# Patient Record
Sex: FEMALE | Race: BLACK OR AFRICAN AMERICAN | NOT HISPANIC OR LATINO | Employment: FULL TIME | URBAN - METROPOLITAN AREA
[De-identification: names, ages, dates, MRNs, and addresses within clinical notes are randomized per-mention and may not be internally consistent; named-entity substitution may affect disease eponyms.]

---

## 2017-08-05 ENCOUNTER — APPOINTMENT (OUTPATIENT)
Dept: RADIOLOGY | Facility: MEDICAL CENTER | Age: 52
End: 2017-08-05
Attending: EMERGENCY MEDICINE
Payer: COMMERCIAL

## 2017-08-05 ENCOUNTER — HOSPITAL ENCOUNTER (EMERGENCY)
Facility: MEDICAL CENTER | Age: 52
End: 2017-08-05
Attending: EMERGENCY MEDICINE
Payer: COMMERCIAL

## 2017-08-05 VITALS
DIASTOLIC BLOOD PRESSURE: 86 MMHG | WEIGHT: 293 LBS | HEIGHT: 67 IN | RESPIRATION RATE: 18 BRPM | SYSTOLIC BLOOD PRESSURE: 165 MMHG | TEMPERATURE: 98.2 F | BODY MASS INDEX: 45.99 KG/M2 | HEART RATE: 88 BPM

## 2017-08-05 DIAGNOSIS — S40.011A CONTUSION OF RIGHT SHOULDER, INITIAL ENCOUNTER: ICD-10-CM
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
## 2017-08-05 DIAGNOSIS — S16.1XXA CERVICAL STRAIN, INITIAL ENCOUNTER: ICD-10-CM

## 2017-08-05 PROCEDURE — 73030 X-RAY EXAM OF SHOULDER: CPT | Mod: RT

## 2017-08-05 PROCEDURE — 99284 EMERGENCY DEPT VISIT MOD MDM: CPT

## 2017-08-05 PROCEDURE — 72125 CT NECK SPINE W/O DYE: CPT

## 2017-08-05 PROCEDURE — 700102 HCHG RX REV CODE 250 W/ 637 OVERRIDE(OP): Performed by: EMERGENCY MEDICINE

## 2017-08-05 PROCEDURE — A9270 NON-COVERED ITEM OR SERVICE: HCPCS | Performed by: EMERGENCY MEDICINE

## 2017-08-05 RX ORDER — HYDROCODONE BITARTRATE AND ACETAMINOPHEN 5; 325 MG/1; MG/1
1-2 TABLET ORAL EVERY 4 HOURS PRN
Qty: 12 TAB | Refills: 0 | Status: SHIPPED | OUTPATIENT
Start: 2017-08-05 | End: 2018-04-18

## 2017-08-05 RX ORDER — HYDROCODONE BITARTRATE AND ACETAMINOPHEN 10; 325 MG/1; MG/1
1 TABLET ORAL ONCE
Status: COMPLETED | OUTPATIENT
Start: 2017-08-05 | End: 2017-08-05

## 2017-08-05 RX ADMIN — HYDROCODONE BITARTRATE AND ACETAMINOPHEN 1 TABLET: 10; 325 TABLET ORAL at 14:46

## 2017-08-05 ASSESSMENT — PAIN SCALES - GENERAL
PAINLEVEL_OUTOF10: 4
PAINLEVEL_OUTOF10: 4

## 2017-08-05 ASSESSMENT — ENCOUNTER SYMPTOMS
NECK PAIN: 1
LOSS OF CONSCIOUSNESS: 0
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
## 2017-08-05 NOTE — DISCHARGE INSTRUCTIONS
Contusion  A contusion is a deep bruise. Contusions are the result of an injury that caused bleeding under the skin. The contusion may turn blue, purple, or yellow. Minor injuries will give you a painless contusion, but more severe contusions may stay painful and swollen for a few weeks.   CAUSES   A contusion is usually caused by a blow, trauma, or direct force to an area of the body.  SYMPTOMS   · Swelling and redness of the injured area.  · Bruising of the injured area.  · Tenderness and soreness of the injured area.  · Pain.  DIAGNOSIS   The diagnosis can be made by taking a history and physical exam. An X-ray, CT scan, or MRI may be needed to determine if there were any associated injuries, such as fractures.  TREATMENT   Specific treatment will depend on what area of the body was injured. In general, the best treatment for a contusion is resting, icing, elevating, and applying cold compresses to the injured area. Over-the-counter medicines may also be recommended for pain control. Ask your caregiver what the best treatment is for your contusion.  HOME CARE INSTRUCTIONS   · Put ice on the injured area.  · Put ice in a plastic bag.  · Place a towel between your skin and the bag.  · Leave the ice on for 15-20 minutes, 3-4 times a day, or as directed by your health care provider.  · Only take over-the-counter or prescription medicines for pain, discomfort, or fever as directed by your caregiver. Your caregiver may recommend avoiding anti-inflammatory medicines (aspirin, ibuprofen, and naproxen) for 48 hours because these medicines may increase bruising.  · Rest the injured area.  · If possible, elevate the injured area to reduce swelling.  SEEK IMMEDIATE MEDICAL CARE IF:   · You have increased bruising or swelling.  · You have pain that is getting worse.  · Your swelling or pain is not relieved with medicines.  MAKE SURE YOU:   · Understand these instructions.  · Will watch your condition.  · Will get help right  away if you are not doing well or get worse.     This information is not intended to replace advice given to you by your health care provider. Make sure you discuss any questions you have with your health care provider.     Document Released: 09/27/2006 Document Revised: 12/23/2014 Document Reviewed: 10/22/2012  [employer] Interactive Patient Education ©2016 [employer]    Cervical Sprain  A cervical sprain is when the tissues (ligaments) that hold the neck bones in place stretch or tear.  HOME CARE   · Put ice on the injured area.  ¨ Put ice in a plastic bag.  ¨ Place a towel between your skin and the bag.  ¨ Leave the ice on for 15-20 minutes, 3-4 times a day.  · You may have been given a collar to wear. This collar keeps your neck from moving while you heal.  ¨ Do not take the collar off unless told by your doctor.  ¨ If you have long hair, keep it outside of the collar.  ¨ Ask your doctor before changing the position of your collar. You may need to change its position over time to make it more comfortable.  ¨ If you are allowed to take off the collar for cleaning or bathing, follow your doctor's instructions on how to do it safely.  ¨ Keep your collar clean by wiping it with mild soap and water. Dry it completely. If the collar has removable pads, remove them every 1-2 days to hand wash them with soap and water. Allow them to air dry. They should be dry before you wear them in the collar.  ¨ Do not drive while wearing the collar.  · Only take medicine as told by your doctor.  · Keep all doctor visits as told.  · Keep all physical therapy visits as told.  · Adjust your work station so that you have good posture while you work.  · Avoid positions and activities that make your problems worse.  · Warm up and stretch before being active.  GET HELP IF:  · Your pain is not controlled with medicine.  · You cannot take less pain medicine over time as planned.  · Your activity level does not improve as expected.  GET HELP  RIGHT AWAY IF:   · You are bleeding.  · Your stomach is upset.  · You have an allergic reaction to your medicine.  · You develop new problems that you cannot explain.  · You lose feeling (become numb) or you cannot move any part of your body (paralysis).  · You have tingling or weakness in any part of your body.  · Your symptoms get worse. Symptoms include:  · Pain, soreness, stiffness, puffiness (swelling), or a burning feeling in your neck.  · Pain when your neck is touched.  · Shoulder or upper back pain.  · Limited ability to move your neck.  · Headache.  · Dizziness.  · Your hands or arms feel week, lose feeling, or tingle.  · Muscle spasms.  · Difficulty swallowing or chewing.  MAKE SURE YOU:   · Understand these instructions.  · Will watch your condition.  · Will get help right away if you are not doing well or get worse.     This information is not intended to replace advice given to you by your health care provider. Make sure you discuss any questions you have with your health care provider.     Document Released: 06/05/2009 Document Revised: 08/20/2014 Document Reviewed: 06/25/2014  [employer] Interactive Patient Education ©2016 [employer]    Motor Vehicle Collision  It is common to have multiple bruises and sore muscles after a motor vehicle collision (MVC). These tend to feel worse for the first 24 hours. You may have the most stiffness and soreness over the first several hours. You may also feel worse when you wake up the first morning after your collision. After this point, you will usually begin to improve with each day. The speed of improvement often depends on the severity of the collision, the number of injuries, and the location and nature of these injuries.  HOME CARE INSTRUCTIONS  · Put ice on the injured area.  ¨ Put ice in a plastic bag.  ¨ Place a towel between your skin and the bag.  ¨ Leave the ice on for 15-20 minutes, 3-4 times a day, or as directed by your health care provider.  · Drink  enough fluids to keep your urine clear or pale yellow. Do not drink alcohol.  · Take a warm shower or bath once or twice a day. This will increase blood flow to sore muscles.  · You may return to activities as directed by your caregiver. Be careful when lifting, as this may aggravate neck or back pain.  · Only take over-the-counter or prescription medicines for pain, discomfort, or fever as directed by your caregiver. Do not use aspirin. This may increase bruising and bleeding.  SEEK IMMEDIATE MEDICAL CARE IF:  · You have numbness, tingling, or weakness in the arms or legs.  · You develop severe headaches not relieved with medicine.  · You have severe neck pain, especially tenderness in the middle of the back of your neck.  · You have changes in bowel or bladder control.  · There is increasing pain in any area of the body.  · You have shortness of breath, light-headedness, dizziness, or fainting.  · You have chest pain.  · You feel sick to your stomach (nauseous), throw up (vomit), or sweat.  · You have increasing abdominal discomfort.  · There is blood in your urine, stool, or vomit.  · You have pain in your shoulder (shoulder strap areas).  · You feel your symptoms are getting worse.  MAKE SURE YOU:  · Understand these instructions.  · Will watch your condition.  · Will get help right away if you are not doing well or get worse.    Return if any shortness of breath or abdominal pain.     This information is not intended to replace advice given to you by your health care provider. Make sure you discuss any questions you have with your health care provider.     Document Released: 12/18/2006 Document Revised: 01/08/2016 Document Reviewed: 05/16/2012  [employer] Interactive Patient Education ©2016 [employer]

## 2017-08-05 NOTE — ED AVS SNAPSHOT
[employer] Access Code: [email or web address]  Expires: 9/4/2017  4:03 PM    Your email address is not on file at [facility].  Email Addresses are required for you to sign up for [employer], please contact [phone] to verify your personal information and to provide your email address prior to attempting to register for [employer].    Cyndee Calvert  [address] [place] [address] [place] [address]    [employer]  A secure, online tool to manage your health information     [facility]’s [employer]® is a secure, online tool that connects you to your personalized health information from the privacy of your home -- day or night - making it very easy for you to manage your healthcare. Once the activation process is completed, you can even access your medical information using the [employer] jose luis, which is available for free in the Apple Jose Luis store or Google Play store.     To learn more about [employer], visit [email or web address]    There are two levels of access available (as shown below):   My Chart Features  [facility] Primary Care Doctor [facility]  Specialists [facility]  Urgent  Care Non-[facility] Primary Care Doctor   Email your healthcare team securely and privately 24/7 X X X    Manage appointments: schedule your next appointment; view details of past/upcoming appointments X      Request prescription refills. X      View recent personal medical records, including lab and immunizations X X X X   View health record, including health history, allergies, medications X X X X   Read reports about your outpatient visits, procedures, consult and ER notes X X X X   See your discharge summary, which is a recap of your hospital and/or ER visit that includes your diagnosis, lab results, and care plan X X  X     How to register for [employer]:  Once your e-mail address has been verified, follow the following steps to sign up for [employer].     1. Go to  [email or web address]  2. Click on the Sign Up Now box, which takes you to the New Member Sign Up page. You will  need to provide the following information:  a. Enter your [employer] Access Code exactly as it appears at the top of this page. (You will not need to use this code after you’ve completed the sign-up process. If you do not sign up before the expiration date, you must request a new code.)   b. Enter your date of birth.   c. Enter your home email address.   d. Click Submit, and follow the next screen’s instructions.  3. Create a [employer] ID. This will be your [employer] login ID and cannot be changed, so think of one that is secure and easy to remember.  4. Create a [employer] password. You can change your password at any time.  5. Enter your Password Reset Question and Answer. This can be used at a later time if you forget your password.   6. Enter your e-mail address. This allows you to receive e-mail notifications when new information is available in [employer].  7. Click Sign Up. You can now view your health information.    For assistance activating your [employer] account, call [phone]
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
## 2017-08-05 NOTE — ED AVS SNAPSHOT
8/5/2017    [address] [place] [address]    Dear [facility]:    [facility] wants to ensure your discharge home is safe and you or your loved ones have had all of your questions answered regarding your care after you leave the hospital.    Below is a list of resources and contact information should you have any questions regarding your hospital stay, follow-up instructions, or active medical symptoms.    Questions or Concerns Regarding… Contact   Medical Questions Related to Your Discharge  (7 days a week, 8am-5pm) Contact a Nurse Care Coordinator   [phone]   Medical Questions Not Related to Your Discharge  (24 hours a day / 7 days a week)  Contact the Nurse Health Line   [phone]    Medications or Discharge Instructions Refer to your discharge packet   or contact your [facility] Primary Care Provider   [phone]   Follow-up Appointment(s) Schedule your appointment via [employer]   or contact Scheduling [phone]   Billing Review your statement via [employer]  or contact Billing [phone]   Medical Records Review your records via [employer]   or contact Medical Records [phone]     You may receive a telephone call within two days of discharge. This call is to make certain you understand your discharge instructions and have the opportunity to have any questions answered. You can also easily access your medical information, test results and upcoming appointments via the [employer] free online health management tool. You can learn more and sign up at [email or web address]. For assistance setting up your [employer] account, please call [phone].    Once again, we want to ensure your discharge home is safe and that you have a clear understanding of any next steps in your care. If you have any questions or concerns, please do not hesitate to contact us, we are here for you. Thank you for choosing [facility] for your healthcare needs.    Sincerely,    Your [facility] Healthcare Team

## 2017-08-08 ENCOUNTER — HOSPITAL ENCOUNTER (EMERGENCY)
Facility: MEDICAL CENTER | Age: 52
End: 2017-08-08
Attending: EMERGENCY MEDICINE
Payer: COMMERCIAL

## 2017-08-08 VITALS
BODY MASS INDEX: 45.99 KG/M2 | WEIGHT: 293 LBS | HEART RATE: 85 BPM | DIASTOLIC BLOOD PRESSURE: 101 MMHG | OXYGEN SATURATION: 98 % | SYSTOLIC BLOOD PRESSURE: 190 MMHG | HEIGHT: 67 IN | TEMPERATURE: 97.6 F | RESPIRATION RATE: 16 BRPM
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
## 2017-08-08 DIAGNOSIS — M54.50 ACUTE BILATERAL LOW BACK PAIN WITHOUT SCIATICA: ICD-10-CM

## 2017-08-08 PROCEDURE — 99284 EMERGENCY DEPT VISIT MOD MDM: CPT

## 2017-08-08 RX ORDER — HYDROCODONE BITARTRATE AND ACETAMINOPHEN 5; 325 MG/1; MG/1
1-2 TABLET ORAL EVERY 6 HOURS PRN
Qty: 20 TAB | Refills: 0 | Status: SHIPPED | OUTPATIENT
Start: 2017-08-08 | End: 2018-04-18

## 2017-08-08 RX ORDER — CYCLOBENZAPRINE HCL 5 MG
5-10 TABLET ORAL 3 TIMES DAILY PRN
Qty: 20 TAB | Refills: 0 | Status: SHIPPED | OUTPATIENT
Start: 2017-08-08 | End: 2018-04-18

## 2017-08-08 NOTE — ED AVS SNAPSHOT
8/8/2017    [address] [place] [address]    Dear [facility]:    [facility] wants to ensure your discharge home is safe and you or your loved ones have had all of your questions answered regarding your care after you leave the hospital.    Below is a list of resources and contact information should you have any questions regarding your hospital stay, follow-up instructions, or active medical symptoms.    Questions or Concerns Regarding… Contact   Medical Questions Related to Your Discharge  (7 days a week, 8am-5pm) Contact a Nurse Care Coordinator   [phone]   Medical Questions Not Related to Your Discharge  (24 hours a day / 7 days a week)  Contact the Nurse Health Line   [phone]    Medications or Discharge Instructions Refer to your discharge packet   or contact your [facility] Primary Care Provider   [phone]   Follow-up Appointment(s) Schedule your appointment via [employer]   or contact Scheduling [phone]   Billing Review your statement via [employer]  or contact Billing [phone]   Medical Records Review your records via [employer]   or contact Medical Records [phone]     You may receive a telephone call within two days of discharge. This call is to make certain you understand your discharge instructions and have the opportunity to have any questions answered. You can also easily access your medical information, test results and upcoming appointments via the [employer] free online health management tool. You can learn more and sign up at [employer]. For assistance setting up your [employer] account, please call [phone].    Once again, we want to ensure your discharge home is safe and that you have a clear understanding of any next steps in your care. If you have any questions or concerns, please do not hesitate to contact us, we are here for you. Thank you for choosing [facility] for your healthcare needs.    Sincerely,    Your [facility] Healthcare Team

## 2017-08-08 NOTE — ED AVS SNAPSHOT
Hyperpublic Access Code: [email or web address]  Expires: 9/4/2017  4:03 PM    Your email address is not on file at "[employer].  Email Addresses are required for you to sign up for Hyperpublic, please contact [phone] to verify your personal information and to provide your email address prior to attempting to register for Hyperpublic.    Cyndee Calvert  [address] [place] [address] [place] [address]    [employer]  A secure, online tool to manage your health information     "[employer]’s Hyperpublic® is a secure, online tool that connects you to your personalized health information from the privacy of your home -- day or night - making it very easy for you to manage your healthcare. Once the activation process is completed, you can even access your medical information using the Hyperpublic jose luis, which is available for free in the Apple [employer] store or Google Play store.     To learn more about Hyperpublic, visit [email or web address]    There are two levels of access available (as shown below):   My Chart Features  [facility] Primary Care Doctor [facility]  Specialists [facility]  Urgent  Care Non-[facility] Primary Care Doctor   Email your healthcare team securely and privately 24/7 X X X    Manage appointments: schedule your next appointment; view details of past/upcoming appointments X      Request prescription refills. X      View recent personal medical records, including lab and immunizations X X X X   View health record, including health history, allergies, medications X X X X   Read reports about your outpatient visits, procedures, consult and ER notes X X X X   See your discharge summary, which is a recap of your hospital and/or ER visit that includes your diagnosis, lab results, and care plan X X  X     How to register for Hyperpublic:  Once your e-mail address has been verified, follow the following steps to sign up for Hyperpublic.     1. Go to  [email or web address]  2. Click on the Sign Up Now box, which takes you to the New Member Sign Up page. You will  need to provide the following information:  a. Enter your [employer] Access Code exactly as it appears at the top of this page. (You will not need to use this code after you’ve completed the sign-up process. If you do not sign up before the expiration date, you must request a new code.)   b. Enter your date of birth.   c. Enter your home email address.   d. Click Submit, and follow the next screen’s instructions.  3. Create a Etherstackt ID. This will be your [employer] login ID and cannot be changed, so think of one that is secure and easy to remember.  4. Create a [employer] password. You can change your password at any time.  5. Enter your Password Reset Question and Answer. This can be used at a later time if you forget your password.   6. Enter your e-mail address. This allows you to receive e-mail notifications when new information is available in [employer].  7. Click Sign Up. You can now view your health information.    For assistance activating your [employer] account, call [phone]
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
## 2017-08-08 NOTE — ED NOTES
Discharge instructions given to pt including follow up w/pcp or returning for any worsening symptoms.  Prescriptions x 2 given to pt. Instructed no drinking no driving while on prescribed pain medication. erp aware of pt's high BP. Pt asymptomatic and hasn't taken her meds for BP yet. Instructed to take it when she gets home. Questions answered by RN.

## 2018-03-19 ENCOUNTER — APPOINTMENT (OUTPATIENT)
Dept: RADIOLOGY | Facility: MEDICAL CENTER | Age: 53
End: 2018-03-19
Attending: EMERGENCY MEDICINE
Payer: COMMERCIAL

## 2018-03-19 ENCOUNTER — HOSPITAL ENCOUNTER (EMERGENCY)
Facility: MEDICAL CENTER | Age: 53
End: 2018-03-19
Attending: EMERGENCY MEDICINE
Payer: COMMERCIAL

## 2018-03-19 VITALS
TEMPERATURE: 97.1 F | OXYGEN SATURATION: 97 % | HEART RATE: 90 BPM | SYSTOLIC BLOOD PRESSURE: 174 MMHG | BODY MASS INDEX: 45.99 KG/M2 | DIASTOLIC BLOOD PRESSURE: 81 MMHG | HEIGHT: 67 IN | WEIGHT: 293 LBS | RESPIRATION RATE: 17 BRPM

## 2018-03-19 DIAGNOSIS — S83.92XA SPRAIN OF LEFT KNEE, UNSPECIFIED LIGAMENT, INITIAL ENCOUNTER: ICD-10-CM
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
## 2018-03-19 PROCEDURE — 73610 X-RAY EXAM OF ANKLE: CPT | Mod: LT

## 2018-03-19 PROCEDURE — 99284 EMERGENCY DEPT VISIT MOD MDM: CPT

## 2018-03-19 PROCEDURE — 73562 X-RAY EXAM OF KNEE 3: CPT | Mod: LT

## 2018-03-19 RX ORDER — MELOXICAM 7.5 MG/1
7.5 TABLET ORAL DAILY
Qty: 30 TAB | Refills: 0 | Status: SHIPPED | OUTPATIENT
Start: 2018-03-19 | End: 2018-04-18

## 2018-03-19 ASSESSMENT — PAIN SCALES - GENERAL: PAINLEVEL_OUTOF10: 8

## 2018-03-19 ASSESSMENT — LIFESTYLE VARIABLES: DO YOU DRINK ALCOHOL: NO

## 2018-03-19 NOTE — ED TRIAGE NOTES
Pt ambulates to triage  Chief Complaint   Patient presents with   • Fall     fell on ice pushing cart at [facility]   • Knee Pain     left knee   • Wrist Injury     right    • Ankle Pain     left    Pt asked to wait in lobby, pt updated on triage process and pt asked to inform RN of any changes.

## 2018-03-19 NOTE — DISCHARGE INSTRUCTIONS
Knee Sprain, Adult  A knee sprain is a stretch or tear in a knee ligament. Knee ligaments are bands of tissue that connect bones in the knee to each other.  What are the causes?  This condition often results from:  · A fall.  · An injury to the knee.  What are the signs or symptoms?  Symptoms of this condition include:  · Trouble bending the leg.  · Swelling in the knee.  · Bruising around the knee.  · Tenderness or pain in the knee.  · Muscle spasms around the knee.  How is this diagnosed?  This condition may be diagnosed based on:  · A physical exam.  · What happened just before you started to have symptoms.  · Tests, including:  ¨ An X-ray. This may be done to make sure no bones are broken.  ¨ An MRI. This may be done to check if the ligament is torn.  ¨ Stress testing of the knee. This may be done to check ligament damage.  How is this treated?  Treatment for this condition may involve:  · Keeping the knee still (immobilized) with a cast, brace, or splint.  · Applying ice to the knee. This helps with pain and swelling.  · Keeping the knee raised (elevated) above the level of your heart when you are resting. This helps with pain and swelling.  · Taking medicine for pain.  · Exercises to prevent or limit permanent weakness or stiffness in your knee.  · Surgery to reconnect the ligament to the bone or to reconstruct it. This may be needed if the ligament tore all the way.  Follow these instructions at home:  If you have a splint or brace:  · Wear the splint or brace as told by your health care provider. Remove it only as told by your health care provider.  · Loosen the splint or brace if your toes tingle, become numb, or turn cold and blue.  · Keep the splint or brace clean.  · If the splint or brace is not waterproof:  ¨ Do not let it get wet.  ¨ Cover it with a watertight covering when you take a bath or a shower.  If you have a cast:  · Do not stick anything inside the cast to scratch your skin. Doing that  increases your risk of infection.  · Check the skin around the cast every day. Tell your health care provider about any concerns.  · You may put lotion on dry skin around the edges of the cast. Do not put lotion on the skin underneath the cast.  · Keep the cast clean.  · If the cast is not waterproof:  ¨ Do not let it get wet.  ¨ Cover it with a watertight covering when you take a bath or a shower.  Managing pain, stiffness, and swelling  · If directed, put ice on the injured area.  ¨ If you have a removable splint or brace, remove it as told by your health care provider.  ¨ Put ice in a plastic bag.  ¨ Place a towel between your skin and the bag or between your cast and the bag.  ¨ Leave the ice on for 20 minutes, 2-3 times a day.  · Gently move your toes often to avoid stiffness and to lessen swelling.  · Elevate the injured area above the level of your heart while you are sitting or lying down.  · Take over-the-counter and prescription medicines only as told by your health care provider.  General instructions  · Do exercises as told by your health care provider.  · Keep all follow-up visits as told by your health care provider. This is important.  Contact a health care provider if:  · You have pain that gets worse.  · The cast, brace, or splint does not fit right.  · The cast, brace, or splint gets damaged.  Get help right away if:  · You cannot use your injured joint to support any of your body weight (cannot bear weight).  · You cannot move the injured joint.  · You cannot walk more than a few steps without pain or without your knee buckling.  · You have significant pain, swelling, or numbness below the cast, brace, or splint.  This information is not intended to replace advice given to you by your health care provider. Make sure you discuss any questions you have with your health care provider.  Document Released: 12/18/2006 Document Revised: 09/06/2017 Document Reviewed: 07/07/2017  [employer]  Patient Education © 2017 [employer]

## 2018-03-19 NOTE — ED PROVIDER NOTES
ED Provider Note    CHIEF COMPLAINT  Chief Complaint   Patient presents with   • Fall     fell on ice pushing cart at [facility]   • Knee Pain     left knee   • Wrist Injury     right    • Ankle Pain     left        HPI  Cyndee Calvert is a 52 y.o. female who presents for evaluation after a slip and fall on the ice. The patient states that she was at [facility] and pushing a cart through the parking lot when she slipped. She is complaining primarily of left knee pain. She also has some left ankle pain and some very slight right wrist discomfort. She states the real reason she will hospital days because of her left knee. She has been ambulatory but states it's uncomfortable.    REVIEW OF SYSTEMS  See HPI for further details. All other systems are negative.     PAST MEDICAL HISTORY  Past Medical History:   Diagnosis Date   • Anemia    • High cholesterol    • Hypertension        FAMILY HISTORY  Family History   Problem Relation Age of Onset   • Heart Disease Mother    • Heart Attack Father    • Heart Failure Brother        SOCIAL HISTORY  Social History     Social History   • Marital status: Single     Spouse name: N/A   • Number of children: N/A   • Years of education: N/A     Social History Main Topics   • Smoking status: Never Smoker   • Smokeless tobacco: Not on file   • Alcohol use No   • Drug use: No   • Sexual activity: Not on file     Other Topics Concern   • Not on file     Social History Narrative   • No narrative on file       SURGICAL HISTORY  Past Surgical History:   Procedure Laterality Date   • BLADDER BIOPSY WITH CYSTOSCOPY  9/2/08    Performed by LILLIE WILLIAM at SURGERY [facility] ORS   • HYSTERECTOMY, TOTAL ABDOMINAL     • TONSILLECTOMY         CURRENT MEDICATIONS  Home Medications    **Home medications have not yet been reviewed for this encounter**         ALLERGIES  Allergies   Allergen Reactions   • Latex    • Percocet [Oxycodone-Acetaminophen] Shortness of Breath, Vomiting and Swelling  "      PHYSICAL EXAM  VITAL SIGNS: BP (!) 184/105   Pulse 98   Temp 36.3 °C (97.3 °F)   Resp 16   Ht 1.702 m (5' 7\")   Wt (!) 141.3 kg (311 lb 8.2 oz)   SpO2 97%   BMI 48.79 kg/m²     Constitutional: Well developed, Well nourished, No acute distress, Non-toxic appearance.   HENT: Normocephalic, Atraumatic.   Eyes:  EOMI, Conjunctiva normal, No discharge.   Cardiovascular: Normal heart rate.   Thorax & Lungs: No respiratory distress.   Skin: Warm, Dry.   Musculoskeletal: Left lower extremity shows no obvious deformity. Exam is made difficult by the patient's obesity. The knee Joint appears to be stable. She has some slight bilateral malleolar tenderness.  Neurologic: Awake alert.    RADIOLOGY/PROCEDURES  DX-KNEE 3 VIEWS LEFT   Final Result      Degenerative changes of the knee without definite acute osseous abnormality.      DX-ANKLE 3+ VIEWS LEFT   Final Result      Soft tissue swelling. No acute fracture.            COURSE & MEDICAL DECISION MAKING  Pertinent Labs & Imaging studies reviewed. (See chart for details)  Is a 52-year-old here for evaluation after ground-level fall. X-rays of the left knee and left ankle are obtained. I reviewed the studies and they show no evidence of acute traumatic injury. She does have some degenerative changes. I discussed results of the study with the patient. I discussed the possibility of a sprain. This may also just be contusion if she struck the knee. It is my do not think she needs a knee immobilizer or crutches as she has been ambulatory without them. I will provide her a prescription for meloxicam and have referred her to Dr. Cooper of orthopedics as needed. She is given a discharge instruction sheet on knee sprains.    FINAL IMPRESSION  1. Mechanical ground-level fall  2. Left knee sprain 3.         Electronically signed by: Roverto Perez, 3/19/2018 10:49 AM    "

## 2018-04-18 ENCOUNTER — RESOLUTE PROFESSIONAL BILLING HOSPITAL PROF FEE (OUTPATIENT)
Dept: HOSPITALIST | Facility: MEDICAL CENTER | Age: 53
End: 2018-04-18
Payer: COMMERCIAL

## 2018-04-18 ENCOUNTER — HOSPITAL ENCOUNTER (INPATIENT)
Facility: MEDICAL CENTER | Age: 53
LOS: 9 days | DRG: 758 | End: 2018-04-27
Attending: EMERGENCY MEDICINE | Admitting: HOSPITALIST
Payer: COMMERCIAL

## 2018-04-18 DIAGNOSIS — L02.214 GROIN ABSCESS: ICD-10-CM

## 2018-04-18 PROBLEM — E11.9 DM (DIABETES MELLITUS) (HCC): Status: ACTIVE | Noted: 2018-04-18

## 2018-04-18 PROBLEM — N17.9 AKI (ACUTE KIDNEY INJURY) (HCC): Status: ACTIVE | Noted: 2018-04-18

## 2018-04-18 LAB
ALBUMIN SERPL BCP-MCNC: 3.4 G/DL (ref 3.2–4.9)
ALBUMIN/GLOB SERPL: 0.7 G/DL
ALP SERPL-CCNC: 97 U/L (ref 30–99)
ALT SERPL-CCNC: 16 U/L (ref 2–50)
ANION GAP SERPL CALC-SCNC: 13 MMOL/L (ref 0–11.9)
APPEARANCE UR: CLEAR
AST SERPL-CCNC: 14 U/L (ref 12–45)
BACTERIA #/AREA URNS HPF: NEGATIVE /HPF
BASOPHILS # BLD AUTO: 0.9 % (ref 0–1.8)
BASOPHILS # BLD: 0.08 K/UL (ref 0–0.12)
BILIRUB SERPL-MCNC: 0.6 MG/DL (ref 0.1–1.5)
BILIRUB UR QL STRIP.AUTO: NEGATIVE
BUN SERPL-MCNC: 23 MG/DL (ref 8–22)
CALCIUM SERPL-MCNC: 8.2 MG/DL (ref 8.5–10.5)
CHLORIDE SERPL-SCNC: 100 MMOL/L (ref 96–112)
CO2 SERPL-SCNC: 21 MMOL/L (ref 20–33)
COLOR UR: YELLOW
CREAT SERPL-MCNC: 2.21 MG/DL (ref 0.5–1.4)
EOSINOPHIL # BLD AUTO: 0.08 K/UL (ref 0–0.51)
EOSINOPHIL NFR BLD: 0.9 % (ref 0–6.9)
EPI CELLS #/AREA URNS HPF: ABNORMAL /HPF
ERYTHROCYTE [DISTWIDTH] IN BLOOD BY AUTOMATED COUNT: 41.7 FL (ref 35.9–50)
GLOBULIN SER CALC-MCNC: 4.7 G/DL (ref 1.9–3.5)
GLUCOSE BLD-MCNC: 165 MG/DL (ref 65–99)
GLUCOSE BLD-MCNC: 189 MG/DL (ref 65–99)
GLUCOSE BLD-MCNC: 255 MG/DL (ref 65–99)
GLUCOSE SERPL-MCNC: 283 MG/DL (ref 65–99)
GLUCOSE UR STRIP.AUTO-MCNC: NEGATIVE MG/DL
GRAM STN SPEC: NORMAL
HCT VFR BLD AUTO: 37.1 % (ref 37–47)
HGB BLD-MCNC: 12 G/DL (ref 12–16)
HYALINE CASTS #/AREA URNS LPF: ABNORMAL /LPF
KETONES UR STRIP.AUTO-MCNC: ABNORMAL MG/DL
LACTATE BLD-SCNC: 1.9 MMOL/L (ref 0.5–2)
LEUKOCYTE ESTERASE UR QL STRIP.AUTO: ABNORMAL
LYMPHOCYTES # BLD AUTO: 2.65 K/UL (ref 1–4.8)
LYMPHOCYTES NFR BLD: 30.1 % (ref 22–41)
MANUAL DIFF BLD: NORMAL
MCH RBC QN AUTO: 29.1 PG (ref 27–33)
MCHC RBC AUTO-ENTMCNC: 32.3 G/DL (ref 33.6–35)
MCV RBC AUTO: 89.8 FL (ref 81.4–97.8)
METAMYELOCYTES NFR BLD MANUAL: 0.9 %
MICRO URNS: ABNORMAL
MONOCYTES # BLD AUTO: 0.47 K/UL (ref 0–0.85)
MONOCYTES NFR BLD AUTO: 5.3 % (ref 0–13.4)
MORPHOLOGY BLD-IMP: NORMAL
NEUTROPHILS # BLD AUTO: 5.45 K/UL (ref 2–7.15)
NEUTROPHILS NFR BLD: 61 % (ref 44–72)
NEUTS BAND NFR BLD MANUAL: 0.9 % (ref 0–10)
NITRITE UR QL STRIP.AUTO: NEGATIVE
NRBC # BLD AUTO: 0 K/UL
NRBC BLD-RTO: 0 /100 WBC
PH UR STRIP.AUTO: 7 [PH]
PLATELET # BLD AUTO: 304 K/UL (ref 164–446)
PLATELET BLD QL SMEAR: NORMAL
PMV BLD AUTO: 11.2 FL (ref 9–12.9)
POTASSIUM SERPL-SCNC: 4.4 MMOL/L (ref 3.6–5.5)
PROT SERPL-MCNC: 8.1 G/DL (ref 6–8.2)
PROT UR QL STRIP: NEGATIVE MG/DL
RBC # BLD AUTO: 4.13 M/UL (ref 4.2–5.4)
RBC # URNS HPF: >150 /HPF
RBC BLD AUTO: NORMAL
RBC UR QL AUTO: ABNORMAL
SIGNIFICANT IND 70042: NORMAL
SITE SITE: NORMAL
SODIUM SERPL-SCNC: 134 MMOL/L (ref 135–145)
SOURCE SOURCE: NORMAL
SP GR UR STRIP.AUTO: 1.01
UROBILINOGEN UR STRIP.AUTO-MCNC: 0.2 MG/DL
WBC # BLD AUTO: 8.8 K/UL (ref 4.8–10.8)
WBC #/AREA URNS HPF: ABNORMAL /HPF

## 2018-04-18 PROCEDURE — 700111 HCHG RX REV CODE 636 W/ 250 OVERRIDE (IP): Performed by: EMERGENCY MEDICINE

## 2018-04-18 PROCEDURE — 85027 COMPLETE CBC AUTOMATED: CPT

## 2018-04-18 PROCEDURE — 80053 COMPREHEN METABOLIC PANEL: CPT

## 2018-04-18 PROCEDURE — 700102 HCHG RX REV CODE 250 W/ 637 OVERRIDE(OP): Performed by: HOSPITALIST

## 2018-04-18 PROCEDURE — 700111 HCHG RX REV CODE 636 W/ 250 OVERRIDE (IP): Performed by: HOSPITALIST

## 2018-04-18 PROCEDURE — 96365 THER/PROPH/DIAG IV INF INIT: CPT

## 2018-04-18 PROCEDURE — 85007 BL SMEAR W/DIFF WBC COUNT: CPT

## 2018-04-18 PROCEDURE — 87205 SMEAR GRAM STAIN: CPT

## 2018-04-18 PROCEDURE — 87070 CULTURE OTHR SPECIMN AEROBIC: CPT

## 2018-04-18 PROCEDURE — 700101 HCHG RX REV CODE 250: Performed by: HOSPITALIST

## 2018-04-18 PROCEDURE — 99223 1ST HOSP IP/OBS HIGH 75: CPT | Performed by: HOSPITALIST

## 2018-04-18 PROCEDURE — 87150 DNA/RNA AMPLIFIED PROBE: CPT

## 2018-04-18 PROCEDURE — 83036 HEMOGLOBIN GLYCOSYLATED A1C: CPT

## 2018-04-18 PROCEDURE — 87086 URINE CULTURE/COLONY COUNT: CPT

## 2018-04-18 PROCEDURE — 700105 HCHG RX REV CODE 258: Performed by: EMERGENCY MEDICINE

## 2018-04-18 PROCEDURE — 82962 GLUCOSE BLOOD TEST: CPT

## 2018-04-18 PROCEDURE — 700105 HCHG RX REV CODE 258: Performed by: HOSPITALIST

## 2018-04-18 PROCEDURE — A9270 NON-COVERED ITEM OR SERVICE: HCPCS | Performed by: HOSPITALIST

## 2018-04-18 PROCEDURE — 87040 BLOOD CULTURE FOR BACTERIA: CPT

## 2018-04-18 PROCEDURE — 81001 URINALYSIS AUTO W/SCOPE: CPT

## 2018-04-18 PROCEDURE — 99285 EMERGENCY DEPT VISIT HI MDM: CPT

## 2018-04-18 PROCEDURE — 83605 ASSAY OF LACTIC ACID: CPT

## 2018-04-18 PROCEDURE — 770006 HCHG ROOM/CARE - MED/SURG/GYN SEMI*

## 2018-04-18 PROCEDURE — 87077 CULTURE AEROBIC IDENTIFY: CPT | Mod: 91

## 2018-04-18 RX ORDER — AMOXICILLIN 250 MG
2 CAPSULE ORAL 2 TIMES DAILY
Status: DISCONTINUED | OUTPATIENT
Start: 2018-04-18 | End: 2018-04-27 | Stop reason: HOSPADM

## 2018-04-18 RX ORDER — SODIUM CHLORIDE 9 MG/ML
INJECTION, SOLUTION INTRAVENOUS CONTINUOUS
Status: DISCONTINUED | OUTPATIENT
Start: 2018-04-18 | End: 2018-04-22

## 2018-04-18 RX ORDER — CARVEDILOL 3.12 MG/1
3.12 TABLET ORAL 2 TIMES DAILY WITH MEALS
Status: DISCONTINUED | OUTPATIENT
Start: 2018-04-18 | End: 2018-04-19

## 2018-04-18 RX ORDER — POLYETHYLENE GLYCOL 3350 17 G/17G
1 POWDER, FOR SOLUTION ORAL
Status: DISCONTINUED | OUTPATIENT
Start: 2018-04-18 | End: 2018-04-27 | Stop reason: HOSPADM

## 2018-04-18 RX ORDER — HYDRALAZINE HYDROCHLORIDE 20 MG/ML
10 INJECTION INTRAMUSCULAR; INTRAVENOUS EVERY 6 HOURS PRN
Status: DISCONTINUED | OUTPATIENT
Start: 2018-04-18 | End: 2018-04-27 | Stop reason: HOSPADM

## 2018-04-18 RX ORDER — HYDROCODONE BITARTRATE AND ACETAMINOPHEN 5; 325 MG/1; MG/1
0.5 TABLET ORAL EVERY 6 HOURS PRN
Status: ON HOLD | COMMUNITY
End: 2018-04-27

## 2018-04-18 RX ORDER — DEXTROSE MONOHYDRATE 25 G/50ML
25 INJECTION, SOLUTION INTRAVENOUS
Status: DISCONTINUED | OUTPATIENT
Start: 2018-04-18 | End: 2018-04-19 | Stop reason: SDUPTHER

## 2018-04-18 RX ORDER — CARVEDILOL 3.12 MG/1
3.12 TABLET ORAL 2 TIMES DAILY WITH MEALS
COMMUNITY
End: 2018-07-25 | Stop reason: SDUPTHER

## 2018-04-18 RX ORDER — BISACODYL 10 MG
10 SUPPOSITORY, RECTAL RECTAL
Status: DISCONTINUED | OUTPATIENT
Start: 2018-04-18 | End: 2018-04-27 | Stop reason: HOSPADM

## 2018-04-18 RX ORDER — VALSARTAN 40 MG/1
40 TABLET ORAL DAILY
COMMUNITY
End: 2018-07-25

## 2018-04-18 RX ORDER — SULFAMETHOXAZOLE AND TRIMETHOPRIM 800; 160 MG/1; MG/1
1 TABLET ORAL 2 TIMES DAILY
Status: ON HOLD | COMMUNITY
Start: 2018-04-16 | End: 2018-04-24

## 2018-04-18 RX ORDER — SIMVASTATIN 5 MG
5 TABLET ORAL NIGHTLY
COMMUNITY
End: 2018-07-25 | Stop reason: SDUPTHER

## 2018-04-18 RX ORDER — SIMVASTATIN 20 MG
5 TABLET ORAL NIGHTLY
Status: DISCONTINUED | OUTPATIENT
Start: 2018-04-18 | End: 2018-04-27 | Stop reason: HOSPADM

## 2018-04-18 RX ORDER — CLINDAMYCIN HYDROCHLORIDE 300 MG/1
300 CAPSULE ORAL 3 TIMES DAILY
Status: ON HOLD | COMMUNITY
Start: 2018-04-16 | End: 2018-04-24

## 2018-04-18 RX ORDER — HYDROCODONE BITARTRATE AND ACETAMINOPHEN 5; 325 MG/1; MG/1
1-2 TABLET ORAL EVERY 6 HOURS PRN
Status: DISCONTINUED | OUTPATIENT
Start: 2018-04-18 | End: 2018-04-27 | Stop reason: HOSPADM

## 2018-04-18 RX ORDER — SODIUM CHLORIDE AND POTASSIUM CHLORIDE 150; 900 MG/100ML; MG/100ML
INJECTION, SOLUTION INTRAVENOUS CONTINUOUS
Status: DISCONTINUED | OUTPATIENT
Start: 2018-04-18 | End: 2018-04-18

## 2018-04-18 RX ADMIN — SODIUM CHLORIDE: 900 INJECTION INTRAVENOUS at 16:06

## 2018-04-18 RX ADMIN — VANCOMYCIN HYDROCHLORIDE 3000 MG: 100 INJECTION, POWDER, LYOPHILIZED, FOR SOLUTION INTRAVENOUS at 16:05

## 2018-04-18 RX ADMIN — STANDARDIZED SENNA CONCENTRATE AND DOCUSATE SODIUM 2 TABLET: 8.6; 5 TABLET, FILM COATED ORAL at 19:52

## 2018-04-18 RX ADMIN — AMPICILLIN SODIUM AND SULBACTAM SODIUM 3 G: 2; 1 INJECTION, POWDER, FOR SOLUTION INTRAMUSCULAR; INTRAVENOUS at 12:58

## 2018-04-18 RX ADMIN — INSULIN HUMAN 1 UNITS: 100 INJECTION, SOLUTION PARENTERAL at 18:08

## 2018-04-18 RX ADMIN — POTASSIUM CHLORIDE AND SODIUM CHLORIDE: 900; 150 INJECTION, SOLUTION INTRAVENOUS at 15:13

## 2018-04-18 RX ADMIN — CARVEDILOL 3.12 MG: 3.12 TABLET, FILM COATED ORAL at 16:58

## 2018-04-18 RX ADMIN — SIMVASTATIN 5 MG: 20 TABLET, FILM COATED ORAL at 19:48

## 2018-04-18 RX ADMIN — AMPICILLIN SODIUM AND SULBACTAM SODIUM 3 G: 2; 1 INJECTION, POWDER, FOR SOLUTION INTRAMUSCULAR; INTRAVENOUS at 19:52

## 2018-04-18 RX ADMIN — HYDRALAZINE HYDROCHLORIDE 10 MG: 20 INJECTION INTRAMUSCULAR; INTRAVENOUS at 16:19

## 2018-04-18 ASSESSMENT — ENCOUNTER SYMPTOMS
FEVER: 0
NECK PAIN: 0
SHORTNESS OF BREATH: 0
DEPRESSION: 0
BLURRED VISION: 0
NAUSEA: 0
DOUBLE VISION: 0
MYALGIAS: 0
DIZZINESS: 0
HEARTBURN: 0
HEADACHES: 0
WEAKNESS: 0
ABDOMINAL PAIN: 0
COUGH: 0
VOMITING: 0
ORTHOPNEA: 0

## 2018-04-18 ASSESSMENT — PAIN SCALES - GENERAL: PAINLEVEL_OUTOF10: 2

## 2018-04-18 NOTE — ED TRIAGE NOTES
Chief Complaint   Patient presents with   • Groin Pain     left x1wks   • Sent by MD     from [facility] clinic     Pt ambulated to triage, sent here by [facility] for right groin abscess. She has been on antibiotic and wound not healing.   Pt is diabetic, fsbs 255.

## 2018-04-18 NOTE — ED PROVIDER NOTES
ED Provider Note    Scribed for Frederick De Leon D.O. by Edouard Ivory. 4/18/2018  11:50 AM    Means of arrival: Walk-in  History obtained from: Patient  History limited by: None    CHIEF COMPLAINT  Chief Complaint   Patient presents with   • Groin Pain     left x1wks   • Sent by MD     from [facility]       VERONICA Calvert is a 52 y.o. female who presents to the Emergency Department complaining of an abscess located in her groin region that she first noticed five days ago. She was evaluated by a physician at the [facility] and placed on an antibiotics course which includes Bactrim. Additionally, she received antibiotic injections two days ago and yesterday, which include Rocephin and Clindamycin. She has not noticed a change in the appearance of the abscess nor the severity of the associated pain. She was again seen at the [facility] today and subsequently referred here. The patient reports associated purulent drainage from the abscess. She denies fever. She is managing her diabetes mellitus with metformin.    REVIEW OF SYSTEMS  Pertinent positives include painful abscess and purulent drainage. Pertinent negatives include no fever.  All other systems reviewed and negative.  C    PAST MEDICAL HISTORY  Past Medical History:   Diagnosis Date   • Anemia    • Diabetes (CMS-HCC)    • High cholesterol    • Hypertension        SURGICAL HISTORY  Past Surgical History:   Procedure Laterality Date   • BLADDER BIOPSY WITH CYSTOSCOPY  9/2/08    Performed by LILLIE WILLIAM at SURGERY [facility] ORS   • HYSTERECTOMY, TOTAL ABDOMINAL     • TONSILLECTOMY          SOCIAL HISTORY  Social History   Substance Use Topics   • Smoking status: Never Smoker        • Alcohol use No      History   Drug Use No       FAMILY HISTORY  Family History   Problem Relation Age of Onset   • Heart Disease Mother    • Heart Attack Father    • Heart Failure Brother        CURRENT MEDICATIONS  Home Medications     Reviewed by  Camryn Philip (Pharmacy Tech) on 04/18/18 at 1323  Med List Status: Complete   Medication Last Dose Status   carvedilol (COREG) 3.125 MG Tab 4/18/2018 Active   clindamycin (CLEOCIN) 300 MG Cap 4/18/2018 Active   HYDROcodone-acetaminophen (NORCO) 5-325 MG Tab per tablet 4/18/2018 Active   metFORMIN (GLUCOPHAGE) 500 MG Tab 4/18/2018 Active   simvastatin (ZOCOR) 5 MG Tab 4/17/2018 Active   sulfamethoxazole-trimethoprim (BACTRIM DS) 800-160 MG tablet 4/18/2018 Active   valsartan (DIOVAN) 40 MG Tab 4/18/2018 Active                ALLERGIES  Allergies   Allergen Reactions   • Latex    • Percocet [Oxycodone-Acetaminophen] Shortness of Breath, Vomiting and Swelling       PHYSICAL EXAM  VITAL SIGNS: BP (!) 164/77   Pulse 96   Temp 35.9 °C (96.7 °F) (Temporal)   Resp 16   Wt (!) 141.1 kg (311 lb)   SpO2 94%   BMI 48.71 kg/m²     Nursing notes and vitals reviewed.  Constitutional: Well developed, Well nourished, Slight distress secondary to pain. Non-toxic appearance.   Eyes: PERRLA, EOMI, Conjunctiva normal, No discharge.   Cardiovascular: Normal heart rate, Normal rhythm, No murmurs, No rubs, No gallops.   Thorax & Lungs: No respiratory distress, No rales, No rhonchi, No wheezing, No chest tenderness.   Abdomen: Bowel sounds normal, Soft, No tenderness, No guarding, No rebound, No masses, No pulsatile masses.   Skin: Warm, Dry, No erythema, No rash. Shows a large erythematous, indurated and slightly draining lesion the mons pubis that radiates to the left labia majora.  :  Shows a large erythematous, indurated and slightly draining lesion the mons pubis that radiates to the left labia majora. There is no expansion to the vaginal introitus  Musculoskeletal: Intact distal pulses, No edema, No cyanosis, No clubbing. Good range of motion in all major joints. No tenderness to palpation or major deformities noted, Neurologic: Alert & oriented x 3, Normal motor function, Normal sensory function, No focal deficits  noted.  Psychiatric: Affect normal for clinical presentation.    DIAGNOSTIC STUDIES/PROCEDURES    LABS  Results for orders placed or performed during the hospital encounter of 04/18/18   Lactic acid (lactate)   Result Value Ref Range    Lactic Acid 1.9 0.5 - 2.0 mmol/L   CBC WITH DIFFERENTIAL   Result Value Ref Range    WBC 8.8 4.8 - 10.8 K/uL    RBC 4.13 (L) 4.20 - 5.40 M/uL    Hemoglobin 12.0 12.0 - 16.0 g/dL    Hematocrit 37.1 37.0 - 47.0 %    MCV 89.8 81.4 - 97.8 fL    MCH 29.1 27.0 - 33.0 pg    MCHC 32.3 (L) 33.6 - 35.0 g/dL    RDW 41.7 35.9 - 50.0 fL    Platelet Count 304 164 - 446 K/uL    MPV 11.2 9.0 - 12.9 fL    Neutrophils-Polys 61.00 44.00 - 72.00 %    Lymphocytes 30.10 22.00 - 41.00 %    Monocytes 5.30 0.00 - 13.40 %    Eosinophils 0.90 0.00 - 6.90 %    Basophils 0.90 0.00 - 1.80 %    Nucleated RBC 0.00 /100 WBC    Neutrophils (Absolute) 5.45 2.00 - 7.15 K/uL    Lymphs (Absolute) 2.65 1.00 - 4.80 K/uL    Monos (Absolute) 0.47 0.00 - 0.85 K/uL    Eos (Absolute) 0.08 0.00 - 0.51 K/uL    Baso (Absolute) 0.08 0.00 - 0.12 K/uL    NRBC (Absolute) 0.00 K/uL   COMP METABOLIC PANEL   Result Value Ref Range    Sodium 134 (L) 135 - 145 mmol/L    Potassium 4.4 3.6 - 5.5 mmol/L    Chloride 100 96 - 112 mmol/L    Co2 21 20 - 33 mmol/L    Anion Gap 13.0 (H) 0.0 - 11.9    Glucose 283 (H) 65 - 99 mg/dL    Bun 23 (H) 8 - 22 mg/dL    Creatinine 2.21 (H) 0.50 - 1.40 mg/dL    Calcium 8.2 (L) 8.5 - 10.5 mg/dL    AST(SGOT) 14 12 - 45 U/L    ALT(SGPT) 16 2 - 50 U/L    Alkaline Phosphatase 97 30 - 99 U/L    Total Bilirubin 0.6 0.1 - 1.5 mg/dL    Albumin 3.4 3.2 - 4.9 g/dL    Total Protein 8.1 6.0 - 8.2 g/dL    Globulin 4.7 (H) 1.9 - 3.5 g/dL    A-G Ratio 0.7 g/dL   ESTIMATED GFR   Result Value Ref Range    GFR If  28 (A) >60 mL/min/1.73 m 2    GFR If Non African American 23 (A) >60 mL/min/1.73 m 2   DIFFERENTIAL MANUAL   Result Value Ref Range    Bands-Stabs 0.90 0.00 - 10.00 %    Metamyelocytes 0.90 %    Manual  Diff Status PERFORMED    PERIPHERAL SMEAR REVIEW   Result Value Ref Range    Peripheral Smear Review see below    PLATELET ESTIMATE   Result Value Ref Range    Plt Estimation Normal    MORPHOLOGY   Result Value Ref Range    RBC Morphology Normal    ACCU-CHEK GLUCOSE   Result Value Ref Range    Glucose - Accu-Ck 255 (H) 65 - 99 mg/dL     All labs reviewed by me.    COURSE & MEDICAL DECISION MAKING  Pertinent Labs & Imaging studies reviewed. (See chart for details)    11:50 AM - Patient seen and examined at bedside. Patient will be treated with ampicillin/sulbactam 3 g in  ml IVPB. Ordered lactic acid, culture wound with gram stain x2, lactic acid every two hours after STAT order, CBC with differential, CMP, U/A, urine culture, blood cultures x2, estimated GFR, and accu-chek glucose to evaluate her symptoms. I examined the patient's abscess in the presence of a female nurse.    12:18 PM I ordered vancomycin 3000 mg in  ml IVPB to treat.    12:56 PM I discussed the patient's case and the above findings with Dr. Valente ([facility] Hospitalist) who agrees to admit and transfer care of the patient at this time.    12:58 PM Paged Gynecology.    12:58 PM I discussed the patient's case and the above findings with Dr. Granger (Gynecology) who agrees to consult.    This is a charming 52 y.o. female that presents with abscess in the mons pubis as well as left labia majora. IV antibiotics have been given the form of Unasyn and vancomycin. Patient does have evidence of elevated blood sugar noted to diabetic ketoacidosis. I discussed the patient with gynecology for surgical intervention the patient will be admitted to hospitalist for IV antibiotic care.  On my examination, is no evidence of necrotizing fasciitis.  DISPOSITION:  Patient will be admitted to Dr. Valente ([facility] Hospitalist) in guarded condition.    FINAL IMPRESSION    Left labial abscess       ider (Scribe), am scribing for, and in the presence of, Frederick DOBBS  Moises NAVA    Electronically signed by: Edouard Ivory (Scribe), 4/18/2018    I, Frederick De Leon D.O. personally performed the services described in this documentation, as scribed by Edouard Ivory in my presence, and it is both accurate and complete.    The note accurately reflects work and decisions made by me.  Frederick De Leon  4/18/2018  5:55 PM

## 2018-04-18 NOTE — PROGRESS NOTES
Pharmacy Kinetics 52 y.o. female on vancomycin day # 1 2018    Vancomycin 3,000 mg IV loading dose x 1 ordered in ER      Indication for treatment: abscess of left groin     Pertinent history per medical record: Admitted on 2018 for a groin abscess. There patient is a 52 y.o. female who presented to the ER from the [facility] w/ a worsening left groin abscess. The pt began a course of oral antibiotics (clindamycin and Bactrim DS BID on ) at the [facility] w/ little clinical improvement and now has foul-smelling wound drainage with increased pain. The patient also reportedly received IV antibiotics about two days (ceftriaxone and clindamycin). The patient denies fevers.     Other antibiotics:   · Ampicillin/sulbactam 3g IV Q6 h (for the first 24 hours)  · Ampicillin/sulbactam 3g IV Q12 h thereafter (due to renal function)     Allergies: Latex and Percocet [oxycodone-acetaminophen]     List concerns for renal function: ANOOP (SCr 2.21), obesity     Pertinent cultures to date:   18: Blood culture - in process   18: Blood culture - in process   18: Urine culture - in process   18: Wound culture - in process     Recent Labs      18   1121   WBC  8.8   NEUTSPOLYS  61.00   BANDSSTABS  0.90     Recent Labs      18   1121   BUN  23*   CREATININE  2.21*   ALBUMIN  3.4     Blood pressure (!) 166/72, pulse 97, temperature 35.9 °C (96.7 °F), temperature source Temporal, resp. rate 16, weight (!) 141.1 kg (311 lb), SpO2 94 %. Temp (24hrs), Av.9 °C (96.7 °F), Min:35.9 °C (96.7 °F), Max:35.9 °C (96.7 °F)    A/P   1. Vancomycin dose change: No other doses ordered at this time due to the patient's renal function  2. Next vancomycin level: Vancomycin random level ordered for 10:00 AM tomorrow morning (~18h post loading dose)  3. Goal trough: 12-16 mcg/mL  4. Comments: Due to the patient's unknown baseline renal function and elevated SCr, no vancomycin maintenance doses have been  scheduled yet. Will obtain a vancomycin random level tomorrow morning ~18h after LD and assess renal function based on AM labs. Pharmacy will continue to follow and recommend de-escalation of antibiotics as appropriate.      Salome Brock, PharmD, BCPS

## 2018-04-18 NOTE — ASSESSMENT & PLAN NOTE
Unknown if patient has underlying CKD, however at this time Cr elevated >2, patient has hx of DM  2nd to dehydration  improving  Avoid nephrotoxics,   Caution with vancomycin, monitor renal function ,renally dose all meds  Hold home metformin  - dc ivf  Normal renal function now.

## 2018-04-18 NOTE — ASSESSMENT & PLAN NOTE
Nutrition consult  Uncontrolled Dm,  A1c 16  Monitor  Discussed healthy diet and life-style modification

## 2018-04-18 NOTE — ED NOTES
Pt resting in bed.  Verbalizes understanding of plan of care.  Second set of blood cultures sent to lab.

## 2018-04-18 NOTE — WOUND TEAM
"[facility]  Inpatient Services  Initial Wound and Skin Care Evaluation    Admission Date:   04/18/2018.  HPI, PMH, SH: Reviewed  Unit where seen by Wound Team: Dacia Chavarria.    WOUND CONSULT RELATED TO: Left mons pubis abscess.    SUBJECTIVE:  \"Oh no! You can't poke me, it hurts so much!\"    Self Report / Pain Level: 8/10    OBJECTIVE: Agreeable despite pain.     WOUND TYPE, LOCATION, CHARACTERISTICS (Pressure ulcers: location, stage, POA or date identified)    Location and type of wound: Left mons pubis: Full thickness wound related to infection/abscess.         Periwound:     Discolored, induration to mons, left labia, upper inner thigh.  Drainage:     Heavy, purulent.  Tissue Type and %:    70% red, 30% yellow.  Wound Edges:    Open.  Odor:      Strongly malodorous.   Exposed structure(s):  None.  S&S of Infection:   Malodorous, purulent drainage, induration      Measurements:   04/18/2018  Length:     0.3 cm  Width:      0.3 cm  Depth:   LINDSEY      INTERVENTIONS BY WOUND TEAM: Plastic backed pad used to manage drainage removed. Area cleaned with NS and gauze. Wiping area painful for patient. Able to measure length and width, but patient stated she could not tolerate probing unless area numbed. Called Dr. Granger and obtained order for lidocaine jelly, as of now had not reached the floor. Took photo, dressed wound with hydrofiber silver over wound opening, then a menstrual pad and hospital briefs to hold secure. Wound Team will return in the morning, apply lidocaine gel and get depth measurement ans well as insert silver strip packing into wound.       Interdisciplinary consultation: Patient and nursing.     EVALUATION: Hydrofiber silver provides absorption of exudate and topical antimicrobial treatment.     Factors affecting wound healing: Diabetes. Infection.   Goals: Manage exudate and provide topical antimicrobial treatment.     NURSING PLAN OF CARE ORDERS (X):    Dressing changes: See Dressing " Maintenance orders: X  Skin care: See Skin Care orders:   Rectal tube care: See Rectal Tube Care orders:   Other orders:    RSKIN: CURRENT (X) ORDERED (O)  Q shift Davis:  X  Q shift pressure point assessments:  X  Pressure redistribution mattress      X  LUISITO      Bariatric LUISITO      Bariatric foam        Heel float boots       Heels floated on pillows      Barrier wipes      Barrier Cream      Barrier paste      Sacral silicone dressing      Silicone O2 tubing      Anchorfast      Trach with Optifoam split foam       Waffle cushion      Rectal tube or BMS      Antifungal tx    Turn q 2 hours   X  Up to chair     Ambulate   PT/OT     Dietician      PO  X   TF   TPN     PVN    NPO   # days   Other       WOUND TEAM PLAN OF CARE (X):   NPWT change 3 x week:        Dressing changes by wound team:       Follow up as needed:     X tomorrow.  Other (explain):    Anticipated discharge plans (X): To be determined.   SNF:           Home Care:           Outpatient Wound Center:            Self Care:            Other:

## 2018-04-18 NOTE — ASSESSMENT & PLAN NOTE
Left groin abscess extending down to the left labia, with induration, fluctuance, foul smelling drainage. Noted about 5 days ago, was seen at [facility] and started on oral abx, however abscess not getting better, rather draining now. Referred to the ER from [facility] today  Continues to have pain and drainage  Gynecology consult, her  -Decreased discharge from wound  -plan for continued outpatient wound care follow-up   arranged an f/u appointment for this patient on 5/1/18.  Wound care to coordinate with patient's daughter to educate on dressing change prior to discharge home.    Will call the patient's daughter to verify whether she knows how to do dressing changes.      SCD for dvt ppx  Not septic a the time of admission  Monitor labs     Discontinued vancomycin  Wound culture found group b strep  Switch from unasyn to augmentin

## 2018-04-18 NOTE — ED NOTES
Med Rec completed per Pt at bedside  Allergies Reviewed    Pt started 10 day course of BACTRIM on 4/16      Pt started 10 days course of CLINDAMYCIN on 4/16

## 2018-04-18 NOTE — ASSESSMENT & PLAN NOTE
Uncontrolled with hyperglycemia, BS > 200 on admission, no gap  Acidosis resolved  Nausea/vomiting resolved  Diabetic education given.  Continue lantus and sliding scale.  Discussed with the patient regarding the importance of diabetic control in association with wound healing.  Nursing staff taught the patient on how to do blood glucose check and insulin injection.

## 2018-04-18 NOTE — H&P
Hospital Medicine History and Physical    Date of Service  4/18/2018    Chief Complaint  Chief Complaint   Patient presents with   • Groin Pain     left x1wks   • Sent by MD     from [facility]       History of Presenting Illness  Cyndee Calvert is a 52 y.o. female who presents to the Emergency Department from [facility] or worsening Left groin abscess. She noticed this about 5 days ago and was started on oral antibiotics by [facility] however very little clinical improvement and now having drainage and foul smell and increased pain.  Additionally, she received antibiotic injections two days ago and yesterday, which include Rocephin and Clindamycin.  The patient reports associated purulent drainage from the abscess. She denies fevers. She is managing her diabetes mellitus with metformin.however BS in the ED are > 200 and has ANOOP with Cr >2.     Gynecology has been consulted by the ERP  No white count, normal lactate, not septic  Primary Care Physician  Pcp Pt States None    Consultants  Gyne surgery    Code Status  full    Review of Systems  Review of Systems   Constitutional: Negative for fever and malaise/fatigue.   HENT: Negative for congestion and hearing loss.    Eyes: Negative for blurred vision and double vision.   Respiratory: Negative for cough and shortness of breath.    Cardiovascular: Negative for chest pain, orthopnea and leg swelling.   Gastrointestinal: Negative for abdominal pain, heartburn, nausea and vomiting.   Genitourinary: Negative for dysuria, frequency and urgency.        Left groin abscess, painful, drainage   Musculoskeletal: Negative for myalgias and neck pain.   Neurological: Negative for dizziness, weakness and headaches.   Psychiatric/Behavioral: Negative for depression and suicidal ideas.        Past Medical History  Past Medical History:   Diagnosis Date   • Anemia    • Diabetes (CMS-HCC)    • High cholesterol    • Hypertension        Surgical History  Past Surgical History:    Procedure Laterality Date   • BLADDER BIOPSY WITH CYSTOSCOPY  08    Performed by LILLIE WILLIAM at SURGERY [facility]   • HYSTERECTOMY, TOTAL ABDOMINAL     • TONSILLECTOMY         Medications  No current facility-administered medications on file prior to encounter.      Current Outpatient Prescriptions on File Prior to Encounter   Medication Sig Dispense Refill   • hydrocodone-acetaminophen (NORCO) 5-325 MG Tab per tablet Take 1-2 Tabs by mouth every 6 hours as needed. 20 Tab 0       Family History  Family History   Problem Relation Age of Onset   • Heart Disease Mother    • Heart Attack Father    • Heart Failure Brother        Social History  Social History   Substance Use Topics   • Smoking status: Never Smoker   • Smokeless tobacco: Not on file   • Alcohol use No       Allergies  Allergies   Allergen Reactions   • Latex    • Percocet [Oxycodone-Acetaminophen] Shortness of Breath, Vomiting and Swelling        Physical Exam  Laboratory   Hemodynamics  Temp (24hrs), Av.9 °C (96.7 °F), Min:35.9 °C (96.7 °F), Max:35.9 °C (96.7 °F)   Temperature: 35.9 °C (96.7 °F)  Pulse  Av  Min: 96  Max: 96    Blood Pressure: (!) 164/77      Respiratory      Respiration: 16, Pulse Oximetry: 94 %             Physical Exam   Constitutional: She is oriented to person, place, and time. She appears well-developed and well-nourished.   HENT:   Head: Normocephalic and atraumatic.   Mouth/Throat: Oropharynx is clear and moist.   Eyes: Conjunctivae are normal. Pupils are equal, round, and reactive to light.   Neck: Normal range of motion. Neck supple. No JVD present.   Cardiovascular: Normal rate and regular rhythm.    No murmur heard.  Pulmonary/Chest: Effort normal and breath sounds normal. No respiratory distress. She has no wheezes.   Abdominal: Soft. Bowel sounds are normal. She exhibits no distension. There is no tenderness. There is no rebound.   Genitourinary:   Genitourinary Comments: Left groin abscess  extending down to the left labia, small opening seen with foul smelling drainage. Induration noted, painful on examination   Musculoskeletal: Normal range of motion. She exhibits no edema or tenderness.   Lymphadenopathy:     She has no cervical adenopathy.   Neurological: She is alert and oriented to person, place, and time.   Skin: Skin is warm and dry. No rash noted. There is erythema. No pallor.   Psychiatric: She has a normal mood and affect.       Recent Labs      04/18/18   1121   WBC  8.8   RBC  4.13*   HEMOGLOBIN  12.0   HEMATOCRIT  37.1   MCV  89.8   MCH  29.1   MCHC  32.3*   RDW  41.7   PLATELETCT  304   MPV  11.2     Recent Labs      04/18/18   1121   SODIUM  134*   POTASSIUM  4.4   CHLORIDE  100   CO2  21   GLUCOSE  283*   BUN  23*   CREATININE  2.21*   CALCIUM  8.2*     Recent Labs      04/18/18   1121   ALTSGPT  16   ASTSGOT  14   ALKPHOSPHAT  97   TBILIRUBIN  0.6   GLUCOSE  283*                 Lab Results   Component Value Date    TROPONINI <0.01 09/17/2008     Urinalysis:    Lab Results  Component Value Date/Time   SPECGRAVITY 1.015 11/20/2010 1745   GLUCOSEUR Negative 11/20/2010 1745   KETONES Negative 11/20/2010 1745   NITRITE Negative 11/20/2010 1745   WBCURINE 100-150 (A) 11/20/2010 1745   RBCURINE 10-20 (A) 11/20/2010 1745   BACTERIA Moderate (A) 11/20/2010 1745   EPITHELCELL Few 11/20/2010 1745        Imaging  none   Assessment/Plan     I anticipate this patient will require atleast 2 midnights for appropriate medical management, needing inpatient admission    * Groin abscess   Assessment & Plan    Left groin abscess extending down to the left labia, with induration, fluctuance, foul smelling drainage. Noted about 5 days ago, was seen at [facility] and started on oral abx, however abscess not getting better, rather draining now. Referred to the ER from [facility] clinic today  Continues to have pain and drainage  Gynecology consulted in the ER for possible drainage  Will keep her NPO  SCD for  dvt ppx  Not septic a the time of admission  Monitor labs   Lactates q2hr  Start IV unasyn and vancomycin, monitor renal function and vancomycin troughs  Cultures pending        ANOOP (acute kidney injury) (CMS-HCC)   Assessment & Plan    Unknown if patient has underlying CKD, however at this time Cr elevated >2, patient has hx of DM  Likely 2/2 recent antibiotic use vs dehydration vs acute infection  Avoid nephrotoxics,   Caution with vancomycin, monitor renal function ,renally dose all meds  Hold home metformin  If no improvement with fluids consider renal US        Hypertension- (present on admission)   Assessment & Plan    Chronic, uncontrolled, monitor  Continue home meds, adding PRN        High cholesterol- (present on admission)   Assessment & Plan    Chronic and stable  Continue statin        DM (diabetes mellitus) (CMS-HCC)   Assessment & Plan    Uncontrolled with hyperglycemia, BS > 200 on admission, no gap  Start IVF  Hold home metformin  Start SS and monitor BS        Obesity- (present on admission)   Assessment & Plan    Nutrition consult  Uncontrolled Dm, obtain A1c  Monitor  Counseled on diet and modification            VTE prophylaxis: scds.

## 2018-04-19 LAB
ANION GAP SERPL CALC-SCNC: 9 MMOL/L (ref 0–11.9)
BUN SERPL-MCNC: 18 MG/DL (ref 8–22)
CALCIUM SERPL-MCNC: 7.9 MG/DL (ref 8.5–10.5)
CHLORIDE SERPL-SCNC: 105 MMOL/L (ref 96–112)
CO2 SERPL-SCNC: 22 MMOL/L (ref 20–33)
CREAT SERPL-MCNC: 1.48 MG/DL (ref 0.5–1.4)
ERYTHROCYTE [DISTWIDTH] IN BLOOD BY AUTOMATED COUNT: 42 FL (ref 35.9–50)
EST. AVERAGE GLUCOSE BLD GHB EST-MCNC: 427 MG/DL
GLUCOSE BLD-MCNC: 145 MG/DL (ref 65–99)
GLUCOSE BLD-MCNC: 170 MG/DL (ref 65–99)
GLUCOSE BLD-MCNC: 199 MG/DL (ref 65–99)
GLUCOSE BLD-MCNC: 220 MG/DL (ref 65–99)
GLUCOSE BLD-MCNC: 246 MG/DL (ref 65–99)
GLUCOSE SERPL-MCNC: 180 MG/DL (ref 65–99)
HBA1C MFR BLD: 16.5 % (ref 0–5.6)
HCT VFR BLD AUTO: 35.2 % (ref 37–47)
HGB BLD-MCNC: 11.1 G/DL (ref 12–16)
MCH RBC QN AUTO: 28.5 PG (ref 27–33)
MCHC RBC AUTO-ENTMCNC: 31.5 G/DL (ref 33.6–35)
MCV RBC AUTO: 90.3 FL (ref 81.4–97.8)
PLATELET # BLD AUTO: 265 K/UL (ref 164–446)
PMV BLD AUTO: 11.1 FL (ref 9–12.9)
POTASSIUM SERPL-SCNC: 3.8 MMOL/L (ref 3.6–5.5)
RBC # BLD AUTO: 3.9 M/UL (ref 4.2–5.4)
SODIUM SERPL-SCNC: 136 MMOL/L (ref 135–145)
VANCOMYCIN SERPL-MCNC: 17.9 UG/ML
WBC # BLD AUTO: 9.4 K/UL (ref 4.8–10.8)

## 2018-04-19 PROCEDURE — 700111 HCHG RX REV CODE 636 W/ 250 OVERRIDE (IP): Performed by: HOSPITALIST

## 2018-04-19 PROCEDURE — A9270 NON-COVERED ITEM OR SERVICE: HCPCS | Performed by: INTERNAL MEDICINE

## 2018-04-19 PROCEDURE — A9270 NON-COVERED ITEM OR SERVICE: HCPCS | Performed by: HOSPITALIST

## 2018-04-19 PROCEDURE — 85027 COMPLETE CBC AUTOMATED: CPT

## 2018-04-19 PROCEDURE — 99233 SBSQ HOSP IP/OBS HIGH 50: CPT | Performed by: INTERNAL MEDICINE

## 2018-04-19 PROCEDURE — 80202 ASSAY OF VANCOMYCIN: CPT

## 2018-04-19 PROCEDURE — 80048 BASIC METABOLIC PNL TOTAL CA: CPT

## 2018-04-19 PROCEDURE — 36415 COLL VENOUS BLD VENIPUNCTURE: CPT

## 2018-04-19 PROCEDURE — 770006 HCHG ROOM/CARE - MED/SURG/GYN SEMI*

## 2018-04-19 PROCEDURE — 700102 HCHG RX REV CODE 250 W/ 637 OVERRIDE(OP): Performed by: INTERNAL MEDICINE

## 2018-04-19 PROCEDURE — 82962 GLUCOSE BLOOD TEST: CPT | Mod: 91

## 2018-04-19 PROCEDURE — 700102 HCHG RX REV CODE 250 W/ 637 OVERRIDE(OP): Performed by: HOSPITALIST

## 2018-04-19 PROCEDURE — 700105 HCHG RX REV CODE 258: Performed by: HOSPITALIST

## 2018-04-19 RX ORDER — ONDANSETRON 2 MG/ML
4 INJECTION INTRAMUSCULAR; INTRAVENOUS EVERY 4 HOURS PRN
Status: DISCONTINUED | OUTPATIENT
Start: 2018-04-19 | End: 2018-04-19

## 2018-04-19 RX ORDER — CARVEDILOL 3.12 MG/1
3.12 TABLET ORAL ONCE
Status: COMPLETED | OUTPATIENT
Start: 2018-04-19 | End: 2018-04-19

## 2018-04-19 RX ORDER — CARVEDILOL 6.25 MG/1
6.25 TABLET ORAL 2 TIMES DAILY WITH MEALS
Status: DISCONTINUED | OUTPATIENT
Start: 2018-04-19 | End: 2018-04-27 | Stop reason: HOSPADM

## 2018-04-19 RX ORDER — DEXTROSE MONOHYDRATE 25 G/50ML
25 INJECTION, SOLUTION INTRAVENOUS
Status: DISCONTINUED | OUTPATIENT
Start: 2018-04-19 | End: 2018-04-27 | Stop reason: HOSPADM

## 2018-04-19 RX ORDER — ONDANSETRON 2 MG/ML
4 INJECTION INTRAMUSCULAR; INTRAVENOUS EVERY 4 HOURS PRN
Status: DISCONTINUED | OUTPATIENT
Start: 2018-04-19 | End: 2018-04-27 | Stop reason: HOSPADM

## 2018-04-19 RX ADMIN — HYDROCODONE BITARTRATE AND ACETAMINOPHEN 1 TABLET: 5; 325 TABLET ORAL at 01:01

## 2018-04-19 RX ADMIN — CARVEDILOL 6.25 MG: 6.25 TABLET, FILM COATED ORAL at 17:59

## 2018-04-19 RX ADMIN — HYDROCODONE BITARTRATE AND ACETAMINOPHEN 1 TABLET: 5; 325 TABLET ORAL at 17:59

## 2018-04-19 RX ADMIN — SIMVASTATIN 5 MG: 20 TABLET, FILM COATED ORAL at 20:05

## 2018-04-19 RX ADMIN — HYDRALAZINE HYDROCHLORIDE 10 MG: 20 INJECTION INTRAMUSCULAR; INTRAVENOUS at 14:43

## 2018-04-19 RX ADMIN — STANDARDIZED SENNA CONCENTRATE AND DOCUSATE SODIUM 2 TABLET: 8.6; 5 TABLET, FILM COATED ORAL at 20:05

## 2018-04-19 RX ADMIN — AMPICILLIN SODIUM AND SULBACTAM SODIUM 3 G: 2; 1 INJECTION, POWDER, FOR SOLUTION INTRAMUSCULAR; INTRAVENOUS at 20:06

## 2018-04-19 RX ADMIN — INSULIN HUMAN 1 UNITS: 100 INJECTION, SOLUTION PARENTERAL at 12:14

## 2018-04-19 RX ADMIN — CARVEDILOL 3.12 MG: 3.12 TABLET, FILM COATED ORAL at 08:10

## 2018-04-19 RX ADMIN — SODIUM CHLORIDE: 900 INJECTION INTRAVENOUS at 16:56

## 2018-04-19 RX ADMIN — AMPICILLIN SODIUM AND SULBACTAM SODIUM 3 G: 2; 1 INJECTION, POWDER, FOR SOLUTION INTRAMUSCULAR; INTRAVENOUS at 14:37

## 2018-04-19 RX ADMIN — INSULIN HUMAN 2 UNITS: 100 INJECTION, SOLUTION PARENTERAL at 17:58

## 2018-04-19 RX ADMIN — INSULIN HUMAN 2 UNITS: 100 INJECTION, SOLUTION PARENTERAL at 00:16

## 2018-04-19 RX ADMIN — HYDROCODONE BITARTRATE AND ACETAMINOPHEN 1 TABLET: 5; 325 TABLET ORAL at 08:13

## 2018-04-19 RX ADMIN — AMPICILLIN SODIUM AND SULBACTAM SODIUM 3 G: 2; 1 INJECTION, POWDER, FOR SOLUTION INTRAMUSCULAR; INTRAVENOUS at 09:33

## 2018-04-19 RX ADMIN — INSULIN HUMAN 3 UNITS: 100 INJECTION, SOLUTION PARENTERAL at 20:10

## 2018-04-19 RX ADMIN — AMPICILLIN SODIUM AND SULBACTAM SODIUM 3 G: 2; 1 INJECTION, POWDER, FOR SOLUTION INTRAMUSCULAR; INTRAVENOUS at 01:05

## 2018-04-19 RX ADMIN — CARVEDILOL 3.12 MG: 3.12 TABLET, FILM COATED ORAL at 14:37

## 2018-04-19 ASSESSMENT — ENCOUNTER SYMPTOMS
NERVOUS/ANXIOUS: 0
VOMITING: 0
DEPRESSION: 0
DIAPHORESIS: 0
HEADACHES: 0
COUGH: 0
FOCAL WEAKNESS: 0
BACK PAIN: 0
PALPITATIONS: 0
MYALGIAS: 1
SPEECH CHANGE: 0
FLANK PAIN: 0
MEMORY LOSS: 0
NAUSEA: 0
SHORTNESS OF BREATH: 0
CHILLS: 0
ABDOMINAL PAIN: 0
FEVER: 0
WEAKNESS: 1

## 2018-04-19 ASSESSMENT — PATIENT HEALTH QUESTIONNAIRE - PHQ9
4. FEELING TIRED OR HAVING LITTLE ENERGY: NEARLY EVERY DAY
1. LITTLE INTEREST OR PLEASURE IN DOING THINGS: SEVERAL DAYS
9. THOUGHTS THAT YOU WOULD BE BETTER OFF DEAD, OR OF HURTING YOURSELF: NOT AT ALL
6. FEELING BAD ABOUT YOURSELF - OR THAT YOU ARE A FAILURE OR HAVE LET YOURSELF OR YOUR FAMILY DOWN: NOT AL ALL
7. TROUBLE CONCENTRATING ON THINGS, SUCH AS READING THE NEWSPAPER OR WATCHING TELEVISION: NOT AT ALL
2. FEELING DOWN, DEPRESSED, IRRITABLE, OR HOPELESS: NOT AT ALL
8. MOVING OR SPEAKING SO SLOWLY THAT OTHER PEOPLE COULD HAVE NOTICED. OR THE OPPOSITE, BEING SO FIGETY OR RESTLESS THAT YOU HAVE BEEN MOVING AROUND A LOT MORE THAN USUAL: NOT AT ALL
3. TROUBLE FALLING OR STAYING ASLEEP OR SLEEPING TOO MUCH: NEARLY EVERY DAY
5. POOR APPETITE OR OVEREATING: SEVERAL DAYS
SUM OF ALL RESPONSES TO PHQ9 QUESTIONS 1 AND 2: 1
SUM OF ALL RESPONSES TO PHQ QUESTIONS 1-9: 8

## 2018-04-19 ASSESSMENT — LIFESTYLE VARIABLES
EVER_SMOKED: NEVER
ALCOHOL_USE: NO

## 2018-04-19 ASSESSMENT — PAIN SCALES - GENERAL
PAINLEVEL_OUTOF10: 8
PAINLEVEL_OUTOF10: 7
PAINLEVEL_OUTOF10: 0
PAINLEVEL_OUTOF10: 6

## 2018-04-19 NOTE — PROGRESS NOTES
Pharmacy Kinetics 52 y.o. female on vancomycin day # 2 2018    Currently on Vancomycin pulse dosing   3000 mg x 1  @1600    VR  @1000 = 17.9    Indication for treatment: abscess of left groin      Pertinent history per medical record: Admitted on 2018 for a groin abscess. There patient is a 52 y.o. female who presented to the ER from the [facility] w/ a worsening left groin abscess. The pt began a course of oral antibiotics (clindamycin and Bactrim DS BID on ) at the [facility] w/ little clinical improvement and now has foul-smelling wound drainage with increased pain. The patient also reportedly received IV antibiotics about two days (ceftriaxone and clindamycin). The patient denies fevers.      Other antibiotics:   · Ampicillin/sulbactam 3g IV Q6 h (for the first 24 hours)  · Ampicillin/sulbactam 3g IV Q12 h thereafter (due to renal function)      Allergies: Latex and Percocet [oxycodone-acetaminophen]      List concerns for renal function: ANOOP (SCr 2.21), obesity      Pertinent cultures to date:   18: Blood culture - in process   18: Blood culture - in process   18: Urine culture - in process   18: Wound culture - GPCs + GPRs    Recent Labs      18   1121  18   0319   WBC  8.8  9.4   NEUTSPOLYS  61.00   --    BANDSSTABS  0.90   --      Recent Labs      18   1121  18   0319   BUN  23*  18   CREATININE  2.21*  1.48*   ALBUMIN  3.4   --      Recent Labs      18   0946   VANCORANDOM  17.9     Intake/Output Summary (Last 24 hours) at 18 1058  Last data filed at 18 0349   Gross per 24 hour   Intake              120 ml   Output                0 ml   Net              120 ml      Blood pressure 160/93, pulse 86, temperature 36.6 °C (97.9 °F), resp. rate 18, weight (!) 141.1 kg (311 lb), last menstrual period 2003, SpO2 91 %, not currently breastfeeding. Temp (24hrs), Av.4 °C (97.6 °F), Min:36.3 °C (97.3 °F), Max:36.6 °C (97.9  °F)      A/P   1. Vancomycin dose change: initiate 2000 mg q24hrs (~14 mg/kg)  2. Next vancomycin level: ~2 days  3. Goal trough: 12-16 mcg/mL  4. Comments: Patient is afebrile and WBC within norms. Wound culture preliminary results with some Gram positive organisms. Substantial improvement in renal indices today. Difficult to know actual renal function with rapid changes - however, 18-hr vanco level post-load indicates patient may be appropriate for q24-hr dosing. Will initiate scheduled dosing and plan to check a level after 2 maintenance doses. Pharmacy will follow.    Freedom Rios, NoheliaD

## 2018-04-19 NOTE — PROGRESS NOTES
[facility] Hospitalist Progress Note    Date of Service: 2018    Chief Complaint  52 y.o. female admitted 2018 with labial abscess s/p packing on IV abx.    Interval Problem Update  Pain controlled  Min h/a    Consultants/Specialty  gyn    Disposition  tbd        Review of Systems   Constitutional: Negative for chills, diaphoresis, fever and malaise/fatigue.   HENT: Negative for congestion and hearing loss.    Respiratory: Negative for cough and shortness of breath.    Cardiovascular: Negative for chest pain, palpitations and leg swelling.   Gastrointestinal: Negative for abdominal pain, nausea and vomiting.   Genitourinary: Negative for dysuria and flank pain.   Musculoskeletal: Positive for myalgias. Negative for back pain.   Neurological: Positive for weakness. Negative for speech change, focal weakness and headaches.   Psychiatric/Behavioral: Negative for depression and memory loss. The patient is not nervous/anxious.       Physical Exam  Laboratory/Imaging   Hemodynamics  Temp (24hrs), Av.4 °C (97.6 °F), Min:36.3 °C (97.3 °F), Max:36.6 °C (97.9 °F)   Temperature: 36.6 °C (97.9 °F)  Pulse  Av.9  Min: 83  Max: 103    Blood Pressure: 160/93      Respiratory      Respiration: 18, Pulse Oximetry: 91 %        RUL Breath Sounds: Clear, RML Breath Sounds: Clear, RLL Breath Sounds: Clear, JULIANA Breath Sounds: Clear, LLL Breath Sounds: Clear    Fluids    Intake/Output Summary (Last 24 hours) at 18 1417  Last data filed at 18 0349   Gross per 24 hour   Intake              120 ml   Output                0 ml   Net              120 ml       Nutrition  Orders Placed This Encounter   Procedures   • DIET ORDER     Standing Status:   Standing     Number of Occurrences:   1     Order Specific Question:   Diet:     Answer:   Diabetic [3]     Order Specific Question:   Electrolyte modifications:     Answer:   No Added Salt [2]     Physical Exam   Constitutional: She is oriented to person, place, and time.  She appears well-nourished. No distress.   HENT:   Head: Normocephalic and atraumatic.   Nose: Nose normal.   Eyes: EOM are normal. Pupils are equal, round, and reactive to light. No scleral icterus.   Neck: Neck supple. No thyromegaly present.   Cardiovascular: Normal rate and intact distal pulses.    Pulmonary/Chest: Effort normal and breath sounds normal. No respiratory distress. She has no wheezes.   Abdominal: Soft. Bowel sounds are normal. She exhibits no distension.   Genitourinary:   Genitourinary Comments: Labial dressing c/d/i   Musculoskeletal: She exhibits edema. She exhibits no tenderness.   Neurological: She is alert and oriented to person, place, and time. No cranial nerve deficit. She exhibits normal muscle tone. Coordination normal.   Skin: Skin is warm and dry. No rash noted. She is not diaphoretic. There is erythema.   Psychiatric: She has a normal mood and affect. Her behavior is normal. Judgment and thought content normal.   Nursing note and vitals reviewed.      Recent Labs      04/18/18   1121  04/19/18   0319   WBC  8.8  9.4   RBC  4.13*  3.90*   HEMOGLOBIN  12.0  11.1*   HEMATOCRIT  37.1  35.2*   MCV  89.8  90.3   MCH  29.1  28.5   MCHC  32.3*  31.5*   RDW  41.7  42.0   PLATELETCT  304  265   MPV  11.2  11.1     Recent Labs      04/18/18   1121  04/19/18   0319   SODIUM  134*  136   POTASSIUM  4.4  3.8   CHLORIDE  100  105   CO2  21  22   GLUCOSE  283*  180*   BUN  23*  18   CREATININE  2.21*  1.48*   CALCIUM  8.2*  7.9*                      Assessment/Plan     * Groin abscess   Assessment & Plan    Left groin abscess extending down to the left labia, with induration, fluctuance, foul smelling drainage. Noted about 5 days ago, was seen at [facility] clinic and started on oral abx, however abscess not getting better, rather draining now. Referred to the ER from [facility] clinic today  Continues to have pain and drainage  Gynecology consulted, appreciate input    SCD for dvt ppx  Not septic a the time  of admission  Monitor labs   Lactates q2hr  Wound cx + GBS  Cont IV unasyn   - dc vancomycin          ANOOP (acute kidney injury) (CMS-HCC)   Assessment & Plan    Unknown if patient has underlying CKD, however at this time Cr elevated >2, patient has hx of DM  2nd to dehydration  improving  Avoid nephrotoxics,   Caution with vancomycin, monitor renal function ,renally dose all meds  Hold home metformin  - cont ivf        Hypertension- (present on admission)   Assessment & Plan    Chronic, uncontrolled, monitor  With ARF, hold arb  Increase coreg        High cholesterol- (present on admission)   Assessment & Plan    Chronic and stable  Continue statin        DM (diabetes mellitus) (CMS-HCC)   Assessment & Plan    Uncontrolled with hyperglycemia, BS > 200 on admission, no gap  Start IVF  Hold home metformin  Start SS and monitor BS        Obesity- (present on admission)   Assessment & Plan    Nutrition consult  Uncontrolled Dm,  A1c 16  Adjust ssi  Consider addition of lantus  Monitor  Counseled on diet and modification          Quality-Core Measures   Reviewed items::  Medications reviewed, Labs reviewed and Radiology images reviewed  DVT prophylaxis - mechanical:  SCDs  Ulcer Prophylaxis::  Not indicated  Antibiotics:  Treating active infection/contamination beyond 24 hours perioperative coverage

## 2018-04-19 NOTE — DIETARY
Nutrition Services: consult for uncontrolled DM and pt with high BMI    A1C = 16.5  Estim. Avg Glu = 427    Visited pt at bedside and went over the importance of diet in the management of blood sugars. Went over proper meal planning tips for diabetes, and provided education on nutrition label reading and recommendations on how many carbs to consume. Pt verbalized understanding.   BMI - Pt with BMI >40 (=48.71). Weight loss counseling not appropriate in acute care setting.     RECOMMEND - Referral to outpatient nutrition services for weight management/medical nutrition therapy after D/C.

## 2018-04-19 NOTE — PROGRESS NOTES
Patient is awake and oriented to person, place, and time. Grown dressing is CDI and not bothering her. She ambulates to the bathroom without assistance and is steady on her feet. Will continue to monitor.

## 2018-04-19 NOTE — DISCHARGE PLANNING
Medical Social Work  Dr. Man recommending Wound care or nursing staff/home health to teach the family wound care for when the patient is at home.  If H/H is recommended, will need to verify if patient has a primary doctor.

## 2018-04-19 NOTE — CARE PLAN
Problem: Safety  Goal: Will remain free from falls  Outcome: PROGRESSING AS EXPECTED  Bed locked in lowest position, treaded socks in place, call light within reach, room near nursing station.  Pt instructed to call for assistance.    Problem: Infection  Goal: Will remain free from infection  Outcome: PROGRESSING AS EXPECTED  Antibiotics administered per MAR.  Handwashing performed before and after patient contact.

## 2018-04-19 NOTE — PROGRESS NOTES
Late entry - Patient arrived from ED, report received from ED nurse.  Pt AOx4, complains of headache, nonpharm interventions in place.  Patient oriented to room.  Skin and sacral assessment complete.  Wound noted to left inguinal fold.  On room air.  Up self, no bed alarm.  Bed locked in lowest position, treaded socks in place, call light within reach.

## 2018-04-19 NOTE — WOUND TEAM
"[facility]  Inpatient Services  Wound and Skin Care Progress Note    Admission Date:   04/18/2018.  HPI, PMH, SH: Reviewed  Unit where seen by Wound Team: Dacia Chavarria.    WOUND CONSULT RELATED TO: Left mons pubis abscess.    SUBJECTIVE:  \"are you done yet\"    Self Report / Pain Level: 8/10    OBJECTIVE: Agreeable despite pain. Pt in bed, ambulating to bathroom.  Pt with aquace ag in place    WOUND TYPE, LOCATION, CHARACTERISTICS (Pressure ulcers: location, stage, POA or date identified)    Location and type of wound: Left mons pubis: Full thickness wound related to infection/abscess.         Periwound:     Discolored, induration to mons, left labia,   Drainage:     Heavy, purulent.  Tissue Type and %:    Only visualize red from small portal.  Wound Edges:    Open, detached  Odor:      malodorous.   Exposed structure(s):  None.  S&S of Infection:   Malodorous, purulent drainage, induration      Measurements:   04/19/2018  Length:     1.5 cm  Width:      0.8 cm  Depth:      1.0  6 oclock   6.0  9 oclock   1.0      INTERVENTIONS BY WOUND TEAM:  Dressing removed, lidocaine gel applied and allowed to sit for 10\".  Area cleaned with NS and gauze. Gentle pressure to express drainage.  Took photo and measruements, dressed wound with silver strip packing, menstrual pad and hospital briefs to hold secure. (ordered XXL briefs to be delivered to wound for nursing to paulino)        Interdisciplinary consultation: Patient and nursing and nursing sutdent.  Requested nurse speak to hospitalist re pain medication for dressing changes    EVALUATION: silver strip packing provides to wick drainage amd provide topical antimicrobial treatment.     Factors affecting wound healing: Diabetes. Infection.   Goals: Manage exudate and provide topical antimicrobial treatment.     NURSING PLAN OF CARE ORDERS (X):    Dressing changes: See Dressing Maintenance orders: X  Skin care: See Skin Care orders:   Rectal tube care: See Rectal " Tube Care orders:   Other orders:    RSKIN: CURRENT (X) ORDERED (O)  Q shift Davis:  X  Q shift pressure point assessments:  X  Pressure redistribution mattress      X  LUISITO      Bariatric LUISITO      Bariatric foam        Heel float boots       Heels floated on pillows      Barrier wipes      Barrier Cream      Barrier paste      Sacral silicone dressing      Silicone O2 tubing      Anchorfast      Trach with Optifoam split foam       Waffle cushion      Rectal tube or BMS      Antifungal tx    Turn q 2 hours   X  Up to chair     Ambulate   PT/OT     Dietician      PO  X   TF   TPN     PVN    NPO   # days   Other       WOUND TEAM PLAN OF CARE (X):   NPWT change 3 x week:        Dressing changes by wound team:     X  Follow up as needed:       Other (explain):    Anticipated discharge plans (X): To be determined.   SNF:           Home Care:           Outpatient Wound Center: X            Self Care:            Other:       Anticipate pt will need ongoing wound care upon discharge from acute care.

## 2018-04-19 NOTE — PROGRESS NOTES
Patient resting quietly in bed, no S/S of distress, AA&Ox4. Denies SOB, chest pain, dizziness. Call light within reach,  pt calls appropriately, up SB to restroom. Bed in lowest position, bed locked, RN and CNA numbers provided, no further needs at this time. No changes from EPIC. Hourly rounding in place.

## 2018-04-19 NOTE — CARE PLAN
Problem: Safety  Goal: Will remain free from falls  Outcome: PROGRESSING AS EXPECTED  Fall precautions in place: treaded slipper socks on, mobility signs posted, hourly rounding, bed in lowest position, belongings and call light are within reach, and near nurses station.    Problem: Pain Management  Goal: Pain level will decrease to patient's comfort goal  Outcome: PROGRESSING AS EXPECTED  Pain managed well with PRN medication at this time.  Patient uses pharmacological and non pharmacological pain-relief strategies. Patient displays improvement in mood, coping.

## 2018-04-19 NOTE — CARE PLAN
Problem: Safety  Goal: Will remain free from injury  Outcome: PROGRESSING AS EXPECTED  Oriented patient to hospital safety equipment.     Problem: Infection  Goal: Will remain free from infection  Outcome: PROGRESSING AS EXPECTED  Administered antibiotic.

## 2018-04-19 NOTE — PROGRESS NOTES
2 RN skin check performed with ADAN Lujan.  Wound noted to left inguinal fold.  Heels red and blanching.  Sacrum intact.  All other areas of skin intact.

## 2018-04-19 NOTE — PROGRESS NOTES
Gynecology consult progress Note    [facility] day #2  Chief Admitting Dx: Abscess  Elevated blood sugar         Subjective:    Patient states that she is feeling much better today and is tolerating the IV antibiotics.  She states that a large amount of drainage and material is coming out of the abscess area.  She states that when she got up to go to the bathroom a small amount of chunky tissue came out of her vulva.  She denies fevers/chills/night sweats.  She states that the labia is much less tender today.  She is asking about her ECG which is scheduled for tomorrow morning.    Discussed the plan for wound care to evaluate the wound and repack as well as teach her and her family wound packing.    Vitals:    04/18/18 1613 04/18/18 1700 04/18/18 1925 04/19/18 0355   BP: (!) 177/90 148/82 147/80 153/88   Pulse: (!) 101  (!) 103 83   Resp:   18 16   Temp:   36.6 °C (97.9 °F) 36.3 °C (97.3 °F)   TempSrc:       SpO2:   93% 91%   Weight:           Exam:    A, A, and O x 3 NAD    Perineum: not examined this morning - will examine tomorrow after wound care has reassessed and changed the packing.     Extremities - bilateral lower extremity swelling.       Labs:   Recent Results (from the past 24 hour(s))   ACCU-CHEK GLUCOSE    Collection Time: 04/18/18 10:54 AM   Result Value Ref Range    Glucose - Accu-Ck 255 (H) 65 - 99 mg/dL   Lactic acid (lactate)    Collection Time: 04/18/18 11:21 AM   Result Value Ref Range    Lactic Acid 1.9 0.5 - 2.0 mmol/L   CBC WITH DIFFERENTIAL    Collection Time: 04/18/18 11:21 AM   Result Value Ref Range    WBC 8.8 4.8 - 10.8 K/uL    RBC 4.13 (L) 4.20 - 5.40 M/uL    Hemoglobin 12.0 12.0 - 16.0 g/dL    Hematocrit 37.1 37.0 - 47.0 %    MCV 89.8 81.4 - 97.8 fL    MCH 29.1 27.0 - 33.0 pg    MCHC 32.3 (L) 33.6 - 35.0 g/dL    RDW 41.7 35.9 - 50.0 fL    Platelet Count 304 164 - 446 K/uL    MPV 11.2 9.0 - 12.9 fL    Neutrophils-Polys 61.00 44.00 - 72.00 %    Lymphocytes 30.10 22.00 -  41.00 %    Monocytes 5.30 0.00 - 13.40 %    Eosinophils 0.90 0.00 - 6.90 %    Basophils 0.90 0.00 - 1.80 %    Nucleated RBC 0.00 /100 WBC    Neutrophils (Absolute) 5.45 2.00 - 7.15 K/uL    Lymphs (Absolute) 2.65 1.00 - 4.80 K/uL    Monos (Absolute) 0.47 0.00 - 0.85 K/uL    Eos (Absolute) 0.08 0.00 - 0.51 K/uL    Baso (Absolute) 0.08 0.00 - 0.12 K/uL    NRBC (Absolute) 0.00 K/uL   COMP METABOLIC PANEL    Collection Time: 04/18/18 11:21 AM   Result Value Ref Range    Sodium 134 (L) 135 - 145 mmol/L    Potassium 4.4 3.6 - 5.5 mmol/L    Chloride 100 96 - 112 mmol/L    Co2 21 20 - 33 mmol/L    Anion Gap 13.0 (H) 0.0 - 11.9    Glucose 283 (H) 65 - 99 mg/dL    Bun 23 (H) 8 - 22 mg/dL    Creatinine 2.21 (H) 0.50 - 1.40 mg/dL    Calcium 8.2 (L) 8.5 - 10.5 mg/dL    AST(SGOT) 14 12 - 45 U/L    ALT(SGPT) 16 2 - 50 U/L    Alkaline Phosphatase 97 30 - 99 U/L    Total Bilirubin 0.6 0.1 - 1.5 mg/dL    Albumin 3.4 3.2 - 4.9 g/dL    Total Protein 8.1 6.0 - 8.2 g/dL    Globulin 4.7 (H) 1.9 - 3.5 g/dL    A-G Ratio 0.7 g/dL   BLOOD CULTURE    Collection Time: 04/18/18 11:21 AM   Result Value Ref Range    Significant Indicator NEG     Source BLD     Site PERIPHERAL     Blood Culture       No Growth    Note: Blood cultures are incubated for 5 days and  are monitored continuously.Positive blood cultures  are called to the RN and reported as soon as  they are identified.     ESTIMATED GFR    Collection Time: 04/18/18 11:21 AM   Result Value Ref Range    GFR If  28 (A) >60 mL/min/1.73 m 2    GFR If Non African American 23 (A) >60 mL/min/1.73 m 2   DIFFERENTIAL MANUAL    Collection Time: 04/18/18 11:21 AM   Result Value Ref Range    Bands-Stabs 0.90 0.00 - 10.00 %    Metamyelocytes 0.90 %    Manual Diff Status PERFORMED    PERIPHERAL SMEAR REVIEW    Collection Time: 04/18/18 11:21 AM   Result Value Ref Range    Peripheral Smear Review see below    PLATELET ESTIMATE    Collection Time: 04/18/18 11:21 AM   Result Value Ref Range     Plt Estimation Normal    MORPHOLOGY    Collection Time: 04/18/18 11:21 AM   Result Value Ref Range    RBC Morphology Normal    HEMOGLOBIN A1C    Collection Time: 04/18/18 11:21 AM   Result Value Ref Range    Glycohemoglobin 16.5 (H) 0.0 - 5.6 %    Est Avg Glucose 427 mg/dL   GRAM STAIN    Collection Time: 04/18/18 12:05 PM   Result Value Ref Range    Significant Indicator .     Source WND     Site Groin     Gram Stain Result       Rare WBCs.  Moderate Gram positive rods.  Few Gram positive cocci.     BLOOD CULTURE    Collection Time: 04/18/18 12:28 PM   Result Value Ref Range    Significant Indicator NEG     Source BLD     Site PERIPHERAL     Blood Culture       No Growth    Note: Blood cultures are incubated for 5 days and  are monitored continuously.Positive blood cultures  are called to the RN and reported as soon as  they are identified.     ACCU-CHEK GLUCOSE    Collection Time: 04/18/18  3:18 PM   Result Value Ref Range    Glucose - Accu-Ck 189 (H) 65 - 99 mg/dL   ACCU-CHEK GLUCOSE    Collection Time: 04/18/18  6:07 PM   Result Value Ref Range    Glucose - Accu-Ck 165 (H) 65 - 99 mg/dL   URINALYSIS    Collection Time: 04/18/18  6:35 PM   Result Value Ref Range    Color Yellow     Character Clear     Specific Gravity 1.015 <1.035    Ph 7.0 5.0 - 8.0    Glucose Negative Negative mg/dL    Ketones Trace (A) Negative mg/dL    Protein Negative Negative mg/dL    Bilirubin Negative Negative    Urobilinogen, Urine 0.2 Negative    Nitrite Negative Negative    Leukocyte Esterase Moderate (A) Negative    Occult Blood Large (A) Negative    Micro Urine Req Microscopic    URINE MICROSCOPIC (W/UA)    Collection Time: 04/18/18  6:35 PM   Result Value Ref Range    WBC 10-20 (A) /hpf    RBC >150 (A) /hpf    Bacteria Negative None /hpf    Epithelial Cells Moderate (A) /hpf    Hyaline Cast 3-5 (A) /lpf   ACCU-CHEK GLUCOSE    Collection Time: 04/19/18 12:10 AM   Result Value Ref Range    Glucose - Accu-Ck 220 (H) 65 - 99 mg/dL    CBC without Differential    Collection Time: 04/19/18  3:19 AM   Result Value Ref Range    WBC 9.4 4.8 - 10.8 K/uL    RBC 3.90 (L) 4.20 - 5.40 M/uL    Hemoglobin 11.1 (L) 12.0 - 16.0 g/dL    Hematocrit 35.2 (L) 37.0 - 47.0 %    MCV 90.3 81.4 - 97.8 fL    MCH 28.5 27.0 - 33.0 pg    MCHC 31.5 (L) 33.6 - 35.0 g/dL    RDW 42.0 35.9 - 50.0 fL    Platelet Count 265 164 - 446 K/uL    MPV 11.1 9.0 - 12.9 fL   Basic Metabolic Panel (BMP)    Collection Time: 04/19/18  3:19 AM   Result Value Ref Range    Sodium 136 135 - 145 mmol/L    Potassium 3.8 3.6 - 5.5 mmol/L    Chloride 105 96 - 112 mmol/L    Co2 22 20 - 33 mmol/L    Glucose 180 (H) 65 - 99 mg/dL    Bun 18 8 - 22 mg/dL    Creatinine 1.48 (H) 0.50 - 1.40 mg/dL    Calcium 7.9 (L) 8.5 - 10.5 mg/dL    Anion Gap 9.0 0.0 - 11.9   ESTIMATED GFR    Collection Time: 04/19/18  3:19 AM   Result Value Ref Range    GFR If  44 (A) >60 mL/min/1.73 m 2    GFR If Non  37 (A) >60 mL/min/1.73 m 2   ACCU-CHEK GLUCOSE    Collection Time: 04/19/18  5:53 AM   Result Value Ref Range    Glucose - Accu-Ck 145 (H) 65 - 99 mg/dL       Assessment:  Mons pubis abscess with tracking to the left labia.  Failed outpatient management.  Poor diabetic control with acute renal failure.    Plan:  Continue IV antibiotics.  Continue packing the abscess site.  Wound care or nursing staff/home health to teach the family wound care for when the patient is at home.  Continue packing until the site closes and the abscess cavity resolves.    Glendy Man M.D.

## 2018-04-19 NOTE — PROGRESS NOTES
Marline from Lab called with critical result of Blood Culture Gram positive cocci: Possible Staphylococcus  at 1215. Critical lab result read back to Marline.   Dr. Portillo notified of critical lab result at 1220.  Critical lab result read back by Dr. Portillo.

## 2018-04-20 ENCOUNTER — APPOINTMENT (OUTPATIENT)
Dept: CARDIOLOGY | Facility: MEDICAL CENTER | Age: 53
End: 2018-04-20
Attending: NURSE PRACTITIONER
Payer: COMMERCIAL

## 2018-04-20 PROBLEM — G44.209 TENSION TYPE HEADACHE: Status: ACTIVE | Noted: 2018-04-20

## 2018-04-20 LAB
BACTERIA BLD CULT: ABNORMAL
BACTERIA BLD CULT: ABNORMAL
BACTERIA UR CULT: NORMAL
BACTERIA WND AEROBE CULT: ABNORMAL
BACTERIA WND AEROBE CULT: ABNORMAL
GLUCOSE BLD-MCNC: 153 MG/DL (ref 65–99)
GLUCOSE BLD-MCNC: 153 MG/DL (ref 65–99)
GLUCOSE BLD-MCNC: 161 MG/DL (ref 65–99)
GLUCOSE BLD-MCNC: 195 MG/DL (ref 65–99)
GRAM STN SPEC: ABNORMAL
SIGNIFICANT IND 70042: ABNORMAL
SIGNIFICANT IND 70042: ABNORMAL
SIGNIFICANT IND 70042: NORMAL
SITE SITE: ABNORMAL
SITE SITE: ABNORMAL
SITE SITE: NORMAL
SOURCE SOURCE: ABNORMAL
SOURCE SOURCE: ABNORMAL
SOURCE SOURCE: NORMAL

## 2018-04-20 PROCEDURE — A9270 NON-COVERED ITEM OR SERVICE: HCPCS | Performed by: HOSPITALIST

## 2018-04-20 PROCEDURE — A9270 NON-COVERED ITEM OR SERVICE: HCPCS | Performed by: INTERNAL MEDICINE

## 2018-04-20 PROCEDURE — 700105 HCHG RX REV CODE 258: Performed by: INTERNAL MEDICINE

## 2018-04-20 PROCEDURE — 99233 SBSQ HOSP IP/OBS HIGH 50: CPT | Performed by: INTERNAL MEDICINE

## 2018-04-20 PROCEDURE — 700102 HCHG RX REV CODE 250 W/ 637 OVERRIDE(OP): Performed by: HOSPITALIST

## 2018-04-20 PROCEDURE — 700111 HCHG RX REV CODE 636 W/ 250 OVERRIDE (IP): Performed by: INTERNAL MEDICINE

## 2018-04-20 PROCEDURE — 36415 COLL VENOUS BLD VENIPUNCTURE: CPT

## 2018-04-20 PROCEDURE — 87040 BLOOD CULTURE FOR BACTERIA: CPT | Mod: 91

## 2018-04-20 PROCEDURE — 700102 HCHG RX REV CODE 250 W/ 637 OVERRIDE(OP): Performed by: INTERNAL MEDICINE

## 2018-04-20 PROCEDURE — 700111 HCHG RX REV CODE 636 W/ 250 OVERRIDE (IP): Performed by: HOSPITALIST

## 2018-04-20 PROCEDURE — 770006 HCHG ROOM/CARE - MED/SURG/GYN SEMI*

## 2018-04-20 PROCEDURE — 82962 GLUCOSE BLOOD TEST: CPT | Mod: 91

## 2018-04-20 PROCEDURE — 700105 HCHG RX REV CODE 258: Performed by: HOSPITALIST

## 2018-04-20 RX ORDER — AMLODIPINE BESYLATE 5 MG/1
5 TABLET ORAL
Status: DISCONTINUED | OUTPATIENT
Start: 2018-04-20 | End: 2018-04-21

## 2018-04-20 RX ORDER — HEPARIN SODIUM 5000 [USP'U]/ML
5000 INJECTION, SOLUTION INTRAVENOUS; SUBCUTANEOUS EVERY 8 HOURS
Status: DISCONTINUED | OUTPATIENT
Start: 2018-04-20 | End: 2018-04-24

## 2018-04-20 RX ORDER — INSULIN GLARGINE 100 [IU]/ML
5 INJECTION, SOLUTION SUBCUTANEOUS EVERY EVENING
Status: DISCONTINUED | OUTPATIENT
Start: 2018-04-20 | End: 2018-04-21

## 2018-04-20 RX ADMIN — HEPARIN SODIUM 5000 UNITS: 5000 INJECTION, SOLUTION INTRAVENOUS; SUBCUTANEOUS at 21:34

## 2018-04-20 RX ADMIN — AMLODIPINE BESYLATE 5 MG: 5 TABLET ORAL at 09:42

## 2018-04-20 RX ADMIN — INSULIN HUMAN 2 UNITS: 100 INJECTION, SOLUTION PARENTERAL at 17:31

## 2018-04-20 RX ADMIN — CARVEDILOL 6.25 MG: 6.25 TABLET, FILM COATED ORAL at 17:25

## 2018-04-20 RX ADMIN — AMPICILLIN SODIUM AND SULBACTAM SODIUM 3 G: 2; 1 INJECTION, POWDER, FOR SOLUTION INTRAMUSCULAR; INTRAVENOUS at 23:50

## 2018-04-20 RX ADMIN — ONDANSETRON HYDROCHLORIDE 4 MG: 2 INJECTION, SOLUTION INTRAMUSCULAR; INTRAVENOUS at 17:25

## 2018-04-20 RX ADMIN — INSULIN HUMAN 2 UNITS: 100 INJECTION, SOLUTION PARENTERAL at 05:56

## 2018-04-20 RX ADMIN — INSULIN HUMAN 2 UNITS: 100 INJECTION, SOLUTION PARENTERAL at 14:04

## 2018-04-20 RX ADMIN — INSULIN GLARGINE 5 UNITS: 100 INJECTION, SOLUTION SUBCUTANEOUS at 21:28

## 2018-04-20 RX ADMIN — SIMVASTATIN 5 MG: 20 TABLET, FILM COATED ORAL at 21:31

## 2018-04-20 RX ADMIN — HYDROCODONE BITARTRATE AND ACETAMINOPHEN 2 TABLET: 5; 325 TABLET ORAL at 08:18

## 2018-04-20 RX ADMIN — ONDANSETRON HYDROCHLORIDE 4 MG: 2 INJECTION, SOLUTION INTRAMUSCULAR; INTRAVENOUS at 13:01

## 2018-04-20 RX ADMIN — AMPICILLIN SODIUM AND SULBACTAM SODIUM 3 G: 2; 1 INJECTION, POWDER, FOR SOLUTION INTRAMUSCULAR; INTRAVENOUS at 08:23

## 2018-04-20 RX ADMIN — CARVEDILOL 6.25 MG: 6.25 TABLET, FILM COATED ORAL at 08:18

## 2018-04-20 RX ADMIN — SODIUM CHLORIDE: 900 INJECTION INTRAVENOUS at 14:06

## 2018-04-20 RX ADMIN — HYDRALAZINE HYDROCHLORIDE 10 MG: 20 INJECTION INTRAMUSCULAR; INTRAVENOUS at 16:15

## 2018-04-20 RX ADMIN — AMPICILLIN SODIUM AND SULBACTAM SODIUM 3 G: 2; 1 INJECTION, POWDER, FOR SOLUTION INTRAMUSCULAR; INTRAVENOUS at 12:57

## 2018-04-20 RX ADMIN — AMPICILLIN SODIUM AND SULBACTAM SODIUM 3 G: 2; 1 INJECTION, POWDER, FOR SOLUTION INTRAMUSCULAR; INTRAVENOUS at 17:25

## 2018-04-20 RX ADMIN — SODIUM CHLORIDE: 900 INJECTION INTRAVENOUS at 03:38

## 2018-04-20 RX ADMIN — INSULIN HUMAN 2 UNITS: 100 INJECTION, SOLUTION PARENTERAL at 21:28

## 2018-04-20 ASSESSMENT — PAIN SCALES - GENERAL
PAINLEVEL_OUTOF10: 5
PAINLEVEL_OUTOF10: 0
PAINLEVEL_OUTOF10: 7

## 2018-04-20 ASSESSMENT — ENCOUNTER SYMPTOMS
CHILLS: 0
MEMORY LOSS: 0
SHORTNESS OF BREATH: 0
NAUSEA: 0
PALPITATIONS: 0
BACK PAIN: 0
ABDOMINAL PAIN: 0
MYALGIAS: 1
TREMORS: 0
NERVOUS/ANXIOUS: 0
HEADACHES: 0
FLANK PAIN: 0
FEVER: 0
WEAKNESS: 1
DIZZINESS: 0

## 2018-04-20 NOTE — CARE PLAN
Problem: Venous Thromboembolism (VTW)/Deep Vein Thrombosis (DVT) Prevention:  Goal: Patient will participate in Venous Thrombosis (VTE)/Deep Vein Thrombosis (DVT)Prevention Measures  Outcome: PROGRESSING AS EXPECTED  Dr. Portillo notified of no VTE prevention- heparin sub Q added.

## 2018-04-20 NOTE — WOUND TEAM
"[facility]  Inpatient Services  Wound and Skin Care Progress Note    Admission Date:   04/18/2018.  HPI, PMH, SH: Reviewed  Unit where seen by Wound Team: Dacia Chavarria.    WOUND CONSULT RELATED TO: Left mons pubis abscess.    SUBJECTIVE:  \"it wasn't as bad this time\"    Self Report / Pain Level: with packing    OBJECTIVE: Agreeable despite pain. Pt in bed, ambulating to bathroom.  Pt packing in place.    WOUND TYPE, LOCATION, CHARACTERISTICS (Pressure ulcers: location, stage, POA or date identified)    Location and type of wound: Left mons pubis: Full thickness wound related to infection/abscess.         Periwound:     Discolored, induration to mons, left labia,   Drainage:     Mod serosanguinous.  Tissue Type and %:    Only visualize red from small portal.  Wound Edges:    Open, detached  Odor:      Slight malodorous.   Exposed structure(s):  None.  S&S of Infection:   odorous, induration      Measurements:   04/19/2018  Length:     1.5 cm  Width:      0.8 cm  Depth:      1.0  6 oclock   6.0  9 oclock   1.0      INTERVENTIONS BY WOUND TEAM:  Dressing removed, lidocaine gel applied and allowed to sit for 10\".  Area cleaned with NS and gauze. Gentle pressure did not express purulent drainage.  Dressed wound with silver strip packing, menstrual pad and pt refused hospital briefs.  If drainage continues to decrease the silver strip packing can be left in place for 48 hours    Interdisciplinary consultation: Patient and nursing .      EVALUATION: silver strip packing provides to wick drainage amd provide topical antimicrobial treatment.     Factors affecting wound healing: Diabetes. Infection.   Goals: Manage exudate and provide topical antimicrobial treatment.     NURSING PLAN OF CARE ORDERS (X):    Dressing changes: See Dressing Maintenance orders: X  Skin care: See Skin Care orders:   Rectal tube care: See Rectal Tube Care orders:   Other orders:    RSKIN: CURRENT (X) ORDERED (O)  Q shift Davis:  X  Q " shift pressure point assessments:  X  Pressure redistribution mattress      X  LUISITO      Bariatric LUISITO      Bariatric foam        Heel float boots       Heels floated on pillows      Barrier wipes      Barrier Cream      Barrier paste      Sacral silicone dressing      Silicone O2 tubing      Anchorfast      Trach with Optifoam split foam       Waffle cushion      Rectal tube or BMS      Antifungal tx    Turn q 2 hours   X  Up to chair     Ambulate   PT/OT     Dietician      PO  X   TF   TPN     PVN    NPO   # days   Other       WOUND TEAM PLAN OF CARE (X):   NPWT change 3 x week:        Dressing changes by wound team:     X  Follow up as needed:       Other (explain):    Anticipated discharge plans (X): To be determined.   SNF:           Home Care:      X     Outpatient Wound Center:            Self Care:            Other:       Anticipate pt will need ongoing wound care upon discharge from acute care.  Recommend HH

## 2018-04-20 NOTE — PROGRESS NOTES
Patient BP elevated- will give scheduled coreg and pain medication and recheck before giving PRN hydralazine.

## 2018-04-20 NOTE — PROGRESS NOTES
Patient alert and oriented, BP elevated today- PRN BP meds given and amlodipine added to patient daily meds, denies pain at this time however medicated per MAR when needed for pain to groin area. Patient continues on IV antibiotics, blood cultures re-drawn today as first set possibly contaminated. Wound packing done by wound team today- reinforced as needed with ABD pad or maternity pad for any drainage. Patient educated on fall risk and calls for help appropriately. Patient refuses bed alarm at this time. Patient with non skid socks in place. Will monitor.

## 2018-04-20 NOTE — PROGRESS NOTES
Report received from day shift RN . Patient a/ox4. Patient reported pain in the IV area on LAC. This RN assessed the IV , blood return noted and flushed with no resistance. Patient denied any pain at this time. No signs of infiltration noted. IV abx started. Patient encouraged to notified if pain returns. 3 units of R insulin administered for fsbs 246. Abdominal pad applied to groin area, white discharge and some foul smell noted.   Assessment completed with no significant findings noted at this time.    Call light and personal belongings within reach, bed kept low, treaded socks on. Assisted as necessary. Kept rested and comfortable at all times. Hourly rounds in place.

## 2018-04-20 NOTE — PROGRESS NOTES
[facility] Hospitalist Progress Note    Date of Service: 2018    Chief Complaint  52 y.o. female admitted 2018 with labial abscess s/p packing on IV abx.    Interval Problem Update  . Nausea and vomiting this a.m.  Now resolved the pain prescription  Pain controlled    Consultants/Specialty  gyn    Disposition  tbd        Review of Systems   Constitutional: Negative for chills, fever and malaise/fatigue.   HENT: Negative for congestion and hearing loss.    Respiratory: Negative for shortness of breath.    Cardiovascular: Negative for palpitations and leg swelling.   Gastrointestinal: Negative for abdominal pain and nausea.   Genitourinary: Negative for dysuria, flank pain and urgency.   Musculoskeletal: Positive for myalgias. Negative for back pain.   Neurological: Positive for weakness. Negative for dizziness, tremors and headaches.   Psychiatric/Behavioral: Negative for memory loss. The patient is not nervous/anxious.       Physical Exam  Laboratory/Imaging   Hemodynamics  Temp (24hrs), Av.4 °C (97.6 °F), Min:36.3 °C (97.4 °F), Max:36.7 °C (98 °F)   Temperature: 36.3 °C (97.4 °F)  Pulse  Av.3  Min: 77  Max: 103    Blood Pressure: 154/73      Respiratory      Respiration: 16, Pulse Oximetry: 95 %        RUL Breath Sounds: Clear, RML Breath Sounds: Clear, RLL Breath Sounds: Clear, JULIANA Breath Sounds: Clear, LLL Breath Sounds: Clear    Fluids    Intake/Output Summary (Last 24 hours) at 18 1447  Last data filed at 18 0900   Gross per 24 hour   Intake             1680 ml   Output                0 ml   Net             1680 ml       Nutrition  Orders Placed This Encounter   Procedures   • DIET ORDER     Standing Status:   Standing     Number of Occurrences:   1     Order Specific Question:   Diet:     Answer:   Diabetic [3]     Order Specific Question:   Electrolyte modifications:     Answer:   No Added Salt [2]     Physical Exam   Constitutional: She is oriented to person, place, and time. She  appears well-nourished. No distress.   HENT:   Head: Normocephalic and atraumatic.   Nose: Nose normal.   Eyes: EOM are normal. Pupils are equal, round, and reactive to light. No scleral icterus.   Neck: Neck supple. No thyromegaly present.   Cardiovascular: Normal rate and intact distal pulses.    Pulmonary/Chest: Effort normal and breath sounds normal. No respiratory distress. She has no wheezes.   Abdominal: Soft. Bowel sounds are normal. She exhibits no distension.   Genitourinary:   Genitourinary Comments: Labial dressing c/d/i   Musculoskeletal: She exhibits edema.   Neurological: She is alert and oriented to person, place, and time. No cranial nerve deficit. She exhibits normal muscle tone.   Skin: Skin is warm and dry. No rash noted. She is not diaphoretic. There is erythema.   Psychiatric: She has a normal mood and affect. Her behavior is normal. Judgment and thought content normal.   Nursing note and vitals reviewed.      Recent Labs      04/18/18   1121  04/19/18   0319   WBC  8.8  9.4   RBC  4.13*  3.90*   HEMOGLOBIN  12.0  11.1*   HEMATOCRIT  37.1  35.2*   MCV  89.8  90.3   MCH  29.1  28.5   MCHC  32.3*  31.5*   RDW  41.7  42.0   PLATELETCT  304  265   MPV  11.2  11.1     Recent Labs      04/18/18   1121  04/19/18   0319   SODIUM  134*  136   POTASSIUM  4.4  3.8   CHLORIDE  100  105   CO2  21  22   GLUCOSE  283*  180*   BUN  23*  18   CREATININE  2.21*  1.48*   CALCIUM  8.2*  7.9*                      Assessment/Plan     * Groin abscess   Assessment & Plan    Left groin abscess extending down to the left labia, with induration, fluctuance, foul smelling drainage. Noted about 5 days ago, was seen at [facility] clinic and started on oral abx, however abscess not getting better, rather draining now. Referred to the ER from [facility] clinic today  Continues to have pain and drainage  Gynecology consulted, appreciate input    SCD for dvt ppx  Not septic a the time of admission  Monitor labs   Lactates q2hr  Wound cx  + GBS  Cont IV unasyn   - dc vancomycin          ANOOP (acute kidney injury) (CMS-HCC)   Assessment & Plan    Unknown if patient has underlying CKD, however at this time Cr elevated >2, patient has hx of DM  2nd to dehydration  improving  Avoid nephrotoxics,   Caution with vancomycin, monitor renal function ,renally dose all meds  Hold home metformin  - cont ivf        Hypertension- (present on admission)   Assessment & Plan    Chronic, uncontrolled, monitor  With ARF, hold arb  - coreg  - Add Norvasc        High cholesterol- (present on admission)   Assessment & Plan    Chronic and stable  Continue statin        Tension type headache   Assessment & Plan    Secondary to hypertension  Continue blood pressure control  Now improving        DM (diabetes mellitus) (CMS-HCC)   Assessment & Plan    Uncontrolled with hyperglycemia, BS > 200 on admission, no gap  Start IVF  Hold home metformin  Start SS and monitor BS        Obesity- (present on admission)   Assessment & Plan    Nutrition consult  Uncontrolled Dm,  A1c 16  Adjust ssi  At low dose lantus  Monitor  Counseled on diet and modification          Quality-Core Measures   Reviewed items::  Medications reviewed, Labs reviewed and Radiology images reviewed  DVT prophylaxis - mechanical:  SCDs  Ulcer Prophylaxis::  Not indicated  Antibiotics:  Treating active infection/contamination beyond 24 hours perioperative coverage

## 2018-04-20 NOTE — CARE PLAN
Problem: Infection  Goal: Will remain free from infection  IV antibiotics administered per MAR. Wound care in place. Perineal hygiene provided. Patient afebrile. No signs of worsening infection noted.     Problem: Bowel/Gastric:  Goal: Normal bowel function is maintained or improved  No BM today, senna administered per MAR. Pt reports LBM yesterday. Denies constipation.

## 2018-04-20 NOTE — CARE PLAN
Problem: Infection  Goal: Will remain free from infection  Outcome: PROGRESSING AS EXPECTED  Patient on antibiotics for abscess to perineum. Proper infection prevention practiced.

## 2018-04-21 LAB
ALBUMIN SERPL BCP-MCNC: 2.8 G/DL (ref 3.2–4.9)
ALBUMIN/GLOB SERPL: 0.7 G/DL
ALP SERPL-CCNC: 70 U/L (ref 30–99)
ALT SERPL-CCNC: 13 U/L (ref 2–50)
ANION GAP SERPL CALC-SCNC: 9 MMOL/L (ref 0–11.9)
AST SERPL-CCNC: 20 U/L (ref 12–45)
BASOPHILS # BLD AUTO: 0.9 % (ref 0–1.8)
BASOPHILS # BLD: 0.08 K/UL (ref 0–0.12)
BILIRUB SERPL-MCNC: 0.9 MG/DL (ref 0.1–1.5)
BUN SERPL-MCNC: 13 MG/DL (ref 8–22)
CALCIUM SERPL-MCNC: 7.9 MG/DL (ref 8.5–10.5)
CHLORIDE SERPL-SCNC: 107 MMOL/L (ref 96–112)
CO2 SERPL-SCNC: 19 MMOL/L (ref 20–33)
CREAT SERPL-MCNC: 0.82 MG/DL (ref 0.5–1.4)
EOSINOPHIL # BLD AUTO: 0.24 K/UL (ref 0–0.51)
EOSINOPHIL NFR BLD: 2.5 % (ref 0–6.9)
ERYTHROCYTE [DISTWIDTH] IN BLOOD BY AUTOMATED COUNT: 43.9 FL (ref 35.9–50)
GLOBULIN SER CALC-MCNC: 4.3 G/DL (ref 1.9–3.5)
GLUCOSE BLD-MCNC: 153 MG/DL (ref 65–99)
GLUCOSE BLD-MCNC: 188 MG/DL (ref 65–99)
GLUCOSE BLD-MCNC: 203 MG/DL (ref 65–99)
GLUCOSE BLD-MCNC: 242 MG/DL (ref 65–99)
GLUCOSE SERPL-MCNC: 168 MG/DL (ref 65–99)
HCT VFR BLD AUTO: 34.7 % (ref 37–47)
HGB BLD-MCNC: 10.9 G/DL (ref 12–16)
LYMPHOCYTES # BLD AUTO: 1.99 K/UL (ref 1–4.8)
LYMPHOCYTES NFR BLD: 21.2 % (ref 22–41)
MANUAL DIFF BLD: NORMAL
MCH RBC QN AUTO: 29 PG (ref 27–33)
MCHC RBC AUTO-ENTMCNC: 31.4 G/DL (ref 33.6–35)
MCV RBC AUTO: 92.3 FL (ref 81.4–97.8)
MONOCYTES # BLD AUTO: 0.32 K/UL (ref 0–0.85)
MONOCYTES NFR BLD AUTO: 3.4 % (ref 0–13.4)
MORPHOLOGY BLD-IMP: NORMAL
MYELOCYTES NFR BLD MANUAL: 1.7 %
NEUTROPHILS # BLD AUTO: 6.61 K/UL (ref 2–7.15)
NEUTROPHILS NFR BLD: 68.6 % (ref 44–72)
NEUTS BAND NFR BLD MANUAL: 1.7 % (ref 0–10)
NRBC # BLD AUTO: 0.02 K/UL
NRBC BLD-RTO: 0.2 /100 WBC
PLATELET # BLD AUTO: 247 K/UL (ref 164–446)
PLATELET BLD QL SMEAR: NORMAL
PMV BLD AUTO: 11 FL (ref 9–12.9)
POTASSIUM SERPL-SCNC: 4.3 MMOL/L (ref 3.6–5.5)
PROT SERPL-MCNC: 7.1 G/DL (ref 6–8.2)
RBC # BLD AUTO: 3.76 M/UL (ref 4.2–5.4)
RBC BLD AUTO: NORMAL
SODIUM SERPL-SCNC: 135 MMOL/L (ref 135–145)
WBC # BLD AUTO: 9.4 K/UL (ref 4.8–10.8)

## 2018-04-21 PROCEDURE — 700102 HCHG RX REV CODE 250 W/ 637 OVERRIDE(OP): Performed by: INTERNAL MEDICINE

## 2018-04-21 PROCEDURE — 85007 BL SMEAR W/DIFF WBC COUNT: CPT

## 2018-04-21 PROCEDURE — 99233 SBSQ HOSP IP/OBS HIGH 50: CPT | Performed by: INTERNAL MEDICINE

## 2018-04-21 PROCEDURE — 700111 HCHG RX REV CODE 636 W/ 250 OVERRIDE (IP): Performed by: INTERNAL MEDICINE

## 2018-04-21 PROCEDURE — A9270 NON-COVERED ITEM OR SERVICE: HCPCS | Performed by: INTERNAL MEDICINE

## 2018-04-21 PROCEDURE — 700105 HCHG RX REV CODE 258: Performed by: INTERNAL MEDICINE

## 2018-04-21 PROCEDURE — 80053 COMPREHEN METABOLIC PANEL: CPT

## 2018-04-21 PROCEDURE — A9270 NON-COVERED ITEM OR SERVICE: HCPCS | Performed by: HOSPITALIST

## 2018-04-21 PROCEDURE — 82962 GLUCOSE BLOOD TEST: CPT | Mod: 91

## 2018-04-21 PROCEDURE — 85027 COMPLETE CBC AUTOMATED: CPT

## 2018-04-21 PROCEDURE — 770006 HCHG ROOM/CARE - MED/SURG/GYN SEMI*

## 2018-04-21 PROCEDURE — 36415 COLL VENOUS BLD VENIPUNCTURE: CPT

## 2018-04-21 PROCEDURE — 700102 HCHG RX REV CODE 250 W/ 637 OVERRIDE(OP): Performed by: HOSPITALIST

## 2018-04-21 PROCEDURE — 700105 HCHG RX REV CODE 258: Performed by: HOSPITALIST

## 2018-04-21 RX ORDER — AMLODIPINE BESYLATE 10 MG/1
10 TABLET ORAL
Status: DISCONTINUED | OUTPATIENT
Start: 2018-04-22 | End: 2018-04-27 | Stop reason: HOSPADM

## 2018-04-21 RX ORDER — INSULIN GLARGINE 100 [IU]/ML
7 INJECTION, SOLUTION SUBCUTANEOUS EVERY EVENING
Status: DISCONTINUED | OUTPATIENT
Start: 2018-04-21 | End: 2018-04-22

## 2018-04-21 RX ORDER — AMLODIPINE BESYLATE 5 MG/1
5 TABLET ORAL ONCE
Status: COMPLETED | OUTPATIENT
Start: 2018-04-21 | End: 2018-04-21

## 2018-04-21 RX ADMIN — AMPICILLIN SODIUM AND SULBACTAM SODIUM 3 G: 2; 1 INJECTION, POWDER, FOR SOLUTION INTRAMUSCULAR; INTRAVENOUS at 05:23

## 2018-04-21 RX ADMIN — SIMVASTATIN 5 MG: 20 TABLET, FILM COATED ORAL at 21:28

## 2018-04-21 RX ADMIN — INSULIN HUMAN 3 UNITS: 100 INJECTION, SOLUTION PARENTERAL at 21:25

## 2018-04-21 RX ADMIN — INSULIN GLARGINE 7 UNITS: 100 INJECTION, SOLUTION SUBCUTANEOUS at 21:24

## 2018-04-21 RX ADMIN — HEPARIN SODIUM 5000 UNITS: 5000 INJECTION, SOLUTION INTRAVENOUS; SUBCUTANEOUS at 21:28

## 2018-04-21 RX ADMIN — AMPICILLIN SODIUM AND SULBACTAM SODIUM 3 G: 2; 1 INJECTION, POWDER, FOR SOLUTION INTRAMUSCULAR; INTRAVENOUS at 12:07

## 2018-04-21 RX ADMIN — HYDROCODONE BITARTRATE AND ACETAMINOPHEN 1 TABLET: 5; 325 TABLET ORAL at 11:13

## 2018-04-21 RX ADMIN — INSULIN HUMAN 2 UNITS: 100 INJECTION, SOLUTION PARENTERAL at 17:59

## 2018-04-21 RX ADMIN — AMLODIPINE BESYLATE 5 MG: 5 TABLET ORAL at 08:29

## 2018-04-21 RX ADMIN — INSULIN HUMAN 2 UNITS: 100 INJECTION, SOLUTION PARENTERAL at 05:16

## 2018-04-21 RX ADMIN — CARVEDILOL 6.25 MG: 6.25 TABLET, FILM COATED ORAL at 08:29

## 2018-04-21 RX ADMIN — SODIUM CHLORIDE: 900 INJECTION INTRAVENOUS at 17:52

## 2018-04-21 RX ADMIN — CARVEDILOL 6.25 MG: 6.25 TABLET, FILM COATED ORAL at 18:02

## 2018-04-21 RX ADMIN — HEPARIN SODIUM 5000 UNITS: 5000 INJECTION, SOLUTION INTRAVENOUS; SUBCUTANEOUS at 05:23

## 2018-04-21 RX ADMIN — INSULIN HUMAN 3 UNITS: 100 INJECTION, SOLUTION PARENTERAL at 12:13

## 2018-04-21 RX ADMIN — AMLODIPINE BESYLATE 5 MG: 5 TABLET ORAL at 14:44

## 2018-04-21 RX ADMIN — AMPICILLIN SODIUM AND SULBACTAM SODIUM 3 G: 2; 1 INJECTION, POWDER, FOR SOLUTION INTRAMUSCULAR; INTRAVENOUS at 17:55

## 2018-04-21 RX ADMIN — HEPARIN SODIUM 5000 UNITS: 5000 INJECTION, SOLUTION INTRAVENOUS; SUBCUTANEOUS at 14:44

## 2018-04-21 RX ADMIN — SODIUM CHLORIDE: 900 INJECTION INTRAVENOUS at 02:55

## 2018-04-21 ASSESSMENT — ENCOUNTER SYMPTOMS
HEADACHES: 0
NERVOUS/ANXIOUS: 0
MEMORY LOSS: 0
FOCAL WEAKNESS: 0
BLURRED VISION: 0
FEVER: 0
DEPRESSION: 0
WEAKNESS: 1
BACK PAIN: 0
MYALGIAS: 1
TREMORS: 0
CHILLS: 0
FLANK PAIN: 0
PALPITATIONS: 0
SHORTNESS OF BREATH: 0
ABDOMINAL PAIN: 0

## 2018-04-21 ASSESSMENT — PAIN SCALES - GENERAL
PAINLEVEL_OUTOF10: 0
PAINLEVEL_OUTOF10: 8
PAINLEVEL_OUTOF10: 0

## 2018-04-21 NOTE — PROGRESS NOTES
[facility]    Date of Service: 2018    Chief Complaint  52 y.o. female admitted 2018 with labial abscess s/p packing on IV abx.    Interval Problem Update  Denies nausea today  Headache resolved      Consultants/Specialty  gyn    Disposition  tbd        Review of Systems   Constitutional: Negative for chills and fever.   HENT: Negative for congestion and hearing loss.    Eyes: Negative for blurred vision.   Respiratory: Negative for shortness of breath.    Cardiovascular: Negative for palpitations and leg swelling.   Gastrointestinal: Negative for abdominal pain.   Genitourinary: Negative for dysuria, flank pain and urgency.   Musculoskeletal: Positive for myalgias. Negative for back pain.   Neurological: Positive for weakness. Negative for tremors, focal weakness and headaches.   Psychiatric/Behavioral: Negative for depression and memory loss. The patient is not nervous/anxious.       Physical Exam  Laboratory/Imaging   Hemodynamics  Temp (24hrs), Av.3 °C (97.3 °F), Min:36.1 °C (96.9 °F), Max:36.6 °C (97.8 °F)   Temperature: 36.4 °C (97.5 °F)  Pulse  Av.5  Min: 77  Max: 103    Blood Pressure: 154/76      Respiratory      Respiration: 17, Pulse Oximetry: 96 %             Fluids    Intake/Output Summary (Last 24 hours) at 18 1331  Last data filed at 18 0900   Gross per 24 hour   Intake             3480 ml   Output                0 ml   Net             3480 ml       Nutrition  Orders Placed This Encounter   Procedures   • DIET ORDER     Standing Status:   Standing     Number of Occurrences:   1     Order Specific Question:   Diet:     Answer:   Diabetic [3]     Order Specific Question:   Electrolyte modifications:     Answer:   No Added Salt [2]     Physical Exam   Constitutional: She is oriented to person, place, and time. She appears well-nourished. No distress.   HENT:   Head: Normocephalic and atraumatic.   Nose: Nose normal.   Eyes: EOM are normal. Pupils are  equal, round, and reactive to light.   Neck: Neck supple. No JVD present.   Cardiovascular: Normal rate and intact distal pulses.    Pulmonary/Chest: Effort normal and breath sounds normal. No respiratory distress.   Abdominal: Soft. Bowel sounds are normal. She exhibits no distension. There is no tenderness.   Genitourinary:   Genitourinary Comments: Labial dressing c/d/i   Musculoskeletal: She exhibits edema.   Neurological: She is alert and oriented to person, place, and time. No cranial nerve deficit. Coordination normal.   Skin: Skin is warm and dry. There is erythema.   Labial opening, with pustular discharge, packing in place   Psychiatric: She has a normal mood and affect. Her behavior is normal. Judgment and thought content normal.   Nursing note and vitals reviewed.      Recent Labs      04/19/18 0319 04/21/18   0421   WBC  9.4  9.4   RBC  3.90*  3.76*   HEMOGLOBIN  11.1*  10.9*   HEMATOCRIT  35.2*  34.7*   MCV  90.3  92.3   MCH  28.5  29.0   MCHC  31.5*  31.4*   RDW  42.0  43.9   PLATELETCT  265  247   MPV  11.1  11.0     Recent Labs      04/19/18   0319  04/21/18   0421   SODIUM  136  135   POTASSIUM  3.8  4.3   CHLORIDE  105  107   CO2  22  19*   GLUCOSE  180*  168*   BUN  18  13   CREATININE  1.48*  0.82   CALCIUM  7.9*  7.9*                      Assessment/Plan     * Groin abscess   Assessment & Plan    Left groin abscess extending down to the left labia, with induration, fluctuance, foul smelling drainage. Noted about 5 days ago, was seen at [facility] and started on oral abx, however abscess not getting better, rather draining now. Referred to the ER from [facility] clinic today  Continues to have pain and drainage  Gynecology consulted, appreciate input    SCD for dvt ppx  Not septic a the time of admission  Monitor labs   Lactates q2hr  Wound cx + GBS  Cont IV unasyn   - dc vancomycin          ANOOP (acute kidney injury) (CMS-HCC)   Assessment & Plan    Unknown if patient has underlying CKD, however  at this time Cr elevated >2, patient has hx of DM  2nd to dehydration  improving  Avoid nephrotoxics,   Caution with vancomycin, monitor renal function ,renally dose all meds  Hold home metformin  - cont ivf        Hypertension- (present on admission)   Assessment & Plan    Chronic, uncontrolled, monitor  With ARF, hold arb  - coreg  -Increase Norvasc        High cholesterol- (present on admission)   Assessment & Plan    Chronic and stable  Continue statin        Tension type headache   Assessment & Plan    Secondary to hypertension  Continue blood pressure control  Resolved        DM (diabetes mellitus) (CMS-[facility])   Assessment & Plan    Uncontrolled with hyperglycemia, BS > 200 on admission, no gap  As below  Bicarb of 19, monitor, no anion gap  Nausea/vomiting resolved        Obesity- (present on admission)   Assessment & Plan    Nutrition consult  Uncontrolled Dm,  A1c 16  Adjust ssi  Increase lantus  Monitor  Counseled on diet and modification          Quality-Core Measures   Reviewed items::  Medications reviewed, Labs reviewed and Radiology images reviewed  DVT prophylaxis - mechanical:  SCDs  Ulcer Prophylaxis::  Not indicated  Antibiotics:  Treating active infection/contamination beyond 24 hours perioperative coverage

## 2018-04-21 NOTE — PROGRESS NOTES
"Gynecology consult progress Note    [facility] day # 4  Chief Admitting Dx: Abscess  Elevated blood sugar         Subjective:    Doing well.  Feeling better.  Tolerating packing and wound care.  No nausea/vomiting/fevers/chills/night sweats.  States that there is less \"pus\" coming out of the wound and less pain in the area.  Discussed using an ice pack to the area to help with the swelling and to help soothe the area (as she is unable to do sitz baths here in the hospital).    Vitals:    04/21/18 0511 04/21/18 0811 04/21/18 0918 04/21/18 1219   BP: 151/77 (!) 175/86 136/80 154/76   Pulse: 89 96 96 91   Resp:  17     Temp:  36.4 °C (97.5 °F)     TempSrc:       SpO2:  96%     Weight:       Height:           Exam:    A, A, and O x 3 NAD    Perineum: patient able to be examined at the bedside - left mons pubis abscess site - packing in place - 1 cm opening with serosanguinous drainage noted.  Still indurated and edematous.  No erythema noted.  Much less tender to palpation.    Labs:   Recent Results (from the past 24 hour(s))   ACCU-CHEK GLUCOSE    Collection Time: 04/20/18  5:30 PM   Result Value Ref Range    Glucose - Accu-Ck 153 (H) 65 - 99 mg/dL   ACCU-CHEK GLUCOSE    Collection Time: 04/20/18  9:27 PM   Result Value Ref Range    Glucose - Accu-Ck 195 (H) 65 - 99 mg/dL   CBC WITH DIFFERENTIAL    Collection Time: 04/21/18  4:21 AM   Result Value Ref Range    WBC 9.4 4.8 - 10.8 K/uL    RBC 3.76 (L) 4.20 - 5.40 M/uL    Hemoglobin 10.9 (L) 12.0 - 16.0 g/dL    Hematocrit 34.7 (L) 37.0 - 47.0 %    MCV 92.3 81.4 - 97.8 fL    MCH 29.0 27.0 - 33.0 pg    MCHC 31.4 (L) 33.6 - 35.0 g/dL    RDW 43.9 35.9 - 50.0 fL    Platelet Count 247 164 - 446 K/uL    MPV 11.0 9.0 - 12.9 fL    Nucleated RBC 0.20 /100 WBC    NRBC (Absolute) 0.02 K/uL    Neutrophils-Polys 68.60 44.00 - 72.00 %    Lymphocytes 21.20 (L) 22.00 - 41.00 %    Monocytes 3.40 0.00 - 13.40 %    Eosinophils 2.50 0.00 - 6.90 %    Basophils 0.90 0.00 - 1.80 " %    Neutrophils (Absolute) 6.61 2.00 - 7.15 K/uL    Lymphs (Absolute) 1.99 1.00 - 4.80 K/uL    Monos (Absolute) 0.32 0.00 - 0.85 K/uL    Eos (Absolute) 0.24 0.00 - 0.51 K/uL    Baso (Absolute) 0.08 0.00 - 0.12 K/uL   COMP METABOLIC PANEL    Collection Time: 04/21/18  4:21 AM   Result Value Ref Range    Sodium 135 135 - 145 mmol/L    Potassium 4.3 3.6 - 5.5 mmol/L    Chloride 107 96 - 112 mmol/L    Co2 19 (L) 20 - 33 mmol/L    Anion Gap 9.0 0.0 - 11.9    Glucose 168 (H) 65 - 99 mg/dL    Bun 13 8 - 22 mg/dL    Creatinine 0.82 0.50 - 1.40 mg/dL    Calcium 7.9 (L) 8.5 - 10.5 mg/dL    AST(SGOT) 20 12 - 45 U/L    ALT(SGPT) 13 2 - 50 U/L    Alkaline Phosphatase 70 30 - 99 U/L    Total Bilirubin 0.9 0.1 - 1.5 mg/dL    Albumin 2.8 (L) 3.2 - 4.9 g/dL    Total Protein 7.1 6.0 - 8.2 g/dL    Globulin 4.3 (H) 1.9 - 3.5 g/dL    A-G Ratio 0.7 g/dL   ESTIMATED GFR    Collection Time: 04/21/18  4:21 AM   Result Value Ref Range    GFR If African American >60 >60 mL/min/1.73 m 2    GFR If Non African American >60 >60 mL/min/1.73 m 2   DIFFERENTIAL MANUAL    Collection Time: 04/21/18  4:21 AM   Result Value Ref Range    Bands-Stabs 1.70 0.00 - 10.00 %    Myelocytes 1.70 %    Manual Diff Status PERFORMED    PERIPHERAL SMEAR REVIEW    Collection Time: 04/21/18  4:21 AM   Result Value Ref Range    Peripheral Smear Review see below    PLATELET ESTIMATE    Collection Time: 04/21/18  4:21 AM   Result Value Ref Range    Plt Estimation Normal    MORPHOLOGY    Collection Time: 04/21/18  4:21 AM   Result Value Ref Range    RBC Morphology Normal    ACCU-CHEK GLUCOSE    Collection Time: 04/21/18  5:13 AM   Result Value Ref Range    Glucose - Accu-Ck 153 (H) 65 - 99 mg/dL   ACCU-CHEK GLUCOSE    Collection Time: 04/21/18 12:12 PM   Result Value Ref Range    Glucose - Accu-Ck 203 (H) 65 - 99 mg/dL     Results for MERY MORA (MRN [id number]) as of 4/21/2018 13:20   Ref. Range 4/20/2018 10:28   Significant Indicator Unknown NEG   Site Unknown  PERIPHERAL   Source Unknown BLD       Assessment:  Continue current timmy cares/mons pubis abscess cares per wound care team - thank you for your excellent care.  Infection improving.  Pain under better control.  Mons pubis still indurated.    Plan:    Continue IV antibiotics.  Home health to be coordinated to assist in changing the packing.  Will follow up in my office in 1-2 weeks to establish care.  Will follow until discharge.  Ice packs to the area.  Patient may use lidocaine jelly as needed for pain control.  Recommend sitz baths if possible while in the hospital for pain control.    Glendy Man M.D.

## 2018-04-21 NOTE — CARE PLAN
Problem: Venous Thromboembolism (VTW)/Deep Vein Thrombosis (DVT) Prevention:  Goal: Patient will participate in Venous Thrombosis (VTE)/Deep Vein Thrombosis (DVT)Prevention Measures   04/21/18 0141   OTHER   Pharmacologic Prophylaxis Used Unfractionated Heparin       Problem: Pain Management  Goal: Pain level will decrease to patient's comfort goal    Intervention: Follow pain managment plan developed in collaboration with patient and Interdisciplinary Team  Denied any pain

## 2018-04-21 NOTE — FACE TO FACE
Face to Face Supporting Documentation - Home Health    The encounter with this patient was in whole or in part the primary reason for home health admission.    Date of encounter:   Patient:                    MRN:                       YOB: 2020  Cyndee Calvert  [id number]  1965     Home health to see patient for:  Wound Care    Skilled need for:  New Onset Medical Diagnosis labial abscess    Skilled nursing interventions to include:  Wound Care    Homebound status evidenced by:  Needs the assistance of another person in order to leave the home. Leaving home requires a considerable and taxing effort. There is a normal inability to leave the home.    Community Physician to provide follow up care: Pcp Pt States None     Optional Interventions? No      I certify the face to face encounter for this home health care referral meets the CMS requirements and the encounter/clinical assessment with the patient was, in whole, or in part, for the medical condition(s) listed above, which is the primary reason for home health care. Based on my clinical findings: the service(s) are medically necessary, support the need for home health care, and the homebound criteria are met.  I certify that this patient has had a face to face encounter by myself.  Rylee Portillo D.O. - NPI: [id number]

## 2018-04-21 NOTE — WOUND TEAM
"[facility]  Inpatient Services  Wound and Skin Care Progress Note    Admission Date:   04/18/2018.  HPI, PMH, SH: Reviewed  Unit where seen by Wound Team: [facility].    WOUND CONSULT RELATED TO: Left mons pubis abscess.    SUBJECTIVE:  \"I might go home Monday\"    Self Report / Pain Level: with packing    OBJECTIVE: Agreeable despite pain. Pt in bed, ambulating to bathroom.  Pt packing in place.    WOUND TYPE, LOCATION, CHARACTERISTICS (Pressure ulcers: location, stage, POA or date identified)    Location and type of wound: Left mons pubis: Full thickness wound related to infection/abscess.         Periwound:     Discolored, induration to mons, left labia,   Drainage:     Mod serosanguinous.  Tissue Type and %:    Only visualize red from small portal.  Wound Edges:    Open, detached  Odor:      Slight malodorous.   Exposed structure(s):  None.  S&S of Infection:   odorous, induration      Measurements:   04/19/2018  Length:     1.5 cm  Width:      0.8 cm  Depth:      1.0  6 oclock   6.0  9 oclock   1.0      INTERVENTIONS BY WOUND TEAM:  Dressing removed.  Did not use lidocaine gel today but nursing is ordering more for next dressing change.  Area cleaned with NS and gauze.   Dressed wound with silver strip packing, menstrual pad and pt refused hospital briefs.  If drainage continues to decrease the silver strip packing can be left in place for 48 hours    Interdisciplinary consultation: Patient and nursing .      EVALUATION: silver strip packing provides to wick drainage amd provide topical antimicrobial treatment.     Factors affecting wound healing: Diabetes. Infection.   Goals: Manage exudate and provide topical antimicrobial treatment.     NURSING PLAN OF CARE ORDERS (X):    Dressing changes: See Dressing Maintenance orders: X  Skin care: See Skin Care orders:   Rectal tube care: See Rectal Tube Care orders:   Other orders:    RSKIN: CURRENT (X) ORDERED (O)  Q shift Davis:  X  Q shift pressure " point assessments:  X  Pressure redistribution mattress      X  LUISITO      Bariatric LUISITO      Bariatric foam        Heel float boots       Heels floated on pillows      Barrier wipes      Barrier Cream      Barrier paste      Sacral silicone dressing      Silicone O2 tubing      Anchorfast      Trach with Optifoam split foam       Waffle cushion      Rectal tube or BMS      Antifungal tx    Turn q 2 hours   X  Up to chair     Ambulate   PT/OT     Dietician      PO  X   TF   TPN     PVN    NPO   # days   Other       WOUND TEAM PLAN OF CARE (X):   NPWT change 3 x week:        Dressing changes by wound team:     X  Follow up as needed:       Other (explain):    Anticipated discharge plans (X): To be determined.   SNF:           Home Care:      X     Outpatient Wound Center:            Self Care:            Other:       Anticipate pt will need ongoing wound care upon discharge from acute care.  Recommend HH

## 2018-04-21 NOTE — PROGRESS NOTES
Patient is AOx4, complains of 8/10 headache, declines pharmacological intervention.  /88, medicated with scheduled BP medications.  Assessment complete, on room air.  No bed alarm, pt is no risk per Bowen Chris fall risk assessment.  Bed locked in lowest position, treaded socks in place, call light within reach, all needs met at this time.

## 2018-04-22 LAB
ANION GAP SERPL CALC-SCNC: 6 MMOL/L (ref 0–11.9)
BUN SERPL-MCNC: 11 MG/DL (ref 8–22)
CALCIUM SERPL-MCNC: 7.8 MG/DL (ref 8.5–10.5)
CHLORIDE SERPL-SCNC: 109 MMOL/L (ref 96–112)
CO2 SERPL-SCNC: 21 MMOL/L (ref 20–33)
CREAT SERPL-MCNC: 1.02 MG/DL (ref 0.5–1.4)
GLUCOSE BLD-MCNC: 187 MG/DL (ref 65–99)
GLUCOSE BLD-MCNC: 201 MG/DL (ref 65–99)
GLUCOSE BLD-MCNC: 211 MG/DL (ref 65–99)
GLUCOSE BLD-MCNC: 246 MG/DL (ref 65–99)
GLUCOSE SERPL-MCNC: 189 MG/DL (ref 65–99)
POTASSIUM SERPL-SCNC: 4.1 MMOL/L (ref 3.6–5.5)
SODIUM SERPL-SCNC: 136 MMOL/L (ref 135–145)

## 2018-04-22 PROCEDURE — 770006 HCHG ROOM/CARE - MED/SURG/GYN SEMI*

## 2018-04-22 PROCEDURE — 700105 HCHG RX REV CODE 258: Performed by: HOSPITALIST

## 2018-04-22 PROCEDURE — A9270 NON-COVERED ITEM OR SERVICE: HCPCS | Performed by: HOSPITALIST

## 2018-04-22 PROCEDURE — 36415 COLL VENOUS BLD VENIPUNCTURE: CPT

## 2018-04-22 PROCEDURE — 700111 HCHG RX REV CODE 636 W/ 250 OVERRIDE (IP): Performed by: INTERNAL MEDICINE

## 2018-04-22 PROCEDURE — 700102 HCHG RX REV CODE 250 W/ 637 OVERRIDE(OP): Performed by: HOSPITALIST

## 2018-04-22 PROCEDURE — 700102 HCHG RX REV CODE 250 W/ 637 OVERRIDE(OP): Performed by: INTERNAL MEDICINE

## 2018-04-22 PROCEDURE — 700105 HCHG RX REV CODE 258: Performed by: INTERNAL MEDICINE

## 2018-04-22 PROCEDURE — A9270 NON-COVERED ITEM OR SERVICE: HCPCS | Performed by: INTERNAL MEDICINE

## 2018-04-22 PROCEDURE — 99233 SBSQ HOSP IP/OBS HIGH 50: CPT | Performed by: INTERNAL MEDICINE

## 2018-04-22 PROCEDURE — 82962 GLUCOSE BLOOD TEST: CPT

## 2018-04-22 PROCEDURE — 80048 BASIC METABOLIC PNL TOTAL CA: CPT

## 2018-04-22 RX ORDER — INSULIN GLARGINE 100 [IU]/ML
10 INJECTION, SOLUTION SUBCUTANEOUS EVERY EVENING
Status: DISCONTINUED | OUTPATIENT
Start: 2018-04-22 | End: 2018-04-27 | Stop reason: HOSPADM

## 2018-04-22 RX ORDER — AMOXICILLIN AND CLAVULANATE POTASSIUM 875; 125 MG/1; MG/1
1 TABLET, FILM COATED ORAL EVERY 12 HOURS
Status: DISCONTINUED | OUTPATIENT
Start: 2018-04-22 | End: 2018-04-27 | Stop reason: HOSPADM

## 2018-04-22 RX ORDER — VALSARTAN 80 MG/1
40 TABLET ORAL DAILY
Status: DISCONTINUED | OUTPATIENT
Start: 2018-04-22 | End: 2018-04-27 | Stop reason: HOSPADM

## 2018-04-22 RX ADMIN — AMOXICILLIN AND CLAVULANATE POTASSIUM 1 TABLET: 875; 125 TABLET, FILM COATED ORAL at 21:45

## 2018-04-22 RX ADMIN — HEPARIN SODIUM 5000 UNITS: 5000 INJECTION, SOLUTION INTRAVENOUS; SUBCUTANEOUS at 05:26

## 2018-04-22 RX ADMIN — AMLODIPINE BESYLATE 10 MG: 10 TABLET ORAL at 08:28

## 2018-04-22 RX ADMIN — INSULIN HUMAN 2 UNITS: 100 INJECTION, SOLUTION PARENTERAL at 05:24

## 2018-04-22 RX ADMIN — AMPICILLIN SODIUM AND SULBACTAM SODIUM 3 G: 2; 1 INJECTION, POWDER, FOR SOLUTION INTRAMUSCULAR; INTRAVENOUS at 12:25

## 2018-04-22 RX ADMIN — INSULIN HUMAN 3 UNITS: 100 INJECTION, SOLUTION PARENTERAL at 17:39

## 2018-04-22 RX ADMIN — AMPICILLIN SODIUM AND SULBACTAM SODIUM 3 G: 2; 1 INJECTION, POWDER, FOR SOLUTION INTRAMUSCULAR; INTRAVENOUS at 00:06

## 2018-04-22 RX ADMIN — INSULIN GLARGINE 10 UNITS: 100 INJECTION, SOLUTION SUBCUTANEOUS at 21:43

## 2018-04-22 RX ADMIN — SODIUM CHLORIDE: 900 INJECTION INTRAVENOUS at 05:21

## 2018-04-22 RX ADMIN — VALSARTAN 40 MG: 80 TABLET ORAL at 17:31

## 2018-04-22 RX ADMIN — CARVEDILOL 6.25 MG: 6.25 TABLET, FILM COATED ORAL at 17:31

## 2018-04-22 RX ADMIN — HYDROCODONE BITARTRATE AND ACETAMINOPHEN 1 TABLET: 5; 325 TABLET ORAL at 09:27

## 2018-04-22 RX ADMIN — HEPARIN SODIUM 5000 UNITS: 5000 INJECTION, SOLUTION INTRAVENOUS; SUBCUTANEOUS at 21:43

## 2018-04-22 RX ADMIN — HEPARIN SODIUM 5000 UNITS: 5000 INJECTION, SOLUTION INTRAVENOUS; SUBCUTANEOUS at 15:03

## 2018-04-22 RX ADMIN — CARVEDILOL 6.25 MG: 6.25 TABLET, FILM COATED ORAL at 08:27

## 2018-04-22 RX ADMIN — INSULIN HUMAN 3 UNITS: 100 INJECTION, SOLUTION PARENTERAL at 12:28

## 2018-04-22 RX ADMIN — INSULIN HUMAN 3 UNITS: 100 INJECTION, SOLUTION PARENTERAL at 21:44

## 2018-04-22 RX ADMIN — AMPICILLIN SODIUM AND SULBACTAM SODIUM 3 G: 2; 1 INJECTION, POWDER, FOR SOLUTION INTRAMUSCULAR; INTRAVENOUS at 05:30

## 2018-04-22 RX ADMIN — SIMVASTATIN 5 MG: 20 TABLET, FILM COATED ORAL at 21:44

## 2018-04-22 ASSESSMENT — ENCOUNTER SYMPTOMS
NERVOUS/ANXIOUS: 0
FOCAL WEAKNESS: 0
TREMORS: 0
DIZZINESS: 0
HEADACHES: 0
FLANK PAIN: 0
ABDOMINAL PAIN: 0
PALPITATIONS: 0
SHORTNESS OF BREATH: 0
BACK PAIN: 0
FEVER: 0
MYALGIAS: 1
WEAKNESS: 1
MEMORY LOSS: 0
NAUSEA: 0

## 2018-04-22 ASSESSMENT — PAIN SCALES - GENERAL
PAINLEVEL_OUTOF10: 0
PAINLEVEL_OUTOF10: 0

## 2018-04-22 NOTE — WOUND TEAM
"[facility]  Inpatient Services  Wound and Skin Care Progress Note    Admission Date:   04/18/2018.  HPI, PMH, SH: Reviewed  Unit where seen by Wound Team: [facility].    WOUND CONSULT RELATED TO: daily dressing change    SUBJECTIVE:  \" I'm supposed to go home tomorrow.\"    Self Report / Pain Level: states 8/10 with oral pre-medication    OBJECTIVE:  Packing in place, patient agreeable to dressing change.    WOUND TYPE, LOCATION, CHARACTERISTICS (Pressure ulcers: location, stage, POA or date identified)    Location and type of wound: Left mons pubis: Full thickness wound related to infection/abscess.         Periwound:     Discolored, induration to mons, left labia,   Drainage:     Mod serosanguinous.  Tissue Type and %:    Only visualize red from small portal.  Wound Edges:    Open, detached  Odor:      none.   Exposed structure(s):  None.  S&S of Infection:   induration      Measurements:   04/19/2018  Length:     1.5 cm  Width:      0.8 cm  Depth:      1.0  6 oclock   6.0  9 oclock   1.0      INTERVENTIONS BY WOUND TEAM:  Dressing removed.  Patient did not request lidocaine gel.  Area cleaned with NS and gauze. Gently packed wound with silver strip packing, ABD pad placed into groin, patient refusing timmy pad and underwear.     Interdisciplinary consultation: Patient and nursing .      EVALUATION: Silver strip packing providing drainage management to wick drainage and provide topical antimicrobial treatment.     Factors affecting wound healing: Diabetes. Infection.   Goals: decrease depth by 2% each week.     NURSING PLAN OF CARE ORDERS (X):    Dressing changes: See Dressing Maintenance orders: X  Skin care: See Skin Care orders:   Rectal tube care: See Rectal Tube Care orders:   Other orders:    WOUND TEAM PLAN OF CARE (X):   NPWT change 3 x week:        Dressing changes by wound team:       Follow up as needed: X      Other (explain):    Anticipated discharge plans (X): To be determined. "   SNF:           Home Care:      X     Outpatient Wound Center:            Self Care:            Other:       Anticipate pt will need ongoing wound care upon discharge from acute care.  Recommend HH

## 2018-04-22 NOTE — CARE PLAN
Problem: Infection  Goal: Will remain free from infection  Outcome: PROGRESSING AS EXPECTED  No s/s of new or worsening infection. Vital signs stable. Abx administered as ordered, pt tolerating well.    Problem: Discharge Barriers/Planning  Goal: Patient's continuum of care needs will be met  Outcome: PROGRESSING AS EXPECTED  RN encouraged pt to verbalize anxieties, fears, concerns. Cares and procedures explained. Pt questions addressed.

## 2018-04-22 NOTE — PROGRESS NOTES
Charge RN informed that this pt has to move to different room.  This RN woke pt up and informed pt re: moving to different room.  Charge RN also came and talked to pt.  After pt was moved, she verbalized that she is irritable about moving middle of night.  Explained to pt again that new pt needs desk bed.  LAST used.

## 2018-04-22 NOTE — DISCHARGE PLANNING
JACEY received home health order from MD. JACEY met with pt at bedside and explained home health order. SW provided pt with a choice form and pt chose [facility]. Pt states she sees her PCP at [facility] and is unsure of the spelling of her doctors name. She thinks its Dr. Cedeño. JACEY faxed choice form to Emeka DURBIN.

## 2018-04-22 NOTE — PROGRESS NOTES
[facility]    Date of Service: 2018    Chief Complaint  52 y.o. female admitted 2018 with labial abscess s/p packing on IV abx.    Interval Problem Update  Reports minimal cough  Denies shortness of breath  Decreasing labial discharge      Consultants/Specialty  gyn    Disposition  To home in 1-2 days with clinical improvement  Home health ordered        Review of Systems   Constitutional: Negative for fever and malaise/fatigue.   HENT: Negative for congestion and hearing loss.    Respiratory: Negative for shortness of breath.    Cardiovascular: Negative for palpitations and leg swelling.   Gastrointestinal: Negative for abdominal pain and nausea.   Genitourinary: Negative for dysuria, flank pain and urgency.   Musculoskeletal: Positive for myalgias. Negative for back pain.   Neurological: Positive for weakness. Negative for dizziness, tremors, focal weakness and headaches.   Psychiatric/Behavioral: Negative for memory loss. The patient is not nervous/anxious.       Physical Exam  Laboratory/Imaging   Hemodynamics  Temp (24hrs), Av.4 °C (97.5 °F), Min:36.3 °C (97.4 °F), Max:36.6 °C (97.8 °F)   Temperature: 36.6 °C (97.8 °F)  Pulse  Av.8  Min: 74  Max: 103    Blood Pressure: 155/74      Respiratory      Respiration: 17, Pulse Oximetry: 95 %             Fluids    Intake/Output Summary (Last 24 hours) at 18 1539  Last data filed at 18 0645   Gross per 24 hour   Intake             2720 ml   Output                0 ml   Net             2720 ml       Nutrition  Orders Placed This Encounter   Procedures   • DIET ORDER     Standing Status:   Standing     Number of Occurrences:   1     Order Specific Question:   Diet:     Answer:   Diabetic [3]     Order Specific Question:   Electrolyte modifications:     Answer:   No Added Salt [2]     Physical Exam   Constitutional: She is oriented to person, place, and time. No distress.   HENT:   Head: Normocephalic and atraumatic.    Mouth/Throat: No oropharyngeal exudate.   Eyes: EOM are normal. Pupils are equal, round, and reactive to light. No scleral icterus.   Neck: Neck supple.   Cardiovascular: Normal rate and intact distal pulses.    Pulmonary/Chest: Effort normal and breath sounds normal. No respiratory distress. She has no wheezes.   Abdominal: Soft. Bowel sounds are normal. She exhibits no distension.   Genitourinary:   Genitourinary Comments: Labial dressing c/d/i   Musculoskeletal: She exhibits edema.   Neurological: She is alert and oriented to person, place, and time. No cranial nerve deficit. She exhibits normal muscle tone.   Skin: Skin is warm and dry. She is not diaphoretic. There is erythema.   Labial opening, with pustular discharge, packing in place   Psychiatric: She has a normal mood and affect. Her behavior is normal. Judgment and thought content normal.   Nursing note and vitals reviewed.      Recent Labs      04/21/18   0421   WBC  9.4   RBC  3.76*   HEMOGLOBIN  10.9*   HEMATOCRIT  34.7*   MCV  92.3   MCH  29.0   MCHC  31.4*   RDW  43.9   PLATELETCT  247   MPV  11.0     Recent Labs      04/21/18   0421  04/22/18   0328   SODIUM  135  136   POTASSIUM  4.3  4.1   CHLORIDE  107  109   CO2  19*  21   GLUCOSE  168*  189*   BUN  13  11   CREATININE  0.82  1.02   CALCIUM  7.9*  7.8*                      Assessment/Plan     * Groin abscess   Assessment & Plan    Left groin abscess extending down to the left labia, with induration, fluctuance, foul smelling drainage. Noted about 5 days ago, was seen at [facility] and started on oral abx, however abscess not getting better, rather draining now. Referred to the ER from [facility] today  Continues to have pain and drainage  Gynecology consult, her  , Decreased discharge, plan for continued wound care outpatient      SCD for dvt ppx  Not septic a the time of admission  Monitor labs     Wound cx + GBS  Dc unasyn , change to augmentin  - dc vancomycin          ANOOP (acute kidney  injury) (CMS-HCC)   Assessment & Plan    Unknown if patient has underlying CKD, however at this time Cr elevated >2, patient has hx of DM  2nd to dehydration  improving  Avoid nephrotoxics,   Caution with vancomycin, monitor renal function ,renally dose all meds  Hold home metformin  - dc ivf        Hypertension- (present on admission)   Assessment & Plan    Chronic, uncontrolled, monitor  With ARF,   - restart  arb  - coreg  -Increase Norvasc        High cholesterol- (present on admission)   Assessment & Plan    Chronic and stable  Continue statin        Tension type headache   Assessment & Plan    Secondary to hypertension  Continue blood pressure control  Resolved        DM (diabetes mellitus) (CMS-HCC)   Assessment & Plan    Uncontrolled with hyperglycemia, BS > 200 on admission, no gap  As below  Acidosis resolved  Nausea/vomiting resolved        Obesity- (present on admission)   Assessment & Plan    Nutrition consult  Uncontrolled Dm,  A1c 16  Adjust ssi  Increase lantus  Monitor  Counseled on diet and modification          Quality-Core Measures   Reviewed items::  Medications reviewed, Labs reviewed and Radiology images reviewed  DVT prophylaxis - mechanical:  SCDs  Ulcer Prophylaxis::  Not indicated  Antibiotics:  Treating active infection/contamination beyond 24 hours perioperative coverage

## 2018-04-22 NOTE — CARE PLAN
Problem: Safety  Goal: Will remain free from injury    Intervention: Provide assistance with mobility  Calls for assist.        Problem: Pain Management  Goal: Pain level will decrease to patient's comfort goal    Intervention: Follow pain managment plan developed in collaboration with patient and Interdisciplinary Team  Declined to take pain med

## 2018-04-22 NOTE — CARE PLAN
Problem: Safety  Goal: Will remain free from falls  Outcome: PROGRESSING AS EXPECTED  Bed locked in lowest position, treaded socks in place, call light within reach.  No bed alarm, patient up self, steady.    Problem: Infection  Goal: Will remain free from infection  Outcome: PROGRESSING AS EXPECTED  Antibiotics administered per MAR.  Handwashing performed before and after patient contact.

## 2018-04-22 NOTE — DISCHARGE PLANNING
Received choice form from [facility] for HH. Referral sent to Bridget at home at 1506.

## 2018-04-23 ENCOUNTER — HOME HEALTH ADMISSION (OUTPATIENT)
Dept: HOME HEALTH SERVICES | Facility: HOME HEALTHCARE | Age: 53
End: 2018-04-23
Payer: COMMERCIAL

## 2018-04-23 LAB
ANION GAP SERPL CALC-SCNC: 6 MMOL/L (ref 0–11.9)
BACTERIA BLD CULT: NORMAL
BUN SERPL-MCNC: 11 MG/DL (ref 8–22)
CALCIUM SERPL-MCNC: 8.3 MG/DL (ref 8.5–10.5)
CHLORIDE SERPL-SCNC: 108 MMOL/L (ref 96–112)
CO2 SERPL-SCNC: 22 MMOL/L (ref 20–33)
CREAT SERPL-MCNC: 0.87 MG/DL (ref 0.5–1.4)
GLUCOSE BLD-MCNC: 157 MG/DL (ref 65–99)
GLUCOSE BLD-MCNC: 159 MG/DL (ref 65–99)
GLUCOSE BLD-MCNC: 194 MG/DL (ref 65–99)
GLUCOSE SERPL-MCNC: 167 MG/DL (ref 65–99)
POTASSIUM SERPL-SCNC: 4 MMOL/L (ref 3.6–5.5)
SIGNIFICANT IND 70042: NORMAL
SITE SITE: NORMAL
SODIUM SERPL-SCNC: 136 MMOL/L (ref 135–145)
SOURCE SOURCE: NORMAL

## 2018-04-23 PROCEDURE — 700102 HCHG RX REV CODE 250 W/ 637 OVERRIDE(OP): Performed by: INTERNAL MEDICINE

## 2018-04-23 PROCEDURE — 770006 HCHG ROOM/CARE - MED/SURG/GYN SEMI*

## 2018-04-23 PROCEDURE — A9270 NON-COVERED ITEM OR SERVICE: HCPCS | Performed by: INTERNAL MEDICINE

## 2018-04-23 PROCEDURE — 36415 COLL VENOUS BLD VENIPUNCTURE: CPT

## 2018-04-23 PROCEDURE — 700102 HCHG RX REV CODE 250 W/ 637 OVERRIDE(OP): Performed by: HOSPITALIST

## 2018-04-23 PROCEDURE — A9270 NON-COVERED ITEM OR SERVICE: HCPCS | Performed by: HOSPITALIST

## 2018-04-23 PROCEDURE — 700111 HCHG RX REV CODE 636 W/ 250 OVERRIDE (IP): Performed by: INTERNAL MEDICINE

## 2018-04-23 PROCEDURE — 99233 SBSQ HOSP IP/OBS HIGH 50: CPT | Performed by: INTERNAL MEDICINE

## 2018-04-23 PROCEDURE — 80048 BASIC METABOLIC PNL TOTAL CA: CPT

## 2018-04-23 PROCEDURE — 82962 GLUCOSE BLOOD TEST: CPT | Mod: 91

## 2018-04-23 RX ADMIN — INSULIN HUMAN 2 UNITS: 100 INJECTION, SOLUTION PARENTERAL at 12:49

## 2018-04-23 RX ADMIN — AMOXICILLIN AND CLAVULANATE POTASSIUM 1 TABLET: 875; 125 TABLET, FILM COATED ORAL at 08:45

## 2018-04-23 RX ADMIN — SIMVASTATIN 5 MG: 20 TABLET, FILM COATED ORAL at 20:52

## 2018-04-23 RX ADMIN — HEPARIN SODIUM 5000 UNITS: 5000 INJECTION, SOLUTION INTRAVENOUS; SUBCUTANEOUS at 20:51

## 2018-04-23 RX ADMIN — INSULIN HUMAN 3 UNITS: 100 INJECTION, SOLUTION PARENTERAL at 20:50

## 2018-04-23 RX ADMIN — AMLODIPINE BESYLATE 10 MG: 10 TABLET ORAL at 08:45

## 2018-04-23 RX ADMIN — HYDROCODONE BITARTRATE AND ACETAMINOPHEN 1 TABLET: 5; 325 TABLET ORAL at 16:32

## 2018-04-23 RX ADMIN — VALSARTAN 40 MG: 80 TABLET ORAL at 08:46

## 2018-04-23 RX ADMIN — HYDROCODONE BITARTRATE AND ACETAMINOPHEN 1 TABLET: 5; 325 TABLET ORAL at 12:07

## 2018-04-23 RX ADMIN — INSULIN HUMAN 2 UNITS: 100 INJECTION, SOLUTION PARENTERAL at 18:03

## 2018-04-23 RX ADMIN — HEPARIN SODIUM 5000 UNITS: 5000 INJECTION, SOLUTION INTRAVENOUS; SUBCUTANEOUS at 05:12

## 2018-04-23 RX ADMIN — CARVEDILOL 6.25 MG: 6.25 TABLET, FILM COATED ORAL at 08:45

## 2018-04-23 RX ADMIN — HEPARIN SODIUM 5000 UNITS: 5000 INJECTION, SOLUTION INTRAVENOUS; SUBCUTANEOUS at 12:49

## 2018-04-23 RX ADMIN — CARVEDILOL 6.25 MG: 6.25 TABLET, FILM COATED ORAL at 16:34

## 2018-04-23 RX ADMIN — INSULIN HUMAN 2 UNITS: 100 INJECTION, SOLUTION PARENTERAL at 05:12

## 2018-04-23 RX ADMIN — INSULIN GLARGINE 10 UNITS: 100 INJECTION, SOLUTION SUBCUTANEOUS at 20:51

## 2018-04-23 RX ADMIN — AMOXICILLIN AND CLAVULANATE POTASSIUM 1 TABLET: 875; 125 TABLET, FILM COATED ORAL at 20:51

## 2018-04-23 ASSESSMENT — ENCOUNTER SYMPTOMS
HEADACHES: 0
COUGH: 0
PALPITATIONS: 0
VOMITING: 0
MYALGIAS: 1
WEAKNESS: 1
NERVOUS/ANXIOUS: 0
BACK PAIN: 0
SHORTNESS OF BREATH: 0
DIAPHORESIS: 0
FLANK PAIN: 0
DEPRESSION: 0
NAUSEA: 0
TREMORS: 0
ABDOMINAL PAIN: 0
FEVER: 0

## 2018-04-23 ASSESSMENT — PAIN SCALES - GENERAL
PAINLEVEL_OUTOF10: 7
PAINLEVEL_OUTOF10: 0
PAINLEVEL_OUTOF10: 0
PAINLEVEL_OUTOF10: 4
PAINLEVEL_OUTOF10: 0

## 2018-04-23 NOTE — PROGRESS NOTES
Patient a/ox4, lying in bed. No distress noted. All scheduled medications administered. IV abx discontinued and PO abx started. Pt educated on PO abx, verbalized understanding. IV fluids were also discontinued. Tolerating well. Assessment completed, no significant findings at this time. Abd pad applied to perineal area. Drainage still noted. Call light and personal belongings within reach, bed kept low, treaded socks on. Assisted as necessary. Kept rested and comfortable at all times. Hourly rounds in place.

## 2018-04-23 NOTE — CARE PLAN
Problem: Infection  Goal: Will remain free from infection  PO abx administered per MAR     Problem: Bowel/Gastric:  Goal: Normal bowel function is maintained or improved  Held senna. Pt reports two loose stools today.

## 2018-04-23 NOTE — DISCHARGE PLANNING
[facility] has received your referral. At this time this referral is being placed on hold for insurance verification. Patient also does not have a PCP. HH required that patient have a PCP to sign all HH orders. Please get this patient set up with the DC clinical within 48 hours of DC or HH cannot accept referral.     THank you

## 2018-04-23 NOTE — PROGRESS NOTES
[facility]ist Progress Note    Date of Service: 2018    Chief Complaint  52 y.o. female admitted 2018 with labial abscess s/p packing on IV abx.    Interval Problem Update  Minimal cough  Report improving labial pain      Consultants/Specialty  gyn    Disposition  To home in 1-2 days with clinical improvement  Wound care to educate patient's daughter to assist with dressing changes  Outpatient wound care follow-up  Diabetic educator  Home health ordered        Review of Systems   Constitutional: Negative for diaphoresis, fever and malaise/fatigue.   HENT: Negative for congestion, hearing loss and tinnitus.    Respiratory: Negative for cough and shortness of breath.    Cardiovascular: Negative for chest pain, palpitations and leg swelling.   Gastrointestinal: Negative for abdominal pain, nausea and vomiting.   Genitourinary: Negative for dysuria, flank pain and urgency.   Musculoskeletal: Positive for myalgias. Negative for back pain.   Neurological: Positive for weakness. Negative for tremors and headaches.   Psychiatric/Behavioral: Negative for depression. The patient is not nervous/anxious.       Physical Exam  Laboratory/Imaging   Hemodynamics  Temp (24hrs), Av.4 °C (97.5 °F), Min:36.2 °C (97.1 °F), Max:36.7 °C (98 °F)   Temperature: 36.7 °C (98 °F)  Pulse  Av.8  Min: 74  Max: 103    Blood Pressure: 141/83      Respiratory      Respiration: 16, Pulse Oximetry: 94 %        RUL Breath Sounds: Clear, RML Breath Sounds: Clear, RLL Breath Sounds: Clear, JULIANA Breath Sounds: Clear, LLL Breath Sounds: Clear    Fluids    Intake/Output Summary (Last 24 hours) at 18 1442  Last data filed at 18 1000   Gross per 24 hour   Intake              320 ml   Output                0 ml   Net              320 ml       Nutrition  Orders Placed This Encounter   Procedures   • DIET ORDER     Standing Status:   Standing     Number of Occurrences:   1     Order Specific Question:   Diet:     Answer:    Diabetic [3]     Order Specific Question:   Electrolyte modifications:     Answer:   No Added Salt [2]     Physical Exam   Constitutional: She is oriented to person, place, and time. She appears well-developed. No distress.   HENT:   Head: Normocephalic and atraumatic.   Eyes: EOM are normal. Pupils are equal, round, and reactive to light.   Neck: Neck supple. No JVD present.   Cardiovascular: Normal rate and intact distal pulses.    Pulmonary/Chest: Effort normal and breath sounds normal. No respiratory distress. She has no wheezes.   Abdominal: Soft. Bowel sounds are normal. She exhibits no distension. There is no tenderness.   Genitourinary:   Genitourinary Comments: Labial dressing c/d/i   Musculoskeletal: She exhibits no edema or tenderness.   Neurological: She is alert and oriented to person, place, and time. No cranial nerve deficit. Coordination normal.   Skin: Skin is warm and dry. There is erythema.   Labial opening, with pustular discharge, packing in place   Psychiatric: She has a normal mood and affect. Her behavior is normal. Judgment and thought content normal.   Nursing note and vitals reviewed.      Recent Labs      04/21/18   0421   WBC  9.4   RBC  3.76*   HEMOGLOBIN  10.9*   HEMATOCRIT  34.7*   MCV  92.3   MCH  29.0   MCHC  31.4*   RDW  43.9   PLATELETCT  247   MPV  11.0     Recent Labs      04/21/18   0421  04/22/18   0328  04/23/18   0404   SODIUM  135  136  136   POTASSIUM  4.3  4.1  4.0   CHLORIDE  107  109  108   CO2  19*  21  22   GLUCOSE  168*  189*  167*   BUN  13  11  11   CREATININE  0.82  1.02  0.87   CALCIUM  7.9*  7.8*  8.3*                      Assessment/Plan     * Groin abscess   Assessment & Plan    Left groin abscess extending down to the left labia, with induration, fluctuance, foul smelling drainage. Noted about 5 days ago, was seen at [facility] and started on oral abx, however abscess not getting better, rather draining now. Referred to the ER from [facility]  today  Continues to have pain and drainage  Gynecology consult, her  -Decreased discharge from wound  -plan for continued wound care outpatient, home health discharge or outpatient wound care follow-up   to assist  Wound care to coordinate with patient's daughter to educate on dressing change prior to discharge home      SCD for dvt ppx  Not septic a the time of admission  Monitor labs     Wound cx + GBS  Dc unasyn , change to augmentin  - dc vancomycin          ANOOP (acute kidney injury) (CMS-HCC)   Assessment & Plan    Unknown if patient has underlying CKD, however at this time Cr elevated >2, patient has hx of DM  2nd to dehydration  improving  Avoid nephrotoxics,   Caution with vancomycin, monitor renal function ,renally dose all meds  Hold home metformin  - dc ivf        Hypertension- (present on admission)   Assessment & Plan    Chronic, uncontrolled, monitor  With ARF,   - restart  arb  - coreg  -Increase Norvasc        High cholesterol- (present on admission)   Assessment & Plan    Chronic and stable  Continue statin        Tension type headache   Assessment & Plan    Secondary to hypertension  Continue blood pressure control  Resolved        DM (diabetes mellitus) (CMS-HCC)   Assessment & Plan    Uncontrolled with hyperglycemia, BS > 200 on admission, no gap  As below  Acidosis resolved  Nausea/vomiting resolved  Now on Lantus 10 units  Diabetic educator        Obesity- (present on admission)   Assessment & Plan    Nutrition consult  Uncontrolled Dm,  A1c 16  Adjust ssi  Increase lantus  Monitor  Counseled on diet and modification          Quality-Core Measures   Reviewed items::  Medications reviewed, Labs reviewed and Radiology images reviewed  DVT prophylaxis - mechanical:  SCDs  Ulcer Prophylaxis::  Not indicated  Antibiotics:  Treating active infection/contamination beyond 24 hours perioperative coverage

## 2018-04-23 NOTE — DISCHARGE PLANNING
Medical Social Work  PC to [facility] to find out if they accept patient's insurance, told they do.

## 2018-04-23 NOTE — CARE PLAN
Problem: Knowledge Deficit  Goal: Knowledge of disease process/condition, treatment plan, diagnostic tests, and medications will improve  POC: Wound care management, dressing care, PO Augmentin     Problem: Discharge Barriers/Planning  Goal: Patient's continuum of care needs will be met  DC home with HH once medically cleared

## 2018-04-23 NOTE — CONSULTS
Diabetes education: Met with patient who has a hx of diabetes on oral agents at home. Pt here with labial abscess, now on insulin.  Pt had [employer] insurance but now has [employer] . Pt was given and taught to use a True metrix meter as this is the meter now covered by her insurance. Goals for blood sugars as well as finger stick frequency also discussed.  Pt was instructed on insulin management including discussion about Levemir, Novolog and Novolin ( insulins covered), storage, shelf life and site rotation. Discussed vials vs pens ( both are tier 2 with her insurance) and patient was instructed on use of both as well as practiced with saline vials and syringes as well as saline pen needles and practice device. Pt will need to give her own injection with nursing. Pt choose pens.  Reviewed hypoglycemia, hyperglycemia, foot care, sick day, what effect blood sugars, need for protein and carbs with every meal and hs snack.  Handouts given and questions answered.  Plan: Pt will need to give her own insulin with nursing. Pt will need prescriptions for Novolog flex pen (box of 5), with sliding scale written out and for ac only, Levemir flex pen ( box of five), gurjit pen needles 4 mm(box of 100), True Metrix test strips and lancets to test four times a day. Please call [phone] if needs change.

## 2018-04-23 NOTE — DISCHARGE PLANNING
Received choice form from [facility].  Referral sent to [facility]. Per Cortney at [facility] they are not providers with insurance.  Spoke with Neeta at [place], Benny at at [facility]. And Rena at [facility] all are not providers with insurance.  Spoke with Katerine at [facility], she states we are providers with insurance.  Call placed to Cortney at [facility], she will review with her manager.

## 2018-04-23 NOTE — DISCHARGE PLANNING
HH Referral declined by [facility] due to them not being contracted with pt's insurance. JUMANA Zimmerman notified.

## 2018-04-23 NOTE — DISCHARGE PLANNING
ATTN: Case Management  RE: Referral for Home Health    Reason for referral denial: Not in network with patients insurance plan. Insurance has declined authorizations in the past because not in network.                  We would like to take this opportunity to thank you for submitting a referral for your patient to continue the journey to recovery with [facility]. We hope to facilitate continued referrals from your facility as our goal is to provide quality, skilled care to as many patients as possible.            Unfortunately, we are not able to accept your patient into our service for the reason listed above. If further clarity is needed, our Intake Coordinators are available to discuss any barriers to service.            If you have any questions or concerns regarding this or future patients’ transition to Home Health, please do not hesitate to contact us. We are open for referrals 7 days a week from 8AM to 5PM at [phone].      We look forward to collaborating with you in the future,  [facility] Team

## 2018-04-23 NOTE — PROGRESS NOTES
Diabetes education: Met with Cyndee and education completed. Please see consult note.  Plan: Pt will need to give her own insulin with nursing. Pt will need prescriptions for Novolog flex pen (box of 5), with sliding scale written out and for ac only, Levemir flex pen ( box of five), gurjit pen needles 4 mm(box of 100), True Metrix test strips and lancets to test four times a day. Please call [phone] if needs change.

## 2018-04-23 NOTE — DIETARY
Nutrition Services:  Brief Update    Consult received for Diabetes diet education.  Diabetes diet explanation and handouts provided to pt 4/19, and 4/21.  Followed up with pt today regarding questions and offered additional handouts, which pt did not want.  RD will re-screen weekly, consult RD as needed.     RD available PRN.

## 2018-04-23 NOTE — DISCHARGE PLANNING
[facility] has received your referral. At this time this referral is being placed on hold for insurance verification. Patient also does not have a PCP. HH required that patient have a PCP to sign all HH orders. Please get this patient set up with the DC clinical within 48 hours of DC or HH cannot accept referral. Also advised CCS Bre that the earliest HH will be able to see this patient is 4/25/2018 and that the MD must place a note that he is agreeable to this time frame.     THank you

## 2018-04-24 LAB
GLUCOSE BLD-MCNC: 160 MG/DL (ref 65–99)
GLUCOSE BLD-MCNC: 161 MG/DL (ref 65–99)
GLUCOSE BLD-MCNC: 211 MG/DL (ref 65–99)
GLUCOSE BLD-MCNC: 222 MG/DL (ref 65–99)

## 2018-04-24 PROCEDURE — A9270 NON-COVERED ITEM OR SERVICE: HCPCS | Performed by: HOSPITALIST

## 2018-04-24 PROCEDURE — 82962 GLUCOSE BLOOD TEST: CPT | Mod: 91

## 2018-04-24 PROCEDURE — 700102 HCHG RX REV CODE 250 W/ 637 OVERRIDE(OP): Performed by: HOSPITALIST

## 2018-04-24 PROCEDURE — A9270 NON-COVERED ITEM OR SERVICE: HCPCS | Performed by: INTERNAL MEDICINE

## 2018-04-24 PROCEDURE — 770006 HCHG ROOM/CARE - MED/SURG/GYN SEMI*

## 2018-04-24 PROCEDURE — 99232 SBSQ HOSP IP/OBS MODERATE 35: CPT | Performed by: INTERNAL MEDICINE

## 2018-04-24 PROCEDURE — 700111 HCHG RX REV CODE 636 W/ 250 OVERRIDE (IP): Performed by: INTERNAL MEDICINE

## 2018-04-24 PROCEDURE — 700102 HCHG RX REV CODE 250 W/ 637 OVERRIDE(OP): Performed by: INTERNAL MEDICINE

## 2018-04-24 RX ORDER — AMOXICILLIN AND CLAVULANATE POTASSIUM 875; 125 MG/1; MG/1
1 TABLET, FILM COATED ORAL 2 TIMES DAILY
Qty: 14 TAB | Refills: 0 | Status: SHIPPED | OUTPATIENT
Start: 2018-04-24 | End: 2018-05-01

## 2018-04-24 RX ORDER — INSULIN GLARGINE 100 [IU]/ML
10 INJECTION, SOLUTION SUBCUTANEOUS EVERY EVENING
Qty: 10 ML | Refills: 1 | Status: ON HOLD | OUTPATIENT
Start: 2018-04-24 | End: 2021-03-17

## 2018-04-24 RX ADMIN — HYDROCODONE BITARTRATE AND ACETAMINOPHEN 1 TABLET: 5; 325 TABLET ORAL at 16:59

## 2018-04-24 RX ADMIN — INSULIN HUMAN 2 UNITS: 100 INJECTION, SOLUTION PARENTERAL at 13:09

## 2018-04-24 RX ADMIN — HEPARIN SODIUM 5000 UNITS: 5000 INJECTION, SOLUTION INTRAVENOUS; SUBCUTANEOUS at 05:54

## 2018-04-24 RX ADMIN — VALSARTAN 40 MG: 80 TABLET ORAL at 09:21

## 2018-04-24 RX ADMIN — SIMVASTATIN 5 MG: 20 TABLET, FILM COATED ORAL at 21:54

## 2018-04-24 RX ADMIN — INSULIN HUMAN 2 UNITS: 100 INJECTION, SOLUTION PARENTERAL at 05:54

## 2018-04-24 RX ADMIN — AMLODIPINE BESYLATE 10 MG: 10 TABLET ORAL at 09:20

## 2018-04-24 RX ADMIN — CARVEDILOL 6.25 MG: 6.25 TABLET, FILM COATED ORAL at 16:59

## 2018-04-24 RX ADMIN — INSULIN HUMAN 3 UNITS: 100 INJECTION, SOLUTION PARENTERAL at 18:54

## 2018-04-24 RX ADMIN — HEPARIN SODIUM 5000 UNITS: 5000 INJECTION, SOLUTION INTRAVENOUS; SUBCUTANEOUS at 21:53

## 2018-04-24 RX ADMIN — INSULIN GLARGINE 10 UNITS: 100 INJECTION, SOLUTION SUBCUTANEOUS at 21:55

## 2018-04-24 RX ADMIN — AMOXICILLIN AND CLAVULANATE POTASSIUM 1 TABLET: 875; 125 TABLET, FILM COATED ORAL at 09:21

## 2018-04-24 RX ADMIN — HEPARIN SODIUM 5000 UNITS: 5000 INJECTION, SOLUTION INTRAVENOUS; SUBCUTANEOUS at 15:10

## 2018-04-24 RX ADMIN — INSULIN HUMAN 5 UNITS: 100 INJECTION, SOLUTION PARENTERAL at 21:54

## 2018-04-24 RX ADMIN — CARVEDILOL 6.25 MG: 6.25 TABLET, FILM COATED ORAL at 09:21

## 2018-04-24 RX ADMIN — AMOXICILLIN AND CLAVULANATE POTASSIUM 1 TABLET: 875; 125 TABLET, FILM COATED ORAL at 21:54

## 2018-04-24 RX ADMIN — HYDROCODONE BITARTRATE AND ACETAMINOPHEN 1 TABLET: 5; 325 TABLET ORAL at 11:18

## 2018-04-24 ASSESSMENT — ENCOUNTER SYMPTOMS
HEADACHES: 0
COUGH: 0
BACK PAIN: 0
FLANK PAIN: 0
FEVER: 0
TREMORS: 0
ABDOMINAL PAIN: 0
DIAPHORESIS: 0
NAUSEA: 0
NERVOUS/ANXIOUS: 0
VOMITING: 0
WEAKNESS: 1
DEPRESSION: 0
PALPITATIONS: 0
SHORTNESS OF BREATH: 0
MYALGIAS: 1

## 2018-04-24 ASSESSMENT — PAIN SCALES - GENERAL
PAINLEVEL_OUTOF10: 2
PAINLEVEL_OUTOF10: 0
PAINLEVEL_OUTOF10: 0

## 2018-04-24 NOTE — PROGRESS NOTES
[facility]ist Progress Note    Date of Service: 2018    Chief Complaint  52 y.o. female admitted 2018 with labial abscess s/p packing on IV abx.    Interval Problem Update  :  The patient states that she is aware that she is ready to be discharged once wound care is arranged.      Consultants/Specialty  gyn    Disposition  To home in 1 day with clinical improvement  Wound care to educate patient's daughter to assist with dressing changes, wound packing three times per week.  Outpatient wound care follow-up  Diabetic educator  Home health ordered        Review of Systems   Constitutional: Negative for diaphoresis, fever and malaise/fatigue.   HENT: Negative for congestion, hearing loss and tinnitus.    Respiratory: Negative for cough and shortness of breath.    Cardiovascular: Negative for chest pain, palpitations and leg swelling.   Gastrointestinal: Negative for abdominal pain, nausea and vomiting.   Genitourinary: Negative for dysuria, flank pain and urgency.   Musculoskeletal: Positive for myalgias. Negative for back pain.   Neurological: Positive for weakness. Negative for tremors and headaches.   Psychiatric/Behavioral: Negative for depression. The patient is not nervous/anxious.       Physical Exam  Laboratory/Imaging   Hemodynamics  Temp (24hrs), Av.4 °C (97.6 °F), Min:36.4 °C (97.5 °F), Max:36.5 °C (97.7 °F)   Temperature: 36.5 °C (97.7 °F)  Pulse  Av.1  Min: 74  Max: 103    Blood Pressure: 143/71      Respiratory      Respiration: 16, Pulse Oximetry: 96 %        RUL Breath Sounds: Clear, RML Breath Sounds: Clear, RLL Breath Sounds: Clear, JULIANA Breath Sounds: Clear, LLL Breath Sounds: Clear    Fluids    Intake/Output Summary (Last 24 hours) at 18 1407  Last data filed at 18 0900   Gross per 24 hour   Intake              240 ml   Output                0 ml   Net              240 ml       Nutrition  Orders Placed This Encounter   Procedures   • DIET ORDER     Standing  Status:   Standing     Number of Occurrences:   1     Order Specific Question:   Diet:     Answer:   Diabetic [3]     Order Specific Question:   Electrolyte modifications:     Answer:   No Added Salt [2]     Physical Exam   Constitutional: She is oriented to person, place, and time. She appears well-developed. No distress.   HENT:   Head: Normocephalic and atraumatic.   Eyes: EOM are normal. Pupils are equal, round, and reactive to light.   Neck: Neck supple. No JVD present.   Cardiovascular: Normal rate and intact distal pulses.    Pulmonary/Chest: Effort normal and breath sounds normal. No respiratory distress. She has no wheezes.   Abdominal: Soft. Bowel sounds are normal. She exhibits no distension. There is no tenderness.   Genitourinary:   Genitourinary Comments: Labial wound open with packing in place    Musculoskeletal: She exhibits no edema or tenderness.   Neurological: She is alert and oriented to person, place, and time. No cranial nerve deficit. Coordination normal.   Skin: Skin is warm and dry.   Labial opening, packing in place, scant amount of white mucous discharge around the wound.   Psychiatric: She has a normal mood and affect. Her behavior is normal. Judgment and thought content normal.   Nursing note and vitals reviewed.          Recent Labs      04/22/18   0328  04/23/18   0404   SODIUM  136  136   POTASSIUM  4.1  4.0   CHLORIDE  109  108   CO2  21  22   GLUCOSE  189*  167*   BUN  11  11   CREATININE  1.02  0.87   CALCIUM  7.8*  8.3*                      Assessment/Plan     * Groin abscess   Assessment & Plan    Left groin abscess extending down to the left labia, with induration, fluctuance, foul smelling drainage. Noted about 5 days ago, was seen at [facility] and started on oral abx, however abscess not getting better, rather draining now. Referred to the ER from [facility] today  Continues to have pain and drainage  Gynecology consult, her  -Decreased discharge from wound  -plan for  continued wound care outpatient, home health discharge or outpatient wound care follow-up   to assist  Wound care to coordinate with patient's daughter to educate on dressing change prior to discharge home      SCD for dvt ppx  Not septic a the time of admission  Monitor labs     Wound cx + GBS  Dc unasyn , change to augmentin  - dc vancomycin          ANOOP (acute kidney injury) (CMS-HCC)   Assessment & Plan    Unknown if patient has underlying CKD, however at this time Cr elevated >2, patient has hx of DM  2nd to dehydration  improving  Avoid nephrotoxics,   Caution with vancomycin, monitor renal function ,renally dose all meds  Hold home metformin  - dc ivf  -- repeat renal function panel.        Hypertension- (present on admission)   Assessment & Plan    - restarted  arb  - coreg  -Increased Norvasc  -- better controlled. Monitor bp.        High cholesterol- (present on admission)   Assessment & Plan    Chronic and stable  Continue statin        Tension type headache   Assessment & Plan    Secondary to hypertension  Continue blood pressure control  Resolved        DM (diabetes mellitus) (CMS-HCC)   Assessment & Plan    Uncontrolled with hyperglycemia, BS > 200 on admission, no gap  Acidosis resolved  Nausea/vomiting resolved  Diabetic education given.  Continue lantus and sliding scale.  Discussed with the patient regarding the importance of diabetic control in association with wound healing.        Obesity- (present on admission)   Assessment & Plan    Nutrition consult  Uncontrolled Dm,  A1c 16  Monitor  Counseled on diet and modification          Quality-Core Measures   Reviewed items::  Medications reviewed, Labs reviewed and Radiology images reviewed  DVT prophylaxis - mechanical:  SCDs  Ulcer Prophylaxis::  Not indicated  Antibiotics:  Treating active infection/contamination beyond 24 hours perioperative coverage

## 2018-04-24 NOTE — DISCHARGE PLANNING
Medical Social Work  [facility] to [facility]: talked to Bárbara, can see the patient on May 3rd at 9:00.

## 2018-04-24 NOTE — DISCHARGE PLANNING
Medical Social Work  Talked to patient about her insurance.  Patient stated prior to being admitted to [facility] she was going to [facility] on a daily basis for wound care and would like to go there.    PC to [facility], [phone], told they can schedule wound care.

## 2018-04-24 NOTE — CARE PLAN
Problem: Infection  Goal: Will remain free from infection  PO antibiotics administered per MAR    Problem: Bowel/Gastric:  Goal: Normal bowel function is maintained or improved  Senna refused. 2 loose Bms today per patient

## 2018-04-24 NOTE — DISCHARGE PLANNING
Per Supervisor Mikala, if no home health agency will accept patient an AIDAN for home health maybe offered to [facility].

## 2018-04-24 NOTE — DISCHARGE PLANNING
Discussed with Supervisor Mikala, referral sent to [facility],.  Spoke with Neeta she will have reviewed, she will return call as they are new with insurance,

## 2018-04-24 NOTE — DISCHARGE PLANNING
Received call [facility] patient has a out of pocket cost of $5,250 for home health. Insurance requires patient pay 100% of home health cost. HANNAH Zimmerman advised.

## 2018-04-24 NOTE — PROGRESS NOTES
Patient lying in bed, a.ox4. No distress noted. Per report patient may be discharged tomorrow. Pt aware and agreeable with discharge. All scheduled medications administered per MAR. No need/request for prn medications. Pt denies pain at this time. No other significant findings at this time. Call light and personal belongings within reach, bed kept low, treaded socks on. Assisted as necessary. Kept rested and comfortable at all times. Hourly rounds in place.

## 2018-04-25 LAB
ALBUMIN SERPL BCP-MCNC: 3 G/DL (ref 3.2–4.9)
BACTERIA BLD CULT: NORMAL
BACTERIA BLD CULT: NORMAL
BASOPHILS # BLD AUTO: 0.7 % (ref 0–1.8)
BASOPHILS # BLD: 0.07 K/UL (ref 0–0.12)
BUN SERPL-MCNC: 12 MG/DL (ref 8–22)
CALCIUM SERPL-MCNC: 8.5 MG/DL (ref 8.5–10.5)
CHLORIDE SERPL-SCNC: 109 MMOL/L (ref 96–112)
CO2 SERPL-SCNC: 23 MMOL/L (ref 20–33)
CREAT SERPL-MCNC: 0.86 MG/DL (ref 0.5–1.4)
EOSINOPHIL # BLD AUTO: 0.29 K/UL (ref 0–0.51)
EOSINOPHIL NFR BLD: 2.7 % (ref 0–6.9)
ERYTHROCYTE [DISTWIDTH] IN BLOOD BY AUTOMATED COUNT: 43.1 FL (ref 35.9–50)
GLUCOSE BLD-MCNC: 133 MG/DL (ref 65–99)
GLUCOSE BLD-MCNC: 177 MG/DL (ref 65–99)
GLUCOSE BLD-MCNC: 181 MG/DL (ref 65–99)
GLUCOSE BLD-MCNC: 232 MG/DL (ref 65–99)
GLUCOSE BLD-MCNC: 262 MG/DL (ref 65–99)
GLUCOSE SERPL-MCNC: 151 MG/DL (ref 65–99)
HCT VFR BLD AUTO: 33 % (ref 37–47)
HGB BLD-MCNC: 10.6 G/DL (ref 12–16)
IMM GRANULOCYTES # BLD AUTO: 0.2 K/UL (ref 0–0.11)
IMM GRANULOCYTES NFR BLD AUTO: 1.9 % (ref 0–0.9)
LYMPHOCYTES # BLD AUTO: 3.26 K/UL (ref 1–4.8)
LYMPHOCYTES NFR BLD: 30.6 % (ref 22–41)
MAGNESIUM SERPL-MCNC: 1.4 MG/DL (ref 1.5–2.5)
MCH RBC QN AUTO: 29.3 PG (ref 27–33)
MCHC RBC AUTO-ENTMCNC: 32.1 G/DL (ref 33.6–35)
MCV RBC AUTO: 91.2 FL (ref 81.4–97.8)
MONOCYTES # BLD AUTO: 0.76 K/UL (ref 0–0.85)
MONOCYTES NFR BLD AUTO: 7.1 % (ref 0–13.4)
NEUTROPHILS # BLD AUTO: 6.07 K/UL (ref 2–7.15)
NEUTROPHILS NFR BLD: 57 % (ref 44–72)
NRBC # BLD AUTO: 0.04 K/UL
NRBC BLD-RTO: 0.4 /100 WBC
PHOSPHATE SERPL-MCNC: 3.2 MG/DL (ref 2.5–4.5)
PLATELET # BLD AUTO: 222 K/UL (ref 164–446)
PMV BLD AUTO: 10.9 FL (ref 9–12.9)
POTASSIUM SERPL-SCNC: 3.9 MMOL/L (ref 3.6–5.5)
RBC # BLD AUTO: 3.62 M/UL (ref 4.2–5.4)
SIGNIFICANT IND 70042: NORMAL
SIGNIFICANT IND 70042: NORMAL
SITE SITE: NORMAL
SITE SITE: NORMAL
SODIUM SERPL-SCNC: 137 MMOL/L (ref 135–145)
SOURCE SOURCE: NORMAL
SOURCE SOURCE: NORMAL
WBC # BLD AUTO: 10.7 K/UL (ref 4.8–10.8)

## 2018-04-25 PROCEDURE — 36415 COLL VENOUS BLD VENIPUNCTURE: CPT

## 2018-04-25 PROCEDURE — 99232 SBSQ HOSP IP/OBS MODERATE 35: CPT | Performed by: INTERNAL MEDICINE

## 2018-04-25 PROCEDURE — 700102 HCHG RX REV CODE 250 W/ 637 OVERRIDE(OP): Performed by: HOSPITALIST

## 2018-04-25 PROCEDURE — 85025 COMPLETE CBC W/AUTO DIFF WBC: CPT

## 2018-04-25 PROCEDURE — 83735 ASSAY OF MAGNESIUM: CPT

## 2018-04-25 PROCEDURE — 82962 GLUCOSE BLOOD TEST: CPT

## 2018-04-25 PROCEDURE — 700102 HCHG RX REV CODE 250 W/ 637 OVERRIDE(OP): Performed by: INTERNAL MEDICINE

## 2018-04-25 PROCEDURE — 770006 HCHG ROOM/CARE - MED/SURG/GYN SEMI*

## 2018-04-25 PROCEDURE — 80069 RENAL FUNCTION PANEL: CPT

## 2018-04-25 PROCEDURE — A9270 NON-COVERED ITEM OR SERVICE: HCPCS | Performed by: INTERNAL MEDICINE

## 2018-04-25 PROCEDURE — A9270 NON-COVERED ITEM OR SERVICE: HCPCS | Performed by: HOSPITALIST

## 2018-04-25 RX ADMIN — CARVEDILOL 6.25 MG: 6.25 TABLET, FILM COATED ORAL at 18:05

## 2018-04-25 RX ADMIN — SIMVASTATIN 5 MG: 20 TABLET, FILM COATED ORAL at 20:58

## 2018-04-25 RX ADMIN — VALSARTAN 40 MG: 80 TABLET ORAL at 08:59

## 2018-04-25 RX ADMIN — INSULIN HUMAN 2 UNITS: 100 INJECTION, SOLUTION PARENTERAL at 12:33

## 2018-04-25 RX ADMIN — INSULIN GLARGINE 10 UNITS: 100 INJECTION, SOLUTION SUBCUTANEOUS at 20:59

## 2018-04-25 RX ADMIN — HYDROCODONE BITARTRATE AND ACETAMINOPHEN 1 TABLET: 5; 325 TABLET ORAL at 15:57

## 2018-04-25 RX ADMIN — AMOXICILLIN AND CLAVULANATE POTASSIUM 1 TABLET: 875; 125 TABLET, FILM COATED ORAL at 20:58

## 2018-04-25 RX ADMIN — INSULIN HUMAN 3 UNITS: 100 INJECTION, SOLUTION PARENTERAL at 20:59

## 2018-04-25 RX ADMIN — AMLODIPINE BESYLATE 10 MG: 10 TABLET ORAL at 09:00

## 2018-04-25 RX ADMIN — AMOXICILLIN AND CLAVULANATE POTASSIUM 1 TABLET: 875; 125 TABLET, FILM COATED ORAL at 09:00

## 2018-04-25 RX ADMIN — MAGNESIUM GLUCONATE 500 MG ORAL TABLET 400 MG: 500 TABLET ORAL at 12:28

## 2018-04-25 RX ADMIN — INSULIN HUMAN 2 UNITS: 100 INJECTION, SOLUTION PARENTERAL at 18:03

## 2018-04-25 RX ADMIN — CARVEDILOL 6.25 MG: 6.25 TABLET, FILM COATED ORAL at 09:00

## 2018-04-25 ASSESSMENT — PATIENT HEALTH QUESTIONNAIRE - PHQ9
SUM OF ALL RESPONSES TO PHQ QUESTIONS 1-9: 8
8. MOVING OR SPEAKING SO SLOWLY THAT OTHER PEOPLE COULD HAVE NOTICED. OR THE OPPOSITE, BEING SO FIGETY OR RESTLESS THAT YOU HAVE BEEN MOVING AROUND A LOT MORE THAN USUAL: NOT AT ALL
4. FEELING TIRED OR HAVING LITTLE ENERGY: NEARLY EVERY DAY
2. FEELING DOWN, DEPRESSED, IRRITABLE, OR HOPELESS: NOT AT ALL
3. TROUBLE FALLING OR STAYING ASLEEP OR SLEEPING TOO MUCH: NEARLY EVERY DAY
6. FEELING BAD ABOUT YOURSELF - OR THAT YOU ARE A FAILURE OR HAVE LET YOURSELF OR YOUR FAMILY DOWN: NOT AL ALL
9. THOUGHTS THAT YOU WOULD BE BETTER OFF DEAD, OR OF HURTING YOURSELF: NOT AT ALL
SUM OF ALL RESPONSES TO PHQ9 QUESTIONS 1 AND 2: 1
1. LITTLE INTEREST OR PLEASURE IN DOING THINGS: SEVERAL DAYS
5. POOR APPETITE OR OVEREATING: SEVERAL DAYS
7. TROUBLE CONCENTRATING ON THINGS, SUCH AS READING THE NEWSPAPER OR WATCHING TELEVISION: NOT AT ALL

## 2018-04-25 ASSESSMENT — ENCOUNTER SYMPTOMS
VOMITING: 0
NAUSEA: 0
HEADACHES: 0
DEPRESSION: 0
MYALGIAS: 1
SHORTNESS OF BREATH: 0
DIAPHORESIS: 0
PALPITATIONS: 0
FEVER: 0
COUGH: 0
FLANK PAIN: 0
WEAKNESS: 1
NERVOUS/ANXIOUS: 0
ABDOMINAL PAIN: 0
TREMORS: 0
BACK PAIN: 0

## 2018-04-25 ASSESSMENT — PAIN SCALES - GENERAL
PAINLEVEL_OUTOF10: 0
PAINLEVEL_OUTOF10: 2
PAINLEVEL_OUTOF10: 4

## 2018-04-25 ASSESSMENT — PAIN SCALES - WONG BAKER
WONGBAKER_NUMERICALRESPONSE: HURTS JUST A LITTLE BIT
WONGBAKER_NUMERICALRESPONSE: HURTS JUST A LITTLE BIT

## 2018-04-25 NOTE — PROGRESS NOTES
Patient lying in bed, a/ox4. No distress noted. All medications administered per MAR. Tolerated well. Assessment completed, no significant findings at this time. Pt is stable. Pt requesting to discontinue heparin since she is ambulating during the day. RN will relay request to on call MD and day shift Rn. Call light and personal belongings within reach, bed kept low, treaded socks on. Assisted as necessary. Kept rested and comfortable at all times. Hourly rounds in place.

## 2018-04-25 NOTE — CARE PLAN
Problem: Infection  Goal: Will remain free from infection    Intervention: Assess signs and symptoms of infection  No drainage noted when cleaning pt's wound. Wound tender to touch.

## 2018-04-25 NOTE — PROGRESS NOTES
On call MD notified of patient's request to discontinue heparin. MD accepted order. Heparin will be discontinued.

## 2018-04-25 NOTE — PROGRESS NOTES
Pt A&Ox4, feeling well with no c/o pain or n/v at this time. Discussed POC for day, discharge pending out pt wound management. Dressing to be changed this afternoon with premedication given.

## 2018-04-25 NOTE — CARE PLAN
Problem: Infection  Goal: Will remain free from infection  PO abx administered per MAR.     Problem: Venous Thromboembolism (VTW)/Deep Vein Thrombosis (DVT) Prevention:  Goal: Patient will participate in Venous Thrombosis (VTE)/Deep Vein Thrombosis (DVT)Prevention Measures  Heparin injection administered per MAR.

## 2018-04-25 NOTE — PROGRESS NOTES
PT aox4. LUIS E. Refused alarms. Educated pt with use of alarms for safety and injury prevention. Pt states that she will call for assistance before getting out of bed. POC discussed to premedicate and change her dressing this afternoon.

## 2018-04-26 ENCOUNTER — PATIENT OUTREACH (OUTPATIENT)
Dept: HEALTH INFORMATION MANAGEMENT | Facility: OTHER | Age: 53
End: 2018-04-26

## 2018-04-26 PROBLEM — E83.42 HYPOMAGNESEMIA: Status: ACTIVE | Noted: 2018-04-26

## 2018-04-26 LAB
GLUCOSE BLD-MCNC: 145 MG/DL (ref 65–99)
GLUCOSE BLD-MCNC: 162 MG/DL (ref 65–99)
GLUCOSE BLD-MCNC: 164 MG/DL (ref 65–99)
GLUCOSE BLD-MCNC: 236 MG/DL (ref 65–99)
MAGNESIUM SERPL-MCNC: 1.5 MG/DL (ref 1.5–2.5)

## 2018-04-26 PROCEDURE — A9270 NON-COVERED ITEM OR SERVICE: HCPCS | Performed by: HOSPITALIST

## 2018-04-26 PROCEDURE — A9270 NON-COVERED ITEM OR SERVICE: HCPCS | Performed by: INTERNAL MEDICINE

## 2018-04-26 PROCEDURE — 700102 HCHG RX REV CODE 250 W/ 637 OVERRIDE(OP): Performed by: HOSPITALIST

## 2018-04-26 PROCEDURE — 82962 GLUCOSE BLOOD TEST: CPT | Mod: 91

## 2018-04-26 PROCEDURE — 700102 HCHG RX REV CODE 250 W/ 637 OVERRIDE(OP): Performed by: INTERNAL MEDICINE

## 2018-04-26 PROCEDURE — 36415 COLL VENOUS BLD VENIPUNCTURE: CPT

## 2018-04-26 PROCEDURE — 99232 SBSQ HOSP IP/OBS MODERATE 35: CPT | Performed by: INTERNAL MEDICINE

## 2018-04-26 PROCEDURE — 770006 HCHG ROOM/CARE - MED/SURG/GYN SEMI*

## 2018-04-26 PROCEDURE — 83735 ASSAY OF MAGNESIUM: CPT

## 2018-04-26 RX ADMIN — MAGNESIUM GLUCONATE 500 MG ORAL TABLET 400 MG: 500 TABLET ORAL at 21:40

## 2018-04-26 RX ADMIN — CARVEDILOL 6.25 MG: 6.25 TABLET, FILM COATED ORAL at 17:39

## 2018-04-26 RX ADMIN — INSULIN GLARGINE 10 UNITS: 100 INJECTION, SOLUTION SUBCUTANEOUS at 21:42

## 2018-04-26 RX ADMIN — AMOXICILLIN AND CLAVULANATE POTASSIUM 1 TABLET: 875; 125 TABLET, FILM COATED ORAL at 08:16

## 2018-04-26 RX ADMIN — CARVEDILOL 6.25 MG: 6.25 TABLET, FILM COATED ORAL at 08:16

## 2018-04-26 RX ADMIN — SIMVASTATIN 5 MG: 20 TABLET, FILM COATED ORAL at 21:41

## 2018-04-26 RX ADMIN — INSULIN HUMAN 2 UNITS: 100 INJECTION, SOLUTION PARENTERAL at 21:42

## 2018-04-26 RX ADMIN — AMLODIPINE BESYLATE 10 MG: 10 TABLET ORAL at 08:16

## 2018-04-26 RX ADMIN — MAGNESIUM GLUCONATE 500 MG ORAL TABLET 400 MG: 500 TABLET ORAL at 15:07

## 2018-04-26 RX ADMIN — HYDROCODONE BITARTRATE AND ACETAMINOPHEN 1 TABLET: 5; 325 TABLET ORAL at 16:51

## 2018-04-26 RX ADMIN — INSULIN HUMAN 2 UNITS: 100 INJECTION, SOLUTION PARENTERAL at 17:43

## 2018-04-26 RX ADMIN — VALSARTAN 40 MG: 80 TABLET ORAL at 08:16

## 2018-04-26 RX ADMIN — AMOXICILLIN AND CLAVULANATE POTASSIUM 1 TABLET: 875; 125 TABLET, FILM COATED ORAL at 21:40

## 2018-04-26 RX ADMIN — INSULIN HUMAN 3 UNITS: 100 INJECTION, SOLUTION PARENTERAL at 12:15

## 2018-04-26 ASSESSMENT — ENCOUNTER SYMPTOMS
PALPITATIONS: 0
FEVER: 0
VOMITING: 0
NERVOUS/ANXIOUS: 0
HEADACHES: 0
DIAPHORESIS: 0
SHORTNESS OF BREATH: 0
DEPRESSION: 0
NAUSEA: 0
ABDOMINAL PAIN: 0
TREMORS: 0
COUGH: 0
BACK PAIN: 0
FLANK PAIN: 0

## 2018-04-26 ASSESSMENT — PAIN SCALES - GENERAL
PAINLEVEL_OUTOF10: 0
PAINLEVEL_OUTOF10: 6

## 2018-04-26 NOTE — DISCHARGE PLANNING
Medical Social Work  Discussed patient's needs during IDT rounds.  Patient's daughter will be here at 5:00 to see if she can change patient's dressing.  If all goes well patient will be d/c tonight.

## 2018-04-26 NOTE — CARE PLAN
Problem: Discharge Barriers/Planning  Goal: Patient's continuum of care needs will be met  Outcome: PROGRESSING SLOWER THAN EXPECTED  Pt daughter might not be able to come until later today. Pt to keep RN updated. Pt daughter needing teaching before pt able to d/c. Awaiting pt daughter.     Problem: Pain Management  Goal: Pain level will decrease to patient's comfort goal  Outcome: PROGRESSING AS EXPECTED  Pt pain well controlled. Denies need for pain medication. Pt able to rest and ambulate comfortably.

## 2018-04-26 NOTE — PROGRESS NOTES
Pt anxious to d/c today. Daughter to come by today for WND care teaching, per pt daughter has many meetings today so she may not be able to come until late today, possibly 5pm, MD aware. Pt requesting to shower, CNA aware. Drsing to be changed, pt requested to wait till after shower. Denies pain.

## 2018-04-26 NOTE — PROGRESS NOTES
[facility]    Date of Service: 2018    Chief Complaint  52 y.o. female admitted 2018 with labial abscess s/p packing on IV abx.    Interval Problem Update  :  The patient states that she is aware that she is ready to be discharged once wound care is arranged.  :  The patient was unable to to do wound care on her own per the patient's primary nurse.  :  The patient states that she feels okay.  No new change.  Her daughter is coming at 5 pm to demonstrate wound care to the nurse.     Consultants/Specialty  gyn    Disposition  Wound care to educate patient's daughter to assist with dressing changes, wound packing three times per week.  Outpatient wound care follow-up  Diabetic educator  If the patient's daughter able to demonstrate wound care to the nursing staff, then the patient may be discharged home.        Review of Systems   Constitutional: Negative for diaphoresis, fever and malaise/fatigue.   HENT: Negative for congestion, hearing loss and tinnitus.    Respiratory: Negative for cough and shortness of breath.    Cardiovascular: Negative for chest pain, palpitations and leg swelling.   Gastrointestinal: Negative for abdominal pain, nausea and vomiting.   Genitourinary: Negative for dysuria, flank pain and urgency.   Musculoskeletal: Negative for back pain.   Neurological: Negative for tremors and headaches.   Psychiatric/Behavioral: Negative for depression. The patient is not nervous/anxious.       Physical Exam  Laboratory/Imaging   Hemodynamics  Temp (24hrs), Av.3 °C (97.3 °F), Min:36.1 °C (96.9 °F), Max:36.5 °C (97.7 °F)   Temperature: 36.5 °C (97.7 °F)  Pulse  Av.9  Min: 74  Max: 103    Blood Pressure: 125/59      Respiratory      Respiration: 14, Pulse Oximetry: 95 %        RUL Breath Sounds: Clear, RML Breath Sounds: Clear, RLL Breath Sounds: Clear, JULIANA Breath Sounds: Clear, LLL Breath Sounds: Clear    Fluids    Intake/Output Summary (Last 24 hours) at  04/26/18 0854  Last data filed at 04/26/18 0400   Gross per 24 hour   Intake              900 ml   Output                0 ml   Net              900 ml       Nutrition  Orders Placed This Encounter   Procedures   • DIET ORDER     Standing Status:   Standing     Number of Occurrences:   1     Order Specific Question:   Diet:     Answer:   Diabetic [3]     Order Specific Question:   Electrolyte modifications:     Answer:   No Added Salt [2]     Physical Exam   Constitutional: She is oriented to person, place, and time. She appears well-developed. No distress.   HENT:   Head: Normocephalic and atraumatic.   Eyes: EOM are normal. Pupils are equal, round, and reactive to light.   Neck: Neck supple. No JVD present.   Cardiovascular: Normal rate and intact distal pulses.    Pulmonary/Chest: Effort normal and breath sounds normal. No respiratory distress. She has no wheezes.   Abdominal: Soft. Bowel sounds are normal. She exhibits no distension. There is no tenderness.   Genitourinary:   Genitourinary Comments: Labial wound open with packing in place    Musculoskeletal: She exhibits no edema or tenderness.   Neurological: She is alert and oriented to person, place, and time. No cranial nerve deficit. Coordination normal.   Skin: Skin is warm and dry.   Labial opening, packing in place.   Psychiatric: She has a normal mood and affect. Her behavior is normal. Judgment and thought content normal.   Nursing note and vitals reviewed.      Recent Labs      04/25/18   0342   WBC  10.7   RBC  3.62*   HEMOGLOBIN  10.6*   HEMATOCRIT  33.0*   MCV  91.2   MCH  29.3   MCHC  32.1*   RDW  43.1   PLATELETCT  222   MPV  10.9     Recent Labs      04/25/18   0342   SODIUM  137   POTASSIUM  3.9   CHLORIDE  109   CO2  23   GLUCOSE  151*   BUN  12   CREATININE  0.86   CALCIUM  8.5                      Assessment/Plan     * Groin abscess   Assessment & Plan    Left groin abscess extending down to the left labia, with induration, fluctuance, foul  smelling drainage. Noted about 5 days ago, was seen at [facility] and started on oral abx, however abscess not getting better, rather draining now. Referred to the ER from [facility] today  Continues to have pain and drainage  Gynecology consult, her  -Decreased discharge from wound  -plan for continued outpatient wound care follow-up   arranged an f/u appointment for this patient on 5/1/18.  Wound care to coordinate with patient's daughter to educate on dressing change prior to discharge home.    Will call the patient's daughter to verify whether she knows how to do dressing changes.      SCD for dvt ppx  Not septic a the time of admission  Monitor labs     Discontinued vancomycin  Wound culture found group b strep  Switch from unasyn to augmentin        ANOOP (acute kidney injury) (CMS-HCC)   Assessment & Plan    Unknown if patient has underlying CKD, however at this time Cr elevated >2, patient has hx of DM  2nd to dehydration  improving  Avoid nephrotoxics,   Caution with vancomycin, monitor renal function ,renally dose all meds  Hold home metformin  - dc ivf  Normal renal function now.        Hypertension- (present on admission)   Assessment & Plan    - Continue  arb  - coreg and Norvasc  -- better controlled. Monitor bp.        High cholesterol- (present on admission)   Assessment & Plan    Chronic and stable  Continue statin        Hypomagnesemia   Assessment & Plan    Continue oral replacement.        Tension type headache   Assessment & Plan    Secondary to hypertension  Continue blood pressure control  Resolved        DM (diabetes mellitus) (CMS-HCC)   Assessment & Plan    Uncontrolled with hyperglycemia, BS > 200 on admission, no gap  Acidosis resolved  Nausea/vomiting resolved  Diabetic education given.  Continue lantus and sliding scale.  Discussed with the patient regarding the importance of diabetic control in association with wound healing.  Nursing staff taught the patient on how to  do blood glucose check and insulin injection.        Class 3 obesity with serious comorbidity and body mass index (BMI) of 45.0 to 49.9 in adult (CMS-HCC)- (present on admission)   Assessment & Plan    Nutrition consult  Uncontrolled Dm,  A1c 16  Monitor  Discussed healthy diet and life-style modification          Quality-Core Measures   Reviewed items::  Medications reviewed, Labs reviewed and Radiology images reviewed  DVT prophylaxis - mechanical:  SCDs  Ulcer Prophylaxis::  Not indicated  Antibiotics:  Treating active infection/contamination beyond 24 hours perioperative coverage

## 2018-04-26 NOTE — PROGRESS NOTES
[facility] Progress Note    Date of Service: 2018    Chief Complaint  52 y.o. female admitted 2018 with labial abscess s/p packing on IV abx.    Interval Problem Update  :  The patient states that she is aware that she is ready to be discharged once wound care is arranged.  :  The patient was unable to to do wound care on her own per the patient's primary nurse.      Consultants/Specialty  gyn    Disposition  To home in 1 day with clinical improvement  Wound care to educate patient's daughter to assist with dressing changes, wound packing three times per week.  Outpatient wound care follow-up  Diabetic educator  [facility] ordered        Review of Systems   Constitutional: Negative for diaphoresis, fever and malaise/fatigue.   HENT: Negative for congestion, hearing loss and tinnitus.    Respiratory: Negative for cough and shortness of breath.    Cardiovascular: Negative for chest pain, palpitations and leg swelling.   Gastrointestinal: Negative for abdominal pain, nausea and vomiting.   Genitourinary: Negative for dysuria, flank pain and urgency.   Musculoskeletal: Positive for myalgias. Negative for back pain.   Neurological: Positive for weakness. Negative for tremors and headaches.   Psychiatric/Behavioral: Negative for depression. The patient is not nervous/anxious.       Physical Exam  Laboratory/Imaging   Hemodynamics  Temp (24hrs), Av.3 °C (97.3 °F), Min:36.1 °C (97 °F), Max:36.6 °C (97.8 °F)   Temperature: 36.1 °C (97 °F)  Pulse  Av.3  Min: 74  Max: 103    Blood Pressure: 132/77      Respiratory      Respiration: 16, Pulse Oximetry: 92 %        RUL Breath Sounds: Clear, RML Breath Sounds: Clear, RLL Breath Sounds: Clear, JULIANA Breath Sounds: Clear, LLL Breath Sounds: Clear    Fluids    Intake/Output Summary (Last 24 hours) at 18 1921  Last data filed at 18 1300   Gross per 24 hour   Intake              900 ml   Output                0 ml   Net              900 ml        Nutrition  Orders Placed This Encounter   Procedures   • DIET ORDER     Standing Status:   Standing     Number of Occurrences:   1     Order Specific Question:   Diet:     Answer:   Diabetic [3]     Order Specific Question:   Electrolyte modifications:     Answer:   No Added Salt [2]     Physical Exam   Constitutional: She is oriented to person, place, and time. She appears well-developed. No distress.   HENT:   Head: Normocephalic and atraumatic.   Eyes: EOM are normal. Pupils are equal, round, and reactive to light.   Neck: Neck supple. No JVD present.   Cardiovascular: Normal rate and intact distal pulses.    Pulmonary/Chest: Effort normal and breath sounds normal. No respiratory distress. She has no wheezes.   Abdominal: Soft. Bowel sounds are normal. She exhibits no distension. There is no tenderness.   Genitourinary:   Genitourinary Comments: Labial wound open with packing in place    Musculoskeletal: She exhibits no edema or tenderness.   Neurological: She is alert and oriented to person, place, and time. No cranial nerve deficit. Coordination normal.   Skin: Skin is warm and dry.   Labial opening, packing in place, scant amount of white mucous discharge around the wound.   Psychiatric: She has a normal mood and affect. Her behavior is normal. Judgment and thought content normal.   Nursing note and vitals reviewed.      Recent Labs      04/25/18   0342   WBC  10.7   RBC  3.62*   HEMOGLOBIN  10.6*   HEMATOCRIT  33.0*   MCV  91.2   MCH  29.3   MCHC  32.1*   RDW  43.1   PLATELETCT  222   MPV  10.9     Recent Labs      04/23/18   0404  04/25/18   0342   SODIUM  136  137   POTASSIUM  4.0  3.9   CHLORIDE  108  109   CO2  22  23   GLUCOSE  167*  151*   BUN  11  12   CREATININE  0.87  0.86   CALCIUM  8.3*  8.5                      Assessment/Plan     * Groin abscess   Assessment & Plan    Left groin abscess extending down to the left labia, with induration, fluctuance, foul smelling drainage. Noted about 5 days  ago, was seen at [facility] and started on oral abx, however abscess not getting better, rather draining now. Referred to the ER from [facility] today  Continues to have pain and drainage  Gynecology consult, her  -Decreased discharge from wound  -plan for continued outpatient wound care follow-up   arranged an f/u appointment for this patient on 5/1/18.  Wound care to coordinate with patient's daughter to educate on dressing change prior to discharge home.    Will call the patient's daughter to verify whether she knows how to do dressing changes.      SCD for dvt ppx  Not septic a the time of admission  Monitor labs     Wound cx + GBS  Dc unasyn , change to augmentin  - dc vancomycin          ANOOP (acute kidney injury) (CMS-HCC)   Assessment & Plan    Unknown if patient has underlying CKD, however at this time Cr elevated >2, patient has hx of DM  2nd to dehydration  improving  Avoid nephrotoxics,   Caution with vancomycin, monitor renal function ,renally dose all meds  Hold home metformin  - dc ivf  -- repeat renal function panel.        Hypertension- (present on admission)   Assessment & Plan    - restarted  arb  - coreg  -Increased Norvasc  -- better controlled. Monitor bp.        High cholesterol- (present on admission)   Assessment & Plan    Chronic and stable  Continue statin        Tension type headache   Assessment & Plan    Secondary to hypertension  Continue blood pressure control  Resolved        DM (diabetes mellitus) (CMS-HCC)   Assessment & Plan    Uncontrolled with hyperglycemia, BS > 200 on admission, no gap  Acidosis resolved  Nausea/vomiting resolved  Diabetic education given.  Continue lantus and sliding scale.  Discussed with the patient regarding the importance of diabetic control in association with wound healing.  Nursing staff taught the patient on how to do blood glucose check and insulin injection.        Class 3 obesity with serious comorbidity and body mass index (BMI) of  45.0 to 49.9 in adult (CMS-HCC)- (present on admission)   Assessment & Plan    Nutrition consult  Uncontrolled Dm,  A1c 16  Monitor  Counseled on diet and modification          Quality-Core Measures   Reviewed items::  Medications reviewed, Labs reviewed and Radiology images reviewed  DVT prophylaxis - mechanical:  SCDs  Ulcer Prophylaxis::  Not indicated  Antibiotics:  Treating active infection/contamination beyond 24 hours perioperative coverage

## 2018-04-26 NOTE — CARE PLAN
Problem: Safety  Goal: Will remain free from injury  Outcome: PROGRESSING AS EXPECTED  No injury or falls during shift, calls appropriately, steady gait, call-light and items within reach     Problem: Bowel/Gastric:  Goal: Normal bowel function is maintained or improved  Outcome: PROGRESSING AS EXPECTED  Pt has had several bowel movements today, refuses senna

## 2018-04-26 NOTE — PROGRESS NOTES
Pt resting in bed quietly, denies pain, blood sugar=232 c/o mild indigestion, requests diet ginger ale. Pt educated on monitoring blood sugar and best places to give insulin. Pt responsive to education although she voices fear of needles. Pt denies needs, call-light and items within reach

## 2018-04-27 VITALS
OXYGEN SATURATION: 93 % | HEIGHT: 67 IN | HEART RATE: 79 BPM | RESPIRATION RATE: 16 BRPM | WEIGHT: 293 LBS | DIASTOLIC BLOOD PRESSURE: 70 MMHG | SYSTOLIC BLOOD PRESSURE: 130 MMHG | TEMPERATURE: 97.6 F | BODY MASS INDEX: 45.99 KG/M2

## 2018-04-27 LAB
GLUCOSE BLD-MCNC: 142 MG/DL (ref 65–99)
GLUCOSE BLD-MCNC: 146 MG/DL (ref 65–99)

## 2018-04-27 PROCEDURE — 700111 HCHG RX REV CODE 636 W/ 250 OVERRIDE (IP): Performed by: INTERNAL MEDICINE

## 2018-04-27 PROCEDURE — 82962 GLUCOSE BLOOD TEST: CPT | Mod: 91

## 2018-04-27 PROCEDURE — A9270 NON-COVERED ITEM OR SERVICE: HCPCS | Performed by: INTERNAL MEDICINE

## 2018-04-27 PROCEDURE — 700102 HCHG RX REV CODE 250 W/ 637 OVERRIDE(OP): Performed by: INTERNAL MEDICINE

## 2018-04-27 PROCEDURE — 99239 HOSP IP/OBS DSCHRG MGMT >30: CPT | Performed by: INTERNAL MEDICINE

## 2018-04-27 RX ORDER — MORPHINE SULFATE 4 MG/ML
2 INJECTION, SOLUTION INTRAMUSCULAR; INTRAVENOUS ONCE
Status: COMPLETED | OUTPATIENT
Start: 2018-04-27 | End: 2018-04-27

## 2018-04-27 RX ORDER — HYDROCODONE BITARTRATE AND ACETAMINOPHEN 5; 325 MG/1; MG/1
1 TABLET ORAL EVERY 6 HOURS PRN
Qty: 20 TAB | Refills: 0 | Status: SHIPPED | OUTPATIENT
Start: 2018-04-27 | End: 2018-05-07

## 2018-04-27 RX ADMIN — MORPHINE SULFATE 2 MG: 4 INJECTION INTRAVENOUS at 11:11

## 2018-04-27 RX ADMIN — VALSARTAN 40 MG: 80 TABLET ORAL at 09:17

## 2018-04-27 RX ADMIN — AMLODIPINE BESYLATE 10 MG: 10 TABLET ORAL at 09:17

## 2018-04-27 RX ADMIN — CARVEDILOL 6.25 MG: 6.25 TABLET, FILM COATED ORAL at 09:17

## 2018-04-27 RX ADMIN — MAGNESIUM GLUCONATE 500 MG ORAL TABLET 400 MG: 500 TABLET ORAL at 09:17

## 2018-04-27 RX ADMIN — AMOXICILLIN AND CLAVULANATE POTASSIUM 1 TABLET: 875; 125 TABLET, FILM COATED ORAL at 09:17

## 2018-04-27 ASSESSMENT — PAIN SCALES - GENERAL: PAINLEVEL_OUTOF10: 6

## 2018-04-27 NOTE — DISCHARGE SUMMARY
CHIEF COMPLAINT ON ADMISSION  Chief Complaint   Patient presents with   • Groin Pain     left x1wks   • Sent by MD     from [facility]       CODE STATUS  Full Code    HPI & HOSPITAL COURSE  This is a 52 y.o. female here with left groin pain and for over a week.  The patient was diagnosed to have a left groin abscess and DM type 2 poorly controlled.   Subsequently, gynecologist, Dr. Glendy Man was consulted.   Dr. Man did not recommend further surgical intervention as the patient's abscess is already opened and drainig.  Thus, the wound care team was consulted to help with packing the wound and specialized wound care.  In the mean time, the patient was under the treatment of IV antibiotic.  The patient's wound culture eventually grew out group B strep.  Then the patient's antibiotic was switched to oral antibiotic.  However, the patient couldn't be discharged to home as the patient has difficulty to doing her own wound care.   With the staff teaching the patient and her daughters, the patient and her family gradually learned how to do wound dressing changes.   Social workers were able to secure a outpatient wound care clinic follow up appointment for the patient.    The patient met 2-midnight criteria for an inpatient stay at the time of discharge.    Therefore, she is discharged in fair and stable condition with close outpatient follow-up.    SPECIFIC OUTPATIENT FOLLOW-UP  Wound care clinic and Gynecologist, Dr. Man.    DISCHARGE PROBLEM LIST  Principal Problem:    Groin abscess POA: Unknown  Active Problems:    ANOOP (acute kidney injury) (CMS-HCC) POA: Unknown    High cholesterol POA: Yes    Hypertension POA: Yes    Anemia POA: Yes    Class 3 obesity with serious comorbidity and body mass index (BMI) of 45.0 to 49.9 in adult (CMS-HCC) POA: Yes    DM (diabetes mellitus) (CMS-HCC) POA: Unknown    Tension type headache POA: Unknown    Hypomagnesemia POA: Unknown  Resolved Problems:    * No  resolved hospital problems. *      FOLLOW UP  Future Appointments  Date Time Provider Department Center   5/1/2018 4:00 PM TRISTA Pack [address] [place] [address]   5/4/2018 9:00 AM TRISTA Reis [address] [place] [address]   5/8/2018 10:00 AM TRISTA Morse [address] [place] [address]   5/10/2018 10:00 AM Bull Diop M.D. [facility] None   5/11/2018 1:00 PM TRISTA Morse [address] [place] [address]   5/15/2018 9:00 AM TRISTA Morse [address] [place] [address]   5/18/2018 2:30 PM TRISTA Morse [address] [place] [address]   5/22/2018 10:00 AM TRISTA Morse [address] [place] [address] LESLY Cheung  [address]  [address]  [place] [address]  [phone]    In 1 week      WOUND CARE [facility]  [address] [facility] [address]  [phone]    For wound re-check, on May 1st, 2018    Glendy Man M.D.  [address]  [place] [id number] [phone]    Schedule an appointment as soon as possible for a visit in 2 weeks  To establish care with Ob gyn doctor.      MEDICATIONS ON DISCHARGE   Cyndee Calvert   Home Medication Instructions AMADOU:[id number]    Printed on:04/27/18 6042   Medication Information                      amoxicillin-clavulanate (AUGMENTIN) 875-125 MG Tab  Take 1 Tab by mouth 2 times a day for 7 days.             carvedilol (COREG) 3.125 MG Tab  Take 3.125 mg by mouth 2 times a day, with meals.             HYDROcodone-acetaminophen (NORCO) 5-325 MG Tab per tablet  Take 1 Tab by mouth every 6 hours as needed (Pain) for up to 10 days.             insulin glargine (LANTUS) 100 UNIT/ML Solution  Inject 10 Units as instructed every evening.             insulin regular (HUMULIN R) 100 Unit/mL Solution  Inject 2-9 Units as instructed 4 Times a Day,Before Meals and at Bedtime.             simvastatin (ZOCOR) 5 MG Tab  Take 5 mg by mouth every evening.             valsartan (DIOVAN) 40 MG Tab  Take 40 mg by mouth every day.                 DIET  Orders Placed This Encounter   Procedures   • DIET ORDER     Standing Status:   Standing     Number of Occurrences:   1      Order Specific Question:   Diet:     Answer:   Diabetic [3]     Order Specific Question:   Electrolyte modifications:     Answer:   No Added Salt [2]       ACTIVITY  As tolerated.        CONSULTATIONS  Gynecologist: Dr. Man.  Inpatient wound care team.    PROCEDURES  None.    LABORATORY  Lab Results   Component Value Date/Time    SODIUM 137 04/25/2018 03:42 AM    POTASSIUM 3.9 04/25/2018 03:42 AM    CHLORIDE 109 04/25/2018 03:42 AM    CO2 23 04/25/2018 03:42 AM    GLUCOSE 151 (H) 04/25/2018 03:42 AM    BUN 12 04/25/2018 03:42 AM    CREATININE 0.86 04/25/2018 03:42 AM    CREATININE 1.2 09/18/2008 03:57 AM        Lab Results   Component Value Date/Time    WBC 10.7 04/25/2018 03:42 AM    HEMOGLOBIN 10.6 (L) 04/25/2018 03:42 AM    HEMATOCRIT 33.0 (L) 04/25/2018 03:42 AM    PLATELETCT 222 04/25/2018 03:42 AM      Physical Exam   Constitutional: She is oriented to person, place, and time. She appears well-developed. No distress.   HENT:   Head: Normocephalic and atraumatic.   Eyes: EOM are normal. Pupils are equal, round, and reactive to light.   Neck: Neck supple. No JVD present.   Cardiovascular: Normal rate and intact distal pulses.    Pulmonary/Chest: Effort normal and breath sounds normal. No respiratory distress. She has no wheezes.   Abdominal: Soft. Bowel sounds are normal. She exhibits no distension. There is no tenderness.   Genitourinary:   Genitourinary Comments: Labial wound open with packing in place    Musculoskeletal: She exhibits no edema or tenderness.   Neurological: She is alert and oriented to person, place, and time. No cranial nerve deficit. Coordination normal.   Skin: Skin is warm and dry.    Psychiatric: She has a normal mood and affect. Her behavior is normal. Judgment and thought content normal.     Total time of the discharge process exceeds 36 minutes

## 2018-04-27 NOTE — PROGRESS NOTES
Pt stated daughter was busy with work today. Pt has not heard from daughter since this morning and states she does not think she will come today. RN changed jani.

## 2018-04-27 NOTE — PROGRESS NOTES
Received report from day RN, assumed care of pt 1900.  Pt A&Ox4. PIV SL, is patent with CDI dressing.  Dressing to groin silver packing with ABD pad is CDI.   Pt denies pain at this time.   Pt up self, states had BM today.   Pt medicated per MAR. Hoping to discharge tomorrow.

## 2018-04-27 NOTE — CARE PLAN
Problem: Discharge Barriers/Planning  Goal: Patient's continuum of care needs will be met  Outcome: PROGRESSING AS EXPECTED  Pt hopeful to be discharged today. Pts needs met throughout shift.     Problem: Pain Management  Goal: Pain level will decrease to patient's comfort goal  Pt declined pain medication throughout shift; stated she was not in pain.

## 2018-04-27 NOTE — DISCHARGE INSTRUCTIONS
Discharge Instructions    Discharged to home by car with relative. Discharged via wheelchair, hospital escort: Yes.  Special equipment needed: Not Applicable    Be sure to schedule a follow-up appointment with your primary care doctor or any specialists as instructed.     Discharge Plan:   Diet Plan: Discussed  Activity Level: Discussed  Confirmed Follow up Appointment: Appointment Scheduled  Confirmed Symptoms Management: Discussed  Medication Reconciliation Updated: Yes  Influenza Vaccine Indication: Patient Refuses    I understand that a diet low in cholesterol, fat, and sodium is recommended for good health. Unless I have been given specific instructions below for another diet, I accept this instruction as my diet prescription.   Other diet: Diabetic    Special Instructions: None    · Is patient discharged on Warfarin / Coumadin?   No     Depression / Suicide Risk    [facility], it is important to learn how to keep safe from harming yourself.    Recognize the warning signs:  · Abrupt changes in personality, positive or negative- including increase in energy   · Giving away possessions  · Change in eating patterns- significant weight changes-  positive or negative  · Change in sleeping patterns- unable to sleep or sleeping all the time   · Unwillingness or inability to communicate  · Depression  · Unusual sadness, discouragement and loneliness  · Talk of wanting to die  · Neglect of personal appearance   · Rebelliousness- reckless behavior  · Withdrawal from people/activities they love  · Confusion- inability to concentrate     If you or a loved one observes any of these behaviors or has concerns about self-harm, here's what you can do:  · Talk about it- your feelings and reasons for harming yourself  · Remove any means that you might use to hurt yourself (examples: pills, rope, extension cords, firearm)  · Get professional help from the community (Mental Health,  Substance Abuse, psychological counseling)  · Do not be alone:Call your Safe Contact- someone whom you trust who will be there for you.  · Call your local CRISIS HOTLINE 181-8002 or 717-660-7060  · Call your local Children's Mobile Crisis Response Team [place] (180) 773-0972 or www.[employer]  · Call the toll free National Suicide Prevention Hotlines   · National Suicide Prevention Lifeline 696-108-TSFV (6406)  · National Hope Line Network 800-SUICIDE (658-3618)

## 2018-04-28 NOTE — PROGRESS NOTES
Wound care education provided to daughter and family friend. Daughter educated and demonstrated ability to change dressing per wound care orders. All supplies provided for pt to take home. Pt has follow up appointment on Monday and RN instructed pt/family to change once over the weekend. Dr. Albarran instructed pt to change 3 x per week. Pt to ask after Monday if dressing needs to be changed at home. Daughter and mother feel comfortable changing after instruction/demonstration by daughter.

## 2018-05-01 ENCOUNTER — NON-PROVIDER VISIT (OUTPATIENT)
Dept: WOUND CARE | Facility: MEDICAL CENTER | Age: 53
End: 2018-05-01
Attending: INTERNAL MEDICINE
Payer: COMMERCIAL

## 2018-05-01 PROCEDURE — 97602 WOUND(S) CARE NON-SELECTIVE: CPT

## 2018-05-01 NOTE — CERTIFICATION
Advanced Wound Care  [facility]  [address]  [address]  [place] [address]  [phone] Fax: [phone]      Initial Evaluation  For Certification Period:05/01/2018 - 07/21/2018      Referring Physician: Rylee Love MD  Primary Physician:   Markos Cheung FNP     Consulting Physicians:         Wound(s):L groin/ labia majora abscess L02.214  Start of Care: 05/01/2018       Subjective:        HPI:     Pt is a 52 year old diabetic lady with class 3 obesity who is referred by hospitalist for management of a groin abscess. She was admitted 4/18/2018 for wound care and IV abx.           Pain:   Pt c/o pain 5/10 during probing of wound, otherwise denies.         Past Medical History:  Past Medical History:   Diagnosis Date   • Anemia    • Diabetes (HCC)    • High cholesterol    • Hypertension      Current Medications:  Current Outpatient Prescriptions:   •  HYDROcodone-acetaminophen (NORCO) 5-325 MG Tab per tablet, Take 1 Tab by mouth every 6 hours as needed (Pain) for up to 10 days., Disp: 20 Tab, Rfl: 0  •  insulin regular (HUMULIN R) 100 Unit/mL Solution, Inject 2-9 Units as instructed 4 Times a Day,Before Meals and at Bedtime., Disp: 10 mL, Rfl: 1  •  insulin glargine (LANTUS) 100 UNIT/ML Solution, Inject 10 Units as instructed every evening., Disp: 10 mL, Rfl: 1  •  amoxicillin-clavulanate (AUGMENTIN) 875-125 MG Tab, Take 1 Tab by mouth 2 times a day for 7 days., Disp: 14 Tab, Rfl: 0  •  valsartan (DIOVAN) 40 MG Tab, Take 40 mg by mouth every day., Disp: , Rfl:   •  carvedilol (COREG) 3.125 MG Tab, Take 3.125 mg by mouth 2 times a day, with meals., Disp: , Rfl:   •  simvastatin (ZOCOR) 5 MG Tab, Take 5 mg by mouth every evening., Disp: , Rfl:   Allergies: Latex and Percocet [oxycodone-acetaminophen]    Past Surgical History:   Past Surgical History:   Procedure Laterality Date   • BLADDER BIOPSY WITH CYSTOSCOPY  9/2/08    Performed by LILLIE WILLIAM at SURGERY [facility]   •  HYSTERECTOMY, TOTAL ABDOMINAL     • TONSILLECTOMY       Social History:    Social History     Social History   • Marital status: Single     Spouse name: N/A   • Number of children: N/A   • Years of education: N/A     Occupational History   • Not on file.     Social History Main Topics   • Smoking status: Never Smoker   • Smokeless tobacco: Not on file   • Alcohol use No   • Drug use: No   • Sexual activity: Not on file     Other Topics Concern   • Not on file     Social History Narrative   • No narrative on file           Objective:      Tests and Measures: Pt reports her FBS this am was 108, and her range is 97 - 180. Culture deferred as there are no clinical ss infection and pt is currently on abx    Orthotic, protective, supportive devices: none    Fall Risk Assessment (jeanette all that apply with an X): pt is not a fall risk              65 years or older                Fall within the last 2 years, uses   Ambulatory devices   Loss of protective sensation in feet,    Use of prostethic/orthotic, years               Presence of lower extremity/foot/toe amputation              Taking medication that increases risk (per facility policy)       Wound Characteristics                                                    Location:L groin/labia majora   Initial Evaluation  Date:05/01/2018   Tissue Type and %: 100% red viable tissue as far as can be seen   Periwound: intact   Drainage: Mod ss   Exposed structures none   Wound Edges:   open   Odor: none   S&S of Infection:   none   Edema: none   Sensation: intact               Measurements: Initial Evaluation  Date:05/01/2018   Length (cm) 1.0   Width (cm) 0.4   Depth (cm) 1.0   Area (cm2) 0.4cm2   Tract/undermine Undermining at 6 o'clock 3.0cm        Procedures:     Debridement :  Non selective with q-tip    Cleansed with:   Irrigated with NSS                                                                       Periwound protected with:skin prep, then zinc barrier  paste   Primary dressing: packed loosely with silver hydrofiber strip packing   Secondary Dressing: ABD pad held in groin fold by underwear (consider using small ad foam or hypafix if this does not work well)   Other:      Patient Education: pt instr increased protein diet, keeping FBS <140 , use of MVI if ok with MD; s/s infection, increased erythema and edema/fever/chills/N+V when to call MD/go to ER. Instr rational for wound care products. Instr to make appts for  2x week. Instr to keep dressings clean and dry, shower on clinic days right before coming in. Pt and cg with good understanding.    Professional Collaboration: Eval sent to referring provider via EPIC      Assessment:      Wound etiology: I+D of abscess    Wound Progress:  Initial eval - TBD    Rationale for Treatment:AqAg to manage bioburden, absorb exudate, and maintain moist wound environment without laterally wicking exudate therefore reducing timmy-wound maceration    Patient tolerance/compliance: Pt tolerated care well. Appears receptive to instr, and motivated to heal    Complicating factors:DM, obesity, infection    Need for ongoing Advanced Wound Care services:continued skilled wound care for debridement as needed, dressing management and skilled clinical observation to prevent complications and expedite healing.        Plan:      Treatment Plan and Recommendations:  Diagnosis/ICD9: L labia majora abscess L02.214    Procedures/CPT:non selective debridement RN    Frequency: 2 x week      Treatment Goals: STG 2 Weeks  LTG 4 Weeks   Granulation Tissue: 100% 100   Decrease Necrotic Tissue to: 0 0   Wound Phase:  proliferative proliferative   Decrease Size by: 40% 80%   Periwound:  intact intact   Decrease tracts/undermining by: 40% 80%   Decrease Pain:  2 0       At the time of each visit a thorough assessment of the patient is completed to assure the  appropriateness of our plan of care.  The dressings or modalities may need to be adapted   from  the original plan to address any significant changes in the wound environment.          Clinician Signature:_______________________________Date__________________      Physician Signature:______________________________Date:__________________

## 2018-05-04 ENCOUNTER — NON-PROVIDER VISIT (OUTPATIENT)
Dept: WOUND CARE | Facility: MEDICAL CENTER | Age: 53
End: 2018-05-04
Attending: INTERNAL MEDICINE
Payer: COMMERCIAL

## 2018-05-04 PROCEDURE — 97602 WOUND(S) CARE NON-SELECTIVE: CPT

## 2018-05-04 NOTE — WOUND TEAM
Advanced Wound Care  [facility]  [address]  [address]  [place] [address]  [phone] Fax: [phone]      Encounter Note  For Certification Period: 05/01/2018 - 07/21/2018      Referring Physician: Rylee Love MD  Primary Physician:   Markos Cheung FNP     Consulting Physicians:         Wound(s):L groin/ labia majora abscess L02.214  Start of Care: 05/01/2018       Subjective:        HPI:     Pt is a 52 year old diabetic lady with class 3 obesity who is referred by hospitalist for management of a groin abscess. She was admitted 4/18/2018 for wound care and IV abx.           Pain: 6/10         Past Medical History:  Past Medical History:   Diagnosis Date   • Anemia    • Diabetes (HCC)    • High cholesterol    • Hypertension      Current Medications:  Current Outpatient Prescriptions:   •  HYDROcodone-acetaminophen (NORCO) 5-325 MG Tab per tablet, Take 1 Tab by mouth every 6 hours as needed (Pain) for up to 10 days., Disp: 20 Tab, Rfl: 0  •  insulin regular (HUMULIN R) 100 Unit/mL Solution, Inject 2-9 Units as instructed 4 Times a Day,Before Meals and at Bedtime., Disp: 10 mL, Rfl: 1  •  insulin glargine (LANTUS) 100 UNIT/ML Solution, Inject 10 Units as instructed every evening., Disp: 10 mL, Rfl: 1  •  valsartan (DIOVAN) 40 MG Tab, Take 40 mg by mouth every day., Disp: , Rfl:   •  carvedilol (COREG) 3.125 MG Tab, Take 3.125 mg by mouth 2 times a day, with meals., Disp: , Rfl:   •  simvastatin (ZOCOR) 5 MG Tab, Take 5 mg by mouth every evening., Disp: , Rfl:   Allergies: Latex and Percocet [oxycodone-acetaminophen]    Past Surgical History:   Past Surgical History:   Procedure Laterality Date   • BLADDER BIOPSY WITH CYSTOSCOPY  9/2/08    Performed by LILLEI WILLIAM at SURGERY [facility]   • HYSTERECTOMY, TOTAL ABDOMINAL     • TONSILLECTOMY       Social History:    Social History     Social History   • Marital status: Single     Spouse name: N/A   • Number of children: N/A    • Years of education: N/A     Occupational History   • Not on file.     Social History Main Topics   • Smoking status: Never Smoker   • Smokeless tobacco: Not on file   • Alcohol use No   • Drug use: No   • Sexual activity: Not on file     Other Topics Concern   • Not on file     Social History Narrative   • No narrative on file           Objective:      Tests and Measures: Pt reports her FBS this am was 108, and her range is 97 - 180. Culture deferred as there are no clinical ss infection and pt is currently on abx    Orthotic, protective, supportive devices: none    Fall Risk Assessment (jeanette all that apply with an X): pt is not a fall risk              65 years or older                Fall within the last 2 years, uses   Ambulatory devices   Loss of protective sensation in feet,    Use of prostethic/orthotic, years               Presence of lower extremity/foot/toe amputation              Taking medication that increases risk (per facility policy)       Wound Characteristics                                                    Location:L groin/labia majora   Initial Evaluation  Date:05/01/2018 Encounter  Date: 05/04/2018   Tissue Type and %: 100% red viable tissue as far as can be seen 100% red viable tissue as far as can be seen   Periwound: intact intact   Drainage: Mod ss Moderate serous   Exposed structures none none   Wound Edges:   open open   Odor: none none   S&S of Infection:   none none   Edema: none localized   Sensation: intact intact               Measurements: Initial Evaluation  Date:05/01/2018   Length (cm) 1.0   Width (cm) 0.4   Depth (cm) 1.0   Area (cm2) 0.4cm2   Tract/undermine Undermining at 6 o'clock 3.0cm        Procedures:     Debridement :  Non selective with q-tip    Cleansed with:   Irrigated with NSS                                                                       Periwound protected with: no sting skin prep, then zinc barrier paste   Primary dressing: packed loosely with silver  hydrofiber strip packing   Secondary Dressing: ABD pad held in groin fold by underwear (consider using small ad foam or hypafix if this does not work well)   Other:      Patient Education: Reiterated importance of: pt instr increased protein diet, keeping FBS <140 , use of MVI if ok with MD; s/s infection, increased erythema and edema/fever/chills/N+V when to call MD/go to ER. Instr rational for wound care products. Instr to make appts for  2x week. Instr to keep dressings clean and dry, shower on clinic days right before coming in. Pt and cg with good understanding.    Professional Collaboration: none      Assessment:      Wound etiology: I+D of abscess    Wound Progress:  Decreasing tract length    Rationale for Treatment:AqAg to manage bioburden, absorb exudate, and maintain moist wound environment without laterally wicking exudate therefore reducing timmy-wound maceration    Patient tolerance/compliance: Pt tolerated care well. Appears receptive to instr, and motivated to heal    Complicating factors:DM, obesity, infection    Need for ongoing Advanced Wound Care services:continued skilled wound care for debridement as needed, dressing management and skilled clinical observation to prevent complications and expedite healing.        Plan:      Treatment Plan and Recommendations:  Diagnosis/ICD9: L labia majora abscess L02.214    Procedures/CPT:non selective debridement RN    Frequency: 2 x week      Treatment Goals: STG 2 Weeks  LTG 4 Weeks   Granulation Tissue: 100% 100   Decrease Necrotic Tissue to: 0 0   Wound Phase:  proliferative proliferative   Decrease Size by: 40% 80%   Periwound:  intact intact   Decrease tracts/undermining by: 40% 80%   Decrease Pain:  2 0       At the time of each visit a thorough assessment of the patient is completed to assure the  appropriateness of our plan of care.  The dressings or modalities may need to be adapted   from the original plan to address any significant changes in the  wound environment.          Clinician Signature:_______________________________Date__________________      Physician Signature:______________________________Date:__________________

## 2018-05-08 ENCOUNTER — NON-PROVIDER VISIT (OUTPATIENT)
Dept: WOUND CARE | Facility: MEDICAL CENTER | Age: 53
End: 2018-05-08
Attending: INTERNAL MEDICINE
Payer: COMMERCIAL

## 2018-05-08 PROCEDURE — 97602 WOUND(S) CARE NON-SELECTIVE: CPT

## 2018-05-08 NOTE — WOUND TEAM
Advanced Wound Care  [facility]  [address]  [address]  [place] [address]  [phone] Fax: [phone]      Encounter Note  For Certification Period: 05/01/2018 - 07/21/2018      Referring Physician: Rylee Love MD  Primary Physician:   Markos Cheung FNP     Consulting Physicians:         Wound(s):L groin/ labia majora abscess L02.214  Start of Care: 05/01/2018       Subjective:        HPI:     Pt is a 52 year old diabetic lady with class 3 obesity who is referred by hospitalist for management of a groin abscess. She was admitted 4/18/2018 for wound care and IV abx.           Pain: 6/10         Past Medical History:  Past Medical History:   Diagnosis Date   • Anemia    • Diabetes (HCC)    • High cholesterol    • Hypertension      Current Medications:  Current Outpatient Prescriptions:   •  insulin regular (HUMULIN R) 100 Unit/mL Solution, Inject 2-9 Units as instructed 4 Times a Day,Before Meals and at Bedtime., Disp: 10 mL, Rfl: 1  •  insulin glargine (LANTUS) 100 UNIT/ML Solution, Inject 10 Units as instructed every evening., Disp: 10 mL, Rfl: 1  •  valsartan (DIOVAN) 40 MG Tab, Take 40 mg by mouth every day., Disp: , Rfl:   •  carvedilol (COREG) 3.125 MG Tab, Take 3.125 mg by mouth 2 times a day, with meals., Disp: , Rfl:   •  simvastatin (ZOCOR) 5 MG Tab, Take 5 mg by mouth every evening., Disp: , Rfl:       Allergies: Latex and Percocet [oxycodone-acetaminophen]            Objective:      Tests and Measures: Pt reports her FBS this am was 108, and her range is 97 - 180. Culture deferred as there are no clinical ss infection and pt is currently on abx    Orthotic, protective, supportive devices: none    Fall Risk Assessment (jeanette all that apply with an X): pt is not a fall risk              65 years or older                Fall within the last 2 years, uses   Ambulatory devices   Loss of protective sensation in feet,    Use of prostethic/orthotic, years               Presence of  lower extremity/foot/toe amputation              Taking medication that increases risk (per facility policy)       Wound Characteristics                                                    Location:L groin/labia majora   Initial Evaluation  Date:05/01/2018 Encounter  Date: 05/08/2018   Tissue Type and %: 100% red viable tissue as far as can be seen 100% red viable tissue as far as can be seen   Periwound: intact intact   Drainage: Mod ss Moderate serous   Exposed structures none none   Wound Edges:   open open   Odor: none none   S&S of Infection:   none none   Edema: none localized   Sensation: intact intact               Measurements: Initial Evaluation  Date:05/01/2018 Encounter  Date: 5/8/18   Length (cm) 1.0 1   Width (cm) 0.4 0.4   Depth (cm) 1.0 0.8   Area (cm2) 0.4cm2 0.4   Tract/undermine Undermining at 6 o'clock 3.0cm UM @ 6 o'clock 2cm        Procedures:     Debridement :  Non selective with q-tip    Cleansed with:   Irrigated with NSS                                                                       Periwound protected with: skin prep, then zinc barrier paste   Primary dressing: packed loosely with silver hydrofiber strip packing   Secondary Dressing: ABD pad held in groin fold by underwear (consider using small ad foam or hypafix if this does not work well)   Other:      Patient Education: Discussed wound progress with patient. She says that the ABD held in place well, and the AqAg strip was also still in place, so we will continue with these dressings.     Reiterated importance of: pt instr increased protein diet, keeping FBS <140 , use of MVI if ok with MD; s/s infection, increased erythema and edema/fever/chills/N+V when to call MD/go to ER. Instr rational for wound care products. Instr to make appts for  2x week. Instr to keep dressings clean and dry, shower on clinic days right before coming in. Pt and cg with good understanding.    Professional Collaboration: none      Assessment:      Wound  etiology: I+D of abscess    Wound Progress:  Decreasing tract length, less depth.     Rationale for Treatment:AqAg to manage bioburden, absorb exudate, and maintain moist wound environment without laterally wicking exudate therefore reducing timmy-wound maceration    Patient tolerance/compliance: Pt tolerated care well. Appears receptive to instr, and motivated to heal    Complicating factors:DM, obesity, infection    Need for ongoing Advanced Wound Care services:continued skilled wound care for debridement as needed, dressing management and skilled clinical observation to prevent complications and expedite healing.        Plan:      Treatment Plan and Recommendations:  Diagnosis/ICD9: L labia majora abscess L02.214    Procedures/CPT:non selective debridement RN    Frequency: 2 x week      Treatment Goals: STG 2 Weeks  LTG 4 Weeks   Granulation Tissue: 100% 100   Decrease Necrotic Tissue to: 0 0   Wound Phase:  proliferative proliferative   Decrease Size by: 40% 80%   Periwound:  intact intact   Decrease tracts/undermining by: 40% 80%   Decrease Pain:  2 0       At the time of each visit a thorough assessment of the patient is completed to assure the  appropriateness of our plan of care.  The dressings or modalities may need to be adapted   from the original plan to address any significant changes in the wound environment.          Clinician Signature:_______________________________Date__________________      Physician Signature:______________________________Date:__________________

## 2018-05-10 ENCOUNTER — OFFICE VISIT (OUTPATIENT)
Dept: CARDIOLOGY | Facility: MEDICAL CENTER | Age: 53
End: 2018-05-10
Payer: COMMERCIAL

## 2018-05-10 VITALS
OXYGEN SATURATION: 97 % | WEIGHT: 293 LBS | HEIGHT: 67 IN | BODY MASS INDEX: 45.99 KG/M2 | HEART RATE: 72 BPM | SYSTOLIC BLOOD PRESSURE: 146 MMHG | DIASTOLIC BLOOD PRESSURE: 96 MMHG | RESPIRATION RATE: 16 BRPM

## 2018-05-10 DIAGNOSIS — I10 ESSENTIAL HYPERTENSION: ICD-10-CM

## 2018-05-10 DIAGNOSIS — R06.09 DYSPNEA ON EXERTION: ICD-10-CM

## 2018-05-10 DIAGNOSIS — Z79.4 TYPE 2 DIABETES MELLITUS WITHOUT COMPLICATION, WITH LONG-TERM CURRENT USE OF INSULIN (HCC): ICD-10-CM

## 2018-05-10 DIAGNOSIS — E78.00 HIGH CHOLESTEROL: ICD-10-CM

## 2018-05-10 DIAGNOSIS — I89.0 LYMPHEDEMA OF BOTH LOWER EXTREMITIES: ICD-10-CM

## 2018-05-10 DIAGNOSIS — E11.9 TYPE 2 DIABETES MELLITUS WITHOUT COMPLICATION, WITH LONG-TERM CURRENT USE OF INSULIN (HCC): ICD-10-CM

## 2018-05-10 PROCEDURE — 99204 OFFICE O/P NEW MOD 45 MIN: CPT | Performed by: INTERNAL MEDICINE

## 2018-05-10 RX ORDER — CARVEDILOL 3.12 MG/1
TABLET ORAL
COMMUNITY
End: 2018-07-25

## 2018-05-10 RX ORDER — NYSTATIN 100000 [USP'U]/G
POWDER TOPICAL
COMMUNITY
End: 2018-07-25

## 2018-05-10 RX ORDER — AMOXICILLIN AND CLAVULANATE POTASSIUM 875; 125 MG/1; MG/1
TABLET, FILM COATED ORAL
COMMUNITY
End: 2018-07-25

## 2018-05-10 RX ORDER — CLINDAMYCIN HYDROCHLORIDE 300 MG/1
CAPSULE ORAL
COMMUNITY
End: 2018-07-25

## 2018-05-10 RX ORDER — SIMVASTATIN 5 MG
TABLET ORAL
COMMUNITY
End: 2018-07-25

## 2018-05-10 RX ORDER — HYDROCODONE BITARTRATE AND ACETAMINOPHEN 5; 325 MG/1; MG/1
TABLET ORAL
COMMUNITY
End: 2021-03-15

## 2018-05-10 RX ORDER — HYDROCHLOROTHIAZIDE 12.5 MG/1
TABLET ORAL
Qty: 30 TAB | Refills: 6 | Status: SHIPPED | OUTPATIENT
Start: 2018-05-10 | End: 2019-01-17 | Stop reason: SDUPTHER

## 2018-05-10 RX ORDER — SULFAMETHOXAZOLE AND TRIMETHOPRIM 800; 160 MG/1; MG/1
TABLET ORAL
COMMUNITY
End: 2018-07-25

## 2018-05-10 RX ORDER — FLUCONAZOLE 150 MG/1
TABLET ORAL
COMMUNITY
End: 2018-07-25

## 2018-05-10 RX ORDER — VALSARTAN 40 MG/1
TABLET ORAL
COMMUNITY
End: 2018-07-25

## 2018-05-10 RX ORDER — MELOXICAM 7.5 MG/1
TABLET ORAL
COMMUNITY
End: 2018-07-25

## 2018-05-11 ENCOUNTER — NON-PROVIDER VISIT (OUTPATIENT)
Dept: WOUND CARE | Facility: MEDICAL CENTER | Age: 53
End: 2018-05-11
Attending: INTERNAL MEDICINE
Payer: COMMERCIAL

## 2018-05-11 PROCEDURE — 97602 WOUND(S) CARE NON-SELECTIVE: CPT

## 2018-05-11 NOTE — WOUND TEAM
Advanced [facility]  [facility]  [address]  [address]  [place] [address]  [phone] Fax: [phone]      Encounter Note  For Certification Period: 05/01/2018 - 07/21/2018      Referring Physician: Rylee Love MD  Primary Physician:   Markos Cheung FNP     Consulting Physicians:         Wound(s):L groin/ labia majora abscess L02.214  Start of Care: 05/01/2018       Subjective:        HPI:     Pt is a 52 year old diabetic lady with class 3 obesity who is referred by hospitalist for management of a groin abscess. She was admitted 4/18/2018 for wound care and IV abx.           Pain: 6/10         Current Medications:  Current Outpatient Prescriptions:   •  amoxicillin-clavulanate (AUGMENTIN) 875-125 MG Tab, amoxicillin 875 mg-potassium clavulanate 125 mg tablet, Disp: , Rfl:   •  clindamycin (CLEOCIN) 300 MG Cap, clindamycin HCl 300 mg capsule, Disp: , Rfl:   •  fluconazole (DIFLUCAN) 150 MG tablet, fluconazole 150 mg tablet, Disp: , Rfl:   •  HYDROcodone-acetaminophen (NORCO) 5-325 MG Tab per tablet, hydrocodone 5 mg-acetaminophen 325 mg tablet, Disp: , Rfl:   •  meloxicam (MOBIC) 7.5 MG Tab, meloxicam 7.5 mg tablet, Disp: , Rfl:   •  metFORMIN (GLUCOPHAGE) 500 MG Tab, metformin 500 mg tablet, Disp: , Rfl:   •  nystatin (NYSTOP) powder, Nystop 100,000 unit/gram topical powder, Disp: , Rfl:   •  sulfamethoxazole-trimethoprim (BACTRIM DS) 800-160 MG tablet, sulfamethoxazole 800 mg-trimethoprim 160 mg tablet, Disp: , Rfl:   •  carvedilol (COREG) 3.125 MG Tab, carvedilol 3.125 mg tablet, Disp: , Rfl:   •  insulin regular (HUMULIN R) 100 Unit/mL Solution, Humulin R Regular U-100 Insulin 100 unit/mL injection solution, Disp: , Rfl:   •  simvastatin (ZOCOR) 5 MG Tab, simvastatin 5 mg tablet, Disp: , Rfl:   •  valsartan (DIOVAN) 40 MG Tab, valsartan 40 mg tablet, Disp: , Rfl:   •  hydroCHLOROthiazide (HYDRODIURIL) 12.5 MG tablet, 25 mg daily, Disp: 30 Tab, Rfl: 6  •  insulin regular (HUMULIN  R) 100 Unit/mL Solution, Inject 2-9 Units as instructed 4 Times a Day,Before Meals and at Bedtime., Disp: 10 mL, Rfl: 1  •  insulin glargine (LANTUS) 100 UNIT/ML Solution, Inject 10 Units as instructed every evening., Disp: 10 mL, Rfl: 1  •  valsartan (DIOVAN) 40 MG Tab, Take 40 mg by mouth every day., Disp: , Rfl:   •  carvedilol (COREG) 3.125 MG Tab, Take 3.125 mg by mouth 2 times a day, with meals., Disp: , Rfl:   •  simvastatin (ZOCOR) 5 MG Tab, Take 5 mg by mouth every evening., Disp: , Rfl:     Allergies: Latex and Percocet [oxycodone-acetaminophen]      Objective:      Tests and Measures: Pt reports her FBS this am was 108, and her range is 97 - 180. Culture deferred as there are no clinical ss infection and pt is currently on abx    Orthotic, protective, supportive devices: none    Fall Risk Assessment (jeanette all that apply with an X): pt is not a fall risk              65 years or older                Fall within the last 2 years, uses   Ambulatory devices   Loss of protective sensation in feet,    Use of prostethic/orthotic, years               Presence of lower extremity/foot/toe amputation              Taking medication that increases risk (per facility policy)       Wound Characteristics                                                    Location:L groin/labia majora   Initial Evaluation  Date:05/01/2018 Encounter  Date: 05/11/2018   Tissue Type and %: 100% red viable tissue as far as can be seen 100% red viable tissue as far as can be seen   Periwound: intact intact   Drainage: Mod ss LINDSEY, pt states she had increased drainage and had to take dressing off   Exposed structures none none   Wound Edges:   open open   Odor: none none   S&S of Infection:   none none   Edema: none localized   Sensation: intact intact               Measurements: Initial Evaluation  Date:05/01/2018 Encounter  Date: 5/8/18   Length (cm) 1.0 1   Width (cm) 0.4 0.4   Depth (cm) 1.0 0.8   Area (cm2) 0.4cm2 0.4   Tract/undermine  Undermining at 6 o'clock 3.0cm UM @ 6 o'clock 2cm        Procedures:     Debridement :  Non selective with q-tip    Cleansed with:   Irrigated with NSS                                                                       Periwound protected with: skin prep   Primary dressing: packed loosely with silver hydrofiber strip packing   Secondary Dressing: ABD pad held in groin fold by underwear (consider using small ad foam or hypafix if this does not work well)   Other:      Patient Education: Discussed wound progress with patient. She says that the ABD and AqAg held in place well but had to remove it because she had increased drainage and abd pad was sticking to her underwear. Pt wants to continue with ABD for now. Reviewed Instructed pt on s/s infection - chills, fever, malaise, NV, increased redness/swelling/pain/exudate - and to go to ER/Urgent Care, pt verbalized understanding.    Professional Collaboration: none      Assessment:      Wound etiology: I+D of abscess    Wound Progress:  No significant change.    Rationale for Treatment:AqAg to manage bioburden, absorb exudate, and maintain moist wound environment without laterally wicking exudate therefore reducing timmy-wound maceration    Patient tolerance/compliance: Pt tolerated care well. Appears receptive to instr, and motivated to heal    Complicating factors:DM, obesity, infection    Need for ongoing Advanced Wound Care services:continued skilled wound care for debridement as needed, dressing management and skilled clinical observation to prevent complications and expedite healing.        Plan:      Treatment Plan and Recommendations:  Diagnosis/ICD9: L labia majora abscess L02.214    Procedures/CPT:non selective debridement RN [id number]    Frequency: 2 x week      Treatment Goals: STG 2 Weeks  LTG 4 Weeks   Granulation Tissue: 100% 100   Decrease Necrotic Tissue to: 0 0   Wound Phase:  proliferative proliferative   Decrease Size by: 40% 80%   Periwound:  intact  intact   Decrease tracts/undermining by: 40% 80%   Decrease Pain:  2 0       At the time of each visit a thorough assessment of the patient is completed to assure the  appropriateness of our plan of care.  The dressings or modalities may need to be adapted   from the original plan to address any significant changes in the wound environment.          Clinician Signature:_______________________________Date__________________      Physician Signature:______________________________Date:__________________

## 2018-05-15 ENCOUNTER — NON-PROVIDER VISIT (OUTPATIENT)
Dept: WOUND CARE | Facility: MEDICAL CENTER | Age: 53
End: 2018-05-15
Attending: INTERNAL MEDICINE
Payer: COMMERCIAL

## 2018-05-15 PROCEDURE — 97602 WOUND(S) CARE NON-SELECTIVE: CPT

## 2018-05-15 NOTE — WOUND TEAM
Advanced [facility]  [facility]  [address]  [address]  [place] [address]  [phone] Fax: [phone]      Encounter Note  For Certification Period: 05/01/2018 - 07/21/2018      Referring Physician: Rylee Love MD  Primary Physician:   Markos Cheung FNP     Consulting Physicians:         Wound(s):L groin/ labia majora abscess L02.214  Start of Care: 05/01/2018       Subjective:        HPI:     Pt is a 52 year old diabetic lady with class 3 obesity who is referred by hospitalist for management of a groin abscess. She was admitted 4/18/2018 for wound care and IV abx.           Pain: 6/10         Current Medications:  Current Outpatient Prescriptions:   •  amoxicillin-clavulanate (AUGMENTIN) 875-125 MG Tab, amoxicillin 875 mg-potassium clavulanate 125 mg tablet, Disp: , Rfl:   •  clindamycin (CLEOCIN) 300 MG Cap, clindamycin HCl 300 mg capsule, Disp: , Rfl:   •  fluconazole (DIFLUCAN) 150 MG tablet, fluconazole 150 mg tablet, Disp: , Rfl:   •  HYDROcodone-acetaminophen (NORCO) 5-325 MG Tab per tablet, hydrocodone 5 mg-acetaminophen 325 mg tablet, Disp: , Rfl:   •  meloxicam (MOBIC) 7.5 MG Tab, meloxicam 7.5 mg tablet, Disp: , Rfl:   •  metFORMIN (GLUCOPHAGE) 500 MG Tab, metformin 500 mg tablet, Disp: , Rfl:   •  nystatin (NYSTOP) powder, Nystop 100,000 unit/gram topical powder, Disp: , Rfl:   •  sulfamethoxazole-trimethoprim (BACTRIM DS) 800-160 MG tablet, sulfamethoxazole 800 mg-trimethoprim 160 mg tablet, Disp: , Rfl:   •  carvedilol (COREG) 3.125 MG Tab, carvedilol 3.125 mg tablet, Disp: , Rfl:   •  insulin regular (HUMULIN R) 100 Unit/mL Solution, Humulin R Regular U-100 Insulin 100 unit/mL injection solution, Disp: , Rfl:   •  simvastatin (ZOCOR) 5 MG Tab, simvastatin 5 mg tablet, Disp: , Rfl:   •  valsartan (DIOVAN) 40 MG Tab, valsartan 40 mg tablet, Disp: , Rfl:   •  hydroCHLOROthiazide (HYDRODIURIL) 12.5 MG tablet, 25 mg daily, Disp: 30 Tab, Rfl: 6  •  insulin regular (HUMULIN  R) 100 Unit/mL Solution, Inject 2-9 Units as instructed 4 Times a Day,Before Meals and at Bedtime., Disp: 10 mL, Rfl: 1  •  insulin glargine (LANTUS) 100 UNIT/ML Solution, Inject 10 Units as instructed every evening., Disp: 10 mL, Rfl: 1  •  valsartan (DIOVAN) 40 MG Tab, Take 40 mg by mouth every day., Disp: , Rfl:   •  carvedilol (COREG) 3.125 MG Tab, Take 3.125 mg by mouth 2 times a day, with meals., Disp: , Rfl:   •  simvastatin (ZOCOR) 5 MG Tab, Take 5 mg by mouth every evening., Disp: , Rfl:     Allergies: Latex and Percocet [oxycodone-acetaminophen]      Objective:      Tests and Measures: Pt reports her FBS this am was 108, and her range is 97 - 180. Culture deferred as there are no clinical ss infection and pt is currently on abx    Orthotic, protective, supportive devices: none    Fall Risk Assessment (jeanette all that apply with an X): pt is not a fall risk              65 years or older                Fall within the last 2 years, uses   Ambulatory devices   Loss of protective sensation in feet,    Use of prostethic/orthotic, years               Presence of lower extremity/foot/toe amputation              Taking medication that increases risk (per facility policy)       Wound Characteristics                                                    Location:L groin/labia majora   Initial Evaluation  Date:05/01/2018 Encounter  Date: 05/15/2018   Tissue Type and %: 100% red viable tissue as far as can be seen 100% red viable tissue as far as can be seen   Periwound: intact intact   Drainage: Mod ss LINDSEY, pt states she had increased drainage and had to take dressing off   Exposed structures none none   Wound Edges:   open open   Odor: none none   S&S of Infection:   none none   Edema: none localized   Sensation: intact intact               Measurements: Initial Evaluation  Date:05/01/2018 Encounter  Date: 5/15/18   Length (cm) 1.0 1   Width (cm) 0.4 0.4   Depth (cm) 1.0 0.4   Area (cm2) 0.4cm2 0.4   Tract/undermine  Undermining at 6 o'clock 3.0cm UM @ 6 o'clock 0.3 cm          Procedures:     Debridement :  Non selective with q-tip    Cleansed with:   Irrigated with NSS                                                                       Periwound protected with: skin prep   Primary dressing: packed loosely with silver hydrofiber strip packing   Secondary Dressing: ABD pad held in groin fold by underwear (consider using small ad foam or hypafix if this does not work well)   Other:      Patient Education: Discussed wound progress with patient. She says that the ABD and AqAg held in place well until this morning but had to remove it again because she had increased drainage and abd pad was sticking to her underwear. Pt wants to continue with ABD for now. Reviewed Instructed pt on s/s infection - chills, fever, malaise, NV, increased redness/swelling/pain/exudate - and to go to ER/Urgent Care, pt verbalized understanding.    Professional Collaboration: none      Assessment:      Wound etiology: I+D of abscess    Wound Progress:  Wound measurement smaller and undermining smaller.    Rationale for Treatment:AqAg to manage bioburden, absorb exudate, and maintain moist wound environment without laterally wicking exudate therefore reducing timmy-wound maceration    Patient tolerance/compliance: Pt tolerated care well. Appears receptive to instr, and motivated to heal    Complicating factors:DM, obesity, infection    Need for ongoing Advanced Wound Care services:continued skilled wound care for debridement as needed, dressing management and skilled clinical observation to prevent complications and expedite healing.        Plan:      Treatment Plan and Recommendations:  Diagnosis/ICD9: L labia majora abscess L02.214    Procedures/CPT:non selective debridement RN [id number]    Frequency: 2 x week      Treatment Goals: STG 2 Weeks  LTG 4 Weeks   Granulation Tissue: 100% 100   Decrease Necrotic Tissue to: 0 0   Wound Phase:  proliferative  proliferative   Decrease Size by: 40% 80%   Periwound:  intact intact   Decrease tracts/undermining by: 40% 80%   Decrease Pain:  2 0       At the time of each visit a thorough assessment of the patient is completed to assure the  appropriateness of our plan of care.  The dressings or modalities may need to be adapted   from the original plan to address any significant changes in the wound environment.          Clinician Signature:_______________________________Date__________________      Physician Signature:______________________________Date:__________________

## 2018-05-18 ENCOUNTER — NON-PROVIDER VISIT (OUTPATIENT)
Dept: WOUND CARE | Facility: MEDICAL CENTER | Age: 53
End: 2018-05-18
Attending: INTERNAL MEDICINE
Payer: COMMERCIAL

## 2018-05-18 PROCEDURE — 97602 WOUND(S) CARE NON-SELECTIVE: CPT

## 2018-05-18 NOTE — WOUND TEAM
Advanced [facility]  [facility]  [address]  [address]  [place] [address]  [phone] Fax: [phone]      Encounter Note  For Certification Period: 05/01/2018 - 07/21/2018      Referring Physician: Rylee Love MD  Primary Physician:   Markos Cheung FNP     Consulting Physicians:         Wound(s):L groin/ labia majora abscess L02.214  Start of Care: 05/01/2018       Subjective:        HPI:     Pt is a 52 year old diabetic lady with class 3 obesity who is referred by hospitalist for management of a groin abscess. She was admitted 4/18/2018 for wound care and IV abx.           Pain: 6/10         Current Medications:  Current Outpatient Prescriptions:   •  amoxicillin-clavulanate (AUGMENTIN) 875-125 MG Tab, amoxicillin 875 mg-potassium clavulanate 125 mg tablet, Disp: , Rfl:   •  clindamycin (CLEOCIN) 300 MG Cap, clindamycin HCl 300 mg capsule, Disp: , Rfl:   •  fluconazole (DIFLUCAN) 150 MG tablet, fluconazole 150 mg tablet, Disp: , Rfl:   •  HYDROcodone-acetaminophen (NORCO) 5-325 MG Tab per tablet, hydrocodone 5 mg-acetaminophen 325 mg tablet, Disp: , Rfl:   •  meloxicam (MOBIC) 7.5 MG Tab, meloxicam 7.5 mg tablet, Disp: , Rfl:   •  metFORMIN (GLUCOPHAGE) 500 MG Tab, metformin 500 mg tablet, Disp: , Rfl:   •  nystatin (NYSTOP) powder, Nystop 100,000 unit/gram topical powder, Disp: , Rfl:   •  sulfamethoxazole-trimethoprim (BACTRIM DS) 800-160 MG tablet, sulfamethoxazole 800 mg-trimethoprim 160 mg tablet, Disp: , Rfl:   •  carvedilol (COREG) 3.125 MG Tab, carvedilol 3.125 mg tablet, Disp: , Rfl:   •  insulin regular (HUMULIN R) 100 Unit/mL Solution, Humulin R Regular U-100 Insulin 100 unit/mL injection solution, Disp: , Rfl:   •  simvastatin (ZOCOR) 5 MG Tab, simvastatin 5 mg tablet, Disp: , Rfl:   •  valsartan (DIOVAN) 40 MG Tab, valsartan 40 mg tablet, Disp: , Rfl:   •  hydroCHLOROthiazide (HYDRODIURIL) 12.5 MG tablet, 25 mg daily, Disp: 30 Tab, Rfl: 6  •  insulin regular (HUMULIN  R) 100 Unit/mL Solution, Inject 2-9 Units as instructed 4 Times a Day,Before Meals and at Bedtime., Disp: 10 mL, Rfl: 1  •  insulin glargine (LANTUS) 100 UNIT/ML Solution, Inject 10 Units as instructed every evening., Disp: 10 mL, Rfl: 1  •  valsartan (DIOVAN) 40 MG Tab, Take 40 mg by mouth every day., Disp: , Rfl:   •  carvedilol (COREG) 3.125 MG Tab, Take 3.125 mg by mouth 2 times a day, with meals., Disp: , Rfl:   •  simvastatin (ZOCOR) 5 MG Tab, Take 5 mg by mouth every evening., Disp: , Rfl:     Allergies: Latex and Percocet [oxycodone-acetaminophen]      Objective:      Tests and Measures: Pt reports her FBS this am was 108, and her range is 97 - 180. Culture deferred as there are no clinical ss infection and pt is currently on abx    Orthotic, protective, supportive devices: none    Fall Risk Assessment (jeanette all that apply with an X): pt is not a fall risk              65 years or older                Fall within the last 2 years, uses   Ambulatory devices   Loss of protective sensation in feet,    Use of prostethic/orthotic, years               Presence of lower extremity/foot/toe amputation              Taking medication that increases risk (per facility policy)       Wound Characteristics                                                    Location:L groin/labia majora   Initial Evaluation  Date:05/01/2018 Encounter  Date: 05/18/2018   Tissue Type and %: 100% red viable tissue as far as can be seen 100% red viable tissue as far as can be seen   Periwound: intact intact   Drainage: Mod ss LINDSEY pt took dressing off   Exposed structures none none   Wound Edges:   open open   Odor: none none   S&S of Infection:   none none   Edema: none None   Sensation: intact intact               Measurements: Initial Evaluation  Date:05/01/2018 Encounter  Date: 5/15/18   Length (cm) 1.0 1   Width (cm) 0.4 0.4   Depth (cm) 1.0 0.4   Area (cm2) 0.4cm2 0.4   Tract/undermine Undermining at 6 o'clock 3.0cm UM @ 6 o'clock 0.3 cm           Procedures:     Debridement :  Non selective with q-tip    Cleansed with:   Irrigated with NSS                                                                       Periwound protected with: skin prep   Primary dressing: silver hydrofiber strip   Secondary Dressing: small gauze, small tegaderm   Other:      Patient Education: Discussed wound progress with patient. Pt c/o mild irritation possibly from abd pad rubbing on her skin. Discussed small gauze and tegaderm, pt agreeable. Reviewed Instructed pt on s/s infection - chills, fever, malaise, NV, increased redness/swelling/pain/exudate - and to go to ER/Urgent Care, pt verbalized understanding.    Professional Collaboration: none      Assessment:      Wound etiology: I+D of abscess    Wound Progress:  Wound appear to continue to contract.    Rationale for Treatment: AqAg to manage bioburden, absorb exudate, and maintain moist wound environment without laterally wicking exudate therefore reducing timmy-wound maceration    Patient tolerance/compliance: Pt tolerated care well. Appears receptive to instr, and motivated to heal    Complicating factors:DM, obesity, infection    Need for ongoing Advanced Wound Care services:continued skilled wound care for debridement as needed, dressing management and skilled clinical observation to prevent complications and expedite healing.        Plan:      Treatment Plan and Recommendations:  Diagnosis/ICD9: L labia majora abscess L02.214    Procedures/CPT:non selective debridement RN [id number]    Frequency: 2 x week      Treatment Goals: STG 2 Weeks  LTG 4 Weeks   Granulation Tissue: 100% 100   Decrease Necrotic Tissue to: 0 0   Wound Phase:  proliferative proliferative   Decrease Size by: 40% 80%   Periwound:  intact intact   Decrease tracts/undermining by: 40% 80%   Decrease Pain:  2 0       At the time of each visit a thorough assessment of the patient is completed to assure the  appropriateness of our plan of care.  The  dressings or modalities may need to be adapted   from the original plan to address any significant changes in the wound environment.          Clinician Signature:_______________________________Date__________________      Physician Signature:______________________________Date:__________________

## 2018-05-21 ENCOUNTER — TELEPHONE (OUTPATIENT)
Dept: CARDIOLOGY | Facility: MEDICAL CENTER | Age: 53
End: 2018-05-21

## 2018-05-21 NOTE — TELEPHONE ENCOUNTER
Attempted to call Akilah back, no answer. L/m for her to call back.    1130: S/w Eze, educated that pt should still get echo done per Dr. Diop's recommendations. She states understanding and will have scheduling call her to get set up. Reassured that pt doesn't have a FU with TF until 7/26.     ----- Message from Jia Abbasi sent at 5/21/2018  9:54 AM PDT -----  Regarding: [facility] [employer] needs call back about Echo  TF/[facility]ule[facility]    [facility] at [phone]. She finally got the auth for the patient's Echo and wants to make sure that the patient still needs to have it done.

## 2018-05-22 ENCOUNTER — NON-PROVIDER VISIT (OUTPATIENT)
Dept: WOUND CARE | Facility: MEDICAL CENTER | Age: 53
End: 2018-05-22
Attending: INTERNAL MEDICINE
Payer: COMMERCIAL

## 2018-05-22 PROCEDURE — 97602 WOUND(S) CARE NON-SELECTIVE: CPT

## 2018-05-22 NOTE — WOUND TEAM
[facility]  [facility]  [address]  [address]  [place] [address]  [phone] Fax: [phone]      Encounter Note  For Certification Period: 05/01/2018 - 07/21/2018      Referring Physician: Rylee Love MD  Primary Physician:   Markos Cheung FNP     Consulting Physicians:         Wound(s):L groin/ labia majora abscess L02.214  Start of Care: 05/01/2018       Subjective:        HPI:     Pt is a 52 year old diabetic lady with class 3 obesity who is referred by hospitalist for management of a groin abscess. She was admitted 4/18/2018 for wound care and IV abx.           Pain: 6/10         Current Medications:  Current Outpatient Prescriptions:   •  amoxicillin-clavulanate (AUGMENTIN) 875-125 MG Tab, amoxicillin 875 mg-potassium clavulanate 125 mg tablet, Disp: , Rfl:   •  clindamycin (CLEOCIN) 300 MG Cap, clindamycin HCl 300 mg capsule, Disp: , Rfl:   •  fluconazole (DIFLUCAN) 150 MG tablet, fluconazole 150 mg tablet, Disp: , Rfl:   •  HYDROcodone-acetaminophen (NORCO) 5-325 MG Tab per tablet, hydrocodone 5 mg-acetaminophen 325 mg tablet, Disp: , Rfl:   •  meloxicam (MOBIC) 7.5 MG Tab, meloxicam 7.5 mg tablet, Disp: , Rfl:   •  metFORMIN (GLUCOPHAGE) 500 MG Tab, metformin 500 mg tablet, Disp: , Rfl:   •  nystatin (NYSTOP) powder, Nystop 100,000 unit/gram topical powder, Disp: , Rfl:   •  sulfamethoxazole-trimethoprim (BACTRIM DS) 800-160 MG tablet, sulfamethoxazole 800 mg-trimethoprim 160 mg tablet, Disp: , Rfl:   •  carvedilol (COREG) 3.125 MG Tab, carvedilol 3.125 mg tablet, Disp: , Rfl:   •  insulin regular (HUMULIN R) 100 Unit/mL Solution, Humulin R Regular U-100 Insulin 100 unit/mL injection solution, Disp: , Rfl:   •  simvastatin (ZOCOR) 5 MG Tab, simvastatin 5 mg tablet, Disp: , Rfl:   •  valsartan (DIOVAN) 40 MG Tab, valsartan 40 mg tablet, Disp: , Rfl:   •  hydroCHLOROthiazide (HYDRODIURIL) 12.5 MG tablet, 25 mg daily, Disp: 30 Tab, Rfl: 6  •  insulin regular (HUMULIN  R) 100 Unit/mL Solution, Inject 2-9 Units as instructed 4 Times a Day,Before Meals and at Bedtime., Disp: 10 mL, Rfl: 1  •  insulin glargine (LANTUS) 100 UNIT/ML Solution, Inject 10 Units as instructed every evening., Disp: 10 mL, Rfl: 1  •  valsartan (DIOVAN) 40 MG Tab, Take 40 mg by mouth every day., Disp: , Rfl:   •  carvedilol (COREG) 3.125 MG Tab, Take 3.125 mg by mouth 2 times a day, with meals., Disp: , Rfl:   •  simvastatin (ZOCOR) 5 MG Tab, Take 5 mg by mouth every evening., Disp: , Rfl:     Allergies: Latex and Percocet [oxycodone-acetaminophen]      Objective:      Tests and Measures: Pt reports her FBS this am was 108, and her range is 97 - 180. Culture deferred as there are no clinical ss infection and pt is currently on abx    Orthotic, protective, supportive devices: none    Fall Risk Assessment (jeanette all that apply with an X): pt is not a fall risk              65 years or older                Fall within the last 2 years, uses   Ambulatory devices   Loss of protective sensation in feet,    Use of prostethic/orthotic, years               Presence of lower extremity/foot/toe amputation              Taking medication that increases risk (per facility policy)       Wound Characteristics                                                    Location:L groin/labia majora   Initial Evaluation  Date:05/01/2018 Encounter  Date: 05/22/2018   Tissue Type and %: 100% red viable tissue as far as can be seen 100% red viable tissue   Periwound: intact intact   Drainage: Mod ss Min ss   Exposed structures none none   Wound Edges:   open open   Odor: none none   S&S of Infection:   none none   Edema: none None   Sensation: intact intact               Measurements: Initial Evaluation  Date:05/01/2018 Encounter  Date: 5/22/18   Length (cm) 1.0 0.7   Width (cm) 0.4 0.2   Depth (cm) 1.0 0.3   Area (cm2) 0.4cm2 0.14 cm2   Tract/undermine Undermining at 6 o'clock 3.0cm None          Procedures:     Debridement :  Non  selective with NS and cotton tip applicator    Cleansed with:   NS and cotton tip applicator                                                                       Periwound protected with: skin prep   Primary dressing: silver hydrofiber strip   Secondary Dressing: small gauze, small tegaderm   Other:      Patient Education: Discussed wound progress with patient. Pt states tegaderm lasted until yesterday and wishes to continue with it. Reviewed Instructed pt on s/s infection - chills, fever, malaise, NV, increased redness/swelling/pain/exudate - and to go to ER/Urgent Care, pt verbalized understanding.    Professional Collaboration: none      Assessment:      Wound etiology: I+D of abscess    Wound Progress:  Wound smaller per measurement    Rationale for Treatment: AqAg to manage bioburden, absorb exudate, and maintain moist wound environment without laterally wicking exudate therefore reducing timmy-wound maceration    Patient tolerance/compliance: Pt tolerated care well. Appears receptive to instr, and motivated to heal    Complicating factors:DM, obesity, infection    Need for ongoing Advanced Wound Care services:continued skilled wound care for debridement as needed, dressing management and skilled clinical observation to prevent complications and expedite healing.        Plan:      Treatment Plan and Recommendations:  Diagnosis/ICD9: L labia majora abscess L02.214    Procedures/CPT:non selective debridement RN [id number]    Frequency: 2 x week      Treatment Goals: STG 2 Weeks  LTG 4 Weeks   Granulation Tissue: 100% 100   Decrease Necrotic Tissue to: 0 0   Wound Phase:  proliferative proliferative   Decrease Size by: 40% 80%   Periwound:  intact intact   Decrease tracts/undermining by: 40% 80%   Decrease Pain:  2 0       At the time of each visit a thorough assessment of the patient is completed to assure the  appropriateness of our plan of care.  The dressings or modalities may need to be adapted   from the original  plan to address any significant changes in the wound environment.          Clinician Signature:_______________________________Date__________________      Physician Signature:______________________________Date:__________________

## 2018-05-25 ENCOUNTER — NON-PROVIDER VISIT (OUTPATIENT)
Dept: WOUND CARE | Facility: MEDICAL CENTER | Age: 53
End: 2018-05-25
Attending: INTERNAL MEDICINE
Payer: COMMERCIAL

## 2018-05-25 PROCEDURE — 97602 WOUND(S) CARE NON-SELECTIVE: CPT

## 2018-05-25 NOTE — WOUND TEAM
[facility]  [facility]  [address]  [address]  [place] [address]  [phone] Fax: [phone]      Encounter Note  For Certification Period: 05/01/2018 - 07/21/2018      Referring Physician: Rylee Love MD  Primary Physician:   Markos Cheung FNP     Consulting Physicians:         Wound(s):L groin/ labia majora abscess L02.214  Start of Care: 05/01/2018       Subjective:        HPI:     Pt is a 52 year old diabetic lady with class 3 obesity who is referred by hospitalist for management of a groin abscess. She was admitted 4/18/2018 for wound care and IV abx.           Pain: 6/10         Current Medications:  Current Outpatient Prescriptions:   •  amoxicillin-clavulanate (AUGMENTIN) 875-125 MG Tab, amoxicillin 875 mg-potassium clavulanate 125 mg tablet, Disp: , Rfl:   •  clindamycin (CLEOCIN) 300 MG Cap, clindamycin HCl 300 mg capsule, Disp: , Rfl:   •  fluconazole (DIFLUCAN) 150 MG tablet, fluconazole 150 mg tablet, Disp: , Rfl:   •  HYDROcodone-acetaminophen (NORCO) 5-325 MG Tab per tablet, hydrocodone 5 mg-acetaminophen 325 mg tablet, Disp: , Rfl:   •  meloxicam (MOBIC) 7.5 MG Tab, meloxicam 7.5 mg tablet, Disp: , Rfl:   •  metFORMIN (GLUCOPHAGE) 500 MG Tab, metformin 500 mg tablet, Disp: , Rfl:   •  nystatin (NYSTOP) powder, Nystop 100,000 unit/gram topical powder, Disp: , Rfl:   •  sulfamethoxazole-trimethoprim (BACTRIM DS) 800-160 MG tablet, sulfamethoxazole 800 mg-trimethoprim 160 mg tablet, Disp: , Rfl:   •  carvedilol (COREG) 3.125 MG Tab, carvedilol 3.125 mg tablet, Disp: , Rfl:   •  insulin regular (HUMULIN R) 100 Unit/mL Solution, Humulin R Regular U-100 Insulin 100 unit/mL injection solution, Disp: , Rfl:   •  simvastatin (ZOCOR) 5 MG Tab, simvastatin 5 mg tablet, Disp: , Rfl:   •  valsartan (DIOVAN) 40 MG Tab, valsartan 40 mg tablet, Disp: , Rfl:   •  hydroCHLOROthiazide (HYDRODIURIL) 12.5 MG tablet, 25 mg daily, Disp: 30 Tab, Rfl: 6  •  insulin regular (HUMULIN  R) 100 Unit/mL Solution, Inject 2-9 Units as instructed 4 Times a Day,Before Meals and at Bedtime., Disp: 10 mL, Rfl: 1  •  insulin glargine (LANTUS) 100 UNIT/ML Solution, Inject 10 Units as instructed every evening., Disp: 10 mL, Rfl: 1  •  valsartan (DIOVAN) 40 MG Tab, Take 40 mg by mouth every day., Disp: , Rfl:   •  carvedilol (COREG) 3.125 MG Tab, Take 3.125 mg by mouth 2 times a day, with meals., Disp: , Rfl:   •  simvastatin (ZOCOR) 5 MG Tab, Take 5 mg by mouth every evening., Disp: , Rfl:     Allergies: Latex and Percocet [oxycodone-acetaminophen]      Objective:      Tests and Measures: Pt reports her FBS this am was 108, and her range is 97 - 180. Culture deferred as there are no clinical ss infection and pt is currently on abx    Orthotic, protective, supportive devices: none    Fall Risk Assessment (jeanette all that apply with an X): pt is not a fall risk                 Wound Characteristics                                                    Location:L groin/labia majora   Initial Evaluation  Date:05/01/2018 Encounter  Date: 05/25/2018   Tissue Type and %: 100% red viable tissue as far as can be seen 100% red viable tissue   Periwound: intact intact   Drainage: Mod ss Min ss   Exposed structures none none   Wound Edges:   open open   Odor: none none   S&S of Infection:   none none   Edema: none None   Sensation: intact intact               Measurements: Initial Evaluation  Date:05/01/2018 Encounter  Date: 5/22/18   Length (cm) 1.0 0.7   Width (cm) 0.4 0.2   Depth (cm) 1.0 0.3   Area (cm2) 0.4cm2 0.14 cm2   Tract/undermine Undermining at 6 o'clock 3.0cm None          Procedures:     Debridement :  Non selective with NS and cotton tip applicator    Cleansed with:   NS and cotton tip applicator                                                                       Periwound protected with: skin prep   Primary dressing: silver hydrofiber strip   Secondary Dressing: small gauze, small tegaderm   Other:       Patient Education:POC discussed with patient, patient's wound nearly resolved.  Patient to keep dressing clean, dry and intact.    Professional Collaboration: none      Assessment:      Wound etiology: I+D of abscess    Wound Progress:  Wound smaller per measurement    Rationale for Treatment: AqAg to manage bioburden, absorb exudate, and maintain moist wound environment without laterally wicking exudate therefore reducing timmy-wound maceration    Patient tolerance/compliance: Pt tolerated care well. Appears receptive to instr, and motivated to heal    Complicating factors:DM, obesity, infection    Need for ongoing Advanced Wound Care services:continued skilled wound care for debridement as needed, dressing management and skilled clinical observation to prevent complications and expedite healing.        Plan:      Treatment Plan and Recommendations:  Diagnosis/ICD9: L labia majora abscess L02.214    Procedures/CPT:non selective debridement RN [id number]    Frequency: 2 x week      Treatment Goals: STG 2 Weeks  LTG 4 Weeks   Granulation Tissue: 100% 100   Decrease Necrotic Tissue to: 0 0   Wound Phase:  proliferative proliferative   Decrease Size by: 40% 80%   Periwound:  intact intact   Decrease tracts/undermining by: 40% 80%   Decrease Pain:  2 0       At the time of each visit a thorough assessment of the patient is completed to assure the  appropriateness of our plan of care.  The dressings or modalities may need to be adapted   from the original plan to address any significant changes in the wound environment.          Clinician Signature:_______________________________Date__________________      Physician Signature:______________________________Date:__________________

## 2018-05-29 ENCOUNTER — NON-PROVIDER VISIT (OUTPATIENT)
Dept: WOUND CARE | Facility: MEDICAL CENTER | Age: 53
End: 2018-05-29
Attending: INTERNAL MEDICINE
Payer: COMMERCIAL

## 2018-05-29 PROCEDURE — 97602 WOUND(S) CARE NON-SELECTIVE: CPT

## 2018-05-29 NOTE — WOUND TEAM
[facility]  [facility]  [address]  [address]  [place] [address]  [phone] Fax: [phone]      Encounter Note  For Certification Period: 05/01/2018 - 07/21/2018      Referring Physician: Rylee Love MD  Primary Physician:   Markos Cheung FNP     Consulting Physicians:         Wound(s):L groin/ labia majora abscess L02.214  Start of Care: 05/01/2018       Subjective:        HPI:     Pt is a 52 year old diabetic lady with class 3 obesity who is referred by hospitalist for management of a groin abscess. She was admitted 4/18/2018 for wound care and IV abx.           Pain: 6/10         Current Medications:  Current Outpatient Prescriptions:   •  amoxicillin-clavulanate (AUGMENTIN) 875-125 MG Tab, amoxicillin 875 mg-potassium clavulanate 125 mg tablet, Disp: , Rfl:   •  clindamycin (CLEOCIN) 300 MG Cap, clindamycin HCl 300 mg capsule, Disp: , Rfl:   •  fluconazole (DIFLUCAN) 150 MG tablet, fluconazole 150 mg tablet, Disp: , Rfl:   •  HYDROcodone-acetaminophen (NORCO) 5-325 MG Tab per tablet, hydrocodone 5 mg-acetaminophen 325 mg tablet, Disp: , Rfl:   •  meloxicam (MOBIC) 7.5 MG Tab, meloxicam 7.5 mg tablet, Disp: , Rfl:   •  metFORMIN (GLUCOPHAGE) 500 MG Tab, metformin 500 mg tablet, Disp: , Rfl:   •  nystatin (NYSTOP) powder, Nystop 100,000 unit/gram topical powder, Disp: , Rfl:   •  sulfamethoxazole-trimethoprim (BACTRIM DS) 800-160 MG tablet, sulfamethoxazole 800 mg-trimethoprim 160 mg tablet, Disp: , Rfl:   •  carvedilol (COREG) 3.125 MG Tab, carvedilol 3.125 mg tablet, Disp: , Rfl:   •  insulin regular (HUMULIN R) 100 Unit/mL Solution, Humulin R Regular U-100 Insulin 100 unit/mL injection solution, Disp: , Rfl:   •  simvastatin (ZOCOR) 5 MG Tab, simvastatin 5 mg tablet, Disp: , Rfl:   •  valsartan (DIOVAN) 40 MG Tab, valsartan 40 mg tablet, Disp: , Rfl:   •  hydroCHLOROthiazide (HYDRODIURIL) 12.5 MG tablet, 25 mg daily, Disp: 30 Tab, Rfl: 6  •  insulin regular (HUMULIN  R) 100 Unit/mL Solution, Inject 2-9 Units as instructed 4 Times a Day,Before Meals and at Bedtime., Disp: 10 mL, Rfl: 1  •  insulin glargine (LANTUS) 100 UNIT/ML Solution, Inject 10 Units as instructed every evening., Disp: 10 mL, Rfl: 1  •  valsartan (DIOVAN) 40 MG Tab, Take 40 mg by mouth every day., Disp: , Rfl:   •  carvedilol (COREG) 3.125 MG Tab, Take 3.125 mg by mouth 2 times a day, with meals., Disp: , Rfl:   •  simvastatin (ZOCOR) 5 MG Tab, Take 5 mg by mouth every evening., Disp: , Rfl:     Allergies: Latex and Percocet [oxycodone-acetaminophen]      Objective:      Tests and Measures: Pt reports her FBS this am was 108, and her range is 97 - 180. Culture deferred as there are no clinical ss infection and pt is currently on abx    Orthotic, protective, supportive devices: none    Fall Risk Assessment (jeanette all that apply with an X): pt is not a fall risk                 Wound Characteristics                                                    Location:L groin/labia majora   Initial Evaluation  Date:05/01/2018 Encounter  Date: 05/29/2018   Tissue Type and %: 100% red viable tissue as far as can be seen 100% epitelium   Periwound: intact intact   Drainage: Mod ss None   Exposed structures none none   Wound Edges:   open Closed   Odor: none none   S&S of Infection:   none none   Edema: none None   Sensation: intact intact               Measurements: Initial Evaluation  Date:05/01/2018 Encounter  Date: 5/29/18   Length (cm) 1.0 Resolved   Width (cm) 0.4    Depth (cm) 1.0    Area (cm2) 0.4cm2    Tract/undermine Undermining at 6 o'clock 3.0cm           Procedures:     Debridement :  Non selective with NS and gauze to remove dried flaky skin    Cleansed with:   NS and gauze                                                                    Periwound protected with: Sween cream   Primary dressing: Gauze folded in half, held in place in groin fold, no adhesive per pt request   Secondary Dressing:    Other:       Patient Education: Wound resolved. Discussed maturation process and to continue gentle care. Pt agreeable to two week skin check.    Professional Collaboration: none      Assessment:      Wound etiology: I+D of abscess    Wound Progress:  Wound resolved    Rationale for Treatment: AqAg to manage bioburden, absorb exudate, and maintain moist wound environment without laterally wicking exudate therefore reducing timmy-wound maceration    Patient tolerance/compliance: Pt tolerated care well. Appears receptive to instr, and motivated to heal    Complicating factors:DM, obesity, infection    Need for ongoing Advanced Wound Care services:continued skilled wound care for debridement as needed, dressing management and skilled clinical observation to prevent complications and expedite healing.        Plan:      Treatment Plan and Recommendations:  Diagnosis/ICD9: L labia majora abscess L02.214    Procedures/CPT:non selective debridement RN [id number]    Frequency: 2 week skin check      Treatment Goals: STG 2 Weeks  LTG 4 Weeks   Granulation Tissue: Resolved Resolved   Decrease Necrotic Tissue to: n/a n/a   Wound Phase:  Maturation Maturation   Decrease Size by: n/a n/a   Periwound:  Intact Intact   Decrease tracts/undermining by: n/a n/a   Decrease Pain:  n/a n/a       At the time of each visit a thorough assessment of the patient is completed to assure the  appropriateness of our plan of care.  The dressings or modalities may need to be adapted   from the original plan to address any significant changes in the wound environment.          Clinician Signature:_______________________________Date__________________      Physician Signature:______________________________Date:__________________

## 2018-06-08 ENCOUNTER — APPOINTMENT (OUTPATIENT)
Dept: CARDIOLOGY | Facility: MEDICAL CENTER | Age: 53
End: 2018-06-08
Attending: INTERNAL MEDICINE
Payer: COMMERCIAL

## 2018-06-08 ENCOUNTER — HOSPITAL ENCOUNTER (OUTPATIENT)
Dept: RADIOLOGY | Facility: MEDICAL CENTER | Age: 53
End: 2018-06-08
Attending: OBSTETRICS & GYNECOLOGY
Payer: COMMERCIAL

## 2018-06-08 DIAGNOSIS — Z12.31 ENCOUNTER FOR SCREENING MAMMOGRAM FOR MALIGNANT NEOPLASM OF BREAST: ICD-10-CM

## 2018-06-08 PROCEDURE — 77067 SCR MAMMO BI INCL CAD: CPT

## 2018-06-12 ENCOUNTER — NON-PROVIDER VISIT (OUTPATIENT)
Dept: WOUND CARE | Facility: MEDICAL CENTER | Age: 53
End: 2018-06-12
Attending: INTERNAL MEDICINE
Payer: COMMERCIAL

## 2018-06-12 DIAGNOSIS — L98.491 ULCER OF RIGHT GROIN, LIMITED TO BREAKDOWN OF SKIN (HCC): ICD-10-CM

## 2018-06-12 PROCEDURE — 97602 WOUND(S) CARE NON-SELECTIVE: CPT

## 2018-06-12 NOTE — WOUND TEAM
Advanced [facility]  [facility]  [address]  [address]  [place] [address]  [phone] Fax: [phone]      Encounter Note  For Certification Period: 05/01/2018 - 07/21/2018      Referring Physician: Rylee Love MD  Primary Physician:   Markos Cheung FNP     Consulting Physicians:         Wound(s):L groin/ labia majora abscess L02.214  Start of Care: 05/01/2018       Subjective:        HPI:     Pt is a 52 year old diabetic lady with class 3 obesity who is referred by hospitalist for management of a groin abscess. She was admitted 4/18/2018 for wound care and IV abx.           Pain: Pt denies pain.        Current Medications:  Current Outpatient Prescriptions:   •  amoxicillin-clavulanate (AUGMENTIN) 875-125 MG Tab, amoxicillin 875 mg-potassium clavulanate 125 mg tablet, Disp: , Rfl:   •  clindamycin (CLEOCIN) 300 MG Cap, clindamycin HCl 300 mg capsule, Disp: , Rfl:   •  fluconazole (DIFLUCAN) 150 MG tablet, fluconazole 150 mg tablet, Disp: , Rfl:   •  HYDROcodone-acetaminophen (NORCO) 5-325 MG Tab per tablet, hydrocodone 5 mg-acetaminophen 325 mg tablet, Disp: , Rfl:   •  meloxicam (MOBIC) 7.5 MG Tab, meloxicam 7.5 mg tablet, Disp: , Rfl:   •  metFORMIN (GLUCOPHAGE) 500 MG Tab, metformin 500 mg tablet, Disp: , Rfl:   •  nystatin (NYSTOP) powder, Nystop 100,000 unit/gram topical powder, Disp: , Rfl:   •  sulfamethoxazole-trimethoprim (BACTRIM DS) 800-160 MG tablet, sulfamethoxazole 800 mg-trimethoprim 160 mg tablet, Disp: , Rfl:   •  carvedilol (COREG) 3.125 MG Tab, carvedilol 3.125 mg tablet, Disp: , Rfl:   •  insulin regular (HUMULIN R) 100 Unit/mL Solution, Humulin R Regular U-100 Insulin 100 unit/mL injection solution, Disp: , Rfl:   •  simvastatin (ZOCOR) 5 MG Tab, simvastatin 5 mg tablet, Disp: , Rfl:   •  valsartan (DIOVAN) 40 MG Tab, valsartan 40 mg tablet, Disp: , Rfl:   •  hydroCHLOROthiazide (HYDRODIURIL) 12.5 MG tablet, 25 mg daily, Disp: 30 Tab, Rfl: 6  •  insulin  regular (HUMULIN R) 100 Unit/mL Solution, Inject 2-9 Units as instructed 4 Times a Day,Before Meals and at Bedtime., Disp: 10 mL, Rfl: 1  •  insulin glargine (LANTUS) 100 UNIT/ML Solution, Inject 10 Units as instructed every evening., Disp: 10 mL, Rfl: 1  •  valsartan (DIOVAN) 40 MG Tab, Take 40 mg by mouth every day., Disp: , Rfl:   •  carvedilol (COREG) 3.125 MG Tab, Take 3.125 mg by mouth 2 times a day, with meals., Disp: , Rfl:   •  simvastatin (ZOCOR) 5 MG Tab, Take 5 mg by mouth every evening., Disp: , Rfl:     Allergies: Latex and Percocet [oxycodone-acetaminophen]      Objective:      Tests and Measures: Pt reports her FBS this am was 108, and her range is 97 - 180. Culture deferred as there are no clinical ss infection and pt is currently on abx    Orthotic, protective, supportive devices: none    Fall Risk Assessment (jeanette all that apply with an X): pt is not a fall risk                 Wound Characteristics                                                    Location:L groin/labia majora   Initial Evaluation  Date:05/01/2018 Encounter  Date: 06/12/18   Tissue Type and %: 100% red viable tissue as far as can be seen resolved   Periwound: intact intact   Drainage: Mod ss None   Exposed structures None None   Wound Edges:   Open Closed   Odor: none none   S&S of Infection:   none none   Edema: none None   Sensation: intact intact               Measurements: Initial Evaluation  Date:05/01/2018 Encounter  Date: 06/12/18   Length (cm) 1.0 Remains resolved   Width (cm) 0.4    Depth (cm) 1.0    Area (cm2) 0.4cm2    Tract/undermine Undermining at 6 o'clock 3.0cm        Wound Characteristics                                                    Location: right groin   Initial Evaluation  Date: 06/12/18   Tissue Type and %: 100% moist pink   Periwound: intact   Drainage: LINDSEY, no bandage in place.  Pt reports minimal.   Exposed structures none   Wound Edges:   open   Odor: none   S&S of Infection:   none   Edema: none    Sensation: intact               Measurements: right groin Initial Evaluation  Date: 06/12/18   Length (cm) 1.5   Width (cm) 1.3   Depth (cm) <0.1   Area (cm2) 1.95 cm2   Tract/undermine none       L groin: No treatment.  Wound resolved.  Treatment below for new right groin wound.  Procedures:     Debridement :  Non selective with NS and gauze to remove dried flaky skin    Cleansed with:   NS and gauze                                                                    Periwound protected with:    Primary dressing: small piece of brava strip as pt reports stinging/burning with many other forms of treatment (she declines zinc paste, aquacel ag).   Secondary Dressing:    Other:      Patient Education: L groin wound resolved.  New right groin wound noted today.  Does not appear to be an abscess.  Appears very superficial.  Pt to see PCP today and note written for pt to hand carry to pcp notifying her that [facility] will follow right groin wound.  Pt agreeable with 2x/week visits.  Reviewed s/sx infection to monitor for.  Pt expresses understanding.    Professional Collaboration: RADHAMES Levin re: new wound.  Note for pt to hand carry to pcp appt later today.    Assessment:      Wound etiology: Left groin wound (resolved): I+D of abscess.  New right groin wound: friction? Pressure?    Wound Progress:  Left groin wound resolved.  New right groin wound noted today.    Rationale for Treatment: brava strip to protect and improve moisture balance.  Pt to keep area clean and use dry gauze at home if brava strip does not stay in place.    Patient tolerance/compliance: Pt tolerated care well. Appears receptive to instr, and motivated to heal    Complicating factors:DM, obesity, infection    Need for ongoing Advanced Wound Care services:continued skilled wound care for debridement as needed, dressing management and skilled clinical observation to prevent complications and expedite healing.    Plan:      Treatment Plan and  Recommendations:  Diagnosis/ICD9: L labia majora abscess L02.214    Procedures/CPT: non selective debridement RN [id number]    Frequency: 2x/week for new right groin wound.    Treatment Goals: STG 2 Weeks  LTG 4 Weeks   Granulation Tissue: Resolved Resolved   Decrease Necrotic Tissue to: n/a n/a   Wound Phase:  Maturation Maturation   Decrease Size by: 100%    Periwound:  Intact Intact   Decrease tracts/undermining by: n/a n/a   Decrease Pain:  n/a n/a       At the time of each visit a thorough assessment of the patient is completed to assure the  appropriateness of our plan of care.  The dressings or modalities may need to be adapted   from the original plan to address any significant changes in the wound environment.          Clinician Signature:_______________________________Date__________________      Physician Signature:______________________________Date:__________________

## 2018-06-15 ENCOUNTER — NON-PROVIDER VISIT (OUTPATIENT)
Dept: WOUND CARE | Facility: MEDICAL CENTER | Age: 53
End: 2018-06-15
Attending: INTERNAL MEDICINE
Payer: COMMERCIAL

## 2018-06-15 PROCEDURE — 97602 WOUND(S) CARE NON-SELECTIVE: CPT

## 2018-06-15 NOTE — WOUND TEAM
Advanced W[facility] Care  [facility]  [address]  [address]  [place] [address]  [phone] Fax: [phone]      Encounter Note  For Certification Period: 05/01/2018 - 07/21/2018      Referring Physician: Rylee Love MD  Primary Physician:   Markos Cheung FNP     Consulting Physicians:         Wound(s): R groin  Start of Care: 05/01/2018       Subjective:        HPI:     Pt is a 52 year old diabetic lady with class 3 obesity who is referred by hospitalist for management of a groin abscess. She was admitted 4/18/2018 for wound care and IV abx.           Pain: Pt denies pain.        Current Medications:  Current Outpatient Prescriptions:   •  amoxicillin-clavulanate (AUGMENTIN) 875-125 MG Tab, amoxicillin 875 mg-potassium clavulanate 125 mg tablet, Disp: , Rfl:   •  clindamycin (CLEOCIN) 300 MG Cap, clindamycin HCl 300 mg capsule, Disp: , Rfl:   •  fluconazole (DIFLUCAN) 150 MG tablet, fluconazole 150 mg tablet, Disp: , Rfl:   •  HYDROcodone-acetaminophen (NORCO) 5-325 MG Tab per tablet, hydrocodone 5 mg-acetaminophen 325 mg tablet, Disp: , Rfl:   •  meloxicam (MOBIC) 7.5 MG Tab, meloxicam 7.5 mg tablet, Disp: , Rfl:   •  metFORMIN (GLUCOPHAGE) 500 MG Tab, metformin 500 mg tablet, Disp: , Rfl:   •  nystatin (NYSTOP) powder, Nystop 100,000 unit/gram topical powder, Disp: , Rfl:   •  sulfamethoxazole-trimethoprim (BACTRIM DS) 800-160 MG tablet, sulfamethoxazole 800 mg-trimethoprim 160 mg tablet, Disp: , Rfl:   •  carvedilol (COREG) 3.125 MG Tab, carvedilol 3.125 mg tablet, Disp: , Rfl:   •  insulin regular (HUMULIN R) 100 Unit/mL Solution, Humulin R Regular U-100 Insulin 100 unit/mL injection solution, Disp: , Rfl:   •  simvastatin (ZOCOR) 5 MG Tab, simvastatin 5 mg tablet, Disp: , Rfl:   •  valsartan (DIOVAN) 40 MG Tab, valsartan 40 mg tablet, Disp: , Rfl:   •  hydroCHLOROthiazide (HYDRODIURIL) 12.5 MG tablet, 25 mg daily, Disp: 30 Tab, Rfl: 6  •  insulin regular (HUMULIN R) 100 Unit/mL  Solution, Inject 2-9 Units as instructed 4 Times a Day,Before Meals and at Bedtime., Disp: 10 mL, Rfl: 1  •  insulin glargine (LANTUS) 100 UNIT/ML Solution, Inject 10 Units as instructed every evening., Disp: 10 mL, Rfl: 1  •  valsartan (DIOVAN) 40 MG Tab, Take 40 mg by mouth every day., Disp: , Rfl:   •  carvedilol (COREG) 3.125 MG Tab, Take 3.125 mg by mouth 2 times a day, with meals., Disp: , Rfl:   •  simvastatin (ZOCOR) 5 MG Tab, Take 5 mg by mouth every evening., Disp: , Rfl:     Allergies: Latex and Percocet [oxycodone-acetaminophen]      Objective:      Tests and Measures: Pt reports her FBS this am was 108, and her range is 97 - 180. Culture deferred as there are no clinical ss infection and pt is currently on abx    Orthotic, protective, supportive devices: none    Fall Risk Assessment (jeanette all that apply with an X): pt is not a fall risk                Wound Characteristics                                                    Location: right groin   Initial Evaluation  Date: 06/12/18 Encounter Date: 06/15/2018   Tissue Type and %: 100% moist pink 100% moist pink   Periwound: intact Intact   Drainage: LINDSEY, no bandage in place.  Pt reports minimal. LINDSEY, no dressing in place   Exposed structures none None   Wound Edges:   open Open   Odor: none None   S&S of Infection:   none None   Edema: none None   Sensation: intact Intact               Measurements: right groin Initial Evaluation  Date: 06/12/18   Length (cm) 1.5   Width (cm) 1.3   Depth (cm) <0.1   Area (cm2) 1.95 cm2   Tract/undermine none     Procedures:     Debridement :  Non selective with NS and gauze    Cleansed with:   NS and gauze                                                                    Periwound protected with:    Primary dressing: small piece of brava strip as pt reports stinging/burning with many other forms of treatment (she declines zinc paste, aquacel ag).   Secondary Dressing:    Other:      Patient Education: Discussed POC and  wound progress with patient. Pt's PCP got called into surgery at pt's last appointment, so she had to reschedule. Reviewed s/s infection and when to present to ED. Pt verbalizes understanding of all instruction.     Professional Collaboration: None today   Assessment:      Wound etiology: Left groin wound (resolved): I+D of abscess.  New right groin wound: friction? Pressure?    Wound Progress:  Wound with increasing viable tissue    Rationale for Treatment: brava strip to protect and improve moisture balance.  Pt to keep area clean and use dry gauze at home if brava strip does not stay in place.    Patient tolerance/compliance: Pt tolerated care well. Appears receptive to instr, and motivated to heal    Complicating factors:DM, obesity, infection    Need for ongoing Advanced Wound Care services:continued skilled wound care for debridement as needed, dressing management and skilled clinical observation to prevent complications and expedite healing.    Plan:      Treatment Plan and Recommendations:  Diagnosis/ICD9: L labia majora abscess L02.214    Procedures/CPT: non selective debridement RN [id number]    Frequency: 2x/week - 30 minutes    Treatment Goals: STG 2 Weeks  LTG 4 Weeks   Granulation Tissue: Resolved Resolved   Decrease Necrotic Tissue to: n/a n/a   Wound Phase:  Maturation Maturation   Decrease Size by:     Periwound:  Intact Intact   Decrease tracts/undermining by: n/a n/a   Decrease Pain:  n/a n/a       At the time of each visit a thorough assessment of the patient is completed to assure the  appropriateness of our plan of care.  The dressings or modalities may need to be adapted   from the original plan to address any significant changes in the wound environment.          Clinician Signature:_______________________________Date__________________      Physician Signature:______________________________Date:__________________

## 2018-06-19 ENCOUNTER — HOSPITAL ENCOUNTER (OUTPATIENT)
Dept: CARDIOLOGY | Facility: MEDICAL CENTER | Age: 53
End: 2018-06-19
Attending: INTERNAL MEDICINE
Payer: COMMERCIAL

## 2018-06-19 ENCOUNTER — NON-PROVIDER VISIT (OUTPATIENT)
Dept: WOUND CARE | Facility: MEDICAL CENTER | Age: 53
End: 2018-06-19
Attending: INTERNAL MEDICINE
Payer: COMMERCIAL

## 2018-06-19 PROCEDURE — 97602 WOUND(S) CARE NON-SELECTIVE: CPT

## 2018-06-19 PROCEDURE — 93306 TTE W/DOPPLER COMPLETE: CPT

## 2018-06-19 NOTE — WOUND TEAM
Advanced W[facility]  [facility]  [address]  [address]  [place] [address]  [phone] Fax: [phone]      Encounter Note  For Certification Period: 05/01/2018 - 07/21/2018      Referring Physician: Rylee Love MD  Primary Physician:   Markos Cheung FNP     Consulting Physicians:         Wound(s): R groin  Start of Care: 05/01/2018       Subjective:        HPI:     Pt is a 52 year old diabetic lady with class 3 obesity who is referred by hospitalist for management of a groin abscess. She was admitted 4/18/2018 for wound care and IV abx.           Pain: Pt denies pain.        Current Medications:  Current Outpatient Prescriptions:   •  amoxicillin-clavulanate (AUGMENTIN) 875-125 MG Tab, amoxicillin 875 mg-potassium clavulanate 125 mg tablet, Disp: , Rfl:   •  clindamycin (CLEOCIN) 300 MG Cap, clindamycin HCl 300 mg capsule, Disp: , Rfl:   •  fluconazole (DIFLUCAN) 150 MG tablet, fluconazole 150 mg tablet, Disp: , Rfl:   •  HYDROcodone-acetaminophen (NORCO) 5-325 MG Tab per tablet, hydrocodone 5 mg-acetaminophen 325 mg tablet, Disp: , Rfl:   •  meloxicam (MOBIC) 7.5 MG Tab, meloxicam 7.5 mg tablet, Disp: , Rfl:   •  metFORMIN (GLUCOPHAGE) 500 MG Tab, metformin 500 mg tablet, Disp: , Rfl:   •  nystatin (NYSTOP) powder, Nystop 100,000 unit/gram topical powder, Disp: , Rfl:   •  sulfamethoxazole-trimethoprim (BACTRIM DS) 800-160 MG tablet, sulfamethoxazole 800 mg-trimethoprim 160 mg tablet, Disp: , Rfl:   •  carvedilol (COREG) 3.125 MG Tab, carvedilol 3.125 mg tablet, Disp: , Rfl:   •  insulin regular (HUMULIN R) 100 Unit/mL Solution, Humulin R Regular U-100 Insulin 100 unit/mL injection solution, Disp: , Rfl:   •  simvastatin (ZOCOR) 5 MG Tab, simvastatin 5 mg tablet, Disp: , Rfl:   •  valsartan (DIOVAN) 40 MG Tab, valsartan 40 mg tablet, Disp: , Rfl:   •  hydroCHLOROthiazide (HYDRODIURIL) 12.5 MG tablet, 25 mg daily, Disp: 30 Tab, Rfl: 6  •  insulin regular (HUMULIN R) 100 Unit/mL  Solution, Inject 2-9 Units as instructed 4 Times a Day,Before Meals and at Bedtime., Disp: 10 mL, Rfl: 1  •  insulin glargine (LANTUS) 100 UNIT/ML Solution, Inject 10 Units as instructed every evening., Disp: 10 mL, Rfl: 1  •  valsartan (DIOVAN) 40 MG Tab, Take 40 mg by mouth every day., Disp: , Rfl:   •  carvedilol (COREG) 3.125 MG Tab, Take 3.125 mg by mouth 2 times a day, with meals., Disp: , Rfl:   •  simvastatin (ZOCOR) 5 MG Tab, Take 5 mg by mouth every evening., Disp: , Rfl:     Allergies: Latex and Percocet [oxycodone-acetaminophen]      Objective:      Tests and Measures: Pt reports her FBS this am was 108, and her range is 97 - 180. Culture deferred as there are no clinical ss infection and pt is currently on abx    Orthotic, protective, supportive devices: none    Fall Risk Assessment (jeanette all that apply with an X): pt is not a fall risk                Wound Characteristics                                                    Location: right groin   Initial Evaluation  Date: 06/12/18 Encounter Date: 06/19/2018   Tissue Type and %: 100% moist pink 100% moist pink   Periwound: intact Intact   Drainage: LINDSEY, no bandage in place.  Pt reports minimal. LINDSEY, no dressing in place   Exposed structures none None   Wound Edges:   open Open   Odor: none None   S&S of Infection:   none None   Edema: none None   Sensation: intact Intact               Measurements: right groin Initial Evaluation  Date: 06/12/18 Encounter Date: 06/19/2018   Length (cm) 1.5 0.1   Width (cm) 1.3 0.1   Depth (cm) <0.1 0.1   Area (cm2) 1.95 cm2 0.01   Tract/undermine none Linda     Procedures:     Debridement :  Non selective with NS and gauze    Cleansed with:   NS and gauze                                                                    Periwound protected with:    Primary dressing: small piece of brava strip as pt reports stinging/burning with many other forms of treatment (she declines zinc paste, aquacel ag).   Secondary Dressing:     Other:      Patient Education: Discussed POC and wound progress with patient. Wound nearly resolved. Reviewed s/s infection and when to present to ED. Pt verbalizes understanding of all instruction.     Professional Collaboration: None today     Assessment:      Wound etiology: Left groin wound (resolved): I+D of abscess.  New right groin wound: friction? Pressure?    Wound Progress:  Wound with pinpoint opening, nearly resolved    Rationale for Treatment: brava strip to protect and improve moisture balance.  Pt to keep area clean and use dry gauze at home if brava strip does not stay in place.    Patient tolerance/compliance: Pt tolerated care well. Appears receptive to instr, and motivated to heal    Complicating factors:DM, obesity, infection    Need for ongoing Advanced Wound Care services:continued skilled wound care for debridement as needed, dressing management and skilled clinical observation to prevent complications and expedite healing.    Plan:      Treatment Plan and Recommendations:  Diagnosis/ICD9: L labia majora abscess L02.214    Procedures/CPT: non selective debridement RN [id number]    Frequency: 1x/week - 30 minutes    Treatment Goals: STG 2 Weeks  LTG 4 Weeks   Granulation Tissue: Resolved Resolved   Decrease Necrotic Tissue to: n/a n/a   Wound Phase:  Maturation Maturation   Decrease Size by:     Periwound:  Intact Intact   Decrease tracts/undermining by: n/a n/a   Decrease Pain:  n/a n/a       At the time of each visit a thorough assessment of the patient is completed to assure the  appropriateness of our plan of care.  The dressings or modalities may need to be adapted   from the original plan to address any significant changes in the wound environment.          Clinician Signature:_______________________________Date__________________      Physician Signature:______________________________Date:__________________

## 2018-06-20 LAB
LV EJECT FRACT  99904: 55
LV EJECT FRACT MOD 2C 99903: 56.5
LV EJECT FRACT MOD 4C 99902: 62.17
LV EJECT FRACT MOD BP 99901: 58.85

## 2018-06-28 ENCOUNTER — NON-PROVIDER VISIT (OUTPATIENT)
Dept: WOUND CARE | Facility: MEDICAL CENTER | Age: 53
End: 2018-06-28
Attending: INTERNAL MEDICINE
Payer: COMMERCIAL

## 2018-06-28 PROCEDURE — 99211 OFF/OP EST MAY X REQ PHY/QHP: CPT

## 2018-06-28 PROCEDURE — 97602 WOUND(S) CARE NON-SELECTIVE: CPT

## 2018-06-28 NOTE — CERTIFICATION
Advanced Wound Care   [facility]   [address]   [address]   [place] [address]   [phone] Fax: [phone]    Discharge Note      Referring Physician: Dr. Rylee Portillo [facility] Hospitalist - sending discharge note to PCP Markos GRACE  Wound Etiology: Friction, pressure?   Wound location: Right groin, left groin abscess resolved at earlier date  ICD-10: L02.214 Left labia majora abscess  Date of Discharge: 06/28/2018    Assessment:  Discharge patient at this time secondary to wound resolution. Pt instr dc today, keep area clean, it will be fragile for a few days, bathe and dry area gently, as scar tissue only ever regains a maximum of 80% of the tensile strength of the surrounding skin, remodeling of scar can continue for 6mo - a year. Contact PCP for a referral back here if any problems with area opening and draining again. Pt understands.    Thank you for the referral and the opportunity to treat your patient.      Clinician Signature: _____________________________ Date:_______________

## 2018-07-25 DIAGNOSIS — I10 ESSENTIAL HYPERTENSION: ICD-10-CM

## 2018-07-25 DIAGNOSIS — E78.00 HIGH CHOLESTEROL: ICD-10-CM

## 2018-07-25 RX ORDER — LOSARTAN POTASSIUM 50 MG/1
50 TABLET ORAL DAILY
Qty: 30 TAB | Refills: 0 | COMMUNITY
Start: 2018-07-25 | End: 2021-03-15

## 2018-07-25 RX ORDER — CARVEDILOL 6.25 MG/1
6.25 TABLET ORAL 2 TIMES DAILY WITH MEALS
Qty: 60 TAB | Refills: 0 | COMMUNITY
Start: 2018-07-25 | End: 2019-07-18 | Stop reason: SDUPTHER

## 2018-07-25 RX ORDER — SIMVASTATIN 10 MG
10 TABLET ORAL EVERY EVENING
Qty: 30 TAB | Refills: 0 | COMMUNITY
Start: 2018-07-25 | End: 2021-03-15

## 2019-01-17 DIAGNOSIS — I10 ESSENTIAL HYPERTENSION: ICD-10-CM

## 2019-01-18 RX ORDER — HYDROCHLOROTHIAZIDE 25 MG/1
25 TABLET ORAL DAILY
Qty: 30 TAB | Refills: 0 | Status: SHIPPED | OUTPATIENT
Start: 2019-01-18 | End: 2019-02-18 | Stop reason: SDUPTHER

## 2019-02-18 DIAGNOSIS — I10 ESSENTIAL HYPERTENSION: ICD-10-CM

## 2019-02-18 NOTE — PROGRESS NOTES
Chief Complaint   Patient presents with   • Hyperlipidemia       Subjective:   Cyndee Calvert is a 52 y.o. female who presents today for evaluation of cardiac risk factors and a fast heartbeat. The patient is unaware of any tachycardia or palpitations. She's had hypertension for quite some time as well as diabetes. She is recently placed on insulin which is been beneficial for her. Hyperlipidemia is also been an issue and she has taken a low-dose statin medication at this point. She is on valsartan and carvedilol for blood pressure she thinks is been pretty well controlled. She does  check her blood pressure at home and it is similar to today's reading. She says her heart rate is usually between 70 and 80 on her home device and tachycardia has not really been recorded. She has exertional dyspnea which occurs after she walks for a fairly short distance and then resolves if she rests which allows her to continue walking. That's about the only exercise that she gets. 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 She has 2 pillow orthopnea which is a chronic situation and is attributed to reflux precautions rather than dyspnea. She does not have PND. She does have chronic edema which is substantial in both lower extremities. She has been on loop diuretics in the past but this was recently discontinued. Hypomagnesemia has been noticed as well. She does not have chest pressure or tightness at rest or with activity. He has had no stroke or TIA symptoms. No symptoms of claudication are noted. She does have arthritis in her knees which limits her activity as well. She's been obese for many years    Past Medical History:   Diagnosis Date   • Anemia    • Diabetes (HCC)    • High cholesterol    • Hypertension      Past Surgical History:   Procedure Laterality Date   • BLADDER BIOPSY WITH CYSTOSCOPY  9/2/08    Performed by LILLIE WILLIAM at SURGERY [facility] ORS   • HYSTERECTOMY, TOTAL ABDOMINAL      • TONSILLECTOMY       Family History   Problem Relation Age of Onset   • Heart Disease Mother    • Heart Attack Father    • Heart Failure Brother      Social History     Social History   • Marital status: Single     Spouse name: N/A   • Number of children: N/A   • Years of education: N/A     Occupational History   • Not on file.     Social History Main Topics   • Smoking status: Never Smoker   • Smokeless tobacco: Never Used   • Alcohol use No   • Drug use: No   • Sexual activity: Not on file     Other Topics Concern   • Not on file     Social History Narrative   • No narrative on file     Allergies   Allergen Reactions   • Percocet [Oxycodone-Acetaminophen] Shortness of Breath, Vomiting and Swelling   • Latex Swelling     Outpatient Encounter Prescriptions as of 5/10/2018   Medication Sig Dispense Refill   • HYDROcodone-acetaminophen (NORCO) 5-325 MG Tab per tablet hydrocodone 5 mg-acetaminophen 325 mg tablet     • insulin regular (HUMULIN R) 100 Unit/mL Solution Inject 2-9 Units as instructed 4 Times a Day,Before Meals and at Bedtime. 10 mL 1   • insulin glargine (LANTUS) 100 UNIT/ML Solution Inject 10 Units as instructed every evening. 10 mL 1   • valsartan (DIOVAN) 40 MG Tab Take 40 mg by mouth every day.     • carvedilol (COREG) 3.125 MG Tab Take 3.125 mg by mouth 2 times a day, with meals.     • simvastatin (ZOCOR) 5 MG Tab Take 5 mg by mouth every evening.     • amoxicillin-clavulanate (AUGMENTIN) 875-125 MG Tab amoxicillin 875 mg-potassium clavulanate 125 mg tablet     • clindamycin (CLEOCIN) 300 MG Cap clindamycin HCl 300 mg capsule     • fluconazole (DIFLUCAN) 150 MG tablet fluconazole 150 mg tablet     • meloxicam (MOBIC) 7.5 MG Tab meloxicam 7.5 mg tablet     • metFORMIN (GLUCOPHAGE) 500 MG Tab metformin 500 mg tablet     • nystatin (NYSTOP) powder Nystop 100,000 unit/gram topical powder     • sulfamethoxazole-trimethoprim (BACTRIM DS) 800-160 MG tablet sulfamethoxazole 800 mg-trimethoprim 160 mg  "tablet     • carvedilol (COREG) 3.125 MG Tab carvedilol 3.125 mg tablet     • insulin regular (HUMULIN R) 100 Unit/mL Solution Humulin R Regular U-100 Insulin 100 unit/mL injection solution     • simvastatin (ZOCOR) 5 MG Tab simvastatin 5 mg tablet     • valsartan (DIOVAN) 40 MG Tab valsartan 40 mg tablet       No facility-administered encounter medications on file as of 5/10/2018.      ROS. Review of systems she was evaluated with the patient. She complains of chills because she is cold all the time. She has recently developed a rash on the right side of her face. She has headaches which she attributes to high sugars or hypertension. Occasional congestion and sore throat are noted. Blurred vision or eye pain discharge have been evaluated by her ophthalmologist. The chronic leg swelling is noted in the history of present illness. She does have occasional heartburn. She did have constipation but when she started insulin that has not been a problem.. She has back pain and joint pain as well as seasonal allergies. She is a lifelong nonsmoker nondrinker and does have several cups of coffee a day. She is trying to exercise a little bit more. She is single and has children.     Objective:   /96   Pulse 72   Resp 16   Ht 1.689 m (5' 6.5\")   Wt (!) 139.7 kg (308 lb)   LMP 04/19/2003   SpO2 97%   BMI 48.97 kg/m²     Physical Exam   Exam:    Vitals:    05/10/18 0955   BP: 146/96   Pulse: 72   Resp: 16   SpO2: 97%   Weight: (!) 139.7 kg (308 lb)   Height: 1.689 m (5' 6.5\")     Constitutional: Well developed, well nourished, obese female in no acute distress. Appears approximate stated age and provides a good history.  HEENT: Normocephalic, pupils are equal and responsive. bilateral arcus. Conjunctiva and sclera are clear. No xanthelasma. No diagonal ear lobe crease noted. Mucus membranes are moist and pink. Good oral hygiene  Neck: JVD noted. JVP = 6-8 cm H2O. Good carotid upstroke with no bruit. No " lymphadenopathy, No thyroid enlargement.  Cardiovascular: Regular rhythm, no ectopics. Short systolic murmur at the upper left sternal border with little radiation and grade 1/6. No diastolic murmur , gallop, rub or click. An S4 is present. S1 is equal to S2 at the apex. No lift, heave,  thrill, or cardiomegaly  Lungs: Normal effort, Clear to auscultation and percussion. No rales, rhonchi, wheezing   Abdomen: Soft, non tender, obese. No liver, kidney, spleen or mass palpable. The abdominal aorta is not palpable. Active bowel sounds are noted and there is no bruit  Extremities: The patient has 4+ non-pitting edema (lymphedema) bilaterally. Palpable posterior tibial and dorsal pedal pulses bilaterally.   Skin:   Warm and dry, no active lesions  Neurologic: Alert & oriented. Strength and sensation grossly intact. No lateralizing signs  Psychiatric:  Affect, mood, and judgement are appropriate    Assessment:     1. Essential hypertension     2. High cholesterol     3. Type 2 diabetes mellitus without complication, with long-term current use of insulin ([facility])         Medical Decision Making:  Today's Assessment / Status / Plan:   The patient's blood pressure is slightly elevated and her neck veins are slightly distended suggesting that the mild diuretic may be beneficial in helping control her blood pressure. Her most recent GFR is over 60. I suggested a weak diuretic and will prescribe hydrochlorothiazide 25 mg a day. She is pretty careful with watching her salt intake which I encouraged her to continue. Her hyperlipidemia is manifested by elevated LDL and low HDL. Higher doses of simvastatin are probably indicated. Carvedilol and valsartan can both be increased as necessary; perhaps adding the diuretic will be enough to control her blood pressure.  Her exertional dyspnea is probably multifactorial and related to deconditioning, obesity, and likely diastolic left ventricular dysfunction. I suggested an echocardiogram  which has been ordered but not completed because of prior authorization or other insurance delays. I think there multiple reasons to obtain this study including dyspnea, possible diastolic dysfunction, and her history of hypertension. She's had diabetes for a long time so diabetic cardiomyopathy is also a consideration. If all goes well the patient will return in 2-3 months for repeat evaluation.   18-Feb-2019

## 2019-02-20 RX ORDER — HYDROCHLOROTHIAZIDE 25 MG/1
25 TABLET ORAL DAILY
Qty: 30 TAB | Refills: 0 | Status: SHIPPED | OUTPATIENT
Start: 2019-02-20 | End: 2019-04-29

## 2019-04-29 ENCOUNTER — OFFICE VISIT (OUTPATIENT)
Dept: CARDIOLOGY | Facility: MEDICAL CENTER | Age: 54
End: 2019-04-29

## 2019-04-29 VITALS
HEART RATE: 64 BPM | DIASTOLIC BLOOD PRESSURE: 100 MMHG | HEIGHT: 67 IN | SYSTOLIC BLOOD PRESSURE: 142 MMHG | BODY MASS INDEX: 45.99 KG/M2 | WEIGHT: 293 LBS | OXYGEN SATURATION: 97 %

## 2019-04-29 DIAGNOSIS — I10 ESSENTIAL HYPERTENSION: ICD-10-CM

## 2019-04-29 PROCEDURE — 99213 OFFICE O/P EST LOW 20 MIN: CPT | Performed by: INTERNAL MEDICINE

## 2019-04-29 RX ORDER — ATORVASTATIN CALCIUM 40 MG/1
40 TABLET, FILM COATED ORAL
Refills: 6 | COMMUNITY
End: 2022-07-01 | Stop reason: SDUPTHER

## 2019-04-29 RX ORDER — FLUCONAZOLE 150 MG/1
TABLET ORAL
Refills: 0 | COMMUNITY
Start: 2019-04-24 | End: 2021-03-15

## 2019-04-29 RX ORDER — HYDROCHLOROTHIAZIDE 25 MG/1
25 TABLET ORAL DAILY
Qty: 90 TAB | Refills: 3 | Status: SHIPPED | OUTPATIENT
Start: 2019-04-29 | End: 2021-06-22

## 2019-04-29 NOTE — PROGRESS NOTES
Chief Complaint   Patient presents with   • Hypertension       Subjective:   Cyndee Calvert is a 53 y.o. female who presents today in follow-up for hypertension.  The patient had an echocardiogram the results of which are noted below.  She ran out of her HCTZ about a month ago and had therefore has not taken it for 1 month.  She noticed that her edema was somewhat better controlled and her blood pressure dropped slightly while on the medication.  It has now risen back to the previous levels.  She had no side effects with the medication.  Her breathing is about the same as far as exertional breathlessness.  She does not have PND.  There are no stroke or TIA symptoms and the patient does not have angina.    Past Medical History:   Diagnosis Date   • Anemia    • Diabetes (HCC)    • High cholesterol    • Hypertension      Past Surgical History:   Procedure Laterality Date   • BLADDER BIOPSY WITH CYSTOSCOPY  9/2/08    Performed by LILLIE WILLIAM at SURGERY [facility] ORS   • HYSTERECTOMY, TOTAL ABDOMINAL     • TONSILLECTOMY       Family History   Problem Relation Age of Onset   • Heart Disease Mother    • Heart Attack Father    • Heart Failure Brother      Social History     Social History   • Marital status: Single     Spouse name: N/A   • Number of children: N/A   • Years of education: N/A     Occupational History   • Not on file.     Social History Main Topics   • Smoking status: Never Smoker   • Smokeless tobacco: Never Used   • Alcohol use No   • Drug use: No   • Sexual activity: Not on file     Other Topics Concern   • Not on file     Social History Narrative   • No narrative on file     Allergies   Allergen Reactions   • Percocet [Oxycodone-Acetaminophen] Shortness of Breath, Vomiting and Swelling   • Latex Swelling     Outpatient Encounter Prescriptions as of 4/29/2019   Medication Sig Dispense Refill   • atorvastatin (LIPITOR) 40 MG Tab TK 1 T PO QD  6   • fluconazole (DIFLUCAN) 150 MG tablet TK  "1 T PO QD FOR 7 DAYS  0   • hydroCHLOROthiazide (HYDRODIURIL) 25 MG Tab Take 1 Tab by mouth every day. 90 Tab 3   • losartan (COZAAR) 50 MG Tab Take 1 Tab by mouth every day. 30 Tab 0   • carvedilol (COREG) 6.25 MG Tab Take 1 Tab by mouth 2 times a day, with meals. 60 Tab 0   • insulin regular (HUMULIN R) 100 Unit/mL Solution Inject 2-9 Units as instructed 4 Times a Day,Before Meals and at Bedtime. 10 mL 1   • insulin glargine (LANTUS) 100 UNIT/ML Solution Inject 10 Units as instructed every evening. 10 mL 1   • [DISCONTINUED] hydroCHLOROthiazide (HYDRODIURIL) 25 MG Tab Take 1 Tab by mouth every day. Needs to be seen for further refills. Thank you (Patient not taking: Reported on 4/29/2019) 30 Tab 0   • simvastatin (ZOCOR) 10 MG Tab Take 1 Tab by mouth every evening. 30 Tab 0   • HYDROcodone-acetaminophen (NORCO) 5-325 MG Tab per tablet hydrocodone 5 mg-acetaminophen 325 mg tablet       No facility-administered encounter medications on file as of 4/29/2019.      ROS.  See HPI     Objective:   /100 (BP Location: Right arm, Patient Position: Sitting, BP Cuff Size: Large adult)   Pulse 64   Ht 1.689 m (5' 6.5\")   Wt (!) 142 kg (313 lb)   LMP 04/19/2003   SpO2 97%   BMI 49.76 kg/m²     Physical Exam General: WD, WN, female in NAD. Weight is up 5 pounds  Neck:  Good carotid upstroke and no bruit  Chest: Clear to A & P  Heart: Regular rhythm, Normal S1 and S2. +S4. No murmur, gallop, rub, click  Ext: No edema  Neuro: Alert, oriented  Psych: normal mood, affect    Assessment:     1. Essential hypertension  hydroCHLOROthiazide (HYDRODIURIL) 25 MG Tab       Medical Decision Making:  Today's Assessment / Status / Plan:   Patient's blood pressure is a little bit higher today than it has been in the past.  She is been off her diuretic for a month.  I think this HCTZ should be restarted and the patient evaluated in 3 to 4 months because I believe that she documented some gradual improvement in her blood pressure and " her edema.  A prescription for 90 tablets has been given with instructions for her to call the office if there is a problem with renewing her prescription.  She will return in 3 to 4 months

## 2019-06-02 ENCOUNTER — HOSPITAL ENCOUNTER (EMERGENCY)
Facility: MEDICAL CENTER | Age: 54
End: 2019-06-02
Attending: EMERGENCY MEDICINE

## 2019-06-02 VITALS
TEMPERATURE: 97.6 F | OXYGEN SATURATION: 94 % | RESPIRATION RATE: 17 BRPM | BODY MASS INDEX: 50.4 KG/M2 | WEIGHT: 293 LBS | HEART RATE: 92 BPM | SYSTOLIC BLOOD PRESSURE: 177 MMHG | DIASTOLIC BLOOD PRESSURE: 92 MMHG

## 2019-06-02 DIAGNOSIS — M65.4 DE QUERVAIN'S TENOSYNOVITIS: ICD-10-CM

## 2019-06-02 PROCEDURE — 99283 EMERGENCY DEPT VISIT LOW MDM: CPT

## 2019-06-02 RX ORDER — METHYLPREDNISOLONE 4 MG/1
TABLET ORAL
Qty: 21 TAB | Refills: 0 | Status: ON HOLD | OUTPATIENT
Start: 2019-06-02 | End: 2021-03-17

## 2019-06-02 NOTE — ED PROVIDER NOTES
ED Provider Note    Scribed for Blake Cunningham M.D. by Ashley Loaiza. 6/2/2019  7:48 AM    Primary care provider: LESLY Denson  Means of arrival: Walk-In  History obtained from: Patient  History limited by: None    CHIEF COMPLAINT  Chief Complaint   Patient presents with   • Digit Pain     L thumb pain, denies trauma       HPI  Cyndee Calvert is a 53 y.o. Diabetic female who presents to the Emergency Department for evaluation of left thumb pain onset this morning when she woke up. Patient reports she heard a pop when the left thumb pain started. She denies any associated numbness or tingling. 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 Patient states pain is exacerbated with grabbing her left thumb. She denies any trauma.      REVIEW OF SYSTEMS  Pertinent positives include left thumb pain.   Pertinent negatives include no numbness or tingling.    See HPI for further details.       PAST MEDICAL HISTORY   has a past medical history of Anemia; Diabetes (HCC); High cholesterol; and Hypertension.    SURGICAL HISTORY   has a past surgical history that includes hysterectomy, total abdominal; tonsillectomy; and bladder biopsy with cystoscopy (9/2/08).    SOCIAL HISTORY  Social History   Substance Use Topics   • Smoking status: Never Smoker   • Smokeless tobacco: Never Used   • Alcohol use No      History   Drug Use No       FAMILY HISTORY  Family History   Problem Relation Age of Onset   • Heart Disease Mother    • Heart Attack Father    • Heart Failure Brother        CURRENT MEDICATIONS  Home Medications    **Home medications have not yet been reviewed for this encounter**         ALLERGIES  Allergies   Allergen Reactions   • Percocet [Oxycodone-Acetaminophen] Shortness of Breath, Vomiting and Swelling   • Latex Swelling       PHYSICAL EXAM  VITAL SIGNS: BP (!) 177/92   Pulse 92   Temp 36.4 °C (97.6 °F) (Temporal)    Resp 17   Wt (!) 143.8 kg (317 lb 0.3 oz)   LMP 04/19/2003   SpO2 94%   BMI 50.40 kg/m²   Nursing note and vitals reviewed.  Constitutional: Well-developed and well-nourished. No distress.   HENT: Head is normocephalic and atraumatic. Oropharynx is clear and moist without exudate or erythema. .  Pulmonary/Chest: No respiratory distress. Chest wall is atraumatic.   Musculoskeletal: Extremities exhibit normal range of motion there is tenderness over extensor surfacce of anterior left thumb with positive finkelstein. Bilateral lower extremities are atraumatic.  Neurological: Awake, alert and oriented to person, place, and time. No focal deficits noted. Strength and sensation are normal distal to the point of injury.  Skin: Skin is warm and dry. No rash.   Psychiatric: Appropriate for clinical situation.    COURSE & MEDICAL DECISION MAKING  Nursing notes, VS, PMSFHx reviewed in chart.     7:48 AM - Patient seen and examined at bedside. Patient will be treated with splint application. I informed patient that she likely has De Quiverian tendonitis and recommended patient limit activities.  She will be given a note for 2 days off work.  Minimize activities, and wear a splint.. She will be prescribed Medrol. The patient will return for new or worsening symptoms and is stable at the time of discharge.       DISPOSITION:  Patient will be discharged home in stable condition.    FOLLOW UP:  [facility], Emergency Dept  [address] [address] [phone]    If symptoms worsen    Edgar Monterroso M.D.  [address] [address]  [phone]    Schedule an appointment as soon as possible for a visit   oncall hand specialist      OUTPATIENT MEDICATIONS:  New Prescriptions    METHYLPREDNISOLONE (MEDROL) 4 MG TAB    Take as per the package instructions.        FINAL IMPRESSION  1. De Quervain's tenosynovitis          Ashley MARES (Scribkaylee), am scribing for, and in the  presence of, Blake Cunningham M.D..    Electronically signed by: Ashley Loaiza (Scribe), 6/2/2019    I, Blake Cunningham M.D. personally performed the services described in this documentation, as scribed by Ashley Loaiza in my presence, and it is both accurate and complete. E    The note accurately reflects work and decisions made by me.  Blake Cunningham  6/2/2019  11:09 AM

## 2019-06-02 NOTE — ED NOTES
Pt. Provided DC instructions and understood MD recommendations. Pt. Had no questions. Pt. Ambulatory with steady gait upon leaving the ED.

## 2019-07-18 DIAGNOSIS — I10 ESSENTIAL HYPERTENSION: ICD-10-CM

## 2019-07-18 RX ORDER — CARVEDILOL 6.25 MG/1
6.25 TABLET ORAL 2 TIMES DAILY WITH MEALS
Qty: 60 TAB | Refills: 11 | Status: SHIPPED | OUTPATIENT
Start: 2019-07-18 | End: 2021-06-22

## 2020-01-25 ENCOUNTER — HOSPITAL ENCOUNTER (EMERGENCY)
Facility: MEDICAL CENTER | Age: 55
End: 2020-01-25
Attending: EMERGENCY MEDICINE
Payer: COMMERCIAL

## 2020-01-25 VITALS
WEIGHT: 293 LBS | RESPIRATION RATE: 16 BRPM | HEIGHT: 67 IN | SYSTOLIC BLOOD PRESSURE: 178 MMHG | OXYGEN SATURATION: 94 % | HEART RATE: 88 BPM | BODY MASS INDEX: 45.99 KG/M2 | TEMPERATURE: 97.3 F | DIASTOLIC BLOOD PRESSURE: 99 MMHG

## 2020-01-25 DIAGNOSIS — J04.0 LARYNGITIS: ICD-10-CM

## 2020-01-25 PROCEDURE — 99283 EMERGENCY DEPT VISIT LOW MDM: CPT

## 2020-01-25 NOTE — ED PROVIDER NOTES
"ED Provider Note    CHIEF COMPLAINT  Chief Complaint   Patient presents with   • Sore Throat     Past three weeks. Started with a cough, cough has resolved. Pain 5/10.       HPI  Cyndee Calvert is a 54 y.o. female who presents with a sore throat and voice hoarseness.  She had cough, congestion, runny nose, vomiting and diarrhea 3 weeks ago.  She did have an associated sore throat.  All of her symptoms seem to have a good step she still has a hoarse voice.  She works at a [employer] center and the hoarse voice makes it difficult.  No difficulty breathing.  She still occasionally coughs, but not as bad.  No chest pain.  No swelling.  No difficulty swallowing.    REVIEW OF SYSTEMS  Pertinent negative: As above    PAST MEDICAL HISTORY  Past Medical History:   Diagnosis Date   • Anemia    • Diabetes (HCC)    • High cholesterol    • Hypertension        SOCIAL HISTORY  Social History     Tobacco Use   • Smoking status: Never Smoker   • Smokeless tobacco: Never Used   Substance Use Topics   • Alcohol use: No   • Drug use: No       SURGICAL HISTORY  Past Surgical History:   Procedure Laterality Date   • BLADDER BIOPSY WITH CYSTOSCOPY  9/2/08    Performed by LILLIE WILLIAM at SURGERY [facility] ORS   • HYSTERECTOMY, TOTAL ABDOMINAL     • TONSILLECTOMY         ALLERGIES  Allergies   Allergen Reactions   • Percocet [Oxycodone-Acetaminophen] Shortness of Breath, Vomiting and Swelling   • Latex Swelling       PHYSICAL EXAM  VITAL SIGNS: BP (!) 164/88   Pulse 91   Temp 36 °C (96.8 °F) (Temporal)   Resp 16   Ht 1.702 m (5' 7\")   Wt (!) 141.5 kg (311 lb 15.2 oz)   LMP 04/19/2003   SpO2 100%   BMI 48.86 kg/m²    Constitutional: Awake and alert. Nontoxic.  Hoarse voice  HENT: Pharynx is normal without erythema exudate asymmetry or swelling.  Moist mucous membranes.  Eyes: Grossly normal  Neck: Normal range of motion  Cardiovascular: Normal heart rate   Thorax & Lungs: No respiratory distress, lung fields are clear " throughout  Abdomen: Nontender  Skin:  No pathologic rash.   Extremities: Well perfused  Psychiatric: Affect normal      COURSE & MEDICAL DECISION MAKING  Patient presents with post viral laryngitis.  I suspect she just requires more time to recover.  There is no suggestion of bacterial infection or abscess.  She is nontoxic.  At this point the best course is watchful waiting.  If she has persistent hoarseness I have recommended she see an ear nose and throat doctor for laryngoscopy.  Otherwise follow-up with primary doctor in 1 week for recheck.    FINAL IMPRESSION  1.  Laryngitis      Disposition: home in good condition      This dictation was created using voice recognition software. The accuracy of the dictation is limited to the abilities of the software.  The nursing notes were reviewed and certain aspects of this information were incorporated into this note.      Electronically signed by: Maged Grimm M.D., 1/25/2020 7:14 AM

## 2020-01-25 NOTE — ED NOTES
Pt VU dcis with strict return precautions and follow up with pcp. Pt left A&OX4 ambulatory with steady gait to discharge. Pt left alone.

## 2020-01-25 NOTE — ED TRIAGE NOTES
"Chief Complaint   Patient presents with   • Sore Throat     Past three weeks. Started with a cough, cough has resolved. Pain 5/10.     BP (!) 164/88   Pulse 91   Temp 36 °C (96.8 °F) (Temporal)   Resp 16   Ht 1.702 m (5' 7\")   Wt (!) 141.5 kg (311 lb 15.2 oz)   LMP 04/19/2003   SpO2 100%   BMI 48.86 kg/m²     Pt to ER for above complaint. Educated on triage process. Encouraged to alert staff to any changes.  "

## 2021-03-10 NOTE — PROGRESS NOTES
Pt appears withdrawn, irritable. Premedicated with norco 1 hour prior to wound care. Pt compliant with all cares.    SW attempted to reach pt this date to inquire if pt  MH and/or  ICM services  Pt previously was followed by [facility] and by ICM through [facility] services  SW/RN/CHW to f/u with pt and assist as indicted

## 2021-03-15 ENCOUNTER — APPOINTMENT (OUTPATIENT)
Dept: RADIOLOGY | Facility: MEDICAL CENTER | Age: 56
DRG: 177 | End: 2021-03-15
Attending: EMERGENCY MEDICINE
Payer: COMMERCIAL

## 2021-03-15 ENCOUNTER — HOSPITAL ENCOUNTER (INPATIENT)
Facility: MEDICAL CENTER | Age: 56
LOS: 1 days | DRG: 177 | End: 2021-03-17
Attending: EMERGENCY MEDICINE | Admitting: STUDENT IN AN ORGANIZED HEALTH CARE EDUCATION/TRAINING PROGRAM
Payer: COMMERCIAL

## 2021-03-15 DIAGNOSIS — Z23 NEED FOR VACCINATION: ICD-10-CM

## 2021-03-15 DIAGNOSIS — J12.82 PNEUMONIA DUE TO COVID-19 VIRUS: ICD-10-CM

## 2021-03-15 DIAGNOSIS — U07.1 PNEUMONIA DUE TO COVID-19 VIRUS: ICD-10-CM

## 2021-03-15 DIAGNOSIS — I10 ESSENTIAL HYPERTENSION: ICD-10-CM

## 2021-03-15 PROBLEM — Z79.899 DVT PROPHYLAXIS: Status: ACTIVE | Noted: 2021-03-15

## 2021-03-15 LAB
ALBUMIN SERPL BCP-MCNC: 3.9 G/DL (ref 3.2–4.9)
ALBUMIN/GLOB SERPL: 1 G/DL
ALP SERPL-CCNC: 63 U/L (ref 30–99)
ALT SERPL-CCNC: 19 U/L (ref 2–50)
ANION GAP SERPL CALC-SCNC: 12 MMOL/L (ref 7–16)
APPEARANCE UR: CLEAR
AST SERPL-CCNC: 32 U/L (ref 12–45)
BACTERIA #/AREA URNS HPF: NEGATIVE /HPF
BASOPHILS # BLD AUTO: 0 % (ref 0–1.8)
BASOPHILS # BLD: 0 K/UL (ref 0–0.12)
BILIRUB SERPL-MCNC: 0.6 MG/DL (ref 0.1–1.5)
BILIRUB UR QL STRIP.AUTO: NEGATIVE
BUN SERPL-MCNC: 18 MG/DL (ref 8–22)
CALCIUM SERPL-MCNC: 9 MG/DL (ref 8.5–10.5)
CHLORIDE SERPL-SCNC: 99 MMOL/L (ref 96–112)
CO2 SERPL-SCNC: 25 MMOL/L (ref 20–33)
COLOR UR: YELLOW
CREAT SERPL-MCNC: 1.3 MG/DL (ref 0.5–1.4)
EOSINOPHIL # BLD AUTO: 0 K/UL (ref 0–0.51)
EOSINOPHIL NFR BLD: 0 % (ref 0–6.9)
EPI CELLS #/AREA URNS HPF: ABNORMAL /HPF
ERYTHROCYTE [DISTWIDTH] IN BLOOD BY AUTOMATED COUNT: 45.1 FL (ref 35.9–50)
FLUAV RNA SPEC QL NAA+PROBE: NEGATIVE
FLUBV RNA SPEC QL NAA+PROBE: NEGATIVE
GLOBULIN SER CALC-MCNC: 4 G/DL (ref 1.9–3.5)
GLUCOSE SERPL-MCNC: 100 MG/DL (ref 65–99)
GLUCOSE UR STRIP.AUTO-MCNC: >=1000 MG/DL
HCT VFR BLD AUTO: 41.3 % (ref 37–47)
HGB BLD-MCNC: 12.8 G/DL (ref 12–16)
HYALINE CASTS #/AREA URNS LPF: ABNORMAL /LPF
KETONES UR STRIP.AUTO-MCNC: NEGATIVE MG/DL
LACTATE BLD-SCNC: 1.5 MMOL/L (ref 0.5–2)
LEUKOCYTE ESTERASE UR QL STRIP.AUTO: NEGATIVE
LG PLATELETS BLD QL SMEAR: NORMAL
LYMPHOCYTES # BLD AUTO: 1.05 K/UL (ref 1–4.8)
LYMPHOCYTES NFR BLD: 33.9 % (ref 22–41)
MANUAL DIFF BLD: NORMAL
MCH RBC QN AUTO: 29.8 PG (ref 27–33)
MCHC RBC AUTO-ENTMCNC: 31 G/DL (ref 33.6–35)
MCV RBC AUTO: 96.3 FL (ref 81.4–97.8)
MICRO URNS: ABNORMAL
MONOCYTES # BLD AUTO: 0.27 K/UL (ref 0–0.85)
MONOCYTES NFR BLD AUTO: 8.7 % (ref 0–13.4)
MORPHOLOGY BLD-IMP: NORMAL
MYELOCYTES NFR BLD MANUAL: 1.7 %
NEUTROPHILS # BLD AUTO: 1.73 K/UL (ref 2–7.15)
NEUTROPHILS NFR BLD: 55.7 % (ref 44–72)
NITRITE UR QL STRIP.AUTO: NEGATIVE
NRBC # BLD AUTO: 0 K/UL
NRBC BLD-RTO: 0 /100 WBC
PH UR STRIP.AUTO: 7 [PH] (ref 5–8)
PLATELET # BLD AUTO: 160 K/UL (ref 164–446)
PLATELET BLD QL SMEAR: NORMAL
PMV BLD AUTO: 12.8 FL (ref 9–12.9)
POTASSIUM SERPL-SCNC: 4.4 MMOL/L (ref 3.6–5.5)
PROT SERPL-MCNC: 7.9 G/DL (ref 6–8.2)
PROT UR QL STRIP: NEGATIVE MG/DL
RBC # BLD AUTO: 4.29 M/UL (ref 4.2–5.4)
RBC # URNS HPF: ABNORMAL /HPF
RBC BLD AUTO: PRESENT
RBC UR QL AUTO: ABNORMAL
RSV RNA SPEC QL NAA+PROBE: NEGATIVE
SARS-COV-2 RNA RESP QL NAA+PROBE: DETECTED
SODIUM SERPL-SCNC: 136 MMOL/L (ref 135–145)
SP GR UR STRIP.AUTO: 1.01
SPECIMEN SOURCE: ABNORMAL
UROBILINOGEN UR STRIP.AUTO-MCNC: 0.2 MG/DL
WBC # BLD AUTO: 3.1 K/UL (ref 4.8–10.8)
WBC #/AREA URNS HPF: ABNORMAL /HPF

## 2021-03-15 PROCEDURE — 85007 BL SMEAR W/DIFF WBC COUNT: CPT

## 2021-03-15 PROCEDURE — G0378 HOSPITAL OBSERVATION PER HR: HCPCS

## 2021-03-15 PROCEDURE — 36415 COLL VENOUS BLD VENIPUNCTURE: CPT

## 2021-03-15 PROCEDURE — 96372 THER/PROPH/DIAG INJ SC/IM: CPT

## 2021-03-15 PROCEDURE — 0241U HCHG SARS-COV-2 COVID-19 NFCT DS RESP RNA 4 TRGT MIC: CPT

## 2021-03-15 PROCEDURE — 83605 ASSAY OF LACTIC ACID: CPT

## 2021-03-15 PROCEDURE — 80053 COMPREHEN METABOLIC PANEL: CPT

## 2021-03-15 PROCEDURE — 85027 COMPLETE CBC AUTOMATED: CPT

## 2021-03-15 PROCEDURE — 700111 HCHG RX REV CODE 636 W/ 250 OVERRIDE (IP): Performed by: STUDENT IN AN ORGANIZED HEALTH CARE EDUCATION/TRAINING PROGRAM

## 2021-03-15 PROCEDURE — 71045 X-RAY EXAM CHEST 1 VIEW: CPT

## 2021-03-15 PROCEDURE — 81001 URINALYSIS AUTO W/SCOPE: CPT

## 2021-03-15 PROCEDURE — 99220 PR INITIAL OBSERVATION CARE,LEVL III: CPT | Mod: CS | Performed by: STUDENT IN AN ORGANIZED HEALTH CARE EDUCATION/TRAINING PROGRAM

## 2021-03-15 PROCEDURE — 87040 BLOOD CULTURE FOR BACTERIA: CPT

## 2021-03-15 PROCEDURE — 82962 GLUCOSE BLOOD TEST: CPT

## 2021-03-15 PROCEDURE — 99285 EMERGENCY DEPT VISIT HI MDM: CPT

## 2021-03-15 PROCEDURE — C9803 HOPD COVID-19 SPEC COLLECT: HCPCS | Performed by: EMERGENCY MEDICINE

## 2021-03-15 PROCEDURE — A9270 NON-COVERED ITEM OR SERVICE: HCPCS | Performed by: STUDENT IN AN ORGANIZED HEALTH CARE EDUCATION/TRAINING PROGRAM

## 2021-03-15 PROCEDURE — 700102 HCHG RX REV CODE 250 W/ 637 OVERRIDE(OP): Performed by: STUDENT IN AN ORGANIZED HEALTH CARE EDUCATION/TRAINING PROGRAM

## 2021-03-15 RX ORDER — POLYETHYLENE GLYCOL 3350 17 G/17G
1 POWDER, FOR SOLUTION ORAL
Status: DISCONTINUED | OUTPATIENT
Start: 2021-03-15 | End: 2021-03-17 | Stop reason: HOSPADM

## 2021-03-15 RX ORDER — PROCHLORPERAZINE EDISYLATE 5 MG/ML
5-10 INJECTION INTRAMUSCULAR; INTRAVENOUS EVERY 4 HOURS PRN
Status: DISCONTINUED | OUTPATIENT
Start: 2021-03-15 | End: 2021-03-17 | Stop reason: HOSPADM

## 2021-03-15 RX ORDER — CARVEDILOL 12.5 MG/1
12.5 TABLET ORAL 2 TIMES DAILY WITH MEALS
Status: ON HOLD | COMMUNITY
End: 2021-03-17

## 2021-03-15 RX ORDER — HEPARIN SODIUM 5000 [USP'U]/ML
5000 INJECTION, SOLUTION INTRAVENOUS; SUBCUTANEOUS EVERY 8 HOURS
Status: DISCONTINUED | OUTPATIENT
Start: 2021-03-15 | End: 2021-03-17 | Stop reason: HOSPADM

## 2021-03-15 RX ORDER — BISACODYL 10 MG
10 SUPPOSITORY, RECTAL RECTAL
Status: DISCONTINUED | OUTPATIENT
Start: 2021-03-15 | End: 2021-03-17 | Stop reason: HOSPADM

## 2021-03-15 RX ORDER — ATORVASTATIN CALCIUM 40 MG/1
40 TABLET, FILM COATED ORAL
Status: DISCONTINUED | OUTPATIENT
Start: 2021-03-15 | End: 2021-03-17 | Stop reason: HOSPADM

## 2021-03-15 RX ORDER — LOSARTAN POTASSIUM 50 MG/1
50 TABLET ORAL DAILY
Status: DISCONTINUED | OUTPATIENT
Start: 2021-03-16 | End: 2021-03-17 | Stop reason: HOSPADM

## 2021-03-15 RX ORDER — HYDROCODONE BITARTRATE AND ACETAMINOPHEN 5; 325 MG/1; MG/1
1 TABLET ORAL EVERY 4 HOURS PRN
Status: DISCONTINUED | OUTPATIENT
Start: 2021-03-15 | End: 2021-03-17 | Stop reason: HOSPADM

## 2021-03-15 RX ORDER — LOSARTAN POTASSIUM 100 MG/1
100 TABLET ORAL EVERY EVENING
COMMUNITY

## 2021-03-15 RX ORDER — PROMETHAZINE HYDROCHLORIDE 25 MG/1
12.5-25 TABLET ORAL EVERY 4 HOURS PRN
Status: DISCONTINUED | OUTPATIENT
Start: 2021-03-15 | End: 2021-03-17 | Stop reason: HOSPADM

## 2021-03-15 RX ORDER — ONDANSETRON 4 MG/1
4 TABLET, ORALLY DISINTEGRATING ORAL EVERY 4 HOURS PRN
Status: DISCONTINUED | OUTPATIENT
Start: 2021-03-15 | End: 2021-03-17 | Stop reason: HOSPADM

## 2021-03-15 RX ORDER — PROMETHAZINE HYDROCHLORIDE 25 MG/1
12.5-25 SUPPOSITORY RECTAL EVERY 4 HOURS PRN
Status: DISCONTINUED | OUTPATIENT
Start: 2021-03-15 | End: 2021-03-17 | Stop reason: HOSPADM

## 2021-03-15 RX ORDER — DEXAMETHASONE 6 MG/1
6 TABLET ORAL DAILY
Status: DISCONTINUED | OUTPATIENT
Start: 2021-03-16 | End: 2021-03-17 | Stop reason: HOSPADM

## 2021-03-15 RX ORDER — GUAIFENESIN/DEXTROMETHORPHAN 100-10MG/5
10 SYRUP ORAL EVERY 6 HOURS PRN
Status: DISCONTINUED | OUTPATIENT
Start: 2021-03-15 | End: 2021-03-17 | Stop reason: HOSPADM

## 2021-03-15 RX ORDER — DEXTROSE MONOHYDRATE 25 G/50ML
50 INJECTION, SOLUTION INTRAVENOUS
Status: DISCONTINUED | OUTPATIENT
Start: 2021-03-15 | End: 2021-03-17 | Stop reason: HOSPADM

## 2021-03-15 RX ORDER — PIOGLITAZONEHYDROCHLORIDE 30 MG/1
30 TABLET ORAL DAILY
COMMUNITY
End: 2021-10-27

## 2021-03-15 RX ORDER — CARVEDILOL 6.25 MG/1
6.25 TABLET ORAL 2 TIMES DAILY WITH MEALS
Status: DISCONTINUED | OUTPATIENT
Start: 2021-03-15 | End: 2021-03-17 | Stop reason: HOSPADM

## 2021-03-15 RX ORDER — AMOXICILLIN 250 MG
2 CAPSULE ORAL 2 TIMES DAILY
Status: DISCONTINUED | OUTPATIENT
Start: 2021-03-15 | End: 2021-03-17 | Stop reason: HOSPADM

## 2021-03-15 RX ORDER — DEXTROSE MONOHYDRATE 25 G/50ML
50 INJECTION, SOLUTION INTRAVENOUS
Status: CANCELLED | OUTPATIENT
Start: 2021-03-15

## 2021-03-15 RX ORDER — EMPAGLIFLOZIN 25 MG/1
25 TABLET, FILM COATED ORAL DAILY
COMMUNITY

## 2021-03-15 RX ORDER — CLONIDINE HYDROCHLORIDE 0.1 MG/1
0.1 TABLET ORAL EVERY 6 HOURS PRN
Status: DISCONTINUED | OUTPATIENT
Start: 2021-03-15 | End: 2021-03-17 | Stop reason: HOSPADM

## 2021-03-15 RX ORDER — INSULIN GLARGINE 100 [IU]/ML
10 INJECTION, SOLUTION SUBCUTANEOUS EVERY EVENING
Status: DISCONTINUED | OUTPATIENT
Start: 2021-03-15 | End: 2021-03-17 | Stop reason: HOSPADM

## 2021-03-15 RX ORDER — ONDANSETRON 2 MG/ML
4 INJECTION INTRAMUSCULAR; INTRAVENOUS EVERY 4 HOURS PRN
Status: DISCONTINUED | OUTPATIENT
Start: 2021-03-15 | End: 2021-03-17 | Stop reason: HOSPADM

## 2021-03-15 RX ORDER — HYDROCHLOROTHIAZIDE 25 MG/1
25 TABLET ORAL DAILY
Status: DISCONTINUED | OUTPATIENT
Start: 2021-03-16 | End: 2021-03-16

## 2021-03-15 RX ADMIN — HEPARIN SODIUM 5000 UNITS: 5000 INJECTION, SOLUTION INTRAVENOUS; SUBCUTANEOUS at 22:00

## 2021-03-15 RX ADMIN — ATORVASTATIN CALCIUM 40 MG: 40 TABLET, FILM COATED ORAL at 20:59

## 2021-03-15 RX ADMIN — CARVEDILOL 6.25 MG: 6.25 TABLET, FILM COATED ORAL at 20:59

## 2021-03-15 ASSESSMENT — ENCOUNTER SYMPTOMS
VOMITING: 0
COUGH: 1
WEIGHT LOSS: 0
DIZZINESS: 0
SENSORY CHANGE: 0
DIARRHEA: 0
ABDOMINAL PAIN: 0
INSOMNIA: 0
SHORTNESS OF BREATH: 1
WHEEZING: 0
CHILLS: 1
CONSTIPATION: 0
BACK PAIN: 0
TINGLING: 0
FEVER: 1
FALLS: 0
DEPRESSION: 0
EYE DISCHARGE: 0
HALLUCINATIONS: 0
NAUSEA: 0
FOCAL WEAKNESS: 0
NERVOUS/ANXIOUS: 0
EYE REDNESS: 0
HEADACHES: 0
PALPITATIONS: 0
HEARTBURN: 0
SORE THROAT: 0

## 2021-03-15 ASSESSMENT — PATIENT HEALTH QUESTIONNAIRE - PHQ9
1. LITTLE INTEREST OR PLEASURE IN DOING THINGS: NOT AT ALL
SUM OF ALL RESPONSES TO PHQ9 QUESTIONS 1 AND 2: 0
2. FEELING DOWN, DEPRESSED, IRRITABLE, OR HOPELESS: NOT AT ALL

## 2021-03-15 ASSESSMENT — COGNITIVE AND FUNCTIONAL STATUS - GENERAL
SUGGESTED CMS G CODE MODIFIER MOBILITY: CJ
DRESSING REGULAR UPPER BODY CLOTHING: A LITTLE
SUGGESTED CMS G CODE MODIFIER DAILY ACTIVITY: CJ
CLIMB 3 TO 5 STEPS WITH RAILING: A LITTLE
DAILY ACTIVITIY SCORE: 22
MOBILITY SCORE: 21
MOVING FROM LYING ON BACK TO SITTING ON SIDE OF FLAT BED: A LITTLE
TOILETING: A LITTLE
MOVING TO AND FROM BED TO CHAIR: A LITTLE

## 2021-03-15 ASSESSMENT — LIFESTYLE VARIABLES
HOW MANY TIMES IN THE PAST YEAR HAVE YOU HAD 5 OR MORE DRINKS IN A DAY: 0
TOTAL SCORE: 0
DOES PATIENT WANT TO STOP DRINKING: NO
HAVE PEOPLE ANNOYED YOU BY CRITICIZING YOUR DRINKING: NO
EVER FELT BAD OR GUILTY ABOUT YOUR DRINKING: NO
CONSUMPTION TOTAL: NEGATIVE
AVERAGE NUMBER OF DAYS PER WEEK YOU HAVE A DRINK CONTAINING ALCOHOL: 0
TOTAL SCORE: 0
ALCOHOL_USE: NO
SUBSTANCE_ABUSE: 0
EVER HAD A DRINK FIRST THING IN THE MORNING TO STEADY YOUR NERVES TO GET RID OF A HANGOVER: NO
ON A TYPICAL DAY WHEN YOU DRINK ALCOHOL HOW MANY DRINKS DO YOU HAVE: 0
TOTAL SCORE: 0
HAVE YOU EVER FELT YOU SHOULD CUT DOWN ON YOUR DRINKING: NO

## 2021-03-15 ASSESSMENT — FIBROSIS 4 INDEX: FIB4 SCORE: 2.62

## 2021-03-15 NOTE — ED PROVIDER NOTES
ED Provider Note    Scribed for Dr. Edgar Morgan M.D. by Kourtney Cota. 3/15/2021  2:57 PM    Primary care provider: LESLY Denson  Means of arrival: Walk in  History obtained from: Patient  History limited by: None    CHIEF COMPLAINT  Chief Complaint   Patient presents with    Fever    Fatigue    Difficulty Breathing     PPE Note: I personally donned full PPE for all patient encounters during this visit, including wearing an N95 respirator mask, gloves, and eye protection. Scribe remained outside the patient's room and did not have any contact with the patient for the duration of patient encounter.      HPI  Cynede Calvert is a 55 y.o. female with a history of diabetes and hypertension who presents to the Emergency Department for evaluation of generalized malaise onset four days. The patient states she has been exposed to COVID-19. She admits to additional symptoms of fever, chills, shortness of breath, fatigue, and cough, but denies nausea, vomiting, diarrhea, coughing blood, changes in smell or taste, or loss of consciousness. She denies alleviating factors.       REVIEW OF SYSTEMS  Pertinent positives include generalized malaise, fever, chills, shortness of breath, fatigue, and cough. Pertinent negatives include no  nausea, vomiting, diarrhea, coughing blood, changes in smell or taste, or loss of consciousness. As above, all other systems reviewed and are negative.   See HPI for further details.     PAST MEDICAL HISTORY   has a past medical history of Anemia, Diabetes (HCC), High cholesterol, and Hypertension.    SURGICAL HISTORY   has a past surgical history that includes hysterectomy, total abdominal; tonsillectomy; and bladder biopsy with cystoscopy (9/2/08).    SOCIAL HISTORY  Social History     Tobacco Use    Smoking status: Never Smoker    Smokeless tobacco: Never Used   Substance Use Topics    Alcohol use: No    Drug use: No      Social History     Substance and Sexual  "Activity   Drug Use No       FAMILY HISTORY  Family History   Problem Relation Age of Onset    Heart Disease Mother     Heart Attack Father     Heart Failure Brother        CURRENT MEDICATIONS  Home Medications       Reviewed by Celso Madrid R.N. (Registered Nurse) on 03/15/21 at 1148  Med List Status: Partial     Medication Last Dose Status   atorvastatin (LIPITOR) 40 MG Tab  Active   carvedilol (COREG) 6.25 MG Tab  Active   fluconazole (DIFLUCAN) 150 MG tablet  Active   hydroCHLOROthiazide (HYDRODIURIL) 25 MG Tab  Active   HYDROcodone-acetaminophen (NORCO) 5-325 MG Tab per tablet  Active   insulin glargine (LANTUS) 100 UNIT/ML Solution  Active   insulin regular (HUMULIN R) 100 Unit/mL Solution  Active   losartan (COZAAR) 50 MG Tab  Active   methylPREDNISolone (MEDROL) 4 MG Tab  Active   simvastatin (ZOCOR) 10 MG Tab  Active                    ALLERGIES  Allergies   Allergen Reactions    Percocet [Oxycodone-Acetaminophen] Shortness of Breath, Vomiting and Swelling    Latex Swelling       PHYSICAL EXAM  VITAL SIGNS: /84   Pulse 85   Temp 36.8 °C (98.3 °F) (Temporal)   Resp 14   Ht 1.702 m (5' 7\")   Wt 124 kg (274 lb)   LMP 04/19/2003   SpO2 90%   BMI 42.91 kg/m²     Constitutional: Well developed, Well nourished, No distress, Non-toxic appearance.   HENT: Dry mouth, Normocephalic, Atraumatic, Bilateral external ears normal, No oral exudates.   Eyes: PERRLA, EOMI, Conjunctiva normal, No discharge.   Neck: No tenderness, Supple, No stridor.   Lymphatic: No lymphadenopathy noted.   Cardiovascular: Normal heart rate, Normal rhythm.   Thorax & Lungs: Diminished breath sounds, No wheezing,  crackles.  In basis  Abdomen: Soft, No tenderness, No masses, No pulsatile masses.   Skin: Warm, Dry, No erythema, No rash.   Extremities:, No edema No cyanosis.   Musculoskeletal: No tenderness to palpation or major deformities noted.  Intact distal pulses  Neurologic: Awake, alert. Moves all extremities " spontaneously.  Psychiatric: Affect normal, Judgment normal, Mood normal.     LABS  Results for orders placed or performed during the hospital encounter of 03/15/21   COV-2, FLU A/B, AND RSV BY PCR (2-4 HOURS [employer]): Collect NP swab in VTM    Specimen: Respirate   Result Value Ref Range    Influenza virus A RNA Negative Negative    Influenza virus B, PCR Negative Negative    RSV, PCR Negative Negative    SARS-CoV-2 by PCR DETECTED (AA)     SARS-CoV-2 Source NP Swab    COMP METABOLIC PANEL   Result Value Ref Range    Sodium 136 135 - 145 mmol/L    Potassium 4.4 3.6 - 5.5 mmol/L    Chloride 99 96 - 112 mmol/L    Co2 25 20 - 33 mmol/L    Anion Gap 12.0 7.0 - 16.0    Glucose 100 (H) 65 - 99 mg/dL    Bun 18 8 - 22 mg/dL    Creatinine 1.30 0.50 - 1.40 mg/dL    Calcium 9.0 8.5 - 10.5 mg/dL    AST(SGOT) 32 12 - 45 U/L    ALT(SGPT) 19 2 - 50 U/L    Alkaline Phosphatase 63 30 - 99 U/L    Total Bilirubin 0.6 0.1 - 1.5 mg/dL    Albumin 3.9 3.2 - 4.9 g/dL    Total Protein 7.9 6.0 - 8.2 g/dL    Globulin 4.0 (H) 1.9 - 3.5 g/dL    A-G Ratio 1.0 g/dL   LACTIC ACID   Result Value Ref Range    Lactic Acid 1.5 0.5 - 2.0 mmol/L   URINALYSIS (UA)    Specimen: Urine   Result Value Ref Range    Color Yellow     Character Clear     Specific Gravity 1.010 <1.035    Ph 7.0 5.0 - 8.0    Glucose >=1000 (A) Negative mg/dL    Ketones Negative Negative mg/dL    Protein Negative Negative mg/dL    Bilirubin Negative Negative    Urobilinogen, Urine 0.2 Negative    Nitrite Negative Negative    Leukocyte Esterase Negative Negative    Occult Blood Small (A) Negative    Micro Urine Req Microscopic    ESTIMATED GFR   Result Value Ref Range    GFR If  51 (A) >60 mL/min/1.73 m 2    GFR If Non  42 (A) >60 mL/min/1.73 m 2     All labs reviewed by me.    RADIOLOGY  DX-CHEST-PORTABLE (1 VIEW)   Final Result      Hypoinflation with patchy basilar atelectasis or infiltrate and worse in the LEFT, concerning for Covid 19 pneumonia.         The radiologist's interpretation of all radiological studies have been reviewed by me.    COURSE & MEDICAL DECISION MAKING  Pertinent Labs & Imaging studies reviewed. (See chart for details)    2:57 PM - Patient seen and examined at bedside. Ordered CBC with diff, CMP, lactic acid, blood culture, DX-Chest, UA, and COV-2 test to evaluate her symptoms. The differential diagnoses include but are not limited to: COVID-19, Pneumonia    4:56 PM - I reevaluated the patient at bedside. I discussed the patient's diagnostic study results which show a positive COVID-19 test. I advised that the patient continue to be monitored for worsening symptoms. Patient will be put on oxygen.    5:16 PM - Paged Hospitalist. I discussed the patient's case and the above findings with Dr. Jackson (Hospitalist) who agrees to evaluate the patient.      Decision Making: This is a patient with shortness of breath Covid positive with some Covid pneumonia changes on chest x-ray she is hypoxic in the 80s and so therefore discussed with hospitalist concerning admission as above    DISPOSITION:  Patient will be hospitalized by Dr. Jackson in guarded condition.      FINAL IMPRESSION  1. Pneumonia due to COVID-19 virus          Kourtney MARES (Elayne), am scribing for, and in the presence of, Edgar Morgan M.D..    Electronically signed by: Kourtney Cota (Elayne), 3/15/2021    Edgar MARES M.D. personally performed the services described in this documentation, as scribed by Kourtney Cota in my presence, and it is both accurate and complete. C    The note accurately reflects work and decisions made by me.  Edgar Morgan M.D.  3/15/2021  9:50 PM

## 2021-03-15 NOTE — ED NOTES
Jose R from Lab called with critical result of + covid Critical lab result read back to jose r.   Dr. guzmán notified of critical lab result at 1625.  Critical lab result read back by Dr. guzmán

## 2021-03-15 NOTE — ED TRIAGE NOTES
54 y/o female ambulatory to triage with c/o fever, fatigue, difficulty breathing and exposure to Covid-19. Pt states symptoms began on Friday.

## 2021-03-16 LAB
ALBUMIN SERPL BCP-MCNC: 3.4 G/DL (ref 3.2–4.9)
ALBUMIN/GLOB SERPL: 0.9 G/DL
ALP SERPL-CCNC: 54 U/L (ref 30–99)
ALT SERPL-CCNC: 15 U/L (ref 2–50)
ANION GAP SERPL CALC-SCNC: 10 MMOL/L (ref 7–16)
AST SERPL-CCNC: 28 U/L (ref 12–45)
BILIRUB SERPL-MCNC: 0.5 MG/DL (ref 0.1–1.5)
BUN SERPL-MCNC: 28 MG/DL (ref 8–22)
CALCIUM SERPL-MCNC: 8.4 MG/DL (ref 8.5–10.5)
CHLORIDE SERPL-SCNC: 99 MMOL/L (ref 96–112)
CHOLEST SERPL-MCNC: 141 MG/DL (ref 100–199)
CO2 SERPL-SCNC: 24 MMOL/L (ref 20–33)
CREAT SERPL-MCNC: 1.68 MG/DL (ref 0.5–1.4)
ERYTHROCYTE [DISTWIDTH] IN BLOOD BY AUTOMATED COUNT: 44 FL (ref 35.9–50)
EST. AVERAGE GLUCOSE BLD GHB EST-MCNC: 258 MG/DL
GLOBULIN SER CALC-MCNC: 3.7 G/DL (ref 1.9–3.5)
GLUCOSE BLD-MCNC: 109 MG/DL (ref 65–99)
GLUCOSE BLD-MCNC: 157 MG/DL (ref 65–99)
GLUCOSE BLD-MCNC: 158 MG/DL (ref 65–99)
GLUCOSE BLD-MCNC: 193 MG/DL (ref 65–99)
GLUCOSE BLD-MCNC: 95 MG/DL (ref 65–99)
GLUCOSE SERPL-MCNC: 100 MG/DL (ref 65–99)
HBA1C MFR BLD: 10.6 % (ref 4–5.6)
HCT VFR BLD AUTO: 35.8 % (ref 37–47)
HDLC SERPL-MCNC: 31 MG/DL
HGB BLD-MCNC: 11.1 G/DL (ref 12–16)
LDLC SERPL CALC-MCNC: 90 MG/DL
MCH RBC QN AUTO: 29.7 PG (ref 27–33)
MCHC RBC AUTO-ENTMCNC: 31 G/DL (ref 33.6–35)
MCV RBC AUTO: 95.7 FL (ref 81.4–97.8)
PLATELET # BLD AUTO: 148 K/UL (ref 164–446)
PMV BLD AUTO: 12.6 FL (ref 9–12.9)
POTASSIUM SERPL-SCNC: 3.9 MMOL/L (ref 3.6–5.5)
PROT SERPL-MCNC: 7.1 G/DL (ref 6–8.2)
RBC # BLD AUTO: 3.74 M/UL (ref 4.2–5.4)
SODIUM SERPL-SCNC: 133 MMOL/L (ref 135–145)
TRIGL SERPL-MCNC: 98 MG/DL (ref 0–149)
WBC # BLD AUTO: 3.4 K/UL (ref 4.8–10.8)

## 2021-03-16 PROCEDURE — 700111 HCHG RX REV CODE 636 W/ 250 OVERRIDE (IP): Performed by: STUDENT IN AN ORGANIZED HEALTH CARE EDUCATION/TRAINING PROGRAM

## 2021-03-16 PROCEDURE — 85027 COMPLETE CBC AUTOMATED: CPT

## 2021-03-16 PROCEDURE — 96372 THER/PROPH/DIAG INJ SC/IM: CPT

## 2021-03-16 PROCEDURE — 700105 HCHG RX REV CODE 258: Performed by: STUDENT IN AN ORGANIZED HEALTH CARE EDUCATION/TRAINING PROGRAM

## 2021-03-16 PROCEDURE — 700102 HCHG RX REV CODE 250 W/ 637 OVERRIDE(OP): Performed by: STUDENT IN AN ORGANIZED HEALTH CARE EDUCATION/TRAINING PROGRAM

## 2021-03-16 PROCEDURE — 36415 COLL VENOUS BLD VENIPUNCTURE: CPT

## 2021-03-16 PROCEDURE — 770006 HCHG ROOM/CARE - MED/SURG/GYN SEMI*

## 2021-03-16 PROCEDURE — A9270 NON-COVERED ITEM OR SERVICE: HCPCS | Performed by: STUDENT IN AN ORGANIZED HEALTH CARE EDUCATION/TRAINING PROGRAM

## 2021-03-16 PROCEDURE — 83036 HEMOGLOBIN GLYCOSYLATED A1C: CPT

## 2021-03-16 PROCEDURE — 80061 LIPID PANEL: CPT

## 2021-03-16 PROCEDURE — 99233 SBSQ HOSP IP/OBS HIGH 50: CPT | Performed by: STUDENT IN AN ORGANIZED HEALTH CARE EDUCATION/TRAINING PROGRAM

## 2021-03-16 PROCEDURE — 80053 COMPREHEN METABOLIC PANEL: CPT

## 2021-03-16 PROCEDURE — 94660 CPAP INITIATION&MGMT: CPT

## 2021-03-16 PROCEDURE — 82962 GLUCOSE BLOOD TEST: CPT | Mod: 91

## 2021-03-16 RX ORDER — SODIUM CHLORIDE, SODIUM LACTATE, POTASSIUM CHLORIDE, AND CALCIUM CHLORIDE .6; .31; .03; .02 G/100ML; G/100ML; G/100ML; G/100ML
500 INJECTION, SOLUTION INTRAVENOUS ONCE
Status: COMPLETED | OUTPATIENT
Start: 2021-03-16 | End: 2021-03-16

## 2021-03-16 RX ADMIN — LOSARTAN POTASSIUM 50 MG: 50 TABLET, FILM COATED ORAL at 05:10

## 2021-03-16 RX ADMIN — HEPARIN SODIUM 5000 UNITS: 5000 INJECTION, SOLUTION INTRAVENOUS; SUBCUTANEOUS at 21:22

## 2021-03-16 RX ADMIN — CARVEDILOL 6.25 MG: 6.25 TABLET, FILM COATED ORAL at 17:38

## 2021-03-16 RX ADMIN — ATORVASTATIN CALCIUM 40 MG: 40 TABLET, FILM COATED ORAL at 21:22

## 2021-03-16 RX ADMIN — INSULIN LISPRO 1 UNITS: 100 INJECTION, SOLUTION INTRAVENOUS; SUBCUTANEOUS at 13:20

## 2021-03-16 RX ADMIN — CARVEDILOL 6.25 MG: 6.25 TABLET, FILM COATED ORAL at 08:11

## 2021-03-16 RX ADMIN — INSULIN GLARGINE 10 UNITS: 100 INJECTION, SOLUTION SUBCUTANEOUS at 17:38

## 2021-03-16 RX ADMIN — HEPARIN SODIUM 5000 UNITS: 5000 INJECTION, SOLUTION INTRAVENOUS; SUBCUTANEOUS at 13:18

## 2021-03-16 RX ADMIN — INSULIN LISPRO 1 UNITS: 100 INJECTION, SOLUTION INTRAVENOUS; SUBCUTANEOUS at 17:41

## 2021-03-16 RX ADMIN — HEPARIN SODIUM 5000 UNITS: 5000 INJECTION, SOLUTION INTRAVENOUS; SUBCUTANEOUS at 05:10

## 2021-03-16 RX ADMIN — HYDROCHLOROTHIAZIDE 25 MG: 25 TABLET ORAL at 05:10

## 2021-03-16 RX ADMIN — HYDROCODONE BITARTRATE AND ACETAMINOPHEN 1 TABLET: 5; 325 TABLET ORAL at 21:22

## 2021-03-16 RX ADMIN — DEXAMETHASONE 6 MG: 6 TABLET ORAL at 05:10

## 2021-03-16 RX ADMIN — SODIUM CHLORIDE, POTASSIUM CHLORIDE, SODIUM LACTATE AND CALCIUM CHLORIDE 500 ML: 600; 310; 30; 20 INJECTION, SOLUTION INTRAVENOUS at 10:33

## 2021-03-16 ASSESSMENT — ENCOUNTER SYMPTOMS
FEVER: 0
BLURRED VISION: 0
VOMITING: 0
SORE THROAT: 0
COUGH: 1
NAUSEA: 1
DEPRESSION: 0
MYALGIAS: 0
SHORTNESS OF BREATH: 1
SPUTUM PRODUCTION: 0
DIZZINESS: 0
NERVOUS/ANXIOUS: 0
HEADACHES: 1
ABDOMINAL PAIN: 0

## 2021-03-16 ASSESSMENT — COPD QUESTIONNAIRES
DO YOU EVER COUGH UP ANY MUCUS OR PHLEGM?: YES, A FEW DAYS A WEEK OR MONTH
DURING THE PAST 4 WEEKS HOW MUCH DID YOU FEEL SHORT OF BREATH: SOME OF THE TIME
HAVE YOU SMOKED AT LEAST 100 CIGARETTES IN YOUR ENTIRE LIFE: NO/DON'T KNOW
COPD SCREENING SCORE: 3

## 2021-03-16 ASSESSMENT — LIFESTYLE VARIABLES: EVER_SMOKED: NEVER

## 2021-03-16 ASSESSMENT — PAIN DESCRIPTION - PAIN TYPE: TYPE: ACUTE PAIN

## 2021-03-16 NOTE — ASSESSMENT & PLAN NOTE
Previously poorly controlled , will recheck hemoglobin A1c   Continue home Lantus of 10 units   Insulin sliding scale with hypoglycemic protocol   Diabetic diet home med: lantus 10untis with iss

## 2021-03-16 NOTE — ASSESSMENT & PLAN NOTE
ANOOP noted on admission, creatinine of 1.63 baseline is normal  -We will hold home HCTZ  -500 mill bolus given  -Avoid nephrotoxins  -Continue to monitor renal function

## 2021-03-16 NOTE — CARE PLAN
Problem: Communication  Goal: The ability to communicate needs accurately and effectively will improve  3/16/2021 0940 by Jennifer Laboy R.N.  Outcome: PROGRESSING AS EXPECTED  3/16/2021 0937 by Jennifer Laboy R.N.  Outcome: PROGRESSING AS EXPECTED     Problem: Safety  Goal: Will remain free from injury  3/16/2021 0940 by Jennifer Laboy R.N.  Outcome: PROGRESSING AS EXPECTED  3/16/2021 0937 by Jennifer Laboy R.N.  Outcome: PROGRESSING AS EXPECTED  Goal: Will remain free from falls  Outcome: PROGRESSING AS EXPECTED     Problem: Infection  Goal: Will remain free from infection  3/16/2021 0940 by Jennifer Laboy R.N.  Outcome: PROGRESSING AS EXPECTED  3/16/2021 0937 by Jennifer Laboy R.N.  Outcome: PROGRESSING AS EXPECTED     Problem: Venous Thromboembolism (VTW)/Deep Vein Thrombosis (DVT) Prevention:  Goal: Patient will participate in Venous Thrombosis (VTE)/Deep Vein Thrombosis (DVT)Prevention Measures  Outcome: PROGRESSING AS EXPECTED     Problem: Bowel/Gastric:  Goal: Normal bowel function is maintained or improved  3/16/2021 0940 by Jennifer Laboy R.N.  Outcome: PROGRESSING AS EXPECTED  3/16/2021 0937 by Jennifer Laboy R.N.  Outcome: PROGRESSING AS EXPECTED  Goal: Will not experience complications related to bowel motility  3/16/2021 0940 by Jennifer Laboy R.N.  Outcome: PROGRESSING AS EXPECTED  3/16/2021 0937 by Jennifer Laboy R.N.  Outcome: PROGRESSING AS EXPECTED     Problem: Knowledge Deficit  Goal: Knowledge of disease process/condition, treatment plan, diagnostic tests, and medications will improve  3/16/2021 0940 by Jennifer Laboy R.N.  Outcome: PROGRESSING AS EXPECTED  3/16/2021 0937 by Jennifer Laboy R.N.  Outcome: PROGRESSING AS EXPECTED  Goal: Knowledge of the prescribed therapeutic regimen will improve  3/16/2021 0940 by Jennifer Laboy R.N.  Outcome: PROGRESSING AS EXPECTED  3/16/2021 0937 by Jennifer Laboy R.N.  Outcome: PROGRESSING AS EXPECTED     Problem: Discharge Barriers/Planning  Goal: Patient's continuum of  care needs will be met  3/16/2021 0940 by Jennifer Laboy R.N.  Outcome: PROGRESSING AS EXPECTED  3/16/2021 0937 by Jennifer Laboy R.N.  Outcome: PROGRESSING AS EXPECTED     Problem: Pain Management  Goal: Pain level will decrease to patient's comfort goal  Outcome: PROGRESSING AS EXPECTED     Problem: Respiratory:  Goal: Respiratory status will improve  3/16/2021 0940 by Jennifer Laboy R.N.  Outcome: PROGRESSING AS EXPECTED  3/16/2021 0937 by Jennifer Laboy R.N.  Outcome: PROGRESSING SLOWER THAN EXPECTED     Problem: Fluid Volume:  Goal: Will maintain balanced intake and output  Outcome: PROGRESSING AS EXPECTED    Call light within reach, IS at bedside, Patient will use IS at least 10 times an hourPRN, monitor and titrate O2 as needed.

## 2021-03-16 NOTE — HOSPITAL COURSE
Ms. Calvert is a pleasant 55-year-old female with a past medical history of type 2 diabetes, hypertension, hyperlipidemia who presented to the emergency room on 3/15/2021 for 4 days of fatigue fever and shortness of breath.  Patient had had Covid exposure 1 week prior.  On admission her vital signs were stable.   Labs did show a lymphopenia with a WBC of 3.4, ANOOP with a BUN 28 creatinine of 1.68.  She was Covid positive on admission  Chest x-ray with bilateral infiltrates left worse than right.   Patient was requiring 2 L of oxygen and admitted for further evaluation and treatment

## 2021-03-16 NOTE — H&P
Hospital Medicine History & Physical Note    Date of Service  3/15/2021    Primary Care Physician  LESLY Denson    Consultants  none    Code Status  Full Code    Chief Complaint  Chief Complaint   Patient presents with   • Fever   • Fatigue   • Difficulty Breathing       History of Presenting Illness  Cyndee Calvert is a 55 y.o. female with a history of diabetes and hypertension, HLD, who presents to the Emergency Department for evaluation of generalized malaise onset four days. The patient states she has been exposed to COVID-19 last week at work. Symptom started 5 days ago, fever, chills, shortness of breath, fatigue, and cough, clear sputum with body ache; symptom progressively getting worse. Which prompt her coming to hospital. She denies nausea, vomiting, diarrhea, coughing blood, changes in smell or taste, or loss of consciousness. She denies alleviating factors.     Review of Systems  Review of Systems   Constitutional: Positive for chills and fever. Negative for malaise/fatigue and weight loss.   HENT: Negative for congestion and sore throat.    Eyes: Negative for discharge and redness.   Respiratory: Positive for cough and shortness of breath. Negative for wheezing.    Cardiovascular: Negative for chest pain, palpitations and leg swelling.   Gastrointestinal: Negative for abdominal pain, constipation, diarrhea, heartburn, nausea and vomiting.   Genitourinary: Negative for dysuria, frequency and urgency.   Musculoskeletal: Negative for back pain, falls and joint pain.   Skin: Negative for itching and rash.   Neurological: Negative for dizziness, tingling, sensory change, focal weakness and headaches.   Psychiatric/Behavioral: Negative for depression, hallucinations and substance abuse. The patient is not nervous/anxious and does not have insomnia.    All other systems reviewed and are negative.      Past Medical History   has a past medical history of Anemia, Diabetes (HCC), High  cholesterol, and Hypertension.    Surgical History   has a past surgical history that includes hysterectomy, total abdominal; tonsillectomy; and bladder biopsy with cystoscopy (9/2/08).     Family History  family history includes Heart Attack in her father; Heart Disease in her mother; Heart Failure in her brother.     Social History   reports that she has never smoked. She has never used smokeless tobacco. She reports that she does not drink alcohol and does not use drugs.    Allergies  Allergies   Allergen Reactions   • Percocet [Oxycodone-Acetaminophen] Shortness of Breath, Vomiting and Swelling   • Latex Swelling       Medications  Prior to Admission Medications   Prescriptions Last Dose Informant Patient Reported? Taking?   HYDROcodone-acetaminophen (NORCO) 5-325 MG Tab per tablet   Yes No   Sig: hydrocodone 5 mg-acetaminophen 325 mg tablet   atorvastatin (LIPITOR) 40 MG Tab   Yes No   Sig: TK 1 T PO QD   carvedilol (COREG) 6.25 MG Tab   No No   Sig: Take 1 Tab by mouth 2 times a day, with meals.   fluconazole (DIFLUCAN) 150 MG tablet   Yes No   Sig: TK 1 T PO QD FOR 7 DAYS   hydroCHLOROthiazide (HYDRODIURIL) 25 MG Tab   No No   Sig: Take 1 Tab by mouth every day.   insulin glargine (LANTUS) 100 UNIT/ML Solution   No No   Sig: Inject 10 Units as instructed every evening.   insulin regular (HUMULIN R) 100 Unit/mL Solution   No No   Sig: Inject 2-9 Units as instructed 4 Times a Day,Before Meals and at Bedtime.   losartan (COZAAR) 50 MG Tab   Yes No   Sig: Take 1 Tab by mouth every day.   methylPREDNISolone (MEDROL) 4 MG Tab   No No   Sig: Take as per the package instructions.      Facility-Administered Medications: None       Physical Exam  Temp:  [36.8 °C (98.3 °F)] 36.8 °C (98.3 °F)  Pulse:  [72-86] 74  Resp:  [14-19] 18  BP: (126-148)/(64-84) 135/69  SpO2:  [89 %-98 %] 98 %    Physical Exam  Vitals reviewed.   Constitutional:       General: She is not in acute distress.     Appearance: Normal appearance. She  is normal weight. She is not diaphoretic.   HENT:      Head: Normocephalic and atraumatic.      Right Ear: External ear normal.      Left Ear: External ear normal.      Nose: Nose normal.      Mouth/Throat:      Pharynx: Oropharynx is clear.   Eyes:      General:         Right eye: No discharge.         Left eye: No discharge.      Extraocular Movements: Extraocular movements intact.      Conjunctiva/sclera: Conjunctivae normal.      Pupils: Pupils are equal, round, and reactive to light.   Cardiovascular:      Rate and Rhythm: Normal rate and regular rhythm.      Pulses: Normal pulses.      Heart sounds: Normal heart sounds.   Pulmonary:      Effort: Pulmonary effort is normal. No respiratory distress.      Breath sounds: Normal breath sounds. No wheezing.   Abdominal:      General: Abdomen is flat. Bowel sounds are normal. There is no distension.      Palpations: Abdomen is soft.      Tenderness: There is no abdominal tenderness. There is no right CVA tenderness, left CVA tenderness, guarding or rebound.   Musculoskeletal:         General: No swelling, tenderness or deformity. Normal range of motion.      Cervical back: Normal range of motion. No rigidity. No muscular tenderness.      Right lower leg: No edema.      Left lower leg: No edema.   Skin:     General: Skin is warm and dry.   Neurological:      General: No focal deficit present.      Mental Status: She is alert and oriented to person, place, and time. Mental status is at baseline.      Cranial Nerves: No cranial nerve deficit.      Sensory: No sensory deficit.      Gait: Gait normal.      Deep Tendon Reflexes: Reflexes normal.   Psychiatric:         Mood and Affect: Mood normal.         Behavior: Behavior normal.         Judgment: Judgment normal.         Laboratory:  Recent Labs     03/15/21  1436   WBC 3.1*   RBC 4.29   HEMOGLOBIN 12.8   HEMATOCRIT 41.3   MCV 96.3   MCH 29.8   MCHC 31.0*   RDW 45.1   PLATELETCT 160*   MPV 12.8     Recent Labs      03/15/21  1436   SODIUM 136   POTASSIUM 4.4   CHLORIDE 99   CO2 25   GLUCOSE 100*   BUN 18   CREATININE 1.30   CALCIUM 9.0     Recent Labs     03/15/21  1436   ALTSGPT 19   ASTSGOT 32   ALKPHOSPHAT 63   TBILIRUBIN 0.6   GLUCOSE 100*         No results for input(s): NTPROBNP in the last 72 hours.      No results for input(s): TROPONINT in the last 72 hours.    Imaging:  DX-CHEST-PORTABLE (1 VIEW)   Final Result      Hypoinflation with patchy basilar atelectasis or infiltrate and worse in the LEFT, concerning for Covid 19 pneumonia.            Assessment/Plan:  I anticipate this patient is appropriate for observation status at this time.    * Pneumonia due to COVID-19 virus  Assessment & Plan  H/o covid-19 exposure  CXR: Hypoinflation with patchy basilar atelectasis or infiltrate and worse in the LEFT, concerning for Covid 19 pneumonia.   Sob, cough  Test positive    Oxygen supplement 2L NC  Decadron 6mg for 10days    Id consult as needed    HTN (hypertension)  Assessment & Plan  Home med: HCTZ. 25MG QD, Losartan 50mg qd  Low salt diet  Prn meds      High cholesterol- (present on admission)  Assessment & Plan  atorvastain 40mg qhs    DM (diabetes mellitus) (HCC)- (present on admission)  Assessment & Plan  Home med: lantus 10untis with iss  C/w same dose  Dm diet  Check A1c    DVT prophylaxis  Assessment & Plan  Heparin 5000units [id number]

## 2021-03-16 NOTE — ED NOTES
Med rec updated and complete.  Allergies reviewed. Pt denies antibiotic use in last 14 days.      Home pharmacy  [facility]

## 2021-03-16 NOTE — CARE PLAN
Care Plan:  Problem: Communication  Goal: The ability to communicate needs accurately and effectively will improve  Outcome: PROGRESSING AS EXPECTED     Problem: Safety  Goal: Will remain free from injury  Outcome: PROGRESSING AS EXPECTED     Problem: Infection  Goal: Will remain free from infection  Outcome: PROGRESSING AS EXPECTED     Problem: Venous Thromboembolism (VTW)/Deep Vein Thrombosis (DVT) Prevention:  Goal: Patient will participate in Venous Thrombosis (VTE)/Deep Vein Thrombosis (DVT)Prevention Measures  Outcome: PROGRESSING AS EXPECTED     Problem: Bowel/Gastric:  Goal: Normal bowel function is maintained or improved  Outcome: PROGRESSING AS EXPECTED     Problem: Knowledge Deficit  Goal: Knowledge of disease process/condition, treatment plan, diagnostic tests, and medications will improve  Outcome: PROGRESSING AS EXPECTED     Problem: Discharge Barriers/Planning  Goal: Patient's continuum of care needs will be met  Outcome: PROGRESSING AS EXPECTED     Problem: Pain Management  Goal: Pain level will decrease to patient's comfort goal  Outcome: PROGRESSING AS EXPECTED     Problem: Respiratory:  Goal: Respiratory status will improve  Outcome: PROGRESSING AS EXPECTED   Pt aox4, SBA, and on 2lpm O2 (baseline 0). Denies pain or SOB but states she feels very fatigued. Covid positive - ISO initiated. Subcut heparin. NO tele. Strict I/Os. Dm2- accu checks. 20ga PIV - RUE.

## 2021-03-16 NOTE — ASSESSMENT & PLAN NOTE
Patient presents with shortness of breath fatigue and fever and history of covid-19 exposure  Fluid positive on admission  Chest x-ray with bilateral filtrates consistent with Covid pneumonia  Patient started on Decadron 6 mg daily x10 days  Currently requiring 2 L nasal cannula will wean as able  Encourage I-S and aggressive pulmonary toilet  Supportive care

## 2021-03-16 NOTE — PROGRESS NOTES
Hospital Medicine Daily Progress Note    Date of Service  3/16/2021    Chief Complaint  55 y.o. female admitted 3/15/2021 with fever and shortness of breath    Hospital Course  Ms. Calvert is a pleasant 55-year-old female with a past medical history of type 2 diabetes, hypertension, hyperlipidemia who presented to the emergency room on 3/15/2021 for 4 days of fatigue fever and shortness of breath.  Patient had had Covid exposure 1 week prior.  On admission her vital signs were stable.   Labs did show a lymphopenia with a WBC of 3.4, ANOOP with a BUN 28 creatinine of 1.68.  She was Covid positive on admission  Chest x-ray with bilateral infiltrates left worse than right.   Patient was requiring 2 L of oxygen and admitted for further evaluation and treatment      Interval Problem Update  Patient seen and examined at bedside, she denies major complaints.  Still with mild cough and runny nose.   -Saturating well on 2 L nasal cannula  -Continue Decadron therapy  -Mild ANOOP still present, patient does feel a bit dehydrated, will give 500 mill bolus and reevaluate renal function in a.m.  -We will do home oxygen check    Consultants/Specialty  N/A    Code Status  Full Code    Disposition  Anticipate discharge in 1 to 2 days    Review of Systems  Review of Systems   Constitutional: Positive for malaise/fatigue. Negative for fever.   HENT: Positive for congestion. Negative for sore throat.    Eyes: Negative for blurred vision.   Respiratory: Positive for cough and shortness of breath. Negative for sputum production.    Cardiovascular: Negative for chest pain and leg swelling.   Gastrointestinal: Positive for nausea. Negative for abdominal pain and vomiting.   Genitourinary: Negative for dysuria.   Musculoskeletal: Negative for myalgias.   Neurological: Positive for headaches. Negative for dizziness.   Psychiatric/Behavioral: Negative for depression. The patient is not nervous/anxious.         Physical Exam  Temp:  [36.2 °C (97.2  °F)-36.6 °C (97.8 °F)] 36.3 °C (97.3 °F)  Pulse:  [70-86] 78  Resp:  [16-20] 18  BP: (111-147)/(64-78) 138/74  SpO2:  [89 %-100 %] 98 %    Physical Exam  Vitals and nursing note reviewed.   Constitutional:       General: She is not in acute distress.     Appearance: Normal appearance. She is obese. She is not ill-appearing.   HENT:      Head: Normocephalic and atraumatic.      Mouth/Throat:      Mouth: Mucous membranes are moist.      Pharynx: Oropharynx is clear.   Eyes:      Extraocular Movements: Extraocular movements intact.      Conjunctiva/sclera: Conjunctivae normal.   Cardiovascular:      Rate and Rhythm: Normal rate and regular rhythm.      Heart sounds: No murmur.   Pulmonary:      Effort: Pulmonary effort is normal.      Breath sounds: Rales present.      Comments: Bibasilar Rales  Abdominal:      General: Bowel sounds are normal.      Palpations: Abdomen is soft.   Musculoskeletal:         General: Normal range of motion.      Cervical back: Normal range of motion.      Comments: Bilateral nonpitting edema   Skin:     General: Skin is warm.      Capillary Refill: Capillary refill takes less than 2 seconds.   Neurological:      General: No focal deficit present.      Mental Status: She is alert and oriented to person, place, and time. Mental status is at baseline.   Psychiatric:         Mood and Affect: Mood normal.         Behavior: Behavior normal.         Fluids    Intake/Output Summary (Last 24 hours) at 3/16/2021 1335  Last data filed at 3/16/2021 0945  Gross per 24 hour   Intake 240 ml   Output --   Net 240 ml       Laboratory  Recent Labs     03/15/21  1436 03/16/21  0619   WBC 3.1* 3.4*   RBC 4.29 3.74*   HEMOGLOBIN 12.8 11.1*   HEMATOCRIT 41.3 35.8*   MCV 96.3 95.7   MCH 29.8 29.7   MCHC 31.0* 31.0*   RDW 45.1 44.0   PLATELETCT 160* 148*   MPV 12.8 12.6     Recent Labs     03/15/21  1436 03/16/21  0619   SODIUM 136 133*   POTASSIUM 4.4 3.9   CHLORIDE 99 99   CO2 25 24   GLUCOSE 100* 100*   BUN 18  28*   CREATININE 1.30 1.68*   CALCIUM 9.0 8.4*             Recent Labs     03/16/21  0619   TRIGLYCERIDE 98   HDL 31*   LDL 90       Imaging  DX-CHEST-PORTABLE (1 VIEW)   Final Result      Hypoinflation with patchy basilar atelectasis or infiltrate and worse in the LEFT, concerning for Covid 19 pneumonia.           Assessment/Plan  * Pneumonia due to COVID-19 virus  Assessment & Plan  Patient presents with shortness of breath fatigue and fever and history of covid-19 exposure  Fluid positive on admission  Chest x-ray with bilateral filtrates consistent with Covid pneumonia  Patient started on Decadron 6 mg daily x10 days  Currently requiring 2 L nasal cannula will wean as able  Encourage I-S and aggressive pulmonary toilet  Supportive care    ANOOP (acute kidney injury) (HCC)- (present on admission)  Assessment & Plan  ANOOP noted on admission, creatinine of 1.63 baseline is normal  -We will hold home HCTZ  -500 mill bolus given  -Avoid nephrotoxins  -Continue to monitor renal function    HTN (hypertension)  Assessment & Plan  Blood pressure stable, holding home HCTZ due to ANOOP  Continue Coreg and losartan for now      High cholesterol- (present on admission)  Assessment & Plan  atorvastain 40mg qhs    DM (diabetes mellitus) (HCC)- (present on admission)  Assessment & Plan  Previously poorly controlled , will recheck hemoglobin A1c   Continue home Lantus of 10 units   Insulin sliding scale with hypoglycemic protocol   Diabetic diet home med: lantus 10untis with iss    DVT prophylaxis  Assessment & Plan  Heparin 5000units [id number]       VTE prophylaxis: Lovenox

## 2021-03-16 NOTE — PROGRESS NOTES
2 RN Skin Check    2 RN skin check complete.   Devices in place: Nasal Cannula.  Skin assessed under devices: yes.  Confirmed pressure ulcers found on: N/A.  New potential pressure ulcers noted on -None found. Wound consult placed N/A.  The following interventions in place Pillows and Lotion.

## 2021-03-17 VITALS
OXYGEN SATURATION: 93 % | HEART RATE: 63 BPM | WEIGHT: 242.51 LBS | DIASTOLIC BLOOD PRESSURE: 84 MMHG | SYSTOLIC BLOOD PRESSURE: 148 MMHG | TEMPERATURE: 97 F | BODY MASS INDEX: 38.06 KG/M2 | RESPIRATION RATE: 18 BRPM | HEIGHT: 67 IN

## 2021-03-17 LAB
ANION GAP SERPL CALC-SCNC: 10 MMOL/L (ref 7–16)
BASOPHILS # BLD AUTO: 0 % (ref 0–1.8)
BASOPHILS # BLD: 0 K/UL (ref 0–0.12)
BUN SERPL-MCNC: 30 MG/DL (ref 8–22)
CALCIUM SERPL-MCNC: 8.8 MG/DL (ref 8.5–10.5)
CHLORIDE SERPL-SCNC: 102 MMOL/L (ref 96–112)
CO2 SERPL-SCNC: 24 MMOL/L (ref 20–33)
CREAT SERPL-MCNC: 1.44 MG/DL (ref 0.5–1.4)
EOSINOPHIL # BLD AUTO: 0 K/UL (ref 0–0.51)
EOSINOPHIL NFR BLD: 0 % (ref 0–6.9)
ERYTHROCYTE [DISTWIDTH] IN BLOOD BY AUTOMATED COUNT: 43.1 FL (ref 35.9–50)
GLUCOSE BLD-MCNC: 120 MG/DL (ref 65–99)
GLUCOSE BLD-MCNC: 163 MG/DL (ref 65–99)
GLUCOSE SERPL-MCNC: 127 MG/DL (ref 65–99)
HCT VFR BLD AUTO: 36.5 % (ref 37–47)
HGB BLD-MCNC: 11.8 G/DL (ref 12–16)
LYMPHOCYTES # BLD AUTO: 1 K/UL (ref 1–4.8)
LYMPHOCYTES NFR BLD: 13.3 % (ref 22–41)
MANUAL DIFF BLD: NORMAL
MCH RBC QN AUTO: 30.5 PG (ref 27–33)
MCHC RBC AUTO-ENTMCNC: 32.3 G/DL (ref 33.6–35)
MCV RBC AUTO: 94.3 FL (ref 81.4–97.8)
MONOCYTES # BLD AUTO: 0.14 K/UL (ref 0–0.85)
MONOCYTES NFR BLD AUTO: 1.8 % (ref 0–13.4)
MORPHOLOGY BLD-IMP: NORMAL
NEUTROPHILS # BLD AUTO: 6.37 K/UL (ref 2–7.15)
NEUTROPHILS NFR BLD: 81.4 % (ref 44–72)
NEUTS BAND NFR BLD MANUAL: 3.5 % (ref 0–10)
NRBC # BLD AUTO: 0 K/UL
NRBC BLD-RTO: 0 /100 WBC
PLATELET # BLD AUTO: 161 K/UL (ref 164–446)
PLATELET BLD QL SMEAR: NORMAL
PMV BLD AUTO: 12 FL (ref 9–12.9)
POTASSIUM SERPL-SCNC: 4.3 MMOL/L (ref 3.6–5.5)
PROCALCITONIN SERPL-MCNC: <0.05 NG/ML
RBC # BLD AUTO: 3.87 M/UL (ref 4.2–5.4)
RBC BLD AUTO: NORMAL
SODIUM SERPL-SCNC: 136 MMOL/L (ref 135–145)
WBC # BLD AUTO: 7.5 K/UL (ref 4.8–10.8)

## 2021-03-17 PROCEDURE — 94760 N-INVAS EAR/PLS OXIMETRY 1: CPT

## 2021-03-17 PROCEDURE — 99453 REM MNTR PHYSIOL PARAM SETUP: CPT

## 2021-03-17 PROCEDURE — 99239 HOSP IP/OBS DSCHRG MGMT >30: CPT | Performed by: STUDENT IN AN ORGANIZED HEALTH CARE EDUCATION/TRAINING PROGRAM

## 2021-03-17 PROCEDURE — 85027 COMPLETE CBC AUTOMATED: CPT

## 2021-03-17 PROCEDURE — 700111 HCHG RX REV CODE 636 W/ 250 OVERRIDE (IP): Performed by: STUDENT IN AN ORGANIZED HEALTH CARE EDUCATION/TRAINING PROGRAM

## 2021-03-17 PROCEDURE — 700102 HCHG RX REV CODE 250 W/ 637 OVERRIDE(OP): Performed by: STUDENT IN AN ORGANIZED HEALTH CARE EDUCATION/TRAINING PROGRAM

## 2021-03-17 PROCEDURE — 84145 PROCALCITONIN (PCT): CPT

## 2021-03-17 PROCEDURE — 94660 CPAP INITIATION&MGMT: CPT

## 2021-03-17 PROCEDURE — 99454 REM MNTR PHYSIOL PARAM 16-30: CPT

## 2021-03-17 PROCEDURE — 36415 COLL VENOUS BLD VENIPUNCTURE: CPT

## 2021-03-17 PROCEDURE — 82962 GLUCOSE BLOOD TEST: CPT

## 2021-03-17 PROCEDURE — 85007 BL SMEAR W/DIFF WBC COUNT: CPT

## 2021-03-17 PROCEDURE — A9270 NON-COVERED ITEM OR SERVICE: HCPCS | Performed by: STUDENT IN AN ORGANIZED HEALTH CARE EDUCATION/TRAINING PROGRAM

## 2021-03-17 PROCEDURE — 80048 BASIC METABOLIC PNL TOTAL CA: CPT

## 2021-03-17 RX ORDER — GUAIFENESIN/DEXTROMETHORPHAN 100-10MG/5
10 SYRUP ORAL EVERY 6 HOURS PRN
Qty: 840 ML | Refills: 0 | Status: SHIPPED | OUTPATIENT
Start: 2021-03-17 | End: 2021-06-22

## 2021-03-17 RX ORDER — BENZONATATE 100 MG/1
100 CAPSULE ORAL 3 TIMES DAILY PRN
Qty: 30 CAPSULE | Refills: 0 | Status: SHIPPED | OUTPATIENT
Start: 2021-03-17 | End: 2021-06-22

## 2021-03-17 RX ORDER — DEXAMETHASONE 6 MG/1
6 TABLET ORAL DAILY
Qty: 8 TABLET | Refills: 0 | Status: SHIPPED | OUTPATIENT
Start: 2021-03-18 | End: 2021-03-26

## 2021-03-17 RX ADMIN — HEPARIN SODIUM 5000 UNITS: 5000 INJECTION, SOLUTION INTRAVENOUS; SUBCUTANEOUS at 05:02

## 2021-03-17 RX ADMIN — CARVEDILOL 6.25 MG: 6.25 TABLET, FILM COATED ORAL at 08:19

## 2021-03-17 RX ADMIN — HYDROCODONE BITARTRATE AND ACETAMINOPHEN 1 TABLET: 5; 325 TABLET ORAL at 08:19

## 2021-03-17 RX ADMIN — DEXAMETHASONE 6 MG: 6 TABLET ORAL at 05:02

## 2021-03-17 RX ADMIN — LOSARTAN POTASSIUM 50 MG: 50 TABLET, FILM COATED ORAL at 05:02

## 2021-03-17 RX ADMIN — HEPARIN SODIUM 5000 UNITS: 5000 INJECTION, SOLUTION INTRAVENOUS; SUBCUTANEOUS at 13:31

## 2021-03-17 RX ADMIN — DOCUSATE SODIUM 50 MG AND SENNOSIDES 8.6 MG 2 TABLET: 8.6; 5 TABLET, FILM COATED ORAL at 05:02

## 2021-03-17 ASSESSMENT — PAIN DESCRIPTION - PAIN TYPE
TYPE: ACUTE PAIN
TYPE: ACUTE PAIN

## 2021-03-17 NOTE — DISCHARGE INSTRUCTIONS
Take medications as directed: New medications after this visit are Decadron, this is a steroid that you will take for 8 additional days for a total of 10-day course.  This medication can increase your blood sugars to be sure to monitor your sugars and take your insulin appropriately  Other medications include Tessalon Perles and guaifenesin both of which are used as needed for cough  You did have a mild kidney injury on admission, it is important that you stay hydrated with water to avoid further injury  Follow-up with your primary care doctor in approximately 2 weeks        Discharge Instructions    Discharged to home by car with relative. Discharged via wheelchair, hospital escort: Yes.  Special equipment needed: Oxygen    Be sure to schedule a follow-up appointment with your primary care doctor or any specialists as instructed.     Discharge Plan:   Diet Plan: Discussed  Activity Level: Discussed  Confirmed Follow up Appointment: Patient to Call and Schedule Appointment  Confirmed Symptoms Management: Discussed  Medication Reconciliation Updated: Yes  Influenza Vaccine Indication: Not indicated: Previously immunized this influenza season and > 8 years of age    I understand that a diet low in cholesterol, fat, and sodium is recommended for good health. Unless I have been given specific instructions below for another diet, I accept this instruction as my diet prescription.   Other diet: Diabetic    Special Instructions: None    · Is patient discharged on Warfarin / Coumadin?   No       COVID-19  COVID-19 is a respiratory infection that is caused by a virus called severe acute respiratory syndrome coronavirus 2 (SARS-CoV-2). The disease is also known as coronavirus disease or novel coronavirus. In some people, the virus may not cause any symptoms. In others, it may cause a serious infection. The infection can get worse quickly and can lead to complications, such as:  · Pneumonia, or infection of the lungs.  · Acute  respiratory distress syndrome or ARDS. This is fluid build-up in the lungs.  · Acute respiratory failure. This is a condition in which there is not enough oxygen passing from the lungs to the body.  · Sepsis or septic shock. This is a serious bodily reaction to an infection.  · Blood clotting problems.  · Secondary infections due to bacteria or fungus.  The virus that causes COVID-19 is contagious. This means that it can spread from person to person through droplets from coughs and sneezes (respiratory secretions).  What are the causes?  This illness is caused by a virus. You may catch the virus by:  · Breathing in droplets from an infected person's cough or sneeze.  · Touching something, like a table or a doorknob, that was exposed to the virus (contaminated) and then touching your mouth, nose, or eyes.  What increases the risk?  Risk for infection  You are more likely to be infected with this virus if you:  · Live in or travel to an area with a COVID-19 outbreak.  · Come in contact with a sick person who recently traveled to an area with a COVID-19 outbreak.  · Provide care for or live with a person who is infected with COVID-19.  Risk for serious illness  You are more likely to become seriously ill from the virus if you:  · Are 65 years of age or older.  · Have a long-term disease that lowers your body's ability to fight infection (immunocompromised).  · Live in a nursing home or long-term care facility.  · Have a long-term (chronic) disease such as:  ? Chronic lung disease, including chronic obstructive pulmonary disease or asthma  ? Heart disease.  ? Diabetes.  ? Chronic kidney disease.  ? Liver disease.  · Are obese.  What are the signs or symptoms?  Symptoms of this condition can range from mild to severe. Symptoms may appear any time from 2 to 14 days after being exposed to the virus. They include:  · A fever.  · A cough.  · Difficulty breathing.  · Chills.  · Muscle pains.  · A sore throat.  · Loss of taste  or smell.  Some people may also have stomach problems, such as nausea, vomiting, or diarrhea.  Other people may not have any symptoms of COVID-19.  How is this diagnosed?  This condition may be diagnosed based on:  · Your signs and symptoms, especially if:  ? You live in an area with a COVID-19 outbreak.  ? You recently traveled to or from an area where the virus is common.  ? You provide care for or live with a person who was diagnosed with COVID-19.  · A physical exam.  · Lab tests, which may include:  ? A nasal swab to take a sample of fluid from your nose.  ? A throat swab to take a sample of fluid from your throat.  ? A sample of mucus from your lungs (sputum).  ? Blood tests.  · Imaging tests, which may include, X-rays, CT scan, or ultrasound.  How is this treated?  At present, there is no medicine to treat COVID-19. Medicines that treat other diseases are being used on a trial basis to see if they are effective against COVID-19.  Your health care provider will talk with you about ways to treat your symptoms. For most people, the infection is mild and can be managed at home with rest, fluids, and over-the-counter medicines.  Treatment for a serious infection usually takes places in a hospital intensive care unit (ICU). It may include one or more of the following treatments. These treatments are given until your symptoms improve.  · Receiving fluids and medicines through an IV.  · Supplemental oxygen. Extra oxygen is given through a tube in the nose, a face mask, or a bose.  · Positioning you to lie on your stomach (prone position). This makes it easier for oxygen to get into the lungs.  · Continuous positive airway pressure (CPAP) or bi-level positive airway pressure (BPAP) machine. This treatment uses mild air pressure to keep the airways open. A tube that is connected to a motor delivers oxygen to the body.  · Ventilator. This treatment moves air into and out of the lungs by using a tube that is placed in  your windpipe.  · Tracheostomy. This is a procedure to create a hole in the neck so that a breathing tube can be inserted.  · Extracorporeal membrane oxygenation (ECMO). This procedure gives the lungs a chance to recover by taking over the functions of the heart and lungs. It supplies oxygen to the body and removes carbon dioxide.  Follow these instructions at home:  Lifestyle  · If you are sick, stay home except to get medical care. Your health care provider will tell you how long to stay home. Call your health care provider before you go for medical care.  · Rest at home as told by your health care provider.  · Do not use any products that contain nicotine or tobacco, such as cigarettes, e-cigarettes, and chewing tobacco. If you need help quitting, ask your health care provider.  · Return to your normal activities as told by your health care provider. Ask your health care provider what activities are safe for you.  General instructions  · Take over-the-counter and prescription medicines only as told by your health care provider.  · Drink enough fluid to keep your urine pale yellow.  · Keep all follow-up visits as told by your health care provider. This is important.  How is this prevented?    There is no vaccine to help prevent COVID-19 infection. However, there are steps you can take to protect yourself and others from this virus.  To protect yourself:   · Do not travel to areas where COVID-19 is a risk. The areas where COVID-19 is reported change often. To identify high-risk areas and travel restrictions, check the CDC travel website: wwwnc.cdc.gov/travel/notices  · If you live in, or must travel to, an area where COVID-19 is a risk, take precautions to avoid infection.  ? Stay away from people who are sick.  ? Wash your hands often with soap and water for 20 seconds. If soap and water are not available, use an alcohol-based hand .  ? Avoid touching your mouth, face, eyes, or nose.  ? Avoid going out in  public, follow guidance from your state and local health authorities.  ? If you must go out in public, wear a cloth face covering or face mask.  ? Disinfect objects and surfaces that are frequently touched every day. This may include:  § Counters and tables.  § Doorknobs and light switches.  § Sinks and faucets.  § Electronics, such as phones, remote controls, keyboards, computers, and tablets.  To protect others:  If you have symptoms of COVID-19, take steps to prevent the virus from spreading to others.  · If you think you have a COVID-19 infection, contact your health care provider right away. Tell your health care team that you think you may have a COVID-19 infection.  · Stay home. Leave your house only to seek medical care. Do not use public transport.  · Do not travel while you are sick.  · Wash your hands often with soap and water for 20 seconds. If soap and water are not available, use alcohol-based hand .  · Stay away from other members of your household. Let healthy household members care for children and pets, if possible. If you have to care for children or pets, wash your hands often and wear a mask. If possible, stay in your own room, separate from others. Use a different bathroom.  · Make sure that all people in your household wash their hands well and often.  · Cough or sneeze into a tissue or your sleeve or elbow. Do not cough or sneeze into your hand or into the air.  · Wear a cloth face covering or face mask.  Where to find more information  · Centers for Disease Control and Prevention: www.cdc.gov/coronavirus/2019-ncov/index.html  · World Health Organization: www.who.int/health-topics/coronavirus  Contact a health care provider if:  · You live in or have traveled to an area where COVID-19 is a risk and you have symptoms of the infection.  · You have had contact with someone who has COVID-19 and you have symptoms of the infection.  Get help right away if:  · You have trouble  breathing.  · You have pain or pressure in your chest.  · You have confusion.  · You have bluish lips and fingernails.  · You have difficulty waking from sleep.  · You have symptoms that get worse.  These symptoms may represent a serious problem that is an emergency. Do not wait to see if the symptoms will go away. Get medical help right away. Call your local emergency services (911 in the [place] Do not drive yourself to the hospital. Let the emergency medical personnel know if you think you have COVID-19.  Summary  · COVID-19 is a respiratory infection that is caused by a virus. It is also known as coronavirus disease or novel coronavirus. It can cause serious infections, such as pneumonia, acute respiratory distress syndrome, acute respiratory failure, or sepsis.  · The virus that causes COVID-19 is contagious. This means that it can spread from person to person through droplets from coughs and sneezes.  · You are more likely to develop a serious illness if you are 65 years of age or older, have a weak immunity, live in a nursing home, or have chronic disease.  · There is no medicine to treat COVID-19. Your health care provider will talk with you about ways to treat your symptoms.  · Take steps to protect yourself and others from infection. Wash your hands often and disinfect objects and surfaces that are frequently touched every day. Stay away from people who are sick and wear a mask if you are sick.  This information is not intended to replace advice given to you by your health care provider. Make sure you discuss any questions you have with your health care provider.  Document Released: 01/23/2020 Document Revised: 05/14/2020 Document Reviewed: 01/23/2020  Elsevier Patient Education © 2020 [employer]    Depression / Suicide Risk    As you are discharged from this [facility] facility, it is important to learn how to keep safe from harming yourself.    Recognize the warning signs:  · Abrupt changes in  personality, positive or negative- including increase in energy   · Giving away possessions  · Change in eating patterns- significant weight changes-  positive or negative  · Change in sleeping patterns- unable to sleep or sleeping all the time   · Unwillingness or inability to communicate  · Depression  · Unusual sadness, discouragement and loneliness  · Talk of wanting to die  · Neglect of personal appearance   · Rebelliousness- reckless behavior  · Withdrawal from people/activities they love  · Confusion- inability to concentrate     If you or a loved one observes any of these behaviors or has concerns about self-harm, here's what you can do:  · Talk about it- your feelings and reasons for harming yourself  · Remove any means that you might use to hurt yourself (examples: pills, rope, extension cords, firearm)  · Get professional help from the community (Mental Health, Substance Abuse, psychological counseling)  · Do not be alone:Call your Safe Contact- someone whom you trust who will be there for you.  · Call your local CRISIS HOTLINE 686-6604 or 092-725-8250  · Call your local Children's Mobile Crisis Response Team [place] (093) 148-9155 or www.[employer]  · Call the toll free National Suicide Prevention Hotlines   · National Suicide Prevention Lifeline 026-325-ZVHT (4758)  · National Hope Line Network 800-SUICIDE (836-6882)

## 2021-03-17 NOTE — DISCHARGE SUMMARY
Discharge Summary    CHIEF COMPLAINT ON ADMISSION  Chief Complaint   Patient presents with   • Fever   • Fatigue   • Difficulty Breathing       Reason for Admission  Sent by MD     Admission Date  3/15/2021    CODE STATUS  Full Code    HPI & HOSPITAL COURSE  This is a 55 y.o. female here with  past medical history of type 2 diabetes, hypertension and hyperlipidemia who presented to the emergency room on 3/15/2021 with 4 days of fatigue, fever and shortness of breath.  Patient had had Covid exposure 1 week prior.  On admission her vital signs were stable however she was hypoxic and requiring supplemental oxygen.  She was Covid positive. Chest x-ray was done and did show bilateral infiltrates, greater on the left than the right.  Her labs were consistent with Covid infection with lymphopenia,  WBC of 3.4, she was also found to have an acute renal injury with a BUN of 28 and a creatinine of 1.68.  Procalcitonin was negative so she was not started on antibiotics.   Patient was started on Decadron 6 mg, she was not a candidate for remdesivir.   As for her renal injury that this was treated with modest IV fluids with improvement, she was educated on importance of staying hydrated to avoid further kidney injury.      During admission her symptoms remained stable and she had no increasing oxygen needs. She was discharged with home oxygen as well as home monitoring for respiratory.     She was educated on symptoms that should prompt her to seek urgent medical attention.      Therefore, she is discharged in fair and stable condition to home with close outpatient follow-up.    The patient met 2-midnight criteria for an inpatient stay at the time of discharge.    Discharge Date  3/17/2021    FOLLOW UP ITEMS POST DISCHARGE  Oxygen needs  Renal function  Follow-up on chronic medical conditions including hypertension and diabetes    DISCHARGE DIAGNOSES  Principal Problem:    Pneumonia due to COVID-19 virus POA: Unknown  Active  Problems:    ANOOP (acute kidney injury) (HCC) POA: Yes    High cholesterol POA: Yes    HTN (hypertension) POA: Unknown    DM (diabetes mellitus) (HCC) POA: Yes    DVT prophylaxis POA: Unknown  Resolved Problems:    * No resolved hospital problems. *      FOLLOW UP  No future appointments.  LESLY Denson  [address] [place] [address]  Janusz BAUTISTA [address]  [phone]    In 2 weeks  For hospital follow-up      MEDICATIONS ON DISCHARGE     Medication List      START taking these medications      Instructions   benzonatate 100 MG Caps  Commonly known as: TESSALON   Take 1 capsule by mouth 3 times a day as needed for Cough.  Dose: 100 mg     dexamethasone 6 MG Tabs  Start taking on: March 18, 2021  Commonly known as: DECADRON   Take 1 tablet by mouth every day for 8 days.  Dose: 6 mg     guaiFENesin dextromethorphan 100-10 MG/5ML Syrp syrup  Commonly known as: ROBITUSSIN DM   Take 10 mL by mouth every 6 hours as needed for Cough.  Dose: 10 mL        CHANGE how you take these medications      Instructions   carvedilol 6.25 MG Tabs  What changed: Another medication with the same name was removed. Continue taking this medication, and follow the directions you see here.  Commonly known as: COREG   Take 1 Tab by mouth 2 times a day, with meals.  Dose: 6.25 mg        CONTINUE taking these medications      Instructions   atorvastatin 40 MG Tabs  Commonly known as: LIPITOR   Take 40 mg by mouth every bedtime.  Dose: 40 mg     hydroCHLOROthiazide 25 MG Tabs  Commonly known as: HYDRODIURIL   Take 1 Tab by mouth every day.  Dose: 25 mg     Jardiance 25 MG Tabs  Generic drug: Empagliflozin   Take 25 mg by mouth every day.  Dose: 25 mg     Levemir 100 UNIT/ML Soln  Generic drug: insulin detemir   Inject 20 Units under the skin 2 times a day.  Dose: 20 Units     losartan 100 MG Tabs  Commonly known as: COZAAR   Take 100 mg by mouth every day.  Dose: 100 mg     pioglitazone 30 MG Tabs  Commonly known as: ACTOS   Take 30 mg by mouth every  day.  Dose: 30 mg        STOP taking these medications    insulin glargine 100 UNIT/ML Soln  Commonly known as: Lantus     insulin regular 100 Unit/mL Soln  Commonly known as: HumuLIN R     methylPREDNISolone 4 MG Tabs  Commonly known as: Medrol            Allergies  Allergies   Allergen Reactions   • Percocet [Oxycodone-Acetaminophen] Shortness of Breath, Vomiting and Swelling   • Latex Swelling       DIET  Orders Placed This Encounter   Procedures   • Diet Order Diet: 2 Gram Sodium; Second Modifier: (optional): Consistent CHO (Diabetic)     Standing Status:   Standing     Number of Occurrences:   1     Order Specific Question:   Diet:     Answer:   2 Gram Sodium [7]     Order Specific Question:   Second Modifier: (optional)     Answer:   Consistent CHO (Diabetic) [4]       ACTIVITY  As tolerated.      CONSULTATIONS  N/A    PROCEDURES  N/A     LABORATORY  Lab Results   Component Value Date    SODIUM 136 03/17/2021    POTASSIUM 4.3 03/17/2021    CHLORIDE 102 03/17/2021    CO2 24 03/17/2021    GLUCOSE 127 (H) 03/17/2021    BUN 30 (H) 03/17/2021    CREATININE 1.44 (H) 03/17/2021    CREATININE 1.2 09/18/2008        Lab Results   Component Value Date    WBC 7.5 03/17/2021    HEMOGLOBIN 11.8 (L) 03/17/2021    HEMATOCRIT 36.5 (L) 03/17/2021    PLATELETCT 161 (L) 03/17/2021        Total time of the discharge process exceeds 35 minutes.

## 2021-03-17 NOTE — PROGRESS NOTES
Patient IV access removed. Discharge paperwork reviewed and all questions answered. Follow-up appointments discussed. Patient discharged to Home with relative via Wheelchair. with all personal belongings.

## 2021-03-17 NOTE — DISCHARGE PLANNING
ADDENDUM  3/17/21 5504  Lsw gave the bedside nurse the orange form with the instructions to give it to the patient. The orange form lists the name and phone number of the accepted O2 provider.    Care Transition Team Discharge Planning    Anticipated Discharge Disposition: DC home with Oxygen pending    Action: Lsw met with patient to complete CTT assessment.  Patient presented A&Ox4 e/b being able to inform this Lsw of her reason for admission. She confirmed the information on her facesheet as being correct expect for the name of her PCP. She reported that the correct PCP's name is Dr. Way with the [facility]. She will DC home with her adult children. She denied having any difficulty with ADLs and IADLs. She denies having a substance abuse or mental health histories.    Lsw obtained the patient's preference in regards to O2 providers  Lsw faxed CHOICE form to DPA.  Lsw placed CHOICE form in CM basket.    Barriers to Discharge: Pending Oxygen approval and arrival.    Plan: Lsw will assist medical team with DC planning.      Care Transition Team Assessment    Information Source  Orientation : Oriented x 4  Information Given By: Patient  Informant's Name: (Cyndee Calvert)  Who is responsible for making decisions for patient? : Patient    Readmission Evaluation  Is this a readmission?: Yes - unplanned readmission  Why do you think you were readmitted?: (Covid 19)  Was an appointment arranged for you prior to discharge?: No  Were there new prescriptions you were supposed to fill after you were discharged?: Yes, prescriptions filled  Did you understand your discharge instructions?: Yes  Did you have enough support after your last discharge?: Yes    Elopement Risk  Legal Hold: No  Ambulatory or Self Mobile in Wheelchair: No-Not an Elopement Risk    Interdisciplinary Discharge Planning  Lives with - Patient's Self Care Capacity: Adult Children  Patient or legal guardian wants to designate a caregiver: No  Support  Systems: Children, Family Member(s), Friends / Neighbors  Housing / Facility: [address]  Patient Prefers to be Discharged to:: Home  Durable Medical Equipment: Not Applicable    Discharge Preparedness  What is your plan after discharge?: Other (comment)  What are your discharge supports?: Child  Prior Functional Level: Ambulatory, Drives Self, Independent with Activities of Daily Living, Independent with Medication Management  Difficulity with ADLs: None  Difficulity with IADLs: None    Functional Assesment  Prior Functional Level: Ambulatory, Drives Self, Independent with Activities of Daily Living, Independent with Medication Management    Finances  Financial Barriers to Discharge: No  Prescription Coverage: Yes    Vision / Hearing Impairment  Vision Impairment : Yes  Right Eye Vision: Impaired, Wears Contacts  Left Eye Vision: Impaired, Wears Glasses  Hearing Impairment : No              Domestic Abuse  Have you ever been the victim of abuse or violence?: No  Physical Abuse or Sexual Abuse: No  Verbal Abuse or Emotional Abuse: No  Possible Abuse/Neglect Reported to:: Not Applicable    Psychological Assessment  History of Substance Abuse: None  History of Psychiatric Problems: No  Non-compliant with Treatment: No  Newly Diagnosed Illness: Yes    Discharge Risks or Barriers  Discharge risks or barriers?: No  Patient risk factors: Readmission    Anticipated Discharge Information  Discharge Address: [facility] [address], [place] [address]  Discharge Contact Phone Number: [phone]

## 2021-03-17 NOTE — DISCHARGE PLANNING
Received Choice form at 0907  Agency/Facility Name: [facility] - 1) [facility], 2) [facility], 3) [facility]  Referral sent per Choice form @ [phone]  Agency/Facility Name: [facility]  Spoke To: Dilali  Outcome: Order being processed. Will call DPA back when complete.

## 2021-03-17 NOTE — CARE PLAN
Problem: Discharge Barriers/Planning  Goal: Patient's continuum of care needs will be met  Outcome: PROGRESSING AS EXPECTED  Patient planning on DC today. Waiting on O2 delivery and home monitoring set up.      Problem: Pain Management  Goal: Pain level will decrease to patient's comfort goal  Outcome: PROGRESSING AS EXPECTED  Patient states that Norco is helping with headache.      Problem: Respiratory:  Goal: Respiratory status will improve  Outcome: PROGRESSING AS EXPECTED   Patient had successful 02 eval today, waiting on o2 delivery and home monitoring set up.

## 2021-03-17 NOTE — RESPIRATORY CARE
REMOTE MONITORING PROGRAM by RESPIRATORY THERAPY  3/17/2021 at 12:19 PM by Piyush Bowling     Patient interviewed by RT. Patient agrees to Remote Monitoring program. Device instruction performed with welcome packet, consent signed and placed at bedside chart. Patient instructed on how to use MyChart and Zoom. No questions at this time.

## 2021-03-17 NOTE — FACE TO FACE
"Face to Face Note  -  Durable Medical Equipment    Cortney Dickey M.D. - NPI: [id number]  I certify that this patient is under my care and that they had a durable medical equipment(DME)face to face encounter by myself that meets the physician DME face-to-face encounter requirements with this patient on:    Date of encounter:   Patient:                    MRN:                       YOB: 2023  Cyndee Calvert  [id number]  1965     The encounter with the patient was in whole, or in part, for the following medical condition, which is the primary reason for durable medical equipment:  Covid-19 Infection    I certify that, based on my findings, the following durable medical equipment is medically necessary:  Oxygen.    HOME O2 Saturation Measurements:(Values must be present for Home Oxygen orders)  Room air sat at rest: 86  Room air sat with amb: 85  With liters of O2: 2, O2 sat at rest with O2: 92  With Liters of O2: 2, O2 sat with amb with O2 : 92  Is the patient mobile?: Yes    My Clinical findings support the need for the above equipment due to:  Hypoxia    Supporting Symptoms: The patient requires supplemental oxygen, as the following interventions have been tried with limited or no improvement: \"Oral and/or IV steroids, \"Ambulation with oximetry and \"Incentive spirometry    If patient feels more short of breath, they can go up to 6 liters per minute and contact healthcare provider.  "

## 2021-03-19 ENCOUNTER — TELEMEDICINE (OUTPATIENT)
Dept: URGENT CARE | Facility: CLINIC | Age: 56
End: 2021-03-19
Payer: COMMERCIAL

## 2021-03-19 VITALS — OXYGEN SATURATION: 92 % | HEART RATE: 87 BPM | RESPIRATION RATE: 18 BRPM

## 2021-03-19 DIAGNOSIS — E11.628 TYPE 2 DIABETES MELLITUS WITH OTHER SKIN COMPLICATION, WITHOUT LONG-TERM CURRENT USE OF INSULIN (HCC): ICD-10-CM

## 2021-03-19 DIAGNOSIS — J12.82 PNEUMONIA DUE TO COVID-19 VIRUS: ICD-10-CM

## 2021-03-19 DIAGNOSIS — Z99.81 ON SUPPLEMENTAL OXYGEN THERAPY: ICD-10-CM

## 2021-03-19 DIAGNOSIS — U07.1 PNEUMONIA DUE TO COVID-19 VIRUS: ICD-10-CM

## 2021-03-19 PROCEDURE — 99457 RPM TX MGMT 1ST 20 MIN: CPT | Mod: 95,CR | Performed by: PHYSICIAN ASSISTANT

## 2021-03-19 ASSESSMENT — ENCOUNTER SYMPTOMS
DIARRHEA: 0
COUGH: 1
CHILLS: 1
SHORTNESS OF BREATH: 1
SORE THROAT: 0
WHEEZING: 0
EYE REDNESS: 0
FEVER: 0
EYE DISCHARGE: 0

## 2021-03-19 NOTE — PROGRESS NOTES
Telemedicine Visit:      Subjective:   Cyndee Calvert is a 55 y.o. female presenting for evaluation and management of Covid-19 Home Monitoring     This evaluation was conducted via Zoom using secure and encrypted videoconferencing technology. The patient was in a private location in the [facility][1].    The patient's identity was confirmed and verbal consent was obtained for this virtual visit.    Home Monitoring Patient Triage  COVID-19 Monitoring Level:     RPM.     Patient is a pleasant 55-year-old female-today is day 2 of remote patient monitoring visit #1.  Patient was recently hospitalized from March 15-March 17.  Discharged on 2 L at that time.  Patient was diagnosed with Covid pneumonia and was discharged on supplemental oxygen.  Also of note patient is diabetic.  Patient mentions that she continues to feel short of breath at rest and in particular with exertion.  Unfortunately patient has been unable to fill her cough suppressant prescriptions as these have not been readily available to her.  Of note she has been attempting to drink more water as this was a concern in the hospital as she does have history of renal insufficiency in the hospital.  Also of note she is diabetic she reports she checked her sugars this morning which was 112.  Furthermore she does have follow-up with her PCP Dr. Way who is at [facility]-strongly encourage patient to make appointment once her remote patient monitoring has been completed.      [facility] Oxygen Flowsheet   1. Record COVID-19 Severity Index (qCSI):   0  2.Confirm appropriate patient and chart with two identifiers:   Yes  3. Days Since Onset of Symptoms:  8 days  4. Does patient have another adult at home to help take care of you:  Yes  5. Confirm O2 supply is working and there are no cannula problems:  Yes  6. Is your breathing today better or worse?  Better  7. Are you able to tolerate fluids?  Yes  8. Any vomiting or diarrhea in  the last 24 hours?  None  9. Are you able to control your fevers?  Not available  10. Are you able to control your cough?  Picking up cough suppressant-syrup and Tessalon Perles this evening.  11. Current O2 Flow Rate:  2 L   12. Review ER precautions: present to ED or call 911 if experiencing severe shortness of breath:  Yes  13. Confirm next VV:  Appointments made today.  14. Final disposition:  Stable  15. Time Spent:  Approximately 15 minutes via video.      Review of Systems   Constitutional: Positive for chills and malaise/fatigue. Negative for fever.   HENT: Positive for congestion. Negative for sore throat.    Eyes: Negative for discharge and redness.   Respiratory: Positive for cough and shortness of breath. Negative for wheezing.    Gastrointestinal: Negative for diarrhea.   Skin: Negative for rash.   All other systems reviewed and are negative.    Review of Systems   Constitutional: Positive for chills and malaise/fatigue. Negative for fever.   HENT: Positive for congestion. Negative for sore throat.    Eyes: Negative for discharge and redness.   Respiratory: Positive for cough and shortness of breath. Negative for wheezing.    Gastrointestinal: Negative for diarrhea.   Skin: Negative for rash.   All other systems reviewed and are negative.        Current medicines (including changes today)  Medications:    • atorvastatin Tabs  • benzonatate Caps  • carvedilol Tabs  • dexamethasone Tabs  • guaiFENesin dextromethorphan Syrp  • hydroCHLOROthiazide Tabs  • insulin detemir Soln  • Jardiance Tabs  • losartan Tabs  • pioglitazone Tabs    Allergies: Percocet [oxycodone-acetaminophen] and Latex    Problem List: Cyndee Calvert has High cholesterol; HTN (hypertension); Anemia; Class 3 obesity with serious comorbidity and body mass index (BMI) of 45.0 to 49.9 in adult; ANOOP (acute kidney injury) (HCC); DM (diabetes mellitus) (HCC); Groin abscess; Tension type headache; Hypomagnesemia; Pneumonia due to  COVID-19 virus; and DVT prophylaxis on their problem list.    Surgical History:  Past Surgical History:   Procedure Laterality Date   • BLADDER BIOPSY WITH CYSTOSCOPY  9/2/08    Performed by LILLIE WILLIAM at SURGERY [facility] ORS   • HYSTERECTOMY, TOTAL ABDOMINAL     • TONSILLECTOMY         Past Social Hx: Cyndee Calvert  reports that she has never smoked. She has never used smokeless tobacco. She reports that she does not drink alcohol and does not use drugs.     Past Family Hx:  Cyndee Calvert family history includes Heart Attack in her father; Heart Disease in her mother; Heart Failure in her brother.     Problem list, medications, and allergies reviewed by myself today in Epic.     Objective:   Pulse 87   Resp 18   LMP 04/19/2003   SpO2 92%  (from [employer] home monitor)    Physical Exam:  Constitutional:       General: Awake.      Appearance: Not toxic-appearing.   HENT:      Nose: No signs of injury.      Mouth/Throat: Voice clear.     Lips: Pink.   Eyes:      General: Lids are normal.      Conjunctiva/sclera: Conjunctivae normal.   Neck:      Trachea: Phonation normal.   Pulmonary:      Effort: Pulmonary effort is normal.   Skin:     Coloration: Skin is not cyanotic or pale.   Neurological:      Mental Status: Alert and oriented to person, place, and time.   Psychiatric:         Mood and Affect: Mood and affect normal.         Speech: Speech normal.         Behavior: Behavior is cooperative.       Assessment and Plan:   The following treatment plan was discussed:     1. Pneumonia due to COVID-19 virus  2. Type 2 diabetes mellitus with other skin complication, without long-term current use of insulin (HCC)  3. On supplemental oxygen therapy    Next appointment: 3/24/2021  Patient is going to attempt self weaning to 1.5 L if at rest.  Concern as patient did have slight dips into the 88-89 range even on 2 L-strongly encourage patient to continue utilization of incentive spirometry.   Patient continues to be short of breath at rest along with exertion.  Noted we will see this patient tomorrow for [id number] with 2 L to 1.5 L as tolerated.    A total of 35 minutes was utilized to review previous records, Nathanael along with video with the patient today.        Please note that this dictation was created using voice recognition software. I have made every reasonable attempt to correct obvious errors, but I expect that there are errors of grammar and possibly content that I did not discover before finalizing the note.    Note electronically signed by Daniel Pearl PA-C

## 2021-03-20 LAB
BACTERIA BLD CULT: NORMAL
BACTERIA BLD CULT: NORMAL
SIGNIFICANT IND 70042: NORMAL
SIGNIFICANT IND 70042: NORMAL
SITE SITE: NORMAL
SITE SITE: NORMAL
SOURCE SOURCE: NORMAL
SOURCE SOURCE: NORMAL

## 2021-03-24 ENCOUNTER — TELEMEDICINE (OUTPATIENT)
Dept: URGENT CARE | Facility: CLINIC | Age: 56
End: 2021-03-24
Payer: COMMERCIAL

## 2021-03-24 ENCOUNTER — TELEPHONE (OUTPATIENT)
Dept: OTHER | Facility: MEDICAL CENTER | Age: 56
End: 2021-03-24

## 2021-03-24 VITALS — HEART RATE: 60 BPM | TEMPERATURE: 98.1 F | OXYGEN SATURATION: 97 % | RESPIRATION RATE: 17 BRPM

## 2021-03-24 DIAGNOSIS — U07.1 PNEUMONIA DUE TO COVID-19 VIRUS: ICD-10-CM

## 2021-03-24 DIAGNOSIS — E11.628 TYPE 2 DIABETES MELLITUS WITH OTHER SKIN COMPLICATION, WITHOUT LONG-TERM CURRENT USE OF INSULIN (HCC): ICD-10-CM

## 2021-03-24 DIAGNOSIS — J12.82 PNEUMONIA DUE TO COVID-19 VIRUS: ICD-10-CM

## 2021-03-24 DIAGNOSIS — Z99.81 ON SUPPLEMENTAL OXYGEN THERAPY: ICD-10-CM

## 2021-03-24 PROCEDURE — 99214 OFFICE O/P EST MOD 30 MIN: CPT | Mod: 95 | Performed by: PHYSICIAN ASSISTANT

## 2021-03-24 NOTE — PROGRESS NOTES
This evaluation was conducted via Zoom using secure and encrypted videoconferencing technology. The patient was in a private location in the state of [place].    The patient's identity was confirmed and verbal consent was obtained for this virtual visit.      [facility] Flowsheet   1. Record COVID-19 Severity Index (qCSI):   0  2.Confirm appropriate patient and chart with two identifiers:   Y  3. Days Since Onset of Symptoms:  13  4. Does patient have another adult at home to help take care of you:  Y  5. Confirm O2 supply is working and there are no cannula problems:  Y  6. Is your breathing today better or worse?  Better  7. Are you able to tolerate fluids?  Y  8. Any vomiting or diarrhea in the last 24 hours?  N  9. Are you able to control your fevers?  N/A  10. Are you able to control your cough?  Y  11. Current O2 Flow Rate:  2LPM  12. Review ER precautions: present to ED or call 911 if experiencing severe shortness of breath:  Y  13. Confirm next VV:  F/U with pulmonology  14. Final disposition:   Stable at home  15. Time Spent:  15        Cyndee Mare Calvert is a 55 y.o. female presenting for evaluation and management of:    Covid and supplemental oxygen monitoring. She is improving, but still gets SOB and lightheaded with activity. Tried decreasing supplemental oxygen flow rate a few days ago, but states sats dipped into the 70s. Has been using 2LPM since then. She is using spirometer a couple times every hour. Highest level was 1000. Taking all prescribed medications to include decadron. FBGs have been higher. Todays FBG was 170. Has been adjusting insulin accordingly. Taking her jardiance, actos and metformin. Cough medications are working well.    ROS   Denies any recent fevers or chills. No nausea or vomiting. No chest.     Allergies   Allergen Reactions   • Percocet [Oxycodone-Acetaminophen] Shortness of Breath, Vomiting and Swelling   • Latex Swelling       Current medicines (including  changes today)  Current Outpatient Medications   Medication Sig Dispense Refill   • metFORMIN (GLUCOPHAGE) 500 MG Tab Take 500 mg by mouth.     • dexamethasone (DECADRON) 6 MG Tab Take 1 tablet by mouth every day for 8 days. 8 tablet 0   • guaiFENesin dextromethorphan (ROBITUSSIN DM) 100-10 MG/5ML Syrup syrup Take 10 mL by mouth every 6 hours as needed for Cough. 840 mL 0   • benzonatate (TESSALON) 100 MG Cap Take 1 capsule by mouth 3 times a day as needed for Cough. 30 capsule 0   • insulin detemir (LEVEMIR) 100 UNIT/ML Solution Inject 20 Units under the skin 2 times a day.     • losartan (COZAAR) 100 MG Tab Take 100 mg by mouth every day.     • Empagliflozin (JARDIANCE) 25 MG Tab Take 25 mg by mouth every day.     • pioglitazone (ACTOS) 30 MG Tab Take 30 mg by mouth every day.     • carvedilol (COREG) 6.25 MG Tab Take 1 Tab by mouth 2 times a day, with meals. (Patient not taking: Reported on 3/15/2021) 60 Tab 11   • atorvastatin (LIPITOR) 40 MG Tab Take 40 mg by mouth every bedtime.  6   • hydroCHLOROthiazide (HYDRODIURIL) 25 MG Tab Take 1 Tab by mouth every day. 90 Tab 3     No current facility-administered medications for this visit.       Patient Active Problem List    Diagnosis Date Noted   • Pneumonia due to COVID-19 virus 03/15/2021     Priority: High   • ANOOP (acute kidney injury) (HCC) 04/18/2018     Priority: High   • Groin abscess 04/18/2018     Priority: High   • High cholesterol      Priority: Medium   • HTN (hypertension)      Priority: Medium   • DVT prophylaxis 03/15/2021     Priority: Low   • Hypomagnesemia 04/26/2018   • Tension type headache 04/20/2018   • Diabetes mellitus (HCC) 04/18/2018   • Class 3 obesity with serious comorbidity and body mass index (BMI) of 45.0 to 49.9 in adult 11/26/2013   • Anemia        Family History   Problem Relation Age of Onset   • Heart Disease Mother    • Heart Attack Father    • Heart Failure Brother        She  has a past medical history of Anemia, Diabetes  (HCC), High cholesterol, and Hypertension.  She  has a past surgical history that includes hysterectomy, total abdominal; tonsillectomy; and bladder biopsy with cystoscopy (9/2/08).       Objective:   LMP 04/19/2003     Physical Exam:  Constitutional: Alert, no distress, well-groomed.  Skin: No rashes in visible areas.  Eye: Round. Conjunctiva clear, lids normal. No icterus.   ENMT: Lips pink without lesions, good dentition, moist mucous membranes. Phonation normal.  Neck: No masses, no thyromegaly. Moves freely without pain.  Respiratory: No cough or wheeze. Nasal canula in place. Slightly winded with steady conversation.  Psych: Alert and oriented x3, normal affect and mood.       Assessment and Plan:   The following treatment plan was discussed:     1. Pneumonia due to COVID-19 virus    2. On supplemental oxygen therapy    3. Type 2 diabetes mellitus with other skin complication, without long-term current use of insulin (HCC)    Other orders  - metFORMIN (GLUCOPHAGE) 500 MG Tab; Take 500 mg by mouth.    1. Pt is on day 7 upon monitoring program.  Patient remains reliant on supplemental oxygen.  Her vital signs are all within normal limits today and Covid severity index is 0.  Patient may be a bit behind due to delayed start of Decadron and lack of daily supervision. Discussed goals of safely titrating pt down, while keeping levels >89%.    Decreased patient to 1 L during our visit.  She did become slightly winded during our conversation but oxygen sats remained stable at 94% at rest for appx 10 min.  I would like her to keep flow oxygen at 1 L/min and gradual decrease.  If sats drop below threshold with increased activity she can bump it up half a liter.  She will require continued monitoring.  Patient referred to pulmonology [facility] for further evaluation.  Continue hourly spirometry.  Continue Decadron.  Cough medications as needed.    2.  Patient's blood sugars have been slightly elevated with  use of Decadron.  She feels comfortable titrating her insulin as needed.  All diabetic medications.  I would like her to follow-up with her PCP later this week for continued management and reevaluation.        Follow-up: Follow up for failure of sx to resolve or with any questions or concerns.

## 2021-03-25 ENCOUNTER — TELEPHONE (OUTPATIENT)
Dept: OTHER | Facility: MEDICAL CENTER | Age: 56
End: 2021-03-25

## 2021-03-25 ENCOUNTER — DOCUMENTATION (OUTPATIENT)
Dept: OTHER | Facility: MEDICAL CENTER | Age: 56
End: 2021-03-25

## 2021-03-25 VITALS — OXYGEN SATURATION: 95 % | RESPIRATION RATE: 21 BRPM | HEART RATE: 87 BPM

## 2021-03-25 VITALS — RESPIRATION RATE: 20 BRPM | HEART RATE: 73 BPM | OXYGEN SATURATION: 97 %

## 2021-03-25 VITALS — HEART RATE: 83 BPM | RESPIRATION RATE: 7 BRPM | OXYGEN SATURATION: 97 %

## 2021-03-25 VITALS — OXYGEN SATURATION: 94 % | HEART RATE: 69 BPM | RESPIRATION RATE: 18 BRPM

## 2021-03-25 VITALS — OXYGEN SATURATION: 95 % | HEART RATE: 72 BPM | RESPIRATION RATE: 6 BRPM

## 2021-03-25 VITALS — HEART RATE: 67 BPM | RESPIRATION RATE: 20 BRPM | OXYGEN SATURATION: 96 %

## 2021-03-25 NOTE — TELEPHONE ENCOUNTER
Follow up per report from night shift, pt disconnected and resting.Call and talked to pt who stated that this is her last battery and can come in to  some more battery later today. Will contact the Remote Monitor Tech to get supply ready and arrange to meet pt when arrive. After hanging up with pt, her monitor reconnect back to systems.

## 2021-03-25 NOTE — TELEPHONE ENCOUNTER
Pt disconnected called and pt stated battery must've  during the night, she will try and change it out. Told pt if she wasn't back on by 0300 I would call back.

## 2021-03-26 ENCOUNTER — TELEPHONE (OUTPATIENT)
Dept: OTHER | Facility: MEDICAL CENTER | Age: 56
End: 2021-03-26

## 2021-03-26 NOTE — TELEPHONE ENCOUNTER
Lucero Romero documented that the patient was having connectivity issues. She will follow up with patient at 0545

## 2021-03-28 ENCOUNTER — TELEPHONE (OUTPATIENT)
Dept: OTHER | Facility: MEDICAL CENTER | Age: 56
End: 2021-03-28

## 2021-03-28 VITALS — OXYGEN SATURATION: 95 % | HEART RATE: 107 BPM | RESPIRATION RATE: 18 BRPM

## 2021-03-28 VITALS — HEART RATE: 98 BPM | RESPIRATION RATE: 18 BRPM | OXYGEN SATURATION: 97 %

## 2021-03-29 NOTE — TELEPHONE ENCOUNTER
Pt O2 dropped to 73% and alerted, I called pt and she said she had turned it down to 1.5 and would turn it back up to 2. She turn it up and immediately went up to 97%.

## 2021-03-29 NOTE — TELEPHONE ENCOUNTER
Pt was disconnected called pt and she stated her device was flashing red I told her to change out her battery, she is going to and if she's not on in 20 mins ill call back.

## 2021-04-01 ENCOUNTER — TELEPHONE (OUTPATIENT)
Dept: OTHER | Facility: MEDICAL CENTER | Age: 56
End: 2021-04-01

## 2021-04-01 VITALS — HEART RATE: 88 BPM | OXYGEN SATURATION: 96 %

## 2021-04-01 NOTE — TELEPHONE ENCOUNTER
Called Cyndee she said she disconnected to take a shower and to change out her bandage. She is back up.

## 2021-04-02 ENCOUNTER — TELEPHONE (OUTPATIENT)
Dept: OTHER | Facility: MEDICAL CENTER | Age: 56
End: 2021-04-02

## 2021-04-02 NOTE — TELEPHONE ENCOUNTER
Pt disconnected. Called pt at 0037. Pt state her batteries dead and will  a new batteries in the morning. I will call the pt every 4 hours.

## 2021-04-04 ENCOUNTER — TELEPHONE (OUTPATIENT)
Dept: OTHER | Facility: MEDICAL CENTER | Age: 56
End: 2021-04-04

## 2021-04-04 VITALS — HEART RATE: 83 BPM | OXYGEN SATURATION: 95 % | RESPIRATION RATE: 26 BRPM

## 2021-04-04 NOTE — TELEPHONE ENCOUNTER
Pt disconnected around 2:55AM. Said the device was blinking red but she didn't have any replacement batteries. She is having someone bring her some later today.

## 2021-04-04 NOTE — TELEPHONE ENCOUNTER
Pt has  been reconnected. Family member came to [facility] to  batteries earlier. I noticed that pt was still disconnected. I called pt and her family member answered. Family put in a new battery and pt is now connected to her device.

## 2021-04-05 ENCOUNTER — TELEPHONE (OUTPATIENT)
Dept: OTHER | Facility: MEDICAL CENTER | Age: 56
End: 2021-04-05

## 2021-04-05 VITALS — RESPIRATION RATE: 18 BRPM | OXYGEN SATURATION: 98 % | HEART RATE: 85 BPM | TEMPERATURE: 97.8 F

## 2021-04-05 NOTE — TELEPHONE ENCOUNTER
Called pt, pt told me her light was green. Advised her to log into [facility] to reconnect. Pt is now back on, will continue to monitor

## 2021-04-06 ENCOUNTER — TELEPHONE (OUTPATIENT)
Dept: OTHER | Facility: MEDICAL CENTER | Age: 56
End: 2021-04-06

## 2021-04-06 NOTE — TELEPHONE ENCOUNTER
Reached out to Dr. Colby regarding patient still on the program at 20 days. Dr. Colby ok with patient coming off of the program.

## 2021-04-13 NOTE — INFECTION CONTROL
This patient is considered COVID RECOVERED.    Patient initially tested positive for COVID on 3/15/2020.  Patient is greater than or equal to 21 days from symptom onset and/or positive test, with symptom improvement.  Per the CDC guidance, this patient no longer requires transmission based precautions.  Patient may be placed on any unit per the bed assignment policy, including placement in a semi-private room with a roommate.

## 2021-04-14 ENCOUNTER — IMMUNIZATION (OUTPATIENT)
Dept: FAMILY PLANNING/WOMEN'S HEALTH CLINIC | Facility: IMMUNIZATION CENTER | Age: 56
End: 2021-04-14
Attending: INTERNAL MEDICINE
Payer: COMMERCIAL

## 2021-04-14 DIAGNOSIS — Z23 ENCOUNTER FOR VACCINATION: Primary | ICD-10-CM

## 2021-04-14 DIAGNOSIS — Z23 NEED FOR VACCINATION: ICD-10-CM

## 2021-04-14 PROCEDURE — 0001A PFIZER SARS-COV-2 VACCINE: CPT

## 2021-04-14 PROCEDURE — 91300 PFIZER SARS-COV-2 VACCINE: CPT

## 2021-05-07 ENCOUNTER — IMMUNIZATION (OUTPATIENT)
Dept: FAMILY PLANNING/WOMEN'S HEALTH CLINIC | Facility: IMMUNIZATION CENTER | Age: 56
End: 2021-05-07
Payer: COMMERCIAL

## 2021-05-07 DIAGNOSIS — Z23 ENCOUNTER FOR VACCINATION: Primary | ICD-10-CM

## 2021-05-07 PROCEDURE — 0002A PFIZER SARS-COV-2 VACCINE: CPT

## 2021-05-07 PROCEDURE — 91300 PFIZER SARS-COV-2 VACCINE: CPT

## 2021-06-22 ENCOUNTER — TELEPHONE (OUTPATIENT)
Dept: SLEEP MEDICINE | Facility: MEDICAL CENTER | Age: 56
End: 2021-06-22

## 2021-06-22 ENCOUNTER — OFFICE VISIT (OUTPATIENT)
Dept: SLEEP MEDICINE | Facility: MEDICAL CENTER | Age: 56
End: 2021-06-22
Payer: COMMERCIAL

## 2021-06-22 VITALS
BODY MASS INDEX: 37.98 KG/M2 | RESPIRATION RATE: 16 BRPM | OXYGEN SATURATION: 97 % | WEIGHT: 242 LBS | TEMPERATURE: 97.7 F | SYSTOLIC BLOOD PRESSURE: 134 MMHG | DIASTOLIC BLOOD PRESSURE: 88 MMHG | HEART RATE: 66 BPM | HEIGHT: 67 IN

## 2021-06-22 DIAGNOSIS — U07.1 COVID-19: ICD-10-CM

## 2021-06-22 DIAGNOSIS — R53.1 WEAKNESS: ICD-10-CM

## 2021-06-22 DIAGNOSIS — R06.02 SOB (SHORTNESS OF BREATH): ICD-10-CM

## 2021-06-22 DIAGNOSIS — E66.9 CLASS 2 OBESITY WITH BODY MASS INDEX (BMI) OF 37.0 TO 37.9 IN ADULT, UNSPECIFIED OBESITY TYPE, UNSPECIFIED WHETHER SERIOUS COMORBIDITY PRESENT: ICD-10-CM

## 2021-06-22 DIAGNOSIS — J96.01 ACUTE RESPIRATORY FAILURE WITH HYPOXIA (HCC): ICD-10-CM

## 2021-06-22 PROCEDURE — 99204 OFFICE O/P NEW MOD 45 MIN: CPT | Performed by: INTERNAL MEDICINE

## 2021-06-22 RX ORDER — CARVEDILOL 12.5 MG/1
12.5 TABLET ORAL 2 TIMES DAILY
COMMUNITY
Start: 2021-05-19

## 2021-06-22 RX ORDER — ALBUTEROL SULFATE 90 UG/1
1-2 AEROSOL, METERED RESPIRATORY (INHALATION) EVERY 4 HOURS PRN
Qty: 1 EACH | Refills: 3 | Status: SHIPPED | OUTPATIENT
Start: 2021-06-22

## 2021-06-22 RX ORDER — SPIRONOLACTONE 50 MG/1
50 TABLET, FILM COATED ORAL DAILY
COMMUNITY
Start: 2021-06-07 | End: 2024-03-12

## 2021-06-22 RX ORDER — INHALER,ASSIST DEVICE,MED MASK
1 SPACER (EA) MISCELLANEOUS ONCE
OUTPATIENT
Start: 2021-06-22 | End: 2021-06-23

## 2021-06-22 RX ORDER — INSULIN DETEMIR 100 [IU]/ML
20 INJECTION, SOLUTION SUBCUTANEOUS
COMMUNITY
Start: 2021-05-05 | End: 2022-05-29

## 2021-06-22 RX ORDER — INSULIN LISPRO 100 [IU]/ML
2-8 INJECTION, SOLUTION INTRAVENOUS; SUBCUTANEOUS
COMMUNITY
Start: 2021-04-15

## 2021-06-22 ASSESSMENT — ENCOUNTER SYMPTOMS
DIAPHORESIS: 0
DIZZINESS: 0
ABDOMINAL PAIN: 0
CONSTIPATION: 0
SHORTNESS OF BREATH: 1
HEMOPTYSIS: 0
SPEECH CHANGE: 0
NAUSEA: 0
FOCAL WEAKNESS: 0
WEAKNESS: 1
PALPITATIONS: 0
FALLS: 0
NECK PAIN: 0
COUGH: 0
HEARTBURN: 0
FEVER: 0
DOUBLE VISION: 0
EYE DISCHARGE: 0
BACK PAIN: 0
CHILLS: 0
WHEEZING: 0
SINUS PAIN: 0
EYE PAIN: 0
SPUTUM PRODUCTION: 0
MYALGIAS: 0
PHOTOPHOBIA: 0
VOMITING: 0
ORTHOPNEA: 0
HEADACHES: 0
STRIDOR: 0
PND: 0
EYE REDNESS: 0
DEPRESSION: 0
CLAUDICATION: 0
BLURRED VISION: 0
TREMORS: 0
WEIGHT LOSS: 0
DIARRHEA: 0
SORE THROAT: 0

## 2021-06-22 ASSESSMENT — FIBROSIS 4 INDEX: FIB4 SCORE: 2.56

## 2021-06-22 NOTE — PROGRESS NOTES
Chief Complaint   Patient presents with   • Establish Care     referral 3/24/21 KIERSTEN Mayers PA-C DX pneumonia due to covid    • Results     HOS Dx difficultly breathing 3/15/21-3/17/21, cxr 3/15/21        HPI: This patient is a 55 y.o. female presenting for evaluation of acute hypoxic respiratory failure following diagnosis of COVID-19 pneumonia.  Patient's past medical history significant for type 2 diabetes, hypertension, dyslipidemia, chronic left lower extremity lymphedema.  She is a lifelong non-smoker.  She currently works for an answering service managing phones.  Family history is notable for multiple family members with diabetes, hypertension and heart disease.  No autoimmune disease.  The [facility] with fatigue, decreased appetite, fever, cough with associated wheezing, shortness of breath and loss of taste.  She was sent to the emergency department where chest x-ray showed bilateral infiltrates and she was noted to have hypoxic respiratory failure.  She tested positive for COVID-19 was admitted to the hospital and treated with Decadron but did not meet criteria for remdesivir.  She did have mild leukopenia at the time.  Procalcitonin level was not elevated.  She was discharged on supplemental oxygen but has been able to wean off and is 97% on room air today.  She denies ongoing cough but continues to have shortness of breath with activity or even when talking for prolonged periods of time particular at work when managing phone calls.  She notes some mild weakness such as difficulty opening jars but is working to try to improve her strength.  No myalgias.    Past Medical History:   Diagnosis Date   • Anemia    • Diabetes (HCC)    • High cholesterol    • Hypertension        Social History     Socioeconomic History   • Marital status: Single     Spouse name: Not on file   • Number of children: Not on file   • Years of education: Not on file   • Highest education level: Not on  file   Occupational History   • Not on file   Tobacco Use   • Smoking status: Never Smoker   • Smokeless tobacco: Never Used   Vaping Use   • Vaping Use: Never used   Substance and Sexual Activity   • Alcohol use: No   • Drug use: No   • Sexual activity: Not on file   Other Topics Concern   • Not on file   Social History Narrative   • Not on file     Social Determinants of Health     Financial Resource Strain:    • Difficulty of Paying Living Expenses:    Food Insecurity:    • Worried About Running Out of Food in the Last Year:    • Ran Out of Food in the Last Year:    Transportation Needs:    • Lack of Transportation (Medical):    • Lack of Transportation (Non-Medical):    Physical Activity:    • Days of Exercise per Week:    • Minutes of Exercise per Session:    Stress:    • Feeling of Stress :    Social Connections:    • Frequency of Communication with Friends and Family:    • Frequency of Social Gatherings with Friends and Family:    • Attends Bahai Services:    • Active Member of Clubs or Organizations:    • Attends Club or Organization Meetings:    • Marital Status:    Intimate Partner Violence:    • Fear of Current or Ex-Partner:    • Emotionally Abused:    • Physically Abused:    • Sexually Abused:        Family History   Problem Relation Age of Onset   • Heart Disease Mother    • Heart Attack Father    • Heart Failure Brother        Current Outpatient Medications on File Prior to Visit   Medication Sig Dispense Refill   • spironolactone (ALDACTONE) 50 MG Tab Take 50 mg by mouth every day.     • carvedilol (COREG) 12.5 MG Tab      • HUMALOG KWIKPEN 100 UNIT/ML Solution Pen-injector injection PEN INJECT 2 UNITS SUBCUTANEOUSLY PER 10G CARB AS NEEDED WITH MEALS     • ASPIRIN 81 PO Take  by mouth.     • metFORMIN (GLUCOPHAGE) 500 MG Tab Take 500 mg by mouth.     • insulin detemir (LEVEMIR) 100 UNIT/ML Solution Inject 20 Units under the skin 2 times a day.     • losartan (COZAAR) 100 MG Tab Take 100 mg by  mouth every day.     • Empagliflozin (JARDIANCE) 25 MG Tab Take 25 mg by mouth every day.     • atorvastatin (LIPITOR) 40 MG Tab Take 40 mg by mouth every bedtime.  6   • LEVEMIR FLEXTOUCH 100 UNIT/ML injection PEN Inject 20 Units under the skin.     • guaiFENesin dextromethorphan (ROBITUSSIN DM) 100-10 MG/5ML Syrup syrup Take 10 mL by mouth every 6 hours as needed for Cough. (Patient not taking: Reported on 6/22/2021) 840 mL 0   • pioglitazone (ACTOS) 30 MG Tab Take 30 mg by mouth every day.     • hydroCHLOROthiazide (HYDRODIURIL) 25 MG Tab Take 1 Tab by mouth every day. (Patient not taking: Reported on 6/22/2021) 90 Tab 3     No current facility-administered medications on file prior to visit.       Allergies: Percocet [oxycodone-acetaminophen] and Latex    ROS:   Review of Systems   Constitutional: Negative for chills, diaphoresis, fever, malaise/fatigue and weight loss.   HENT: Negative for congestion, ear discharge, ear pain, hearing loss, nosebleeds, sinus pain, sore throat and tinnitus.    Eyes: Negative for blurred vision, double vision, photophobia, pain, discharge and redness.   Respiratory: Positive for shortness of breath. Negative for cough, hemoptysis, sputum production, wheezing and stridor.    Cardiovascular: Negative for chest pain, palpitations, orthopnea, claudication, leg swelling and PND.   Gastrointestinal: Negative for abdominal pain, constipation, diarrhea, heartburn, nausea and vomiting.   Genitourinary: Negative for dysuria and urgency.   Musculoskeletal: Negative for back pain, falls, joint pain, myalgias and neck pain.   Skin: Negative for itching and rash.   Neurological: Positive for weakness. Negative for dizziness, tremors, speech change, focal weakness and headaches.   Endo/Heme/Allergies: Negative for environmental allergies.   Psychiatric/Behavioral: Negative for depression.       /88 (BP Location: Right arm, Patient Position: Sitting, BP Cuff Size: Large adult)   Pulse 66   " Temp 36.5 °C (97.7 °F) (Temporal)   Resp 16   Ht 1.704 m (5' 7.1\")   Wt 110 kg (242 lb)   SpO2 97%     Physical Exam:  Physical Exam  Vitals reviewed.   Constitutional:       General: She is not in acute distress.     Appearance: Normal appearance. She is well-developed. She is obese.   HENT:      Head: Normocephalic and atraumatic.      Right Ear: External ear normal.      Left Ear: External ear normal.      Nose: Nose normal.      Mouth/Throat:      Mouth: Mucous membranes are moist.      Pharynx: Oropharynx is clear. No oropharyngeal exudate.   Eyes:      General: No scleral icterus.     Extraocular Movements: Extraocular movements intact.      Conjunctiva/sclera: Conjunctivae normal.      Pupils: Pupils are equal, round, and reactive to light.   Neck:      Vascular: No JVD.      Trachea: No tracheal deviation.   Cardiovascular:      Rate and Rhythm: Normal rate and regular rhythm.      Heart sounds: Normal heart sounds. No murmur heard.   No friction rub. No gallop.    Pulmonary:      Effort: Pulmonary effort is normal. No accessory muscle usage or respiratory distress.      Breath sounds: Normal breath sounds. No wheezing or rales.   Abdominal:      General: There is no distension.      Palpations: Abdomen is soft.      Tenderness: There is no abdominal tenderness.   Musculoskeletal:         General: No tenderness or deformity. Normal range of motion.      Cervical back: Normal range of motion and neck supple.      Right lower leg: No edema.      Left lower leg: No edema.   Lymphadenopathy:      Cervical: No cervical adenopathy.   Skin:     General: Skin is warm and dry.      Findings: No rash.      Nails: There is no clubbing.   Neurological:      Mental Status: She is alert and oriented to person, place, and time.      Cranial Nerves: No cranial nerve deficit.      Gait: Gait normal.   Psychiatric:         Mood and Affect: Mood normal.         Behavior: Behavior normal.         PFTs as reviewed by me " personally: None  Imaging as reviewed by me personally: As per HPI    Assessment:  1. SOB (shortness of breath)  albuterol 108 (90 Base) MCG/ACT Aero Soln inhalation aerosol    PULMONARY FUNCTION TESTS -Test requested: Complete Pulmonary Function Test    DX-CHEST-2 VIEWS    AEROCHAMBER PLUS-MEDIUM MASK MISC 1 Each   2. COVID-19  PULMONARY FUNCTION TESTS -Test requested: Complete Pulmonary Function Test    DX-CHEST-2 VIEWS    AEROCHAMBER PLUS-MEDIUM MASK MISC 1 Each   3. Class 2 obesity with body mass index (BMI) of 37.0 to 37.9 in adult, unspecified obesity type, unspecified whether serious comorbidity present     4. Weakness     5. Acute respiratory failure with hypoxia (HCC)         Plan:  1.  This is persistent although improving following coronavirus pneumonia.  She may have an element of reactive airways disease versus ongoing weakness.  She has been successfully weaned off supplemental oxygen during the day.  I have ordered for pulmonary function testing, updated chest x-ray and prescribed short acting bronchodilators for use with spacer.  I will see her back in the next 3 to 4 months.  2.  Patient suffered from moderate infection with acute hypoxic respiratory failure which has since resolved.  She has residual symptoms including shortness of breath and weakness.  I did offer referral to physiatry which she declined today but will treat empirically for postinfectious reactive airways disease and update chest x-ray and obtain PFTs as per above.  3.  This does put patient at increased risk for obesity related pulmonary complications.  Encouraged healthy diet habits.  4.  Secondary to COVID-19.  I suspect she is continuing to recover.  She declined physiatry referral today.  I encouraged her ongoing efforts to increase activity.  5.  Secondary to coronavirus.  She has successfully weaned off supplemental oxygen during the day but I will continue to have her using it at night and at follow-up we can consider  overnight oximetry.  Return in about 4 months (around 10/22/2021) for PFTs, CXR.

## 2021-06-22 NOTE — TELEPHONE ENCOUNTER
Pt during check out, asked if she should continue with oxygen. Verified verbally with Dr. Houston, pt is to continue oxygen at night as she's been doing will discuss at follow up.     I left vm to the patient as per Dr. Houston. Continue oxygen at night

## 2021-07-12 ENCOUNTER — APPOINTMENT (OUTPATIENT)
Dept: RADIOLOGY | Facility: MEDICAL CENTER | Age: 56
End: 2021-07-12
Attending: INTERNAL MEDICINE
Payer: COMMERCIAL

## 2021-07-13 ENCOUNTER — TELEPHONE (OUTPATIENT)
Dept: OTHER | Facility: MEDICAL CENTER | Age: 56
End: 2021-07-13

## 2021-08-02 ENCOUNTER — HOSPITAL ENCOUNTER (OUTPATIENT)
Dept: RADIOLOGY | Facility: MEDICAL CENTER | Age: 56
End: 2021-08-02
Attending: INTERNAL MEDICINE
Payer: COMMERCIAL

## 2021-08-02 DIAGNOSIS — U07.1 COVID-19: ICD-10-CM

## 2021-08-02 DIAGNOSIS — R06.02 SOB (SHORTNESS OF BREATH): ICD-10-CM

## 2021-08-02 PROCEDURE — 71046 X-RAY EXAM CHEST 2 VIEWS: CPT

## 2021-10-22 ENCOUNTER — NON-PROVIDER VISIT (OUTPATIENT)
Dept: SLEEP MEDICINE | Facility: MEDICAL CENTER | Age: 56
End: 2021-10-22
Attending: INTERNAL MEDICINE
Payer: COMMERCIAL

## 2021-10-22 VITALS — BODY MASS INDEX: 45.67 KG/M2 | WEIGHT: 291 LBS | HEIGHT: 67 IN

## 2021-10-22 DIAGNOSIS — U07.1 COVID-19: ICD-10-CM

## 2021-10-22 DIAGNOSIS — R06.02 SOB (SHORTNESS OF BREATH): ICD-10-CM

## 2021-10-22 PROCEDURE — 94726 PLETHYSMOGRAPHY LUNG VOLUMES: CPT | Performed by: INTERNAL MEDICINE

## 2021-10-22 PROCEDURE — 94060 EVALUATION OF WHEEZING: CPT | Performed by: INTERNAL MEDICINE

## 2021-10-22 PROCEDURE — 94729 DIFFUSING CAPACITY: CPT | Performed by: INTERNAL MEDICINE

## 2021-10-22 ASSESSMENT — PULMONARY FUNCTION TESTS
FEV1: 2.15
FVC_PREDICTED: 3.07
FEV1_PERCENT_PREDICTED: 81
FVC: 2.35
FEV1/FVC_PERCENT_PREDICTED: 109
FEV1/FVC_PERCENT_CHANGE: -2
FVC: 2.49
FEV1/FVC: 86
FEV1/FVC_PERCENT_PREDICTED: 107
FEV1/FVC_PERCENT_LLN: 66
FVC_PERCENT_PREDICTED: 76
FEV1/FVC_PREDICTED: 80
FEV1/FVC_PERCENT_PREDICTED: 79
FEV1/FVC: 83.83
FEV1_PERCENT_PREDICTED: 88
FEV1/FVC_PERCENT_PREDICTED: 108
FEV1/FVC_PERCENT_PREDICTED: 105
FEV1: 1.97
FEV1/FVC_PERCENT_CHANGE: 160
FEV1/FVC: 86
FEV1/FVC: 84
FEV1_LLN: 2.03
FEV1_PERCENT_CHANGE: -5
FEV1_PREDICTED: 2.43
FEV1_PERCENT_CHANGE: -8
FVC_PERCENT_PREDICTED: 81
FVC_LLN: 2.56

## 2021-10-22 ASSESSMENT — FIBROSIS 4 INDEX: FIB4 SCORE: 2.6

## 2021-10-22 NOTE — PROCEDURES
Tech: Mayte Valentin, RRT, CPFT  Tech notes: Good patient effort & cooperation.  The results of this test meet the ATS/ERS standards for acceptability & reproducibility.  Test was performed on the [employer] Plethysmograph-Elite DX system.  Predicted values were GLI-2012 for spirometry, GLI- 2017 for DLCO, ITS for Lung Volumes.  The DLCO was uncorrected for Hgb.  A bronchodilator of Ventolin HFA -2puffs via spacer administered.  DLCO performed during dilation period.    Interpretation:  Baseline spirometry shows low normal airflows with FVC of 2.49 L or 81% predicted and FEV1 of 2.15 L or 88% predicted.  FEV1/FVC ratio is mildly elevated 86 suggesting restriction.  No significant bronchodilator response.  Lung volumes are restrictive with total capacity 3.93 L or 71% predicted.  Diffusion capacity is within normal is 86% predicted and does overcorrect for VA at 133% predicted.  Pulmonary function testing shows restrictive defect with preserved DLCO.  This finding could be consistent with a diagnosis of COVID-19.  May also represent an element of pseudorestriction in the setting of obesity.  Correlate with imaging.

## 2021-10-27 ENCOUNTER — OFFICE VISIT (OUTPATIENT)
Dept: SLEEP MEDICINE | Facility: MEDICAL CENTER | Age: 56
End: 2021-10-27
Payer: COMMERCIAL

## 2021-10-27 VITALS
HEIGHT: 67 IN | BODY MASS INDEX: 45.99 KG/M2 | SYSTOLIC BLOOD PRESSURE: 110 MMHG | HEART RATE: 89 BPM | WEIGHT: 293 LBS | RESPIRATION RATE: 16 BRPM | OXYGEN SATURATION: 96 % | DIASTOLIC BLOOD PRESSURE: 76 MMHG

## 2021-10-27 DIAGNOSIS — U07.1 COVID-19: ICD-10-CM

## 2021-10-27 DIAGNOSIS — E66.01 CLASS 3 SEVERE OBESITY WITH BODY MASS INDEX (BMI) OF 45.0 TO 49.9 IN ADULT, UNSPECIFIED OBESITY TYPE, UNSPECIFIED WHETHER SERIOUS COMORBIDITY PRESENT (HCC): ICD-10-CM

## 2021-10-27 DIAGNOSIS — J96.01 ACUTE RESPIRATORY FAILURE WITH HYPOXIA (HCC): ICD-10-CM

## 2021-10-27 DIAGNOSIS — R06.02 SOB (SHORTNESS OF BREATH): ICD-10-CM

## 2021-10-27 PROCEDURE — 99213 OFFICE O/P EST LOW 20 MIN: CPT | Performed by: INTERNAL MEDICINE

## 2021-10-27 ASSESSMENT — ENCOUNTER SYMPTOMS
PHOTOPHOBIA: 0
DOUBLE VISION: 0
NECK PAIN: 0
HEMOPTYSIS: 0
BACK PAIN: 0
WHEEZING: 0
PND: 0
BLURRED VISION: 0
SPUTUM PRODUCTION: 0
WEAKNESS: 0
NAUSEA: 0
SORE THROAT: 0
PALPITATIONS: 0
TREMORS: 0
STRIDOR: 0
DIARRHEA: 0
HEARTBURN: 0
FEVER: 0
CONSTIPATION: 0
SHORTNESS OF BREATH: 0
EYE PAIN: 0
VOMITING: 0
WEIGHT LOSS: 0
HEADACHES: 0
CLAUDICATION: 0
EYE REDNESS: 0
EYE DISCHARGE: 0
COUGH: 0
FOCAL WEAKNESS: 0
MYALGIAS: 0
DIAPHORESIS: 0
SINUS PAIN: 0
SPEECH CHANGE: 0
DIZZINESS: 0
ORTHOPNEA: 0
FALLS: 0
DEPRESSION: 0
ABDOMINAL PAIN: 0
CHILLS: 0

## 2021-10-27 ASSESSMENT — FIBROSIS 4 INDEX: FIB4 SCORE: 2.6

## 2021-10-27 NOTE — PROGRESS NOTES
Chief Complaint   Patient presents with   • Follow-Up     last seen 6/22/21    • Results     PFT 10/22/21, CXR 8/2/21         HPI: This patient is a 56 y.o. female whom is followed in our clinic for SOB and hx of covid last seen by me on 6/22/21.   Patient's past medical history significant for type 2 diabetes, hypertension, dyslipidemia, chronic left lower extremity lymphedema.  She is a lifelong non-smoker.   The patient presented to [facility] on March 15 with fatigue, decreased appetite, fever, cough with associated wheezing, shortness of breath and loss of taste.  She was sent to the emergency department where chest x-ray showed bilateral infiltrates and she was noted to have hypoxic respiratory failure.  She tested positive for COVID-19 was admitted to the hospital and treated with Decadron but did not meet criteria for remdesivir.  She did have mild leukopenia at the time.  Procalcitonin level was not elevated.  She was discharged on supplemental oxygen but has been able to wean off during the day but still uses occasional at night. Since her last visit she had PFTs which show mild restrictive defect with TLC of 71% predicted and FVC at 81% predicted with normal diffusion capacity.  She has albuterol which she has not used more than 3 times since her last clinic visit.  She feels almost back to baseline symptomatically but still has some mild fatigue.  She has since been vaccinated for Covid.  A follow-up chest x-ray ordered after our last clinic visit and obtained in August showed clearance of bilateral infiltrates.    Past Medical History:   Diagnosis Date   • Anemia    • Diabetes (HCC)    • High cholesterol    • Hypertension        Social History     Socioeconomic History   • Marital status: Single     Spouse name: Not on file   • Number of children: Not on file   • Years of education: Not on file   • Highest education level: Not on file   Occupational History   • Not on file   Tobacco Use   • Smoking  status: Never Smoker   • Smokeless tobacco: Never Used   Vaping Use   • Vaping Use: Never used   Substance and Sexual Activity   • Alcohol use: No   • Drug use: No   • Sexual activity: Not on file   Other Topics Concern   • Not on file   Social History Narrative   • Not on file     Social Determinants of Health     Financial Resource Strain:    • Difficulty of Paying Living Expenses:    Food Insecurity:    • Worried About Running Out of Food in the Last Year:    • Ran Out of Food in the Last Year:    Transportation Needs:    • Lack of Transportation (Medical):    • Lack of Transportation (Non-Medical):    Physical Activity:    • Days of Exercise per Week:    • Minutes of Exercise per Session:    Stress:    • Feeling of Stress :    Social Connections:    • Frequency of Communication with Friends and Family:    • Frequency of Social Gatherings with Friends and Family:    • Attends [employer] Services:    • Active Member of Clubs or Organizations:    • Attends Club or Organization Meetings:    • Marital Status:    Intimate Partner Violence:    • Fear of Current or Ex-Partner:    • Emotionally Abused:    • Physically Abused:    • Sexually Abused:        Family History   Problem Relation Age of Onset   • Heart Disease Mother    • Heart Attack Father    • Heart Failure Brother        Current Outpatient Medications on File Prior to Visit   Medication Sig Dispense Refill   • albuterol 108 (90 Base) MCG/ACT Aero Soln inhalation aerosol Inhale 1-2 Puffs every four hours as needed. 1 Each 3   • spironolactone (ALDACTONE) 50 MG Tab Take 50 mg by mouth every day.     • LEVEMIR FLEXTOUCH 100 UNIT/ML injection PEN Inject 20 Units under the skin.     • carvedilol (COREG) 12.5 MG Tab      • HUMALOG KWIKPEN 100 UNIT/ML Solution Pen-injector injection PEN INJECT 2 UNITS SUBCUTANEOUSLY PER 10G CARB AS NEEDED WITH MEALS     • ASPIRIN 81 PO Take  by mouth.     • metFORMIN (GLUCOPHAGE) 500 MG Tab Take 500 mg by mouth 2 times a day.     •  "losartan (COZAAR) 100 MG Tab Take 100 mg by mouth every day.     • Empagliflozin (JARDIANCE) 25 MG Tab Take 25 mg by mouth every day.     • atorvastatin (LIPITOR) 40 MG Tab Take 40 mg by mouth every bedtime.  6   • insulin detemir (LEVEMIR) 100 UNIT/ML Solution Inject 20 Units under the skin 2 times a day.     • pioglitazone (ACTOS) 30 MG Tab Take 30 mg by mouth every day.       No current facility-administered medications on file prior to visit.       Percocet [oxycodone-acetaminophen] and Latex      ROS:   Review of Systems   Constitutional: Negative for chills, diaphoresis, fever, malaise/fatigue and weight loss.   HENT: Negative for congestion, ear discharge, ear pain, hearing loss, nosebleeds, sinus pain, sore throat and tinnitus.    Eyes: Negative for blurred vision, double vision, photophobia, pain, discharge and redness.   Respiratory: Negative for cough, hemoptysis, sputum production, shortness of breath, wheezing and stridor.    Cardiovascular: Negative for chest pain, palpitations, orthopnea, claudication, leg swelling and PND.   Gastrointestinal: Negative for abdominal pain, constipation, diarrhea, heartburn, nausea and vomiting.   Genitourinary: Negative for dysuria and urgency.   Musculoskeletal: Negative for back pain, falls, joint pain, myalgias and neck pain.   Skin: Negative for itching and rash.   Neurological: Negative for dizziness, tremors, speech change, focal weakness, weakness and headaches.   Endo/Heme/Allergies: Negative for environmental allergies.   Psychiatric/Behavioral: Negative for depression.       /76 (BP Location: Right arm, Patient Position: Sitting, BP Cuff Size: Large adult)   Pulse 89   Resp 16   Ht 1.702 m (5' 7\")   Wt (!) 133 kg (294 lb)   SpO2 96%   Physical Exam  Vitals reviewed.   Constitutional:       General: She is not in acute distress.     Appearance: Normal appearance. She is well-developed. She is obese.   HENT:      Head: Normocephalic and atraumatic. "      Right Ear: External ear normal.      Left Ear: External ear normal.      Nose: Nose normal. No congestion.      Mouth/Throat:      Mouth: Mucous membranes are moist.      Pharynx: Oropharynx is clear. No oropharyngeal exudate.   Eyes:      General: No scleral icterus.     Extraocular Movements: Extraocular movements intact.      Conjunctiva/sclera: Conjunctivae normal.      Pupils: Pupils are equal, round, and reactive to light.   Neck:      Vascular: No JVD.      Trachea: No tracheal deviation.   Cardiovascular:      Rate and Rhythm: Normal rate and regular rhythm.      Heart sounds: Normal heart sounds. No murmur heard.   No friction rub. No gallop.    Pulmonary:      Effort: Pulmonary effort is normal. No accessory muscle usage or respiratory distress.      Breath sounds: Normal breath sounds. No wheezing or rales.   Abdominal:      General: There is no distension.      Palpations: Abdomen is soft.      Tenderness: There is no abdominal tenderness.   Musculoskeletal:         General: No tenderness or deformity. Normal range of motion.      Cervical back: Normal range of motion and neck supple.      Right lower leg: No edema.      Left lower leg: No edema.   Lymphadenopathy:      Cervical: No cervical adenopathy.   Skin:     General: Skin is warm and dry.      Findings: No rash.      Nails: There is no clubbing.   Neurological:      Mental Status: She is alert and oriented to person, place, and time.      Cranial Nerves: No cranial nerve deficit.      Gait: Gait normal.   Psychiatric:         Mood and Affect: Mood normal.         Behavior: Behavior normal.         PFTs as reviewed by me personally: as per hPI    Imaging as reviewed by me personally:  As per HPI    Assessment:  1. Acute respiratory failure with hypoxia (HCC)  Overnight Oximetry   2. COVID-19     3. SOB (shortness of breath)     4. Class 3 severe obesity with body mass index (BMI) of 45.0 to 49.9 in adult, unspecified obesity type, unspecified  whether serious comorbidity present (HCC)         Plan:  1.  This appears to have resolved.  I would like to order an overnight oximetry study on room air to see if she still needs oxygen at night.  If abnormal desaturation and clustered pattern, consider eval for sleep apnea.  2.  Patient reports almost back at baseline.  Continue best supportive care.  She is fully vaccinated.  3.  Improved significantly with recovery from Covid.  Encouraged regular exercise.  Albuterol as needed.  4.  This does put patient at increased risk for obesity related pulmonary complications.  Encouraged healthy lifestyle habits.  Return in about 6 months (around 4/27/2022).

## 2021-11-12 ENCOUNTER — HOME STUDY (OUTPATIENT)
Dept: SLEEP MEDICINE | Facility: MEDICAL CENTER | Age: 56
End: 2021-11-12
Attending: INTERNAL MEDICINE
Payer: COMMERCIAL

## 2021-11-12 DIAGNOSIS — J96.01 ACUTE RESPIRATORY FAILURE WITH HYPOXIA (HCC): ICD-10-CM

## 2021-11-12 PROCEDURE — 94762 N-INVAS EAR/PLS OXIMTRY CONT: CPT | Performed by: INTERNAL MEDICINE

## 2021-11-12 NOTE — Clinical Note
Do you mind calling the patient to let her know, it looks like she still needs oxygen at night for right now. We will keep her on 2LPM. Thank you

## 2021-11-22 ENCOUNTER — TELEPHONE (OUTPATIENT)
Dept: SLEEP MEDICINE | Facility: MEDICAL CENTER | Age: 56
End: 2021-11-22

## 2021-11-22 RX ORDER — AZITHROMYCIN 250 MG/1
TABLET, FILM COATED ORAL
Qty: 6 TABLET | Refills: 0 | Status: SHIPPED | OUTPATIENT
Start: 2021-11-22 | End: 2022-06-30

## 2021-11-22 NOTE — PROCEDURES
This is an overnight oximetry study performed on November 14, 2021 for duration of 10 hours and 15 minutes on room air.  Basal SPO2 was 90%.  Total time spent below saturation of 88% was 126 minutes.  From the hours of 9:30 PM to roughly 1 AM the patient had normal oxygenation.  Between 1 AM and 6 AM the patient appeared to maintain a saturation right around 88%.  Patient may benefit from supplemental oxygen at night.

## 2021-11-22 NOTE — TELEPHONE ENCOUNTER
----- Message from Renata Houston M.D. sent at 11/21/2021  8:12 PM PST -----  Do you mind calling the patient to let her know, it looks like she still needs oxygen at night for right now. We will keep her on 2LPM. Thank you

## 2021-11-22 NOTE — TELEPHONE ENCOUNTER
Pt was contacted informed per Dr. Houston OPO results oxygen levels drop and advised to continue 2 LPM noc. Patient had no questions or concerns.     Pt states she been coughing greenish/yellow phlegm for 1 week, increase in SOb more at night and will put on her oxygen. Patient states have been needing to use albuterol inhaler more than usual.  Per pt Saturday 3 inhalations total, Sunday a total of 4 inhalations. States helps a bit. Pt declines chest pain and/or tightness, painful breathing or trouble breathing. Per patient no cold symptoms. Confirmed pharmacy on file.

## 2021-11-23 NOTE — TELEPHONE ENCOUNTER
Renata Houston M.D.  You 7 hours ago (9:55 AM)         We can try a course of azithromycin if symptoms are not improving with just albuterol    Message text      Pt was contacted and informed per Dr. Houston try course of RX ZPAK antibiotic. If symptoms do not resolve or improve encouraged patient to contact us.

## 2022-03-14 ENCOUNTER — HOSPITAL ENCOUNTER (OUTPATIENT)
Dept: RADIOLOGY | Facility: MEDICAL CENTER | Age: 57
End: 2022-03-14
Attending: PHYSICIAN ASSISTANT
Payer: COMMERCIAL

## 2022-03-14 DIAGNOSIS — Z12.31 VISIT FOR SCREENING MAMMOGRAM: ICD-10-CM

## 2022-03-14 PROCEDURE — 77063 BREAST TOMOSYNTHESIS BI: CPT

## 2022-03-25 NOTE — PROGRESS NOTES
Adult dose Tylenol / Motrin - as needed for fever/pain/headache, etc    Tylenol - 1000mg (2 extra strength)  per dose  Motrin  -  600mg  (3 regular tabs) per dose    May alternate every three hours such that:    Tylenol -> 3hrs - Motrin -> 3hrs - > Tylenol -> 3hrs -> Motrin       Mucinex D - congestion  Flonase daily   Dimetapp DM - congestion and cough (4 tsp per dose   BP elevated at this time, will give PRN hydralazine and recheck BP.

## 2022-04-27 ENCOUNTER — OFFICE VISIT (OUTPATIENT)
Dept: SLEEP MEDICINE | Facility: MEDICAL CENTER | Age: 57
End: 2022-04-27
Payer: COMMERCIAL

## 2022-04-27 VITALS
RESPIRATION RATE: 16 BRPM | WEIGHT: 291 LBS | HEIGHT: 67 IN | OXYGEN SATURATION: 94 % | SYSTOLIC BLOOD PRESSURE: 140 MMHG | DIASTOLIC BLOOD PRESSURE: 76 MMHG | HEART RATE: 83 BPM | BODY MASS INDEX: 45.67 KG/M2

## 2022-04-27 DIAGNOSIS — R06.02 SOB (SHORTNESS OF BREATH): ICD-10-CM

## 2022-04-27 DIAGNOSIS — Z91.09 ENVIRONMENTAL ALLERGIES: ICD-10-CM

## 2022-04-27 DIAGNOSIS — U07.1 COVID-19: ICD-10-CM

## 2022-04-27 DIAGNOSIS — I49.9 IRREGULAR HEART RATE: ICD-10-CM

## 2022-04-27 DIAGNOSIS — J96.01 ACUTE RESPIRATORY FAILURE WITH HYPOXIA (HCC): ICD-10-CM

## 2022-04-27 PROCEDURE — 94761 N-INVAS EAR/PLS OXIMETRY MLT: CPT | Performed by: INTERNAL MEDICINE

## 2022-04-27 PROCEDURE — 99214 OFFICE O/P EST MOD 30 MIN: CPT | Mod: 25 | Performed by: INTERNAL MEDICINE

## 2022-04-27 RX ORDER — LIRAGLUTIDE 6 MG/ML
1.8 INJECTION SUBCUTANEOUS EVERY EVENING
COMMUNITY
Start: 2022-02-16

## 2022-04-27 RX ORDER — MONTELUKAST SODIUM 10 MG/1
10 TABLET ORAL EVERY EVENING
Qty: 30 TABLET | Refills: 11 | Status: SHIPPED | OUTPATIENT
Start: 2022-04-27 | End: 2023-05-10

## 2022-04-27 RX ORDER — INSULIN GLARGINE 100 [IU]/ML
20 INJECTION, SOLUTION SUBCUTANEOUS 2 TIMES DAILY
COMMUNITY
Start: 2022-02-16

## 2022-04-27 ASSESSMENT — ENCOUNTER SYMPTOMS
FOCAL WEAKNESS: 0
SPUTUM PRODUCTION: 0
SHORTNESS OF BREATH: 1
CONSTIPATION: 0
EYE DISCHARGE: 0
ABDOMINAL PAIN: 0
EYE PAIN: 0
FEVER: 0
CHILLS: 0
CLAUDICATION: 0
SINUS PAIN: 0
DIAPHORESIS: 0
ORTHOPNEA: 0
BLURRED VISION: 0
TREMORS: 0
NAUSEA: 0
PHOTOPHOBIA: 0
EYE REDNESS: 0
MYALGIAS: 0
NECK PAIN: 0
WEAKNESS: 0
VOMITING: 0
WEIGHT LOSS: 0
PND: 0
SORE THROAT: 0
DOUBLE VISION: 0
BACK PAIN: 0
PALPITATIONS: 0
DIARRHEA: 0
SPEECH CHANGE: 0
HEARTBURN: 0
DEPRESSION: 0
COUGH: 0
FALLS: 0
HEADACHES: 0
HEMOPTYSIS: 0
DIZZINESS: 0
STRIDOR: 0
WHEEZING: 0

## 2022-04-27 ASSESSMENT — FIBROSIS 4 INDEX: FIB4 SCORE: 2.6

## 2022-04-27 NOTE — PROGRESS NOTES
Chief Complaint   Patient presents with   • Follow-Up     Last seen 10/27/21    • Results     OPO 11/12/21          HPI: This patient is a 56 y.o. female whom is followed in our clinic for acute hypoxic respiratory failure following covid 19 pna last seen by me on 10/27/21.   Patient's past medical history significant for type 2 diabetes, hypertension, dyslipidemia, chronic left lower extremity lymphedema.  She is a lifelong non-smoker.   The patient presented to hospitals on March 15 with fatigue, decreased appetite, fever, cough with associated wheezing, shortness of breath and loss of taste.  She was sent to the emergency department where chest x-ray showed bilateral infiltrates and she was noted to have hypoxic respiratory failure.  She tested positive for COVID-19 was admitted to the hospital and treated with Decadron but did not meet criteria for remdesivir.  She did have mild leukopenia at the time.  Procalcitonin level was not elevated.  She was discharged on supplemental oxygen but has been able to wean off during the day but still had borderline low O2 at night based on OPO on RA done after our last visit. PFTs from 10/021 showed mild restrictive defect with TLC of 71% predicted and FVC at 81% predicted with normal diffusion capacity. A follow-up chest x-ray in August showed clearance of bilateral infiltrates.SHe presents today for f/u. She has been walking but is more SOB she believes 2/2 allergies with associated sinus congestion. She is using OTC anthistamine and intranasal steroids. She denies significant cough or wheezing. Allergies are often bad in the spring for her but this year is worse than in the past. She has albuterol which she uses infrequently. She noted low O2 in th 80s recently on one occasion.        Past Medical History:   Diagnosis Date   • Anemia    • Diabetes (HCC)    • High cholesterol    • Hypertension        Social History     Socioeconomic History   • Marital status: Single      Spouse name: Not on file   • Number of children: Not on file   • Years of education: Not on file   • Highest education level: Not on file   Occupational History   • Not on file   Tobacco Use   • Smoking status: Never Smoker   • Smokeless tobacco: Never Used   Vaping Use   • Vaping Use: Never used   Substance and Sexual Activity   • Alcohol use: No   • Drug use: No   • Sexual activity: Not on file   Other Topics Concern   • Not on file   Social History Narrative   • Not on file     Social Determinants of Health     Financial Resource Strain: Not on file   Food Insecurity: Not on file   Transportation Needs: Not on file   Physical Activity: Not on file   Stress: Not on file   Social Connections: Not on file   Intimate Partner Violence: Not on file   Housing Stability: Not on file       Family History   Problem Relation Age of Onset   • Heart Disease Mother    • Heart Attack Father    • Heart Failure Brother        Current Outpatient Medications on File Prior to Visit   Medication Sig Dispense Refill   • insulin glargine (LANTUS SOLOSTAR) 100 UNIT/ML Solution Pen-injector injection      • liraglutide (VICTOZA) 18 MG/3ML Solution Pen-injector      • azithromycin (ZITHROMAX) 250 MG Tab Take 2 tablets on day 1, then take 1 tablet a day for 4 days. 6 Tablet 0   • albuterol 108 (90 Base) MCG/ACT Aero Soln inhalation aerosol Inhale 1-2 Puffs every four hours as needed. 1 Each 3   • spironolactone (ALDACTONE) 50 MG Tab Take 50 mg by mouth every day.     • carvedilol (COREG) 12.5 MG Tab      • HUMALOG KWIKPEN 100 UNIT/ML Solution Pen-injector injection PEN INJECT 2 UNITS SUBCUTANEOUSLY PER 10G CARB AS NEEDED WITH MEALS     • ASPIRIN 81 PO Take  by mouth.     • metFORMIN (GLUCOPHAGE) 500 MG Tab Take 500 mg by mouth 2 times a day.     • losartan (COZAAR) 100 MG Tab Take 100 mg by mouth every day.     • Empagliflozin (JARDIANCE) 25 MG Tab Take 25 mg by mouth every day.     • atorvastatin (LIPITOR) 40 MG Tab Take 40 mg by mouth  "every bedtime.  6   • LEVEMIR FLEXTOUCH 100 UNIT/ML injection PEN Inject 20 Units under the skin.       No current facility-administered medications on file prior to visit.       Percocet [oxycodone-acetaminophen] and Latex      ROS:   Review of Systems   Constitutional: Negative for chills, diaphoresis, fever, malaise/fatigue and weight loss.   HENT: Positive for congestion. Negative for ear discharge, ear pain, hearing loss, nosebleeds, sinus pain, sore throat and tinnitus.    Eyes: Negative for blurred vision, double vision, photophobia, pain, discharge and redness.   Respiratory: Positive for shortness of breath. Negative for cough, hemoptysis, sputum production, wheezing and stridor.    Cardiovascular: Negative for chest pain, palpitations, orthopnea, claudication, leg swelling and PND.   Gastrointestinal: Negative for abdominal pain, constipation, diarrhea, heartburn, nausea and vomiting.   Genitourinary: Negative for dysuria and urgency.   Musculoskeletal: Negative for back pain, falls, joint pain, myalgias and neck pain.   Skin: Negative for itching and rash.   Neurological: Negative for dizziness, tremors, speech change, focal weakness, weakness and headaches.   Endo/Heme/Allergies: Negative for environmental allergies.   Psychiatric/Behavioral: Negative for depression.       /76 (BP Location: Right arm, Patient Position: Sitting, BP Cuff Size: Large adult)   Pulse 83   Resp 16   Ht 1.702 m (5' 7\")   Wt (!) 132 kg (291 lb)   SpO2 94%   Physical Exam  Vitals reviewed.   Constitutional:       General: She is not in acute distress.     Appearance: Normal appearance. She is well-developed and normal weight.   HENT:      Head: Normocephalic and atraumatic.      Right Ear: External ear normal.      Left Ear: External ear normal.      Nose: Nose normal. No congestion.      Mouth/Throat:      Mouth: Mucous membranes are moist.      Pharynx: Oropharynx is clear. No oropharyngeal exudate.   Eyes:      " General: No scleral icterus.     Conjunctiva/sclera: Conjunctivae normal.      Pupils: Pupils are equal, round, and reactive to light.   Neck:      Vascular: No JVD.      Trachea: No tracheal deviation.   Cardiovascular:      Rate and Rhythm: Normal rate. Rhythm irregular.      Heart sounds: Normal heart sounds. No murmur heard.    No friction rub. No gallop.      Comments: Pt bradycardic but regular initially then HR changes somewhat abruptly  Pulmonary:      Effort: Pulmonary effort is normal. No accessory muscle usage or respiratory distress.      Breath sounds: No wheezing or rales.      Comments: Faint expiratory wheezes  Abdominal:      General: There is no distension.      Palpations: Abdomen is soft.      Tenderness: There is no abdominal tenderness.   Musculoskeletal:         General: No tenderness or deformity. Normal range of motion.      Cervical back: Normal range of motion and neck supple.      Right lower leg: No edema.      Left lower leg: No edema.   Lymphadenopathy:      Cervical: No cervical adenopathy.   Skin:     General: Skin is warm and dry.      Findings: No rash.      Nails: There is no clubbing.   Neurological:      Mental Status: She is alert and oriented to person, place, and time.      Cranial Nerves: No cranial nerve deficit.      Gait: Gait normal.   Psychiatric:         Mood and Affect: Mood normal.         Behavior: Behavior normal.         PFTs as reviewed by me personally: as per hPI    Imaging as reviewed by me personally:  As per hPI  Assessment:  1. Acute respiratory failure with hypoxia (HCC)  Multiple Oximetry    PULMONARY FUNCTION TESTS -Test requested: Complete Pulmonary Function Test    Exercise Test for Bronchospasm / 6-Minute Walk    Multiple Oximetry   2. SOB (shortness of breath)  PULMONARY FUNCTION TESTS -Test requested: Complete Pulmonary Function Test    Exercise Test for Bronchospasm / 6-Minute Walk   3. COVID-19  PULMONARY FUNCTION TESTS -Test requested: Complete  Pulmonary Function Test    REFERRAL TO CARDIOLOGY   4. Environmental allergies     5. Irregular heart rate  REFERRAL TO CARDIOLOGY       Plan:  1. 2/2 covid. ? Her recent low measure at home was artifact. No desaturation on ambulatory oximetry today. Will order repeat PFTs and 6 MWT at f/u. Continue oxygen at 2LPM at night based on testing from November 2. New and she is quite congested today. I recommended we start montelukast and use albuterol pre-activity. She will let me know if sxs do not improve and we can try medrol dose pack  3. Severe but pt has improved both radiographically and symptomatically. Continue best supportive care  4. See discussion above. Start montelukast  5. This was evident on my exam and with 6 MWT. The rhythm was regular but she went from quite bradycardic (40s) to normal rate (70s) just in the span of my exam; will refer to cardiology for holter/ECG  Return in about 6 months (around 10/27/2022) for pulmonary function tests.

## 2022-04-27 NOTE — NON-PROVIDER
During multiox, patient's pulse rate started at 41 at rest with 97 percent o2. While completing multiox patient's pulse rate would increase to the 50's, then 80's and back down to low 50's.

## 2022-04-27 NOTE — PROCEDURES
Multi-Ox Readings  Multi Ox #1 Room air   O2 sat % at rest 97   O2 sat % on exertion 93   O2 sat average on exertion 95   Multi Ox #2     O2 sat % at rest     O2 sat % on exertion     O2 sat average on exertion       Oxygen Use 2   Oxygen Frequency 24/7   Duration of need     Is the patient mobile within the home?     CPAP Use?     BIPAP Use?     Servo Titration

## 2022-05-16 ENCOUNTER — PHYSICAL THERAPY (OUTPATIENT)
Dept: PHYSICAL THERAPY | Facility: REHABILITATION | Age: 57
End: 2022-05-16
Attending: PHYSICIAN ASSISTANT
Payer: COMMERCIAL

## 2022-05-16 DIAGNOSIS — I89.0 LYMPHEDEMA OF BOTH LOWER EXTREMITIES: ICD-10-CM

## 2022-05-16 DIAGNOSIS — R60.9 LIPEDEMA: ICD-10-CM

## 2022-05-16 PROCEDURE — 97161 PT EVAL LOW COMPLEX 20 MIN: CPT

## 2022-05-16 NOTE — OP THERAPY EVALUATION
Outpatient Physical Therapy  LYMPHEDEMA THERAPY INITIAL EVALUATION    [facility]  [address]  [address]  [facility] [address]  Phone:  [phone]  Fax:  [phone]    Date of Evaluation: 05/16/2022    Patient: Cyndee Calvert  YOB: 1965  MRN: [id number]     Referring Provider: Deanna Mccollum P.A.-C.  [address] [place] [address]   Referring Diagnosis No admission diagnoses are documented for this encounter.     Time Calculation    Start time: 0900  Stop time: 0946 Time Calculation (min): 46 minutes             Chief Complaint: Lipolymphedema    Visit Diagnoses     ICD-10-CM   1. Lipedema  R60.9   2. Lymphedema of both lower extremities  I89.0       Subjective:   History of Present Illness:     Mechanism of injury:  Pt reports her leg swelling has been going on for years. Her grandmother had the same condition and so does her sister. Lately, her swelling has been bothering her. L LE feels tight and heavy and therefore does not like moving around too much. Thighs have been painful bilaterally and have become hypersensitive. She has tried elevating her legs and the distal aspect tends to improve. Noticed legs really blew up after she gave birth to her son. Has also tried compression stockings but have not found ones that fit properly.   Patient Goals:     Patient goals for therapy:  Decreased edema and return to sport/leisure activities      Past Medical History:   Diagnosis Date   • Anemia    • Diabetes (HCC)    • High cholesterol    • Hypertension      Past Surgical History:   Procedure Laterality Date   • BLADDER BIOPSY WITH CYSTOSCOPY  9/2/08    Performed by LILLIE WILLIAM at SURGERY [facility]   • HYSTERECTOMY, TOTAL ABDOMINAL     • TONSILLECTOMY       Social History     Tobacco Use   • Smoking status: Never Smoker   • Smokeless tobacco: Never Used   Substance Use Topics   • Alcohol use: No     Family and Occupational History  "    Socioeconomic History   • Marital status: Single     Spouse name: Not on file   • Number of children: Not on file   • Years of education: Not on file   • Highest education level: Not on file   Occupational History   • Not on file       Lymphedema Objective    Visit type   Lipolymphedema      Skin Appearance    Soft.  Left Lower Extremity Circumferential Measurements  Waist: cm  Hip: cm  Scrotum: cm  Ground/Upper Thigh: cm  Mid Thigh: 66.2 cm  Knee: 48 cm  Upper Calf: 60.8 cm  Mid Calf: 49.1 cm  Ankle: 37.4 cm  Heel to Foot: 24.3 cm  Total: 285.8 cm    Right Lower Extremity Circumferential Measurements  Waist: cm  Hip: cm  Scrotum: cm  Ground/Upper Thigh: cm  Mid Thigh: 60.3 cm  Knee: 45.1 cm  Upper Calf: 62.3 cm  Mid Calf: 47.7 cm  Ankle: 39.6 cm  Heel to Foot: 24.3 cm  Total: 279.3 cm          Therapeutic Treatments and Modalities:     Therapeutic Treatment and Modalities Summary: Educated patient on pathogenesis of lymphedema. Distributed brochure. Patient has also been educated on skin care precautions including hygiene, lotions with pH 5-5.5, and avoidance of lacerations/mosquito bites/heat. Also educated pt on signs/symptoms of cellulitis.     Patient was educated in regular, self MLD of bilateral lower extremity utilizing inguinal lymph nodes bilaterally with associated pathways.  Correct strokes were emphasized and handout was provided.  Patient was invited to return for in clinic, skilled physical therapist administered MLD for which patient will set an additional appointment.       Following discussion re: the need for external compression and education in compression garment options.  Patient was educated in its optimal use (all day, every day, especially during higher risk activities as discussed, remove at night).  Lymphedema risk factors were discussed with a lymphedema \"Do's and Don'ts\" handout provided.  Patient verbalized understanding to all education today.    Discussed recommendation for a " Flexitouch for continued maintenance of decreased swelling after the initial course of decongestion from physical therapy to prevent return of swelling into the tissues. Because the lymphatic system is located superficially and is already damaged from long-term swelling, a traditional pump will be inadequate. A traditional pump would cause further damage to the lymphatic limb as the compression will damage the already fragile and intact lymphatic vessels. Patient will be unable to tolerate the high pressures of a basic pump. A basic pump will exacerbate the condition and cause intolerable pain due to the superficial and sensitive nature of lymphatic vessels. A Flexitouch works like manual lymphatic drainage, which clears the unaffected areas first to allow the swollen region to have tissue space to push the fluid and protein to. The Flexitouch also has a more progressive, segmental, and lighter compression than a traditional pump. Therefore, it is able to assist protein to move out of the affected tissue.     Time-based treatments/modalities:           Assessment and Plan:   Functional Impairments: impaired functional mobility, lacks appropriate home exercise program, pain with function and swelling    Assessment details:  Pt is a 57yo F who has been suffering from large legs since the birth of her son. Lately, her legs have become more painful from her ankles to her hips, and hips to feet have been swelling intermittently. Given her history and presence of large legs in grandmother and sister, it is likely that pt originally developed lipedema. 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  Lymphedema is characterized by high protein edema in the interstitial tissues causing damage to the lymph transport system and resulting in decreased transport capacity of the lymphatic system.  Patient has tried elevation, self OTC compression garments, diuresis and a low sodium  diet, all of which were unsuccessful at treating their swelling.  Skilled lymphedema treatment is unable to be carried out by the patient and unskilled caregivers. Services will be provided by a certified lymphedema therapist.  Complete decongestive therapy is considered the gold standard of medical practice for lymphedema treatment and has proven to be effective for reduction in limb volume size and decrease risk of complications secondary to swelling (such as wounds and infections).    Patient to be seen until decongestion occurs. Physical therapy interventions to include manual lymphatic drainage, compression bandaging, skin care education, wound care if applicable, therapeutic exercises, custom garment fitting and lymphedema education to improve skin integrity, decrease lymphedema swelling, improve joint arthrokinematics and range of motion and improve quality of life.    Spontaneous recovery is not expected without skilled completed decongestive lymphedema therapy.    The patient was informed of evaluation findings and intervention plan and agrees to participate in the plan as outlined.  Prognosis: fair      Goals:   Short Term Goals:  1. Pt will achieve a total limb circumference reduction of 5cm in order to decrease risk for infection and progression of disease process.  2. Pt will be independent with self-MLD to facilitate fluid migration to healthy tissues and lymph nodes.   Pt will consistently perform exercises with bandages on to facilitate muscle pump of fluid from affected extremity.  Short term goal time span:  2-4 weeks    Long Term Goals:  1. Pt will have a reduction in total limb circumference of 10cm in order to decrease risk for infection and progression of disease process.   2. Patient will be independent with maintenance phase management of lymphedema including: compression garments, skin care education, risk reduction strategies, manual lymphatic drainage, and therapeutic exercise.  Long term  goal time span:  4-6 weeks    Plan:  Therapy options:  Physical therapy treatment to continue  Planned therapy interventions:  Addressing equipment needs, compression bandaging, compression stocking, decongestive exercises, home exercise program, intermittent compression, manual lymph drainage, myofascial release techniques, orthotic measurements/fitting, patient education, postural exercises, range of motion exercises, referral for compression garment & instructions for don/doffing, self-care/training (CPT 47955), sequential compression pump, short stretch bandages, soft tissue manual techniques (CPT 22993), Velcro wraps, strengthening exercises and skin/wound care  Planned education:  Functional anatomy and physiology of the lymphatic system, pathophysiology of lymphedema, lymphedema exercise, lymphedema precautions, proper skin care/nutrition, compression bandaging, self massage, infection prevention, scar tissue management, activity guidelines, dietary guidelines, skin care guidelines, home pump use, bandage removal and long term self-management of lymphedema  Frequency:  2x week  Duration in weeks:  8  Discussed with:  Patient      Functional Assessment Used    LLIS    Referring provider co-signature:  I have reviewed this plan of care and my co-signature certifies the need for services.    Certification Period: 05/16/2022 to  07/11/22    Physician Signature: ________________________________ Date: ______________

## 2022-05-24 ENCOUNTER — PHYSICAL THERAPY (OUTPATIENT)
Dept: PHYSICAL THERAPY | Facility: REHABILITATION | Age: 57
End: 2022-05-24
Attending: PHYSICIAN ASSISTANT
Payer: COMMERCIAL

## 2022-05-24 DIAGNOSIS — I89.0 LYMPHEDEMA OF BOTH LOWER EXTREMITIES: ICD-10-CM

## 2022-05-24 DIAGNOSIS — R60.9 LIPEDEMA: ICD-10-CM

## 2022-05-24 PROCEDURE — 97530 THERAPEUTIC ACTIVITIES: CPT

## 2022-05-24 NOTE — OP THERAPY DAILY TREATMENT
Outpatient Physical Therapy  LYMPHEDEMA THERAPY DAILY TREATMENT     [facility] [address]  [address]  [place] [address]  Phone:  [phone]  Fax:  [phone]    Date: 05/24/2022    Patient: Cyndee Calvert  YOB: 1965  MRN: [id number]     Time Calculation    Start time: 0815  Stop time: 0859 Time Calculation (min): 44 minutes         Chief Complaint: Lipolymphedema    Visit #: 2    Subjective:   History of Present Illness:     Mechanism of injury:  Pt reports her legs are doing well this morning. Brought picture of when legs/feet are worse.       Lymphedema Objective      Other Notes  Deferred measurements today.            Left Lower Extremity Circumferential Measurements 5/16  Waist: cm  Hip: cm  Scrotum: cm  Ground/Upper Thigh: cm  Mid Thigh: 66.2 cm  Knee: 48 cm  Upper Calf: 60.8 cm  Mid Calf: 49.1 cm  Ankle: 37.4 cm  Heel to Foot: 24.3 cm  Total: 285.8 cm     Right Lower Extremity Circumferential Measurements 5/16  Waist: cm  Hip: cm  Scrotum: cm  Ground/Upper Thigh: cm  Mid Thigh: 60.3 cm  Knee: 45.1 cm  Upper Calf: 62.3 cm  Mid Calf: 47.7 cm  Ankle: 39.6 cm  Heel to Foot: 24.3 cm  Total: 279.3 cm    Therapeutic Treatments and Modalities:     1. Therapeutic Activities (CPT 30394)    Therapeutic Treatment and Modalities Summary: MLD to B LE without trunk involvement. Focus on L LE due to time contraint.   The purpose of Manual Lymph Drainage (MLD) is to reduce lymph volume in the affected limb by increasing intake of lymphatic load into the lymphatic system, increasing the volume of transported lymph fluid, moving lymph fluid in superficial lymph vessels to collateral lymph collectors, anastomoses, or tissue channels, and increasing venous return. The goal of MLD is to re-route the lymph flow around blocked areas into more centrally located healthy lymph vessels, which drain into the venous system.     Multi-layer compression bandages applied to B  LE distal to knees utilizing the technique of 50% overlap and 50% stretch. Stockinette has been applied as a protective base layer with padding overlay to further protect skin from short-stretch bandages. Short stretch bandages in the size of 6cm, 8cm, 10cm x2 were utilized to complete compression to distal aspects of affected limbs. The high working pressure and low resting pressure qualities of short-stretch bandages make them the preferred compression bandage in the management of lymphedema, especially during the decongestive phase (phase I) of Complete Decongestive Therapy (CDT). Utilization of theses bandages prevents the re-accumulation of evacuated lymph fluid and conserves the results achieved during MLD.    Time-based treatments/modalities:    Physical Therapy Timed Treatment Charges  Therapeutic activity minutes (CPT 57067): 44 minutes      Assessment and Plan:   Assessment details:  Today was pt's first day of CDT. Pt understands to unwrap should she experience numbness/large quantity of pain to B LE. Pt has been educated on how to check for capillary refill. Pt also understands importance of maintaining compression and alternatives to bathing. Will benefit from return to clinic to assess previous treatment results and achieve further reduction to B LE.    Plan:  Therapy options:  Physical therapy treatment to continue  Discussed with:  Patient

## 2022-05-26 ENCOUNTER — PHYSICAL THERAPY (OUTPATIENT)
Dept: PHYSICAL THERAPY | Facility: REHABILITATION | Age: 57
End: 2022-05-26
Attending: PHYSICIAN ASSISTANT
Payer: COMMERCIAL

## 2022-05-26 DIAGNOSIS — R60.9 LIPEDEMA: ICD-10-CM

## 2022-05-26 PROCEDURE — 97140 MANUAL THERAPY 1/> REGIONS: CPT

## 2022-05-26 NOTE — OP THERAPY DAILY TREATMENT
Outpatient Physical Therapy  LYMPHEDEMA THERAPY DAILY TREATMENT     [facility] [address]  [address]  [place] [address]  Phone:  [phone]  Fax:  [phone]    Date: 05/26/2022    Patient: Cyndee Calvert  YOB: 1965  MRN: [id number]     Time Calculation    Start time: 1546  Stop time: 1626 Time Calculation (min): 40 minutes         Chief Complaint: Lipolymphedema    Visit #: 3    Subjective:   History of Present Illness:     Mechanism of injury:  Wraps went ok. Did feel uncomfortable this morning at posterior calf and anterior shin; somewhat burning feeling.       Lymphedema Objective    Left Lower Extremity Circumferential Measurements  Waist: cm  Hip: cm  Scrotum: cm  Ground/Upper Thigh: cm  Mid Thigh: 64.1 cm  Knee: 50.3 cm  Upper Calf: 61.7 cm  Mid Calf: 48.6 cm  Ankle: 37.9 cm  Heel to Foot: 24.1 cm  Total: 286.7 cm    Right Lower Extremity Circumferential Measurements  Waist: cm  Hip: cm  Scrotum: cm  Ground/Upper Thigh: cm  Mid Thigh: 59.6 cm  Knee: 46.3 cm  Upper Calf: 58.3 cm  Mid Calf: 46.2 cm  Ankle: 37.8 cm  Heel to Foot: 25.1 cm  Total: 273.3 cm             Left Lower Extremity Circumferential Measurements 5/16  Waist: cm  Hip: cm  Scrotum: cm  Ground/Upper Thigh: cm  Mid Thigh: 66.2 cm  Knee: 48 cm  Upper Calf: 60.8 cm  Mid Calf: 49.1 cm  Ankle: 37.4 cm  Heel to Foot: 24.3 cm  Total: 285.8 cm     Right Lower Extremity Circumferential Measurements 5/16  Waist: cm  Hip: cm  Scrotum: cm  Ground/Upper Thigh: cm  Mid Thigh: 60.3 cm  Knee: 45.1 cm  Upper Calf: 62.3 cm  Mid Calf: 47.7 cm  Ankle: 39.6 cm  Heel to Foot: 24.3 cm  Total: 279.3 cm    Therapeutic Treatments and Modalities:     1. Therapeutic Activities (CPT 89118)    Therapeutic Treatment and Modalities Summary: MLD to B LE without trunk involvement. Focus on L LE due to time contraint.   The purpose of Manual Lymph Drainage (MLD) is to reduce lymph volume in the affected limb by  increasing intake of lymphatic load into the lymphatic system, increasing the volume of transported lymph fluid, moving lymph fluid in superficial lymph vessels to collateral lymph collectors, anastomoses, or tissue channels, and increasing venous return. The goal of MLD is to re-route the lymph flow around blocked areas into more centrally located healthy lymph vessels, which drain into the venous system.     Multi-layer compression bandages applied to B LE distal to knees utilizing the technique of 50% overlap and 50% stretch. Stockinette has been applied as a protective base layer with padding overlay to further protect skin from short-stretch bandages. Short stretch bandages in the size of 6cm, 8cm, 10cm x2 were utilized to complete compression to distal aspects of affected limbs. The high working pressure and low resting pressure qualities of short-stretch bandages make them the preferred compression bandage in the management of lymphedema, especially during the decongestive phase (phase I) of Complete Decongestive Therapy (CDT). Utilization of theses bandages prevents the re-accumulation of evacuated lymph fluid and conserves the results achieved during MLD.    Time-based treatments/modalities:    Physical Therapy Timed Treatment Charges  Manual therapy minutes (CPT 15343): 40 minutes      Assessment and Plan:   Assessment details:  Pt achieved a reduction to R LE of 7cm and did not have reduction to L LE. She will benefit from further intervention to achieve decongestion to B LE.     Plan:  Therapy options:  Physical therapy treatment to continue  Discussed with:  Patient

## 2022-05-29 ENCOUNTER — OFFICE VISIT (OUTPATIENT)
Dept: URGENT CARE | Facility: PHYSICIAN GROUP | Age: 57
End: 2022-05-29
Payer: COMMERCIAL

## 2022-05-29 VITALS
WEIGHT: 287 LBS | RESPIRATION RATE: 14 BRPM | SYSTOLIC BLOOD PRESSURE: 144 MMHG | HEART RATE: 90 BPM | BODY MASS INDEX: 45.04 KG/M2 | HEIGHT: 67 IN | DIASTOLIC BLOOD PRESSURE: 90 MMHG | TEMPERATURE: 99.5 F | OXYGEN SATURATION: 95 %

## 2022-05-29 DIAGNOSIS — I10 PRIMARY HYPERTENSION: ICD-10-CM

## 2022-05-29 DIAGNOSIS — K52.9 AGE (ACUTE GASTROENTERITIS): ICD-10-CM

## 2022-05-29 LAB
EXTERNAL QUALITY CONTROL: NORMAL
GLUCOSE BLD-MCNC: 120 MG/DL (ref 70–100)
SARS-COV+SARS-COV-2 AG RESP QL IA.RAPID: NEGATIVE

## 2022-05-29 PROCEDURE — 82962 GLUCOSE BLOOD TEST: CPT | Performed by: FAMILY MEDICINE

## 2022-05-29 PROCEDURE — 87426 SARSCOV CORONAVIRUS AG IA: CPT | Performed by: FAMILY MEDICINE

## 2022-05-29 PROCEDURE — 99214 OFFICE O/P EST MOD 30 MIN: CPT | Performed by: FAMILY MEDICINE

## 2022-05-29 RX ORDER — ONDANSETRON 4 MG/1
4 TABLET, ORALLY DISINTEGRATING ORAL ONCE
Status: COMPLETED | OUTPATIENT
Start: 2022-05-29 | End: 2022-05-29

## 2022-05-29 RX ORDER — ONDANSETRON 4 MG/1
4 TABLET, ORALLY DISINTEGRATING ORAL EVERY 8 HOURS PRN
Qty: 10 TABLET | Refills: 0 | Status: SHIPPED | OUTPATIENT
Start: 2022-05-29 | End: 2022-07-01

## 2022-05-29 RX ADMIN — ONDANSETRON 4 MG: 4 TABLET, ORALLY DISINTEGRATING ORAL at 12:30

## 2022-05-29 ASSESSMENT — FIBROSIS 4 INDEX: FIB4 SCORE: 2.6

## 2022-05-29 NOTE — PROGRESS NOTES
"Chief Complaint   Patient presents with   • Chills     Vomiting since Thrusday         Chief Complaint   Patient presents with   • Chills     Vomiting since Thrusday             Emesis  This is a new problem. The current episode started in the past 2 days. The problem occurs 3 times per day. The problem has been unchanged.  no blood in emesis.      Associated symptoms include diarrhea.   + subj fever.       Denies abd pain. Pertinent negatives include no abdominal pain, chills, coughing,  , headaches, or weight loss. Nothing aggravates the symptoms. There are no known risk factors. Patient has tried nothing for the symptoms. There is no history of inflammatory bowel disease.     Past Medical History:   Diagnosis Date   • Anemia    • Diabetes (HCC)    • High cholesterol    • Hypertension        Social History     Tobacco Use   • Smoking status: Never Smoker   • Smokeless tobacco: Never Used   Vaping Use   • Vaping Use: Never used   Substance Use Topics   • Alcohol use: No   • Drug use: No                  Review of Systems      Respiratory: Negative for cough and wheezing.    Cardiovascular: Negative for chest pain.   Gastrointestinal:   Negative for  blood in stool.   Neurological: Negative for dizziness and headaches.   All other systems reviewed and are negative.         Objective:     BP (!) 144/90 (BP Location: Right arm, Patient Position: Sitting, BP Cuff Size: Adult long)   Pulse 90   Temp 37.5 °C (99.5 °F) (Temporal)   Resp 14   Ht 1.702 m (5' 7\")   Wt (!) 130 kg (287 lb)   SpO2 95%       Physical Exam   Constitutional: pt is oriented to person, place, and time and appears well-developed. No distress.   HENT:   Head: Normocephalic and atraumatic.   Mouth/Throat: No oropharyngeal exudate.   Eyes: Conjunctivae are normal. No scleral icterus.   Cardiovascular: Normal rate, regular rhythm and normal heart sounds.    Pulmonary/Chest: Effort normal and breath sounds normal. No respiratory distress. Pt has no " wheezes. Pt has no rales.   Abdominal: Normal appearance and bowel sounds are normal. There is no splenomegaly or hepatomegaly. There is no tenderness. There is no rebound, no guarding, no CVA tenderness and no tenderness at McBurney's point.   Lymphadenopathy:     Pt has no cervical adenopathy.   Neurological: pt is alert and oriented to person, place, and time.   Skin: Skin is warm. Pt is not diaphoretic. No erythema.   Psychiatric:  behavior is normal.   Nursing note and vitals reviewed.              Assessment/Plan:         1. Viral gastroenteritis    Pt presents with n/v/d and systemic symptoms      She is afebrile today.     Blood sugar 120  Able to pass PO challenge after 4mg zofran       BRAT diet x 24 hr  Push clear fluids    Will screen for COVID    - ondansetron (ZOFRAN) 4 MG Tab tablet; Take 1 Tab by mouth every four hours as needed for Nausea/Vomiting.  Dispense: 20 Tab; Refill: 1    F/u in 2 d if sx not improved      2. HTN  Not at goal     Continue on losartan, coreg  F/u PCP

## 2022-05-31 ENCOUNTER — APPOINTMENT (OUTPATIENT)
Dept: PHYSICAL THERAPY | Facility: REHABILITATION | Age: 57
End: 2022-05-31
Attending: PHYSICIAN ASSISTANT
Payer: COMMERCIAL

## 2022-06-02 ENCOUNTER — APPOINTMENT (OUTPATIENT)
Dept: PHYSICAL THERAPY | Facility: REHABILITATION | Age: 57
End: 2022-06-02
Attending: PHYSICIAN ASSISTANT
Payer: COMMERCIAL

## 2022-06-15 ENCOUNTER — APPOINTMENT (OUTPATIENT)
Dept: PHYSICAL THERAPY | Facility: REHABILITATION | Age: 57
End: 2022-06-15
Attending: PHYSICIAN ASSISTANT
Payer: COMMERCIAL

## 2022-06-15 DIAGNOSIS — I89.0 LYMPHEDEMA OF BOTH LOWER EXTREMITIES: ICD-10-CM

## 2022-06-15 DIAGNOSIS — R60.9 LIPEDEMA: ICD-10-CM

## 2022-06-15 PROCEDURE — 97140 MANUAL THERAPY 1/> REGIONS: CPT

## 2022-06-15 NOTE — OP THERAPY DAILY TREATMENT
Outpatient Physical Therapy  LYMPHEDEMA THERAPY DAILY TREATMENT     [facility] [address].  [address]  [place] [address]  Phone:  [phone]  Fax:  [phone]    Date: 06/15/2022    Patient: Cyndee Calvert  YOB: 1965  MRN: [id number]     Time Calculation    Start time: 1420  Stop time: 1501 Time Calculation (min): 41 minutes         Chief Complaint: Lipolymphedema    Visit #: 4    Subjective:   History of Present Illness:     Mechanism of injury:  Was sick with kidney infection over the past week. Also developed a bruise to posterior calf that was painful; unknown cause. Is improving.       Lymphedema Objective    Left Lower Extremity Circumferential Measurements  Waist: cm  Hip: cm  Scrotum: cm  Ground/Upper Thigh: cm  Mid Thigh: 61 cm  Knee: 48 cm  Upper Calf: 59.8 cm  Mid Calf: 47.9 cm  Ankle: 39.3 cm  Heel to Foot: 24.3 cm  Total: 280.3 cm    Right Lower Extremity Circumferential Measurements  Waist: cm  Hip: cm  Scrotum: cm  Ground/Upper Thigh: cm  Mid Thigh: 58.2 cm  Knee: 44.7 cm  Upper Calf: 58.5 cm  Mid Calf: 48.2 cm  Ankle: 40.5 cm  Heel to Foot: 24.4 cm  Total: 274.5 cm         Left Lower Extremity Circumferential Measurements 5/26  Waist: cm  Hip: cm  Scrotum: cm  Ground/Upper Thigh: cm  Mid Thigh: 64.1 cm  Knee: 50.3 cm  Upper Calf: 61.7 cm  Mid Calf: 48.6 cm  Ankle: 37.9 cm  Heel to Foot: 24.1 cm  Total: 286.7 cm     Right Lower Extremity Circumferential Measurements 5/26  Waist: cm  Hip: cm  Scrotum: cm  Ground/Upper Thigh: cm  Mid Thigh: 59.6 cm  Knee: 46.3 cm  Upper Calf: 58.3 cm  Mid Calf: 46.2 cm  Ankle: 37.8 cm  Heel to Foot: 25.1 cm  Total: 273.3 cm     Left Lower Extremity Circumferential Measurements 5/16  Waist: cm  Hip: cm  Scrotum: cm  Ground/Upper Thigh: cm  Mid Thigh: 66.2 cm  Knee: 48 cm  Upper Calf: 60.8 cm  Mid Calf: 49.1 cm  Ankle: 37.4 cm  Heel to Foot: 24.3 cm  Total: 285.8 cm     Right Lower Extremity Circumferential  Measurements 5/16  Waist: cm  Hip: cm  Scrotum: cm  Ground/Upper Thigh: cm  Mid Thigh: 60.3 cm  Knee: 45.1 cm  Upper Calf: 62.3 cm  Mid Calf: 47.7 cm  Ankle: 39.6 cm  Heel to Foot: 24.3 cm  Total: 279.3 cm       Therapeutic Treatments and Modalities:     1. Therapeutic Activities (CPT 12346)    Therapeutic Treatment and Modalities Summary: MLD to B LE without trunk involvement. Focus on L LE due to time contraint.   The purpose of Manual Lymph Drainage (MLD) is to reduce lymph volume in the affected limb by increasing intake of lymphatic load into the lymphatic system, increasing the volume of transported lymph fluid, moving lymph fluid in superficial lymph vessels to collateral lymph collectors, anastomoses, or tissue channels, and increasing venous return. The goal of MLD is to re-route the lymph flow around blocked areas into more centrally located healthy lymph vessels, which drain into the venous system.     Multi-layer compression bandages applied to B LE distal to knees utilizing the technique of 50% overlap and 50% stretch. Stockinette has been applied as a protective base layer with padding overlay to further protect skin from short-stretch bandages. Short stretch bandages in the size of 6cm, 8cm, 10cm x2 were utilized to complete compression to distal aspects of affected limbs. The high working pressure and low resting pressure qualities of short-stretch bandages make them the preferred compression bandage in the management of lymphedema, especially during the decongestive phase (phase I) of Complete Decongestive Therapy (CDT). Utilization of theses bandages prevents the re-accumulation of evacuated lymph fluid and conserves the results achieved during MLD.    Time-based treatments/modalities:    Physical Therapy Timed Treatment Charges  Manual therapy minutes (CPT 54199): 41 minutes      Assessment and Plan:   Assessment details:  Pt achieved a 6cm reduction to L LE and is sustaining R LE measurements.  Bruise at calf looks to be in a healing stage and is not hot, painful, red, or raised. She has been advised to remove compression should it become painful over bruise. Cyndee will benefit from further intervention to achieve decongestion.     Plan:  Therapy options:  Physical therapy treatment to continue  Discussed with:  Patient
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
## 2022-06-21 ENCOUNTER — APPOINTMENT (OUTPATIENT)
Dept: PHYSICAL THERAPY | Facility: REHABILITATION | Age: 57
End: 2022-06-21
Attending: PHYSICIAN ASSISTANT
Payer: COMMERCIAL

## 2022-06-22 ENCOUNTER — PHYSICAL THERAPY (OUTPATIENT)
Dept: PHYSICAL THERAPY | Facility: REHABILITATION | Age: 57
End: 2022-06-22
Attending: PHYSICIAN ASSISTANT
Payer: COMMERCIAL

## 2022-06-22 DIAGNOSIS — I89.0 LYMPHEDEMA OF BOTH LOWER EXTREMITIES: ICD-10-CM

## 2022-06-22 PROCEDURE — 97140 MANUAL THERAPY 1/> REGIONS: CPT

## 2022-06-22 NOTE — OP THERAPY DAILY TREATMENT
Outpatient Physical Therapy  LYMPHEDEMA THERAPY DAILY TREATMENT     [facility] [address]  [address]  [place] [address]  Phone:  [phone]  Fax:  [phone]    Date: 06/22/2022    Patient: Cyndee Calvert  YOB: 1965  MRN: [id number]     Time Calculation    Start time: 1118  Stop time: 1203 Time Calculation (min): 45 minutes         Chief Complaint: Lipolymphedema    Visit #: 5    Subjective:   History of Present Illness:     Mechanism of injury:  Pt reporting wraps slipped one morning when she woke up. Otherwise, no issues.       Lymphedema Objective    Left Lower Extremity Circumferential Measurements  Waist: cm  Hip: cm  Scrotum: cm  Ground/Upper Thigh: cm  Mid Thigh: 62.3 cm  Knee: 48.2 cm  Upper Calf: 60.6 cm  Mid Calf: 46.3 cm  Ankle: 38.9 cm  Heel to Foot: 24.2 cm  Total: 280.5 cm    Right Lower Extremity Circumferential Measurements  Waist: cm  Hip: cm  Scrotum: cm  Ground/Upper Thigh: cm  Mid Thigh: 58.7 cm  Knee: 45.3 cm  Upper Calf: 60.4 cm  Mid Calf: 46.4 cm  Ankle: 37.1 cm  Heel to Foot: 24.7 cm  Total: 272.6 cm           Left Lower Extremity Circumferential Measurements 6/15  Waist: cm  Hip: cm  Scrotum: cm  Ground/Upper Thigh: cm  Mid Thigh: 61 cm  Knee: 48 cm  Upper Calf: 59.8 cm  Mid Calf: 47.9 cm  Ankle: 39.3 cm  Heel to Foot: 24.3 cm  Total: 280.3 cm     Right Lower Extremity Circumferential Measurements 6/15  Waist: cm  Hip: cm  Scrotum: cm  Ground/Upper Thigh: cm  Mid Thigh: 58.2 cm  Knee: 44.7 cm  Upper Calf: 58.5 cm  Mid Calf: 48.2 cm  Ankle: 40.5 cm  Heel to Foot: 24.4 cm  Total: 274.5 cm           Left Lower Extremity Circumferential Measurements 5/26  Waist: cm  Hip: cm  Scrotum: cm  Ground/Upper Thigh: cm  Mid Thigh: 64.1 cm  Knee: 50.3 cm  Upper Calf: 61.7 cm  Mid Calf: 48.6 cm  Ankle: 37.9 cm  Heel to Foot: 24.1 cm  Total: 286.7 cm     Right Lower Extremity Circumferential Measurements 5/26  Waist: cm  Hip: cm  Scrotum:  cm  Ground/Upper Thigh: cm  Mid Thigh: 59.6 cm  Knee: 46.3 cm  Upper Calf: 58.3 cm  Mid Calf: 46.2 cm  Ankle: 37.8 cm  Heel to Foot: 25.1 cm  Total: 273.3 cm     Left Lower Extremity Circumferential Measurements 5/16  Waist: cm  Hip: cm  Scrotum: cm  Ground/Upper Thigh: cm  Mid Thigh: 66.2 cm  Knee: 48 cm  Upper Calf: 60.8 cm  Mid Calf: 49.1 cm  Ankle: 37.4 cm  Heel to Foot: 24.3 cm  Total: 285.8 cm     Right Lower Extremity Circumferential Measurements 5/16  Waist: cm  Hip: cm  Scrotum: cm  Ground/Upper Thigh: cm  Mid Thigh: 60.3 cm  Knee: 45.1 cm  Upper Calf: 62.3 cm  Mid Calf: 47.7 cm  Ankle: 39.6 cm  Heel to Foot: 24.3 cm  Total: 279.3 cm       Therapeutic Treatments and Modalities:     1. Manual Therapy (CPT 69568)    Therapeutic Treatment and Modalities Summary: MLD to B LE without trunk involvement. Focus on L LE due to time contraint.   The purpose of Manual Lymph Drainage (MLD) is to reduce lymph volume in the affected limb by increasing intake of lymphatic load into the lymphatic system, increasing the volume of transported lymph fluid, moving lymph fluid in superficial lymph vessels to collateral lymph collectors, anastomoses, or tissue channels, and increasing venous return. The goal of MLD is to re-route the lymph flow around blocked areas into more centrally located healthy lymph vessels, which drain into the venous system.     Multi-layer compression bandages applied to B LE distal to knees utilizing the technique of 50% overlap and 50% stretch. Stockinette has been applied as a protective base layer with padding overlay to further protect skin from short-stretch bandages. Short stretch bandages in the size of 6cm, 8cm, 10cm x2 were utilized to complete compression to distal aspects of affected limbs. The high working pressure and low resting pressure qualities of short-stretch bandages make them the preferred compression bandage in the management of lymphedema, especially during the decongestive  phase (phase I) of Complete Decongestive Therapy (CDT). Utilization of theses bandages prevents the re-accumulation of evacuated lymph fluid and conserves the results achieved during MLD.    Time-based treatments/modalities:    Physical Therapy Timed Treatment Charges  Manual therapy minutes (CPT 01112): 45 minutes      Assessment and Plan:   Assessment details:  Pt has reduced R LE by an additional 2cm. L LE overall has not changed, but it does appear that pt has experiencing a fluid migration distally to proximally. She will benefit from further intervention to achieve decongestion.     Plan:  Therapy options:  Physical therapy treatment to continue  Discussed with:  Patient

## 2022-06-29 ENCOUNTER — PHYSICAL THERAPY (OUTPATIENT)
Dept: PHYSICAL THERAPY | Facility: REHABILITATION | Age: 57
End: 2022-06-29
Attending: PHYSICIAN ASSISTANT
Payer: COMMERCIAL

## 2022-06-29 ENCOUNTER — OFFICE VISIT (OUTPATIENT)
Dept: URGENT CARE | Facility: PHYSICIAN GROUP | Age: 57
End: 2022-06-29
Payer: COMMERCIAL

## 2022-06-29 ENCOUNTER — HOSPITAL ENCOUNTER (OUTPATIENT)
Dept: RADIOLOGY | Facility: MEDICAL CENTER | Age: 57
End: 2022-06-29
Attending: NURSE PRACTITIONER
Payer: COMMERCIAL

## 2022-06-29 VITALS
OXYGEN SATURATION: 99 % | HEART RATE: 81 BPM | BODY MASS INDEX: 44.26 KG/M2 | DIASTOLIC BLOOD PRESSURE: 62 MMHG | HEIGHT: 67 IN | TEMPERATURE: 97.7 F | SYSTOLIC BLOOD PRESSURE: 134 MMHG | RESPIRATION RATE: 16 BRPM | WEIGHT: 282 LBS

## 2022-06-29 DIAGNOSIS — E11.628 TYPE 2 DIABETES MELLITUS WITH OTHER SKIN COMPLICATION, WITHOUT LONG-TERM CURRENT USE OF INSULIN (HCC): ICD-10-CM

## 2022-06-29 DIAGNOSIS — I89.0 LYMPHEDEMA OF BOTH LOWER EXTREMITIES: ICD-10-CM

## 2022-06-29 DIAGNOSIS — S99.921A INJURY OF TOE ON RIGHT FOOT, INITIAL ENCOUNTER: ICD-10-CM

## 2022-06-29 DIAGNOSIS — R60.9 LIPEDEMA: ICD-10-CM

## 2022-06-29 DIAGNOSIS — S99.922A INJURY OF TOE ON LEFT FOOT, INITIAL ENCOUNTER: ICD-10-CM

## 2022-06-29 DIAGNOSIS — I89.0 LYMPHEDEMA: ICD-10-CM

## 2022-06-29 DIAGNOSIS — S92.514A CLOSED NONDISPLACED FRACTURE OF PROXIMAL PHALANX OF LESSER TOE OF RIGHT FOOT, INITIAL ENCOUNTER: Primary | ICD-10-CM

## 2022-06-29 PROCEDURE — 97140 MANUAL THERAPY 1/> REGIONS: CPT

## 2022-06-29 PROCEDURE — 73620 X-RAY EXAM OF FOOT: CPT | Mod: RT

## 2022-06-29 PROCEDURE — 99214 OFFICE O/P EST MOD 30 MIN: CPT | Performed by: NURSE PRACTITIONER

## 2022-06-29 ASSESSMENT — FIBROSIS 4 INDEX: FIB4 SCORE: 2.6

## 2022-06-29 NOTE — OP THERAPY DAILY TREATMENT
Outpatient Physical Therapy  LYMPHEDEMA THERAPY DAILY TREATMENT     [facility] [address].  [address] [facility]  [place] [address]  Phone:  [phone]  Fax:  [phone]    Date: 06/29/2022    Patient: Cyndee Calvert  YOB: 1965  MRN: [id number]     Time Calculation    Start time: 0730  Stop time: 0816 Time Calculation (min): 46 minutes         Chief Complaint: Lipolymphedema    Visit #: 6    Subjective:   History of Present Illness:     Mechanism of injury:  Pt not doing well. Thinks she broke her her R 5th toe.       Lymphedema Objective    Left Lower Extremity Circumferential Measurements  Waist: cm  Hip: cm  Scrotum: cm  Ground/Upper Thigh: cm  Mid Thigh: 65 cm  Knee: 48.2 cm  Upper Calf: 60 cm  Mid Calf: 44.6 cm  Ankle: 38.5 cm  Heel to Foot: 24.7 cm  Total: 281 cm    Right Lower Extremity Circumferential Measurements  Waist: cm  Hip: cm  Scrotum: cm  Ground/Upper Thigh: cm  Mid Thigh: 58 cm  Knee: 44.2 cm  Upper Calf: 58.8 cm  Mid Calf: 47.3 cm  Ankle: 39.2 cm  Heel to Foot: 26.4 cm  Total: 273.9 cm             Left Lower Extremity Circumferential Measurements 6/22  Waist: cm  Hip: cm  Scrotum: cm  Ground/Upper Thigh: cm  Mid Thigh: 62.3 cm  Knee: 48.2 cm  Upper Calf: 60.6 cm  Mid Calf: 46.3 cm  Ankle: 38.9 cm  Heel to Foot: 24.2 cm  Total: 280.5 cm     Right Lower Extremity Circumferential Measurements 6/22  Waist: cm  Hip: cm  Scrotum: cm  Ground/Upper Thigh: cm  Mid Thigh: 58.7 cm  Knee: 45.3 cm  Upper Calf: 60.4 cm  Mid Calf: 46.4 cm  Ankle: 37.1 cm  Heel to Foot: 24.7 cm  Total: 272.6 cm         Left Lower Extremity Circumferential Measurements 6/15  Waist: cm  Hip: cm  Scrotum: cm  Ground/Upper Thigh: cm  Mid Thigh: 61 cm  Knee: 48 cm  Upper Calf: 59.8 cm  Mid Calf: 47.9 cm  Ankle: 39.3 cm  Heel to Foot: 24.3 cm  Total: 280.3 cm     Right Lower Extremity Circumferential Measurements 6/15  Waist: cm  Hip: cm  Scrotum: cm  Ground/Upper Thigh: cm  Mid  Thigh: 58.2 cm  Knee: 44.7 cm  Upper Calf: 58.5 cm  Mid Calf: 48.2 cm  Ankle: 40.5 cm  Heel to Foot: 24.4 cm  Total: 274.5 cm      Left Lower Extremity Circumferential Measurements 5/26  Waist: cm  Hip: cm  Scrotum: cm  Ground/Upper Thigh: cm  Mid Thigh: 64.1 cm  Knee: 50.3 cm  Upper Calf: 61.7 cm  Mid Calf: 48.6 cm  Ankle: 37.9 cm  Heel to Foot: 24.1 cm  Total: 286.7 cm     Right Lower Extremity Circumferential Measurements 5/26  Waist: cm  Hip: cm  Scrotum: cm  Ground/Upper Thigh: cm  Mid Thigh: 59.6 cm  Knee: 46.3 cm  Upper Calf: 58.3 cm  Mid Calf: 46.2 cm  Ankle: 37.8 cm  Heel to Foot: 25.1 cm  Total: 273.3 cm     Left Lower Extremity Circumferential Measurements 5/16  Waist: cm  Hip: cm  Scrotum: cm  Ground/Upper Thigh: cm  Mid Thigh: 66.2 cm  Knee: 48 cm  Upper Calf: 60.8 cm  Mid Calf: 49.1 cm  Ankle: 37.4 cm  Heel to Foot: 24.3 cm  Total: 285.8 cm     Right Lower Extremity Circumferential Measurements 5/16  Waist: cm  Hip: cm  Scrotum: cm  Ground/Upper Thigh: cm  Mid Thigh: 60.3 cm  Knee: 45.1 cm  Upper Calf: 62.3 cm  Mid Calf: 47.7 cm  Ankle: 39.6 cm  Heel to Foot: 24.3 cm  Total: 279.3 cm      Therapeutic Treatments and Modalities:     1. Manual Therapy (CPT 62012)    Therapeutic Treatment and Modalities Summary: MLD to B LE without trunk involvement. Focus on L LE due to time contraint.   The purpose of Manual Lymph Drainage (MLD) is to reduce lymph volume in the affected limb by increasing intake of lymphatic load into the lymphatic system, increasing the volume of transported lymph fluid, moving lymph fluid in superficial lymph vessels to collateral lymph collectors, anastomoses, or tissue channels, and increasing venous return. The goal of MLD is to re-route the lymph flow around blocked areas into more centrally located healthy lymph vessels, which drain into the venous system.     Multi-layer compression bandages applied to B LE to mid-thighs utilizing the technique of 50% overlap and 50% stretch.  Stockinette has been applied as a protective base layer with padding overlay to further protect skin from short-stretch bandages. Short stretch bandages in the size of 6cm, 8cm, 10cm x2 were utilized to complete compression to most aspects of affected limbs. The high working pressure and low resting pressure qualities of short-stretch bandages make them the preferred compression bandage in the management of lymphedema, especially during the decongestive phase (phase I) of Complete Decongestive Therapy (CDT). Utilization of theses bandages prevents the re-accumulation of evacuated lymph fluid and conserves the results achieved during MLD.    Time-based treatments/modalities:    Physical Therapy Timed Treatment Charges  Manual therapy minutes (CPT 84522): 46 minutes      Assessment and Plan:   Assessment details:  Pt has been sustaining her measurements with small fluctuations. Today, wrapped pt up to her thighs to evaluate if further change can be made with increased length of compression. If so, she will benefit from further intervention to achieve ongoing decongestion. If this makes no difference, she will likely be ready for a compression garment and soon be able to transition to phase II.     Plan:  Therapy options:  Physical therapy treatment to continue  Discussed with:  Patient

## 2022-06-29 NOTE — PROGRESS NOTES
Cyndee Calvert is a 56 y.o. female who presents for Toe Injury (R 5th toe, bruised swelling, pain x1 day )      HPI This is a new problem. Cyndee Calvert is a 56 y.o. patient who presents to urgent care with c/o: Injury to her right fifth toe.  She stubbed it 3 days ago on the door jam.  She had immediate pain and little bit of swelling.  She then restubbed it again yesterday which caused more pain and swelling.  She has lipedema and lipedema and is seen in physical therapy routinely for wrapping of her lower legs.  She was there this morning for wrapping and the physical therapist told her that her toe looked really swollen and a little red and that she should come in and have it evaluated. Pain is severe with movement or touch. She can walk but has to bear weight on inside of foot/ toes.   Denies numbness or tingling  Treatments tried: ice, rest, elevation  No other aggravating or alleviating factors.     3 days ago  Yesterday     ROS    Allergies:       Allergies   Allergen Reactions   • Percocet [Oxycodone-Acetaminophen] Shortness of Breath, Vomiting and Swelling   • Latex Swelling       PMSFS Hx:  Past Medical History:   Diagnosis Date   • Anemia    • Diabetes (HCC)    • High cholesterol    • Hypertension      Past Surgical History:   Procedure Laterality Date   • BLADDER BIOPSY WITH CYSTOSCOPY  9/2/08    Performed by LILLIE WILLIAM at SURGERY [facility] ORS   • HYSTERECTOMY, TOTAL ABDOMINAL     • TONSILLECTOMY       Family History   Problem Relation Age of Onset   • Heart Disease Mother    • Heart Attack Father    • Heart Failure Brother      Social History     Tobacco Use   • Smoking status: Never Smoker   • Smokeless tobacco: Never Used   Substance Use Topics   • Alcohol use: No       Problems:   Patient Active Problem List   Diagnosis   • High cholesterol   • HTN (hypertension)   • Anemia   • Class 3 obesity with serious comorbidity and body mass index (BMI) of 45.0 to 49.9 in  "adult   • ANOOP (acute kidney injury) (HCC)   • Diabetes mellitus (HCC)   • Groin abscess   • Tension type headache   • Hypomagnesemia   • Pneumonia due to COVID-19 virus   • DVT prophylaxis   • Lymphedema   • Lipedema       Medications:   Current Outpatient Medications on File Prior to Visit   Medication Sig Dispense Refill   • ondansetron (ZOFRAN ODT) 4 MG TABLET DISPERSIBLE Take 1 Tablet by mouth every 8 hours as needed for Nausea. 10 Tablet 0   • insulin glargine (LANTUS SOLOSTAR) 100 UNIT/ML Solution Pen-injector injection      • liraglutide (VICTOZA) 18 MG/3ML Solution Pen-injector      • montelukast (SINGULAIR) 10 MG Tab Take 1 Tablet by mouth every evening. 30 Tablet 11   • azithromycin (ZITHROMAX) 250 MG Tab Take 2 tablets on day 1, then take 1 tablet a day for 4 days. 6 Tablet 0   • albuterol 108 (90 Base) MCG/ACT Aero Soln inhalation aerosol Inhale 1-2 Puffs every four hours as needed. 1 Each 3   • spironolactone (ALDACTONE) 50 MG Tab Take 50 mg by mouth every day.     • carvedilol (COREG) 12.5 MG Tab      • HUMALOG KWIKPEN 100 UNIT/ML Solution Pen-injector injection PEN INJECT 2 UNITS SUBCUTANEOUSLY PER 10G CARB AS NEEDED WITH MEALS     • ASPIRIN 81 PO Take  by mouth.     • metFORMIN (GLUCOPHAGE) 500 MG Tab Take 500 mg by mouth 2 times a day.     • losartan (COZAAR) 100 MG Tab Take 100 mg by mouth every day.     • Empagliflozin (JARDIANCE) 25 MG Tab Take 25 mg by mouth every day.     • atorvastatin (LIPITOR) 40 MG Tab Take 40 mg by mouth every bedtime.  6     No current facility-administered medications on file prior to visit.          Objective:     /62   Pulse 81   Temp 36.5 °C (97.7 °F) (Temporal)   Resp 16   Ht 1.702 m (5' 7\")   Wt (!) 128 kg (282 lb)   LMP 04/19/2003   SpO2 99%   BMI 44.17 kg/m²     Physical Exam  Vitals reviewed.   Constitutional:       General: She is not in acute distress.     Appearance: Normal appearance. She is normal weight.   Cardiovascular:      Rate and Rhythm: " Normal rate.      Pulses: Normal pulses.      Comments: Chronic lymphedema of lower extremities  Pulmonary:      Effort: Pulmonary effort is normal.   Musculoskeletal:      Right foot: Swelling present.        Feet:    Skin:     General: Skin is warm.      Capillary Refill: Capillary refill takes less than 2 seconds.      Comments: Multilayer compression bandages on both of her lower extremities for chronic lymphedema and lipedema.   Neurological:      Mental Status: She is alert.   Psychiatric:         Mood and Affect: Mood normal.         Behavior: Behavior normal.         Thought Content: Thought content normal.         RADIOLOGY RESULTS   DX-FOOT-2- RIGHT    Result Date: 6/29/2022 6/29/2022 9:29 AM HISTORY/REASON FOR EXAM:  Right foot and 5th toe pain after injury. TECHNIQUE/EXAM DESCRIPTION AND NUMBER OF VIEWS: 2 views of the RIGHT foot. COMPARISON:  2/22/2010 FINDINGS: There is no focal soft tissue swelling. There is a slight cortical offset and irregularity at the lateral aspect of the base of the right 5th proximal phalanx. The alignment is maintained. There are calcaneal enthesophytes. There is degenerative joint space narrowing of the DIP and PIP joints.     1.  There is a suspected nondisplaced fracture at the lateral base of the right 5th proximal phalanx.         Xray: Reviewed and interpreted independently by me. I agree with the radiologist's findings.       Assessment /Associated Orders:      1. Closed nondisplaced fracture of proximal phalanx of lesser toe of right foot, initial encounter     2. Lymphedema     3. Lipedema     4. Injury of toe on left foot, initial encounter  CANCELED: DX-FOOT-2- RIGHT   5. Type 2 diabetes mellitus with other skin complication, without long-term current use of insulin (HCC)         Medical Decision Making:    Pt is clinically stable at today's acute urgent care visit.  No acute distress noted. Appropriate for outpatient management at this time.   Acute problem today  with uncertain prognosis.   There is concern for delayed healing of fracture. Referral to orthopedic for follow up care.   CANE prn extra support  Hung taped toe   Maintain good glycemic control to help promote good healing of fracture  Resume all prior  RX medications. Take as prescribed.       PRICE therapy:   P- protect from further injury  R- rest the affected area as much as possible while pain and swelling persist  I- Ice packs - 15 minutes every 2 hours for the first 24 hours, then 4-5 times daily   C- compress either with splint or ace wrap as directed  E-elevate the extremity to help with swelling and pain    OTC  analgesic of choice (acetaminophen or NSAID) prn pain. Follow manufactures dosing and safety precautions.       • Discussed DDx, management options (risks,benefits, and alternatives to planned treatment), natural progression and supportive care.  Expressed understanding and the treatment plan was agreed upon. Questions were encouraged and answered   • Return to urgent care prn if new or worsening sx or if there is no improvement in condition prn.    • Educated in Red flags and indications to immediately call 911 or present to the Emergency Department.     I personally reviewed prior external notes and test results pertinent to today's visit.  I have independently reviewed and interpreted all diagnostics ordered during this urgent care acute visit.   Time spent evaluating this patient today was at least 34 minutes and includes preparing for visit, counseling/education, exam and evaluation, obtaining history, independent interpretation, ordering lab/test/procedures,medication management and documentation.Time does not include separately billable procedures noted .

## 2022-07-01 ENCOUNTER — OFFICE VISIT (OUTPATIENT)
Dept: CARDIOLOGY | Facility: MEDICAL CENTER | Age: 57
End: 2022-07-01
Attending: INTERNAL MEDICINE
Payer: COMMERCIAL

## 2022-07-01 ENCOUNTER — NON-PROVIDER VISIT (OUTPATIENT)
Dept: CARDIOLOGY | Facility: MEDICAL CENTER | Age: 57
End: 2022-07-01
Attending: INTERNAL MEDICINE

## 2022-07-01 VITALS
OXYGEN SATURATION: 99 % | RESPIRATION RATE: 18 BRPM | BODY MASS INDEX: 44.83 KG/M2 | SYSTOLIC BLOOD PRESSURE: 112 MMHG | DIASTOLIC BLOOD PRESSURE: 68 MMHG | HEART RATE: 83 BPM | WEIGHT: 285.6 LBS | HEIGHT: 67 IN

## 2022-07-01 DIAGNOSIS — I10 PRIMARY HYPERTENSION: ICD-10-CM

## 2022-07-01 DIAGNOSIS — I49.3 PVCS (PREMATURE VENTRICULAR CONTRACTIONS): ICD-10-CM

## 2022-07-01 DIAGNOSIS — E11.628 TYPE 2 DIABETES MELLITUS WITH OTHER SKIN COMPLICATION, WITHOUT LONG-TERM CURRENT USE OF INSULIN (HCC): ICD-10-CM

## 2022-07-01 DIAGNOSIS — I47.29 NSVT (NONSUSTAINED VENTRICULAR TACHYCARDIA) (HCC): ICD-10-CM

## 2022-07-01 DIAGNOSIS — R06.09 DYSPNEA ON EXERTION: ICD-10-CM

## 2022-07-01 DIAGNOSIS — I49.9 IRREGULAR HEART RATE: ICD-10-CM

## 2022-07-01 DIAGNOSIS — E78.5 DYSLIPIDEMIA: ICD-10-CM

## 2022-07-01 DIAGNOSIS — I49.1 APC (ATRIAL PREMATURE CONTRACTIONS): ICD-10-CM

## 2022-07-01 PROCEDURE — 99204 OFFICE O/P NEW MOD 45 MIN: CPT | Performed by: INTERNAL MEDICINE

## 2022-07-01 PROCEDURE — 93000 ELECTROCARDIOGRAM COMPLETE: CPT | Performed by: INTERNAL MEDICINE

## 2022-07-01 RX ORDER — PHENAZOPYRIDINE HYDROCHLORIDE 200 MG/1
TABLET, FILM COATED ORAL
COMMUNITY
Start: 2022-06-13 | End: 2022-07-01

## 2022-07-01 RX ORDER — ATORVASTATIN CALCIUM 80 MG/1
80 TABLET, FILM COATED ORAL
Qty: 90 TABLET | Refills: 3 | Status: SHIPPED | OUTPATIENT
Start: 2022-07-01 | End: 2023-08-31

## 2022-07-01 ASSESSMENT — FIBROSIS 4 INDEX: FIB4 SCORE: 2.6

## 2022-07-01 NOTE — PATIENT INSTRUCTIONS
Wear cardiac for 2 weeks, then mail back  Schedule nuclear stress test  Increase atorvastatin to total dose of 80 mg at night

## 2022-07-01 NOTE — PROGRESS NOTES
CARDIOLOGY CONSULTATION NOTE      Date of Visit: 7/1/2022    Primary Care Provider: Deanna Mccollum P.A.-C.    Patient Name: Cyndee Calvert  YOB: 1965  MRN: [id number]     Reason for Visit:   Dyspnea on exertion, bradycardia     Patient Story:   Cyndee Calvert is a 56-year-old woman with a history of morbid obesity, type 2 diabetes, hypertension, and hyperlipidemia Briefly, she had COVID in October 2021 and developed significant hypoxia requiring hospitalization and treatment with steroids, but not remdesivir.  She followed up with her pulmonologist in April 2022.  While performing a 6-minute walk test to evaluate persistent dyspnea on exertion, she was noted to be bradycardic at rest with a heart rate in the 40s with significant heart rate variability when she started exercise.  She was referred to Cardiology for further evaluation.    She presents today for consultation.  She reports that her main symptom is persistent shortness of breath.  She notes that this seems to be improving, but her functional status is still significantly reduced since she had COVID.  She has not particularly noticed low heart rates, but states every once a while she will feel suddenly flushed for a few minutes.  She otherwise denies any known history of cardiovascular disease, however, she has a strong family history of coronary artery disease.  Her father, mother, and brother all have coronary artery disease in her father underwent bypass surgery.  She is a non-smoker.     Medications and Allergies:     Current Outpatient Medications   Medication Sig Dispense Refill   • atorvastatin (LIPITOR) 80 MG tablet Take 1 Tablet by mouth at bedtime. 90 Tablet 3   • insulin glargine (LANTUS SOLOSTAR) 100 UNIT/ML Solution Pen-injector injection Inject 20 Units under the skin every evening.     • liraglutide (VICTOZA) 18 MG/3ML Solution Pen-injector Inject  under the skin every day.     • montelukast  (SINGULAIR) 10 MG Tab Take 1 Tablet by mouth every evening. 30 Tablet 11   • albuterol 108 (90 Base) MCG/ACT Aero Soln inhalation aerosol Inhale 1-2 Puffs every four hours as needed. 1 Each 3   • spironolactone (ALDACTONE) 50 MG Tab Take 50 mg by mouth every day.     • carvedilol (COREG) 12.5 MG Tab Take 12.5 mg by mouth 2 times a day.     • HUMALOG KWIKPEN 100 UNIT/ML Solution Pen-injector injection PEN INJECT 2 UNITS SUBCUTANEOUSLY PER 10G CARB AS NEEDED WITH MEALS     • ASPIRIN 81 PO Take  by mouth.     • metFORMIN (GLUCOPHAGE) 500 MG Tab Take 500 mg by mouth 2 times a day.     • losartan (COZAAR) 100 MG Tab Take 100 mg by mouth every day.     • Empagliflozin (JARDIANCE) 25 MG Tab Take 25 mg by mouth every day.       No current facility-administered medications for this visit.     Allergies   Allergen Reactions   • Percocet [Oxycodone-Acetaminophen] Shortness of Breath, Vomiting and Swelling   • Latex Swelling      Medical Decision Making:   # Dyspnea on exertion, bradycardia: Temporally, her shortness of breath began after she had COVID-pneumonia.  This is slowly improving and I suspect most of her symptoms are related to effects from COVID.  However, given documented bradycardia and heart rate irregularity, we will further evaluate with cardiac monitoring.  Additionally, given her substantial cardiac risk factors, will also exclude obstructive coronary artery disease with stress testing.  -2-week cardiac monitor  -Pharmacological nuclear MPI    # Hypertension: Her blood pressure was very well controlled today on her current regimen.    # Insulin-dependent type 2 diabetes with morbid obesity, dyslipidemia: She is already on an SGL-2 inhibitor and a GLP-1 agonist.  Her most recent lipids showed poor LDL control for a diabetic.  I recommended increasing her atorvastatin to 80 mg nightly.  Her long-term goal LDL should be less than 70.  If this is not achieved with higher dose atorvastatin, she can be started on  "ezetimibe by her PCP.    Follow Up  As needed pending results of cardiac testing     Cardiac Studies and Procedures:   Echocardiography  TTE (6/19/2018)  Mild concentric left ventricular hypertrophy.  Grossly normal left ventricular systolic function.  Left ventricular ejection fraction is visually estimated to be 55%.  Normal diastolic function.  Normal inferior vena cava size and inspiratory collapse.  Aortic sclerosis without stenosis.  Estimated right ventricular systolic pressure is 30 mmHg.    Electrophysiology  ECG (7/1/2022)-independently interpreted  Normal sinus rhythm     Vital Signs:   /68 (BP Location: Left arm, Patient Position: Sitting, BP Cuff Size: Adult)   Pulse 83   Resp 18   Ht 1.702 m (5' 7\")   Wt (!) 130 kg (285 lb 9.6 oz)   SpO2 99%    BP Readings from Last 4 Encounters:   07/01/22 112/68   06/29/22 134/62   05/29/22 (!) 144/90   04/27/22 140/76     Wt Readings from Last 4 Encounters:   07/01/22 (!) 130 kg (285 lb 9.6 oz)   06/29/22 (!) 128 kg (282 lb)   05/29/22 (!) 130 kg (287 lb)   04/27/22 (!) 132 kg (291 lb)     Body mass index is 44.73 kg/m².     Laboratories:   Lipids (6/9/2022 - [facility])  Total 166    HDL 25      Lab Results   Component Value Date/Time    LDL 90 03/16/2021 0619     (H) 09/18/2008 0357     Lab Results   Component Value Date/Time    HDL 31 (A) 03/16/2021 0619    HDL 33 (L) 09/18/2008 0357       Lab Results   Component Value Date/Time    TRIGLYCERIDE 98 03/16/2021 0619    TRIGLYCERIDE 121 09/18/2008 0357       Lab Results   Component Value Date/Time    CHOLSTRLTOT 141 03/16/2021 0619    CHOLSTRLTOT 189 09/18/2008 0357     GFR  Lab Results   Component Value Date/Time    IFNOTAFR 37 (A) 03/17/2021 0803    IFNOTAFR 31 (A) 03/16/2021 0619    IFNOTAFR 42 (A) 03/15/2021 1436    IFNOTAFR >60 04/25/2018 0342    IFNOTAFR >60 04/23/2018 0404     Chemistries  Lab Results   Component Value Date/Time    CREATININE 1.44 (H) 03/17/2021 0803    CREATININE " 1.68 (H) 03/16/2021 0619    CREATININE 1.30 03/15/2021 1436    CREATININE 0.86 04/25/2018 0342    CREATININE 0.87 04/23/2018 0404     Lab Results   Component Value Date/Time    BUN 30 (H) 03/17/2021 0803    BUN 28 (H) 03/16/2021 0619    BUN 18 03/15/2021 1436    BUN 12 04/25/2018 0342    BUN 11 04/23/2018 0404     Lab Results   Component Value Date/Time    POTASSIUM 4.3 03/17/2021 0803    POTASSIUM 3.9 03/16/2021 0619    POTASSIUM 4.4 03/15/2021 1436     Lab Results   Component Value Date/Time    SODIUM 136 03/17/2021 0803    SODIUM 133 (L) 03/16/2021 0619    SODIUM 136 03/15/2021 1436     Lab Results   Component Value Date/Time    GLUCOSE 127 (H) 03/17/2021 0803    GLUCOSE 100 (H) 03/16/2021 0619    GLUCOSE 100 (H) 03/15/2021 1436     Lab Results   Component Value Date/Time    ASTSGOT 28 03/16/2021 0619    ASTSGOT 32 03/15/2021 1436    ASTSGOT 20 04/21/2018 0421     Lab Results   Component Value Date/Time    ALTSGPT 15 03/16/2021 0619    ALTSGPT 19 03/15/2021 1436    ALTSGPT 13 04/21/2018 0421     Lab Results   Component Value Date/Time    ALKPHOSPHAT 54 03/16/2021 0619    ALKPHOSPHAT 63 03/15/2021 1436    ALKPHOSPHAT 70 04/21/2018 0421     Lab Results   Component Value Date/Time    HBA1C 10.6 (H) 03/16/2021 0619    HBA1C 16.5 (H) 04/18/2018 1121     Blood Counts  Lab Results   Component Value Date/Time    HEMOGLOBIN 11.8 (L) 03/17/2021 0803    HEMOGLOBIN 11.1 (L) 03/16/2021 0619    HEMOGLOBIN 12.8 03/15/2021 1436     Lab Results   Component Value Date/Time    PLATELETCT 161 (L) 03/17/2021 0803    PLATELETCT 148 (L) 03/16/2021 0619    PLATELETCT 160 (L) 03/15/2021 1436     Lab Results   Component Value Date/Time    WBC 7.5 03/17/2021 0803    WBC 3.4 (L) 03/16/2021 0619    WBC 3.1 (L) 03/15/2021 1436      Physical Examination:   General: Well-appearing, no acute distress  Eyes: Extraocular movements intact, anicteric  Neck: Supple, full range of motion, no jugular venous distension  Pulmonary: Normal respiratory  effort, clear to auscultation bilaterally  Cardiovascular: Regular rate and rhythm, distant heart sounds, but no severe murmurs or gallops appreciated  Gastrointestinal: Obese  Extremities: Warm and well perfused, nonpitting 2+ lower extremity edema  Neurological: Alert and oriented, no gross focal motor deficits     Past History:   Past Medical History  The patient's past medical history was reviewed.  See HPI and self-reported patient medical history form for pertinent medical history to consultation.    Past Social History  The patient's social history was reviewed.  See HPI self-reported patient medical history form for pertinent social history to consultation.    Past Family History  The patient's family history was reviewed.  See HPI self-reported patient medical history form for pertinent family history to consultation.    Review of Systems  A pertinent cardiac review of systems was performed and was otherwise unremarkable except as per HPI and self-reported patient medical history form.        William Alva MD, [facility]  Interventional Cardiology  [facility], [address]  [address] [address]  Phone: [phone]  Fax: [phone]

## 2022-07-01 NOTE — PROGRESS NOTES
Patient enrolled in the 14 day ePatch Holter monitoring program per William Alva MD.  >Office hook-up, serial #[id number].  >Currently pending EOS.

## 2022-07-04 LAB — EKG IMPRESSION: NORMAL

## 2022-07-06 ENCOUNTER — HOSPITAL ENCOUNTER (OUTPATIENT)
Facility: MEDICAL CENTER | Age: 57
End: 2022-07-06
Attending: PHYSICIAN ASSISTANT
Payer: COMMERCIAL

## 2022-07-06 ENCOUNTER — APPOINTMENT (OUTPATIENT)
Dept: PHYSICAL THERAPY | Facility: REHABILITATION | Age: 57
End: 2022-07-06
Attending: PHYSICIAN ASSISTANT
Payer: COMMERCIAL

## 2022-07-06 DIAGNOSIS — I89.0 LYMPHEDEMA OF BOTH LOWER EXTREMITIES: ICD-10-CM

## 2022-07-06 PROCEDURE — 81001 URINALYSIS AUTO W/SCOPE: CPT

## 2022-07-06 PROCEDURE — 80053 COMPREHEN METABOLIC PANEL: CPT

## 2022-07-06 PROCEDURE — 97140 MANUAL THERAPY 1/> REGIONS: CPT

## 2022-07-06 NOTE — OP THERAPY DAILY TREATMENT
Outpatient Physical Therapy  LYMPHEDEMA THERAPY DAILY TREATMENT     [facility] [address]  [address]  [place] [address]  Phone:  [phone]  Fax:  [phone]    Date: 07/06/2022    Patient: Cyndee Calvert  YOB: 1965  MRN: [id number]     Time Calculation    Start time: 0730  Stop time: 0817 Time Calculation (min): 47 minutes         Chief Complaint: Lipolymphedema    Visit #: 7    Subjective:   History of Present Illness:     Mechanism of injury:  Pt reports her R 5th toe is broken after all. Is painful and swollen. She is to see an orthopedist soon.       Lymphedema Objective    Left Lower Extremity Circumferential Measurements  Waist: cm  Hip: cm  Scrotum: cm  Ground/Upper Thigh: cm  Mid Thigh: 62.4 cm  Knee: 47.7 cm  Upper Calf: 59.6 cm  Mid Calf: 48.2 cm  Ankle: 39.6 cm  Heel to Foot: 24 cm  Total: 281.5 cm    Right Lower Extremity Circumferential Measurements  Waist: cm  Hip: cm  Scrotum: cm  Ground/Upper Thigh: cm  Mid Thigh: 58.2 cm  Knee: 43.8 cm  Upper Calf: 59.3 cm  Mid Calf: 46.2 cm  Ankle: 39.1 cm  Heel to Foot: 26 cm  Total: 272.6 cm             Left Lower Extremity Circumferential Measurements 6/29  Waist: cm  Hip: cm  Scrotum: cm  Ground/Upper Thigh: cm  Mid Thigh: 65 cm  Knee: 48.2 cm  Upper Calf: 60 cm  Mid Calf: 44.6 cm  Ankle: 38.5 cm  Heel to Foot: 24.7 cm  Total: 281 cm     Right Lower Extremity Circumferential Measurements 6/29  Waist: cm  Hip: cm  Scrotum: cm  Ground/Upper Thigh: cm  Mid Thigh: 58 cm  Knee: 44.2 cm  Upper Calf: 58.8 cm  Mid Calf: 47.3 cm  Ankle: 39.2 cm  Heel to Foot: 26.4 cm  Total: 273.9 cm      Left Lower Extremity Circumferential Measurements 6/22  Waist: cm  Hip: cm  Scrotum: cm  Ground/Upper Thigh: cm  Mid Thigh: 62.3 cm  Knee: 48.2 cm  Upper Calf: 60.6 cm  Mid Calf: 46.3 cm  Ankle: 38.9 cm  Heel to Foot: 24.2 cm  Total: 280.5 cm     Right Lower Extremity Circumferential Measurements 6/22  Waist: cm  Hip:  cm  Scrotum: cm  Ground/Upper Thigh: cm  Mid Thigh: 58.7 cm  Knee: 45.3 cm  Upper Calf: 60.4 cm  Mid Calf: 46.4 cm  Ankle: 37.1 cm  Heel to Foot: 24.7 cm  Total: 272.6 cm          Left Lower Extremity Circumferential Measurements 6/15  Waist: cm  Hip: cm  Scrotum: cm  Ground/Upper Thigh: cm  Mid Thigh: 61 cm  Knee: 48 cm  Upper Calf: 59.8 cm  Mid Calf: 47.9 cm  Ankle: 39.3 cm  Heel to Foot: 24.3 cm  Total: 280.3 cm     Right Lower Extremity Circumferential Measurements 6/15  Waist: cm  Hip: cm  Scrotum: cm  Ground/Upper Thigh: cm  Mid Thigh: 58.2 cm  Knee: 44.7 cm  Upper Calf: 58.5 cm  Mid Calf: 48.2 cm  Ankle: 40.5 cm  Heel to Foot: 24.4 cm  Total: 274.5 cm      Left Lower Extremity Circumferential Measurements 5/26  Waist: cm  Hip: cm  Scrotum: cm  Ground/Upper Thigh: cm  Mid Thigh: 64.1 cm  Knee: 50.3 cm  Upper Calf: 61.7 cm  Mid Calf: 48.6 cm  Ankle: 37.9 cm  Heel to Foot: 24.1 cm  Total: 286.7 cm     Right Lower Extremity Circumferential Measurements 5/26  Waist: cm  Hip: cm  Scrotum: cm  Ground/Upper Thigh: cm  Mid Thigh: 59.6 cm  Knee: 46.3 cm  Upper Calf: 58.3 cm  Mid Calf: 46.2 cm  Ankle: 37.8 cm  Heel to Foot: 25.1 cm  Total: 273.3 cm     Left Lower Extremity Circumferential Measurements 5/16  Waist: cm  Hip: cm  Scrotum: cm  Ground/Upper Thigh: cm  Mid Thigh: 66.2 cm  Knee: 48 cm  Upper Calf: 60.8 cm  Mid Calf: 49.1 cm  Ankle: 37.4 cm  Heel to Foot: 24.3 cm  Total: 285.8 cm     Right Lower Extremity Circumferential Measurements 5/16  Waist: cm  Hip: cm  Scrotum: cm  Ground/Upper Thigh: cm  Mid Thigh: 60.3 cm  Knee: 45.1 cm  Upper Calf: 62.3 cm  Mid Calf: 47.7 cm  Ankle: 39.6 cm  Heel to Foot: 24.3 cm  Total: 279.3 cm       Therapeutic Treatments and Modalities:     1. Manual Therapy (CPT 74641)    Therapeutic Treatment and Modalities Summary: MLD to B LE without trunk involvement. Focus on L LE due to time contraint.   The purpose of Manual Lymph Drainage (MLD) is to reduce lymph volume in the affected  limb by increasing intake of lymphatic load into the lymphatic system, increasing the volume of transported lymph fluid, moving lymph fluid in superficial lymph vessels to collateral lymph collectors, anastomoses, or tissue channels, and increasing venous return. The goal of MLD is to re-route the lymph flow around blocked areas into more centrally located healthy lymph vessels, which drain into the venous system.     Multi-layer compression bandages applied to B LE to mid-thighs utilizing the technique of 50% overlap and 50% stretch. Stockinette has been applied as a protective base layer with padding overlay to further protect skin from short-stretch bandages. Short stretch bandages in the size of 6cm, 8cm, 10cm x2 were utilized to complete compression to most aspects of affected limbs. The high working pressure and low resting pressure qualities of short-stretch bandages make them the preferred compression bandage in the management of lymphedema, especially during the decongestive phase (phase I) of Complete Decongestive Therapy (CDT). Utilization of theses bandages prevents the re-accumulation of evacuated lymph fluid and conserves the results achieved during MLD.    Time-based treatments/modalities:    Physical Therapy Timed Treatment Charges  Manual therapy minutes (CPT 69251): 47 minutes      Assessment and Plan:   Assessment details:  Pt's R foot continues to be swollen due to broken toe. She finds the area too painful to be touched and therefore cannot tolerate compression. L thigh seems to be harboring fluid. Both these factors are contributing to maintenance of her numbers. She will benefit from further intervention to reduce LE. She will likely benefit from Velcro to maintain compression.     Plan:  Therapy options:  Physical therapy treatment to continue  Discussed with:  Patient

## 2022-07-07 LAB
ALBUMIN SERPL BCP-MCNC: 3.8 G/DL (ref 3.2–4.9)
ALBUMIN/GLOB SERPL: 1.3 G/DL
ALP SERPL-CCNC: 79 U/L (ref 30–99)
ALT SERPL-CCNC: 17 U/L (ref 2–50)
ANION GAP SERPL CALC-SCNC: 11 MMOL/L (ref 7–16)
APPEARANCE UR: CLEAR
AST SERPL-CCNC: 13 U/L (ref 12–45)
BACTERIA #/AREA URNS HPF: ABNORMAL /HPF
BILIRUB SERPL-MCNC: 0.4 MG/DL (ref 0.1–1.5)
BILIRUB UR QL STRIP.AUTO: NEGATIVE
BUN SERPL-MCNC: 21 MG/DL (ref 8–22)
CALCIUM SERPL-MCNC: 8.5 MG/DL (ref 8.5–10.5)
CHLORIDE SERPL-SCNC: 113 MMOL/L (ref 96–112)
CO2 SERPL-SCNC: 19 MMOL/L (ref 20–33)
COLOR UR: YELLOW
CREAT SERPL-MCNC: 1.56 MG/DL (ref 0.5–1.4)
EPI CELLS #/AREA URNS HPF: ABNORMAL /HPF
GFR SERPLBLD CREATININE-BSD FMLA CKD-EPI: 38 ML/MIN/1.73 M 2
GLOBULIN SER CALC-MCNC: 3 G/DL (ref 1.9–3.5)
GLUCOSE SERPL-MCNC: 90 MG/DL (ref 65–99)
GLUCOSE UR STRIP.AUTO-MCNC: 500 MG/DL
HYALINE CASTS #/AREA URNS LPF: ABNORMAL /LPF
KETONES UR STRIP.AUTO-MCNC: NEGATIVE MG/DL
LEUKOCYTE ESTERASE UR QL STRIP.AUTO: ABNORMAL
MICRO URNS: ABNORMAL
NITRITE UR QL STRIP.AUTO: NEGATIVE
PH UR STRIP.AUTO: 5.5 [PH] (ref 5–8)
POTASSIUM SERPL-SCNC: 5.5 MMOL/L (ref 3.6–5.5)
PROT SERPL-MCNC: 6.8 G/DL (ref 6–8.2)
PROT UR QL STRIP: NEGATIVE MG/DL
RBC # URNS HPF: ABNORMAL /HPF
RBC UR QL AUTO: NEGATIVE
SODIUM SERPL-SCNC: 143 MMOL/L (ref 135–145)
SP GR UR STRIP.AUTO: 1.02
UROBILINOGEN UR STRIP.AUTO-MCNC: 0.2 MG/DL
WBC #/AREA URNS HPF: ABNORMAL /HPF

## 2022-07-11 ENCOUNTER — PHYSICAL THERAPY (OUTPATIENT)
Dept: PHYSICAL THERAPY | Facility: REHABILITATION | Age: 57
End: 2022-07-11
Attending: PHYSICIAN ASSISTANT
Payer: COMMERCIAL

## 2022-07-11 DIAGNOSIS — I89.0 LYMPHEDEMA OF BOTH LOWER EXTREMITIES: ICD-10-CM

## 2022-07-11 PROCEDURE — 97140 MANUAL THERAPY 1/> REGIONS: CPT

## 2022-07-11 NOTE — OP THERAPY DAILY TREATMENT
Outpatient Physical Therapy  LYMPHEDEMA THERAPY DAILY TREATMENT     [facility] Physical Therapy [address].  [address] [facility]  [place] [address]  Phone:  [phone]  Fax:  [phone]    Date: 07/11/2022    Patient: Cyndee Calvert  YOB: 1965  MRN: [id number]     Time Calculation    Start time: 0815  Stop time: 0901 Time Calculation (min): 46 minutes         Chief Complaint: Lipolymphedema    Visit #: 8    Subjective:   History of Present Illness:     Mechanism of injury:  Received pump; likes it. Might need adjustment. Legs are a little sore.       Lymphedema Objective    Left Lower Extremity Circumferential Measurements  Waist: cm  Hip: cm  Scrotum: cm  Ground/Upper Thigh: cm  Mid Thigh: 63.3 cm  Knee: 49 cm  Upper Calf: 60.2 cm  Mid Calf: 49.2 cm  Ankle: 38.5 cm  Heel to Foot: 24.3 cm  Total: 284.5 cm    Right Lower Extremity Circumferential Measurements  Waist: cm  Hip: cm  Scrotum: cm  Ground/Upper Thigh: cm  Mid Thigh: 58.3 cm  Knee: 44.7 cm  Upper Calf: 60.6 cm  Mid Calf: 52.1 cm  Ankle: 40.1 cm  Heel to Foot: 25.4 cm  Total: 281.2 cm            Left Lower Extremity Circumferential Measurements 7/6  Waist: cm  Hip: cm  Scrotum: cm  Ground/Upper Thigh: cm  Mid Thigh: 62.4 cm  Knee: 47.7 cm  Upper Calf: 59.6 cm  Mid Calf: 48.2 cm  Ankle: 39.6 cm  Heel to Foot: 24 cm  Total: 281.5 cm     Right Lower Extremity Circumferential Measurements 7/6  Waist: cm  Hip: cm  Scrotum: cm  Ground/Upper Thigh: cm  Mid Thigh: 58.2 cm  Knee: 43.8 cm  Upper Calf: 59.3 cm  Mid Calf: 46.2 cm  Ankle: 39.1 cm  Heel to Foot: 26 cm  Total: 272.6 cm                Left Lower Extremity Circumferential Measurements 6/29  Waist: cm  Hip: cm  Scrotum: cm  Ground/Upper Thigh: cm  Mid Thigh: 65 cm  Knee: 48.2 cm  Upper Calf: 60 cm  Mid Calf: 44.6 cm  Ankle: 38.5 cm  Heel to Foot: 24.7 cm  Total: 281 cm     Right Lower Extremity Circumferential Measurements 6/29  Waist: cm  Hip: cm  Scrotum:  cm  Ground/Upper Thigh: cm  Mid Thigh: 58 cm  Knee: 44.2 cm  Upper Calf: 58.8 cm  Mid Calf: 47.3 cm  Ankle: 39.2 cm  Heel to Foot: 26.4 cm  Total: 273.9 cm      Left Lower Extremity Circumferential Measurements 6/22  Waist: cm  Hip: cm  Scrotum: cm  Ground/Upper Thigh: cm  Mid Thigh: 62.3 cm  Knee: 48.2 cm  Upper Calf: 60.6 cm  Mid Calf: 46.3 cm  Ankle: 38.9 cm  Heel to Foot: 24.2 cm  Total: 280.5 cm     Right Lower Extremity Circumferential Measurements 6/22  Waist: cm  Hip: cm  Scrotum: cm  Ground/Upper Thigh: cm  Mid Thigh: 58.7 cm  Knee: 45.3 cm  Upper Calf: 60.4 cm  Mid Calf: 46.4 cm  Ankle: 37.1 cm  Heel to Foot: 24.7 cm  Total: 272.6 cm          Left Lower Extremity Circumferential Measurements 6/15  Waist: cm  Hip: cm  Scrotum: cm  Ground/Upper Thigh: cm  Mid Thigh: 61 cm  Knee: 48 cm  Upper Calf: 59.8 cm  Mid Calf: 47.9 cm  Ankle: 39.3 cm  Heel to Foot: 24.3 cm  Total: 280.3 cm     Right Lower Extremity Circumferential Measurements 6/15  Waist: cm  Hip: cm  Scrotum: cm  Ground/Upper Thigh: cm  Mid Thigh: 58.2 cm  Knee: 44.7 cm  Upper Calf: 58.5 cm  Mid Calf: 48.2 cm  Ankle: 40.5 cm  Heel to Foot: 24.4 cm  Total: 274.5 cm      Left Lower Extremity Circumferential Measurements 5/26  Waist: cm  Hip: cm  Scrotum: cm  Ground/Upper Thigh: cm  Mid Thigh: 64.1 cm  Knee: 50.3 cm  Upper Calf: 61.7 cm  Mid Calf: 48.6 cm  Ankle: 37.9 cm  Heel to Foot: 24.1 cm  Total: 286.7 cm     Right Lower Extremity Circumferential Measurements 5/26  Waist: cm  Hip: cm  Scrotum: cm  Ground/Upper Thigh: cm  Mid Thigh: 59.6 cm  Knee: 46.3 cm  Upper Calf: 58.3 cm  Mid Calf: 46.2 cm  Ankle: 37.8 cm  Heel to Foot: 25.1 cm  Total: 273.3 cm     Left Lower Extremity Circumferential Measurements 5/16  Waist: cm  Hip: cm  Scrotum: cm  Ground/Upper Thigh: cm  Mid Thigh: 66.2 cm  Knee: 48 cm  Upper Calf: 60.8 cm  Mid Calf: 49.1 cm  Ankle: 37.4 cm  Heel to Foot: 24.3 cm  Total: 285.8 cm     Right Lower Extremity Circumferential  Measurements 5/16  Waist: cm  Hip: cm  Scrotum: cm  Ground/Upper Thigh: cm  Mid Thigh: 60.3 cm  Knee: 45.1 cm  Upper Calf: 62.3 cm  Mid Calf: 47.7 cm  Ankle: 39.6 cm  Heel to Foot: 24.3 cm  Total: 279.3 cm    Therapeutic Treatments and Modalities:     1. Manual Therapy (CPT 18399)    Therapeutic Treatment and Modalities Summary: MLD to B LE without trunk involvement. Focus on L LE due to time contraint.   The purpose of Manual Lymph Drainage (MLD) is to reduce lymph volume in the affected limb by increasing intake of lymphatic load into the lymphatic system, increasing the volume of transported lymph fluid, moving lymph fluid in superficial lymph vessels to collateral lymph collectors, anastomoses, or tissue channels, and increasing venous return. The goal of MLD is to re-route the lymph flow around blocked areas into more centrally located healthy lymph vessels, which drain into the venous system.     Multi-layer compression bandages applied to B LE to mid-thighs utilizing the technique of 50% overlap and 50% stretch. Stockinette has been applied as a protective base layer with padding overlay to further protect skin from short-stretch bandages. Short stretch bandages in the size of 6cm, 8cm, 10cm x2 were utilized to complete compression to most aspects of affected limbs. The high working pressure and low resting pressure qualities of short-stretch bandages make them the preferred compression bandage in the management of lymphedema, especially during the decongestive phase (phase I) of Complete Decongestive Therapy (CDT). Utilization of theses bandages prevents the re-accumulation of evacuated lymph fluid and conserves the results achieved during MLD.    Time-based treatments/modalities:    Physical Therapy Timed Treatment Charges  Manual therapy minutes (CPT 00096): 46 minutes      Assessment and Plan:   Functional Impairments: swelling    Assessment details:  B LE do appear to have sustained a fluid migration  distally to proximally. If she is able to maintain compression and use pump, anticipate fluid will continue to migrate such that it will be controlled, especially at feet. Cyndee will benefit from further intervention to achieve decongestion.     Plan:  Therapy options:  Physical therapy treatment to continue  Discussed with:  Patient

## 2022-07-18 ENCOUNTER — HOSPITAL ENCOUNTER (OUTPATIENT)
Dept: RADIOLOGY | Facility: MEDICAL CENTER | Age: 57
End: 2022-07-18
Attending: INTERNAL MEDICINE
Payer: COMMERCIAL

## 2022-07-18 DIAGNOSIS — R06.09 DYSPNEA ON EXERTION: ICD-10-CM

## 2022-07-18 PROCEDURE — A9502 TC99M TETROFOSMIN: HCPCS

## 2022-07-18 PROCEDURE — 93018 CV STRESS TEST I&R ONLY: CPT | Performed by: INTERNAL MEDICINE

## 2022-07-18 PROCEDURE — 700111 HCHG RX REV CODE 636 W/ 250 OVERRIDE (IP)

## 2022-07-18 PROCEDURE — 78452 HT MUSCLE IMAGE SPECT MULT: CPT | Mod: 26 | Performed by: INTERNAL MEDICINE

## 2022-07-18 RX ORDER — REGADENOSON 0.08 MG/ML
INJECTION, SOLUTION INTRAVENOUS
Status: COMPLETED
Start: 2022-07-18 | End: 2022-07-18

## 2022-07-18 RX ORDER — REGADENOSON 0.08 MG/ML
0.4 INJECTION, SOLUTION INTRAVENOUS ONCE
Status: COMPLETED | OUTPATIENT
Start: 2022-07-18 | End: 2022-07-18

## 2022-07-18 RX ADMIN — REGADENOSON 0.4 MG: 0.08 INJECTION, SOLUTION INTRAVENOUS at 10:43

## 2022-07-19 ENCOUNTER — PHYSICAL THERAPY (OUTPATIENT)
Dept: PHYSICAL THERAPY | Facility: REHABILITATION | Age: 57
End: 2022-07-19
Attending: PHYSICIAN ASSISTANT
Payer: COMMERCIAL

## 2022-07-19 DIAGNOSIS — I89.0 LYMPHEDEMA OF BOTH LOWER EXTREMITIES: ICD-10-CM

## 2022-07-19 PROCEDURE — 97140 MANUAL THERAPY 1/> REGIONS: CPT

## 2022-07-19 PROCEDURE — 97535 SELF CARE MNGMENT TRAINING: CPT

## 2022-07-19 NOTE — OP THERAPY DAILY TREATMENT
Outpatient Physical Therapy  LYMPHEDEMA THERAPY DAILY TREATMENT     [facility] Physical Therapy [address].  [address]  [place] [address]  Phone:  [phone]  Fax:  [phone]    Date: 07/19/2022    Patient: Cyndee Calvert  YOB: 1965  MRN: [id number]     Time Calculation    Start time: 0816  Stop time: 0902 Time Calculation (min): 46 minutes         Chief Complaint: Lipolymphedema    Visit #: 9    Subjective:   History of Present Illness:     Mechanism of injury:  Has shoe for R foot now for broken toe. Is wearing her leggings. Thinks they are too big.       Lymphedema Objective    Left Lower Extremity Circumferential Measurements  Waist: cm  Hip: cm  Scrotum: cm  Ground/Upper Thigh: cm  Mid Thigh: 63.5 cm  Knee: 49.4 cm  Upper Calf: 59.4 cm  Mid Calf: 44.5 cm  Ankle: 38.3 cm  Heel to Foot: 24.6 cm  Total: 279.7 cm    Right Lower Extremity Circumferential Measurements  Waist: cm  Hip: cm  Scrotum: cm  Ground/Upper Thigh: cm  Mid Thigh: 58.2 cm  Knee: 44.3 cm  Upper Calf: 59.9 cm  Mid Calf: 46.8 cm  Ankle: 37.8 cm  Heel to Foot: 25.7 cm  Total: 272.7 cm         Left Lower Extremity Circumferential Measurements 7/11  Waist: cm  Hip: cm  Scrotum: cm  Ground/Upper Thigh: cm  Mid Thigh: 63.3 cm  Knee: 49 cm  Upper Calf: 60.2 cm  Mid Calf: 49.2 cm  Ankle: 38.5 cm  Heel to Foot: 24.3 cm  Total: 284.5 cm     Right Lower Extremity Circumferential Measurements 7/11  Waist: cm  Hip: cm  Scrotum: cm  Ground/Upper Thigh: cm  Mid Thigh: 58.3 cm  Knee: 44.7 cm  Upper Calf: 60.6 cm  Mid Calf: 52.1 cm  Ankle: 40.1 cm  Heel to Foot: 25.4 cm  Total: 281.2 cm           Left Lower Extremity Circumferential Measurements 7/6  Waist: cm  Hip: cm  Scrotum: cm  Ground/Upper Thigh: cm  Mid Thigh: 62.4 cm  Knee: 47.7 cm  Upper Calf: 59.6 cm  Mid Calf: 48.2 cm  Ankle: 39.6 cm  Heel to Foot: 24 cm  Total: 281.5 cm     Right Lower Extremity Circumferential Measurements 7/6  Waist: cm  Hip:  cm  Scrotum: cm  Ground/Upper Thigh: cm  Mid Thigh: 58.2 cm  Knee: 43.8 cm  Upper Calf: 59.3 cm  Mid Calf: 46.2 cm  Ankle: 39.1 cm  Heel to Foot: 26 cm  Total: 272.6 cm     Left Lower Extremity Circumferential Measurements 6/29  Waist: cm  Hip: cm  Scrotum: cm  Ground/Upper Thigh: cm  Mid Thigh: 65 cm  Knee: 48.2 cm  Upper Calf: 60 cm  Mid Calf: 44.6 cm  Ankle: 38.5 cm  Heel to Foot: 24.7 cm  Total: 281 cm     Right Lower Extremity Circumferential Measurements 6/29  Waist: cm  Hip: cm  Scrotum: cm  Ground/Upper Thigh: cm  Mid Thigh: 58 cm  Knee: 44.2 cm  Upper Calf: 58.8 cm  Mid Calf: 47.3 cm  Ankle: 39.2 cm  Heel to Foot: 26.4 cm  Total: 273.9 cm      Left Lower Extremity Circumferential Measurements 6/22  Waist: cm  Hip: cm  Scrotum: cm  Ground/Upper Thigh: cm  Mid Thigh: 62.3 cm  Knee: 48.2 cm  Upper Calf: 60.6 cm  Mid Calf: 46.3 cm  Ankle: 38.9 cm  Heel to Foot: 24.2 cm  Total: 280.5 cm     Right Lower Extremity Circumferential Measurements 6/22  Waist: cm  Hip: cm  Scrotum: cm  Ground/Upper Thigh: cm  Mid Thigh: 58.7 cm  Knee: 45.3 cm  Upper Calf: 60.4 cm  Mid Calf: 46.4 cm  Ankle: 37.1 cm  Heel to Foot: 24.7 cm  Total: 272.6 cm          Left Lower Extremity Circumferential Measurements 6/15  Waist: cm  Hip: cm  Scrotum: cm  Ground/Upper Thigh: cm  Mid Thigh: 61 cm  Knee: 48 cm  Upper Calf: 59.8 cm  Mid Calf: 47.9 cm  Ankle: 39.3 cm  Heel to Foot: 24.3 cm  Total: 280.3 cm     Right Lower Extremity Circumferential Measurements 6/15  Waist: cm  Hip: cm  Scrotum: cm  Ground/Upper Thigh: cm  Mid Thigh: 58.2 cm  Knee: 44.7 cm  Upper Calf: 58.5 cm  Mid Calf: 48.2 cm  Ankle: 40.5 cm  Heel to Foot: 24.4 cm  Total: 274.5 cm      Left Lower Extremity Circumferential Measurements 5/26  Waist: cm  Hip: cm  Scrotum: cm  Ground/Upper Thigh: cm  Mid Thigh: 64.1 cm  Knee: 50.3 cm  Upper Calf: 61.7 cm  Mid Calf: 48.6 cm  Ankle: 37.9 cm  Heel to Foot: 24.1 cm  Total: 286.7 cm     Right Lower Extremity Circumferential  Measurements 5/26  Waist: cm  Hip: cm  Scrotum: cm  Ground/Upper Thigh: cm  Mid Thigh: 59.6 cm  Knee: 46.3 cm  Upper Calf: 58.3 cm  Mid Calf: 46.2 cm  Ankle: 37.8 cm  Heel to Foot: 25.1 cm  Total: 273.3 cm     Left Lower Extremity Circumferential Measurements 5/16  Waist: cm  Hip: cm  Scrotum: cm  Ground/Upper Thigh: cm  Mid Thigh: 66.2 cm  Knee: 48 cm  Upper Calf: 60.8 cm  Mid Calf: 49.1 cm  Ankle: 37.4 cm  Heel to Foot: 24.3 cm  Total: 285.8 cm     Right Lower Extremity Circumferential Measurements 5/16  Waist: cm  Hip: cm  Scrotum: cm  Ground/Upper Thigh: cm  Mid Thigh: 60.3 cm  Knee: 45.1 cm  Upper Calf: 62.3 cm  Mid Calf: 47.7 cm  Ankle: 39.6 cm  Heel to Foot: 24.3 cm  Total: 279.3 cm    Therapeutic Treatments and Modalities:     1. Self Care ADL Training (CPT 11592), Pt brought compression leggings; discussed duration of wear/frequency. Also discussed POC and likelihood of continued maintenance with use of leggings and pump.     2. Manual Therapy (CPT 69213)    Therapeutic Treatment and Modalities Summary: MLD to B LE without trunk involvement. Focus on L LE due to time contraint.   The purpose of Manual Lymph Drainage (MLD) is to reduce lymph volume in the affected limb by increasing intake of lymphatic load into the lymphatic system, increasing the volume of transported lymph fluid, moving lymph fluid in superficial lymph vessels to collateral lymph collectors, anastomoses, or tissue channels, and increasing venous return. The goal of MLD is to re-route the lymph flow around blocked areas into more centrally located healthy lymph vessels, which drain into the venous system.         Time-based treatments/modalities:    Physical Therapy Timed Treatment Charges  Functional training, self care minutes (CPT 46655): 12 minutes  Manual therapy minutes (CPT 48535): 34 minutes      Assessment and Plan:   Assessment details:  Pt lost five cm from L LE and 9cm from R LE. She was able to more consistently maintain  compression with her leggings. Anticipate that if pt continues to maintain or lose that she will be able to transition to phase II of CDT and soon DC.     Plan:  Therapy options:  Physical therapy treatment to continue  Discussed with:  Patient

## 2022-07-20 ENCOUNTER — PATIENT MESSAGE (OUTPATIENT)
Dept: CARDIOLOGY | Facility: MEDICAL CENTER | Age: 57
End: 2022-07-20
Payer: COMMERCIAL

## 2022-07-20 NOTE — TELEPHONE ENCOUNTER
----- Message from William Alva M.D. sent at 7/20/2022  7:59 AM PDT -----  Please let her know that her stress test results were normal and did not suggest any significant blockages.  Cardiac monitor results are still pending, which we will call her with.

## 2022-07-25 ENCOUNTER — TELEPHONE (OUTPATIENT)
Dept: CARDIOLOGY | Facility: MEDICAL CENTER | Age: 57
End: 2022-07-25
Payer: COMMERCIAL

## 2022-07-26 ENCOUNTER — PHYSICAL THERAPY (OUTPATIENT)
Dept: PHYSICAL THERAPY | Facility: REHABILITATION | Age: 57
End: 2022-07-26
Attending: PHYSICIAN ASSISTANT
Payer: COMMERCIAL

## 2022-07-26 ENCOUNTER — PATIENT MESSAGE (OUTPATIENT)
Dept: CARDIOLOGY | Facility: MEDICAL CENTER | Age: 57
End: 2022-07-26

## 2022-07-26 DIAGNOSIS — I89.0 LYMPHEDEMA OF BOTH LOWER EXTREMITIES: ICD-10-CM

## 2022-07-26 DIAGNOSIS — R06.09 DYSPNEA ON EXERTION: ICD-10-CM

## 2022-07-26 PROCEDURE — 97535 SELF CARE MNGMENT TRAINING: CPT

## 2022-07-26 PROCEDURE — 93228 REMOTE 30 DAY ECG REV/REPORT: CPT | Performed by: INTERNAL MEDICINE

## 2022-07-26 NOTE — OP THERAPY PROGRESS SUMMARY
Outpatient Physical Therapy  PROGRESS SUMMARY NOTE      [facility] Physical Therapy [place] [address] [address]  [address]  [facility] [address]  Phone:  [phone]  Fax:  [phone]    Date of Visit: 07/26/2022    Patient: Cyndee Calvert  YOB: 1965  MRN: [id number]     Referring Provider: Deanna Mccollum P.A.-C.  [address] [facility] [place] [address]   Referring Diagnosis Lymphedema, not elsewhere classified [I89.0]     Visit Diagnoses     ICD-10-CM   1. Lymphedema of both lower extremities  I89.0       Rehab Potential: fair    Progress Report Period: 5/16/22-7/26/22    Functional Assessment Used          Objective Findings and Assessment:   Patient progression towards goals: Pt has been seen for 10 visits of physical therapy to address her lipolymphedema. She has seen decongestion and more recently has experienced a plateau in her measurements. Cyndee is likely close to DC from services but may require 1-2 more visits to ensure she can independently don her Velcro compression appropriately.     Objective findings and assessment details: Left Lower Extremity Circumferential Measurements 7/26  Mid Thigh: 64.3 cm  Knee: 49.7 cm  Upper Calf: 61.1 cm  Mid Calf: 46.3 cm  Ankle: 39.5 cm  Heel to Foot: 25 cm  Total: 285.9 cm     Right Lower Extremity Circumferential Measurements 7/26  Mid Thigh: 57.7 cm  Knee: 44.6 cm  Upper Calf: 59.3 cm  Mid Calf: 48.6 cm  Ankle: 38.7 cm  Heel to Foot: 25.3 cm  Total: 274.2 cm     Left Lower Extremity Circumferential Measurements EVAL  Mid Thigh: 66.2 cm  Knee: 48 cm  Upper Calf: 60.8 cm  Mid Calf: 49.1 cm  Ankle: 37.4 cm  Heel to Foot: 24.3 cm  Total: 285.8 cm     Right Lower Extremity Circumferential Measurements EVAL  Mid Thigh: 60.3 cm  Knee: 45.1 cm  Upper Calf: 62.3 cm  Mid Calf: 47.7 cm  Ankle: 39.6 cm  Heel to Foot: 24.3 cm  Total: 279.3 cm    Goals:   Short Term Goals:    1. Pt will achieve a total limb circumference reduction of 5cm in order to  decrease risk for infection and progression of disease process. Goal Partially Met  2. Pt will be independent with self-MLD to facilitate fluid migration to healthy tissues and lymph nodes. Goal Met  3. Pt will consistently perform exercises with bandages on to facilitate muscle pump of fluid from affected extremity. Goal Met  Short term goal time span:  2-4 weeks      Long Term Goals:    1. Pt will have a reduction in total limb circumference of 10cm in order to decrease risk for infection and progression of disease process. Goal Not Met  2. Patient will be independent with maintenance phase management of lymphedema including: compression garments, skin care education, risk reduction strategies, manual lymphatic drainage, and therapeutic exercise. Goal Partially Met  Long term goal time span:  4-6 weeks    Plan:   Planned therapy interventions:  Manual Therapy (CPT 63574), Therapeutic Activities (CPT 37158), Therapeutic Exercise (CPT 02126), Self Care ADL Training (CPT 44006) and Vasopneumatic Pump Therapy (CPT 63813)  Frequency:  2x month  Duration in weeks:  8      Referring provider co-signature:  I have reviewed this plan of care and my co-signature certifies the need for services.     Certification Period: 07/26/2022 to 09/20/22    Physician Signature: ________________________________ Date: ______________

## 2022-07-26 NOTE — OP THERAPY DAILY TREATMENT
Outpatient Physical Therapy  LYMPHEDEMA THERAPY DAILY TREATMENT     [facility] Physical Therapy [address].  [address] [facility]  [place] [address]  Phone:  [phone]  Fax:  [phone]    Date: 07/26/2022    Patient: Cyndee Calvert  YOB: 1965  MRN: [id number]     Time Calculation    Start time: 0815  Stop time: 0837 Time Calculation (min): 22 minutes         Chief Complaint: Lipolymphedema    Visit #: 10    Subjective:   History of Present Illness:     Mechanism of injury:  Legs are doing ok. R LE seems more swollen but maybe due to toe fracture.       Lymphedema Objective    Left Lower Extremity Circumferential Measurements  Waist: cm  Hip: cm  Scrotum: cm  Ground/Upper Thigh: cm  Mid Thigh: 64.3 cm  Knee: 49.7 cm  Upper Calf: 61.1 cm  Mid Calf: 46.3 cm  Ankle: 39.5 cm  Heel to Foot: 25 cm  Total: 285.9 cm    Right Lower Extremity Circumferential Measurements  Waist: cm  Hip: cm  Scrotum: cm  Ground/Upper Thigh: cm  Mid Thigh: 57.7 cm  Knee: 44.6 cm  Upper Calf: 59.3 cm  Mid Calf: 48.6 cm  Ankle: 38.7 cm  Heel to Foot: 25.3 cm  Total: 274.2 cm             Left Lower Extremity Circumferential Measurements 7/19  Waist: cm  Hip: cm  Scrotum: cm  Ground/Upper Thigh: cm  Mid Thigh: 63.5 cm  Knee: 49.4 cm  Upper Calf: 59.4 cm  Mid Calf: 44.5 cm  Ankle: 38.3 cm  Heel to Foot: 24.6 cm  Total: 279.7 cm     Right Lower Extremity Circumferential Measurements 7/19  Waist: cm  Hip: cm  Scrotum: cm  Ground/Upper Thigh: cm  Mid Thigh: 58.2 cm  Knee: 44.3 cm  Upper Calf: 59.9 cm  Mid Calf: 46.8 cm  Ankle: 37.8 cm  Heel to Foot: 25.7 cm  Total: 272.7 cm           Left Lower Extremity Circumferential Measurements 7/11  Waist: cm  Hip: cm  Scrotum: cm  Ground/Upper Thigh: cm  Mid Thigh: 63.3 cm  Knee: 49 cm  Upper Calf: 60.2 cm  Mid Calf: 49.2 cm  Ankle: 38.5 cm  Heel to Foot: 24.3 cm  Total: 284.5 cm     Right Lower Extremity Circumferential Measurements 7/11  Waist: cm  Hip: cm  Scrotum:  cm  Ground/Upper Thigh: cm  Mid Thigh: 58.3 cm  Knee: 44.7 cm  Upper Calf: 60.6 cm  Mid Calf: 52.1 cm  Ankle: 40.1 cm  Heel to Foot: 25.4 cm  Total: 281.2 cm           Left Lower Extremity Circumferential Measurements 7/6  Waist: cm  Hip: cm  Scrotum: cm  Ground/Upper Thigh: cm  Mid Thigh: 62.4 cm  Knee: 47.7 cm  Upper Calf: 59.6 cm  Mid Calf: 48.2 cm  Ankle: 39.6 cm  Heel to Foot: 24 cm  Total: 281.5 cm     Right Lower Extremity Circumferential Measurements 7/6  Waist: cm  Hip: cm  Scrotum: cm  Ground/Upper Thigh: cm  Mid Thigh: 58.2 cm  Knee: 43.8 cm  Upper Calf: 59.3 cm  Mid Calf: 46.2 cm  Ankle: 39.1 cm  Heel to Foot: 26 cm  Total: 272.6 cm     Left Lower Extremity Circumferential Measurements 6/29  Waist: cm  Hip: cm  Scrotum: cm  Ground/Upper Thigh: cm  Mid Thigh: 65 cm  Knee: 48.2 cm  Upper Calf: 60 cm  Mid Calf: 44.6 cm  Ankle: 38.5 cm  Heel to Foot: 24.7 cm  Total: 281 cm     Right Lower Extremity Circumferential Measurements 6/29  Waist: cm  Hip: cm  Scrotum: cm  Ground/Upper Thigh: cm  Mid Thigh: 58 cm  Knee: 44.2 cm  Upper Calf: 58.8 cm  Mid Calf: 47.3 cm  Ankle: 39.2 cm  Heel to Foot: 26.4 cm  Total: 273.9 cm      Left Lower Extremity Circumferential Measurements 6/22  Waist: cm  Hip: cm  Scrotum: cm  Ground/Upper Thigh: cm  Mid Thigh: 62.3 cm  Knee: 48.2 cm  Upper Calf: 60.6 cm  Mid Calf: 46.3 cm  Ankle: 38.9 cm  Heel to Foot: 24.2 cm  Total: 280.5 cm     Right Lower Extremity Circumferential Measurements 6/22  Waist: cm  Hip: cm  Scrotum: cm  Ground/Upper Thigh: cm  Mid Thigh: 58.7 cm  Knee: 45.3 cm  Upper Calf: 60.4 cm  Mid Calf: 46.4 cm  Ankle: 37.1 cm  Heel to Foot: 24.7 cm  Total: 272.6 cm          Left Lower Extremity Circumferential Measurements 6/15  Waist: cm  Hip: cm  Scrotum: cm  Ground/Upper Thigh: cm  Mid Thigh: 61 cm  Knee: 48 cm  Upper Calf: 59.8 cm  Mid Calf: 47.9 cm  Ankle: 39.3 cm  Heel to Foot: 24.3 cm  Total: 280.3 cm     Right Lower Extremity Circumferential  Measurements 6/15  Waist: cm  Hip: cm  Scrotum: cm  Ground/Upper Thigh: cm  Mid Thigh: 58.2 cm  Knee: 44.7 cm  Upper Calf: 58.5 cm  Mid Calf: 48.2 cm  Ankle: 40.5 cm  Heel to Foot: 24.4 cm  Total: 274.5 cm      Left Lower Extremity Circumferential Measurements 5/26  Waist: cm  Hip: cm  Scrotum: cm  Ground/Upper Thigh: cm  Mid Thigh: 64.1 cm  Knee: 50.3 cm  Upper Calf: 61.7 cm  Mid Calf: 48.6 cm  Ankle: 37.9 cm  Heel to Foot: 24.1 cm  Total: 286.7 cm     Right Lower Extremity Circumferential Measurements 5/26  Waist: cm  Hip: cm  Scrotum: cm  Ground/Upper Thigh: cm  Mid Thigh: 59.6 cm  Knee: 46.3 cm  Upper Calf: 58.3 cm  Mid Calf: 46.2 cm  Ankle: 37.8 cm  Heel to Foot: 25.1 cm  Total: 273.3 cm     Left Lower Extremity Circumferential Measurements 5/16  Waist: cm  Hip: cm  Scrotum: cm  Ground/Upper Thigh: cm  Mid Thigh: 66.2 cm  Knee: 48 cm  Upper Calf: 60.8 cm  Mid Calf: 49.1 cm  Ankle: 37.4 cm  Heel to Foot: 24.3 cm  Total: 285.8 cm     Right Lower Extremity Circumferential Measurements 5/16  Waist: cm  Hip: cm  Scrotum: cm  Ground/Upper Thigh: cm  Mid Thigh: 60.3 cm  Knee: 45.1 cm  Upper Calf: 62.3 cm  Mid Calf: 47.7 cm  Ankle: 39.6 cm  Heel to Foot: 24.3 cm  Total: 279.3 cm    Therapeutic Treatments and Modalities:     1. Self Care ADL Training (CPT 49833)    Therapeutic Treatment and Modalities Summary: Discussed POC with pt; it does appear she has reached a plateau and is managing well with Flexitouch and her compression legging. She is self-aware of signs of swelling to her B LE.     Time-based treatments/modalities:    Physical Therapy Timed Treatment Charges  Functional training, self care minutes (CPT 49913): 22 minutes      Assessment and Plan:   Assessment details:  Pt appears to have reached a plateau and does seem appropriate to transition to phase II of CDT. Cyndee anticipates purchasing Velcro to control swelling on days she travels or if it is hotter. If pt requires assist with donning Velcro  compression, PT will be available. Otherwise, will DC.     Plan:  Therapy options:  Physical therapy treatment to continue  Discussed with:  Patient

## 2022-07-27 NOTE — TELEPHONE ENCOUNTER
----- Message from William Alva M.D. sent at 7/26/2022  2:48 PM PDT -----  Please let the patient know that her cardiac monitor did not show any sustained arrhythmias.  She had a higher than average number of PVCs, although this is still typically benign especially if the stress test is normal.  Given frequent PVCs, I would like her to have an echocardiogram to ensure there is no significant valvular disease.  Her estimated ejection fraction was normal on her MPI, which is reassuring.

## 2022-07-27 NOTE — PATIENT COMMUNICATION
Replied to pt via [employer] regarding MD recommendations, see encounter.    awaiting pt response.

## 2022-08-17 ENCOUNTER — PATIENT MESSAGE (OUTPATIENT)
Dept: CARDIOLOGY | Facility: MEDICAL CENTER | Age: 57
End: 2022-08-17

## 2022-08-17 ENCOUNTER — HOSPITAL ENCOUNTER (OUTPATIENT)
Dept: CARDIOLOGY | Facility: MEDICAL CENTER | Age: 57
End: 2022-08-17
Attending: INTERNAL MEDICINE
Payer: COMMERCIAL

## 2022-08-17 DIAGNOSIS — R06.09 DYSPNEA ON EXERTION: ICD-10-CM

## 2022-08-17 LAB
LV EJECT FRACT  99904: 55
LV EJECT FRACT MOD 2C 99903: 62.23
LV EJECT FRACT MOD 4C 99902: 49.57
LV EJECT FRACT MOD BP 99901: 54.95

## 2022-08-17 PROCEDURE — 93306 TTE W/DOPPLER COMPLETE: CPT | Mod: 26 | Performed by: INTERNAL MEDICINE

## 2022-08-17 PROCEDURE — 93306 TTE W/DOPPLER COMPLETE: CPT

## 2022-08-18 NOTE — TELEPHONE ENCOUNTER
----- Message from William Alva M.D. sent at 8/17/2022  4:53 PM PDT -----  Her echocardiogram showed some minor changes that we see in patients with hypertension.  However, the overall function of the heart was normal with no significant valvular abnormalities.  No further cardiology evaluation recommended at this time if she has not had any new or concerning symptoms.

## 2022-10-27 ENCOUNTER — NON-PROVIDER VISIT (OUTPATIENT)
Dept: SLEEP MEDICINE | Facility: MEDICAL CENTER | Age: 57
End: 2022-10-27
Attending: INTERNAL MEDICINE
Payer: COMMERCIAL

## 2022-10-27 VITALS — BODY MASS INDEX: 45.99 KG/M2 | WEIGHT: 293 LBS | HEIGHT: 67 IN

## 2022-10-27 DIAGNOSIS — J96.01 ACUTE RESPIRATORY FAILURE WITH HYPOXIA (HCC): ICD-10-CM

## 2022-10-27 DIAGNOSIS — R06.02 SOB (SHORTNESS OF BREATH): ICD-10-CM

## 2022-10-27 DIAGNOSIS — U07.1 COVID-19: ICD-10-CM

## 2022-10-27 PROCEDURE — 94060 EVALUATION OF WHEEZING: CPT | Performed by: STUDENT IN AN ORGANIZED HEALTH CARE EDUCATION/TRAINING PROGRAM

## 2022-10-27 PROCEDURE — 94726 PLETHYSMOGRAPHY LUNG VOLUMES: CPT | Performed by: STUDENT IN AN ORGANIZED HEALTH CARE EDUCATION/TRAINING PROGRAM

## 2022-10-27 PROCEDURE — 94729 DIFFUSING CAPACITY: CPT | Performed by: STUDENT IN AN ORGANIZED HEALTH CARE EDUCATION/TRAINING PROGRAM

## 2022-10-27 ASSESSMENT — 6 MINUTE WALK TEST (6MWT)
COMMENTS: TEST ON ROOM AIR
SAO2 AT 2 MIN: 95
PERCEIVED FATIGUE AT 6 MIN: 0
PERCEIVED FATIGUE AT 5 MIN: 0
PERCEIVED FATIGUE AT 4 MIN: 0
HEART RATE AT 3 MIN: 90
HEART RATE AT 1 MIN: 92
COMMENTS: TEST ON ROOM AIR.
PERCEIVED FATIGUE AT 1 MIN: 0
SAO2 AT 2 MIN: 94
HEART RATE AT 6 MIN: 96
SAO2 AT 4 MIN: 95
BLOOD PRESSURE: RIGHT ARM
PERCEIVED BREATHLESSNESS AT 5 MIN: 0.5
HEART RATE AT 4 MIN: 92
PERCENT OF NORMAL WALKED: 57
PERCEIVED BREATHLESSNESS AT 2 MIN: 0.5
SAO2 AT 5 MIN: 93
BLOOD PRESSURE AT 1 MIN: 126/74
PERCEIVED BREATHLESSNESS AT 4 MIN: 0.5
NUMBER OF RESTS: 0
PERCEIVED BREATHLESSNESS AT 3 MIN: 0.5
PERCEIVED BREATHLESSNESS AT 6 MIN: 1
PERCEIVED FATIGUE AT 3 MIN: 0
SITTING BLOOD PRESSURE: 124/70
HEART RATE: 74
PERCEIVED FATIGUE AT 2 MIN: 0
TOTAL REST TIME: 0
SAO2 AT 1 MIN: 92
O2 SAT PERCENT ROOM AIR: 97
HEART RATE AT 2 MIN: 94
SAO2 AT 3 MIN: 95
AMBULATES WITH O2: WITHOUT O2
HEART RATE AT 2 MIN: 86
HEART RATE AT 5 MIN: 97
SAO2 AT 6 MIN: 93
SAO2 AT 1 MIN: 95
PERCEIVED FATIGUE AT 2 MIN: 0
PERCEIVED BREATHLESSNESS AT 1 MIN: 1
PERCEIVED FATIGUE AT 1 MIN: 0
HEART RATE AT 1 MIN: 96
PERCEIVED BREATHLESSNESS AT 1 MIN: 0.5
PERCEIVED BREATHLESSNESS AT 2 MIN: 1

## 2022-10-27 ASSESSMENT — PULMONARY FUNCTION TESTS
FEV1: 2.17
FVC: 2.6
FVC_LLN: 2.54
FEV1/FVC_PERCENT_LLN: 66
FEV1/FVC_PERCENT_PREDICTED: 104
FEV1_LLN: 2.00
FEV1: 2.1
FEV1_PERCENT_CHANGE: 3
FVC: 2.61
FEV1/FVC_PERCENT_PREDICTED: 105
FEV1/FVC_PERCENT_PREDICTED: 101
FEV1/FVC: 83
FEV1/FVC_PERCENT_CHANGE: 2
FEV1/FVC: 81
FVC_PERCENT_PREDICTED: 86
FEV1_PERCENT_CHANGE: 0
FEV1/FVC_PERCENT_PREDICTED: 102
FEV1/FVC: 81
FEV1_PERCENT_PREDICTED: 90
FEV1/FVC_PERCENT_PREDICTED: 79
FEV1_PREDICTED: 2.4
FEV1_PERCENT_PREDICTED: 87
FVC_PERCENT_PREDICTED: 85
FEV1/FVC_PREDICTED: 79
FVC_PREDICTED: 3.04
FEV1/FVC: 83.14

## 2022-10-27 ASSESSMENT — FIBROSIS 4 INDEX: FIB4 SCORE: 1.16

## 2022-10-27 NOTE — PROCEDURES
Technician: Cristina Du RRT, CPFT  Good patient effort & cooperation.  The results of this test meet the ATS/ERS standards for acceptability & reproducibility.  Test was performed on the [employer]tron[employer] Body Plethysmograph-Elite DX system.  Predicted equations for Spirometry are GLI-2012, ITS for lung volumes, and GLI-2017 for DLCO.  The DLCO was uncorrected for Hgb.  A bronchodilator of Ventolin HFA -2puffs via spacer administered.  DLCO performed during dilation period.    Interpretation;      There is mild restriction. There is no obstruction or a significant bronchodilator response. Normal diffusion capacity.     I, Delmis Garcia MD, am the author of this note.     Delmis Garcia MD  Pulmonary and Critical Care Medicine  [facility]

## 2022-10-29 NOTE — PROCEDURES
6MWT Interpretation    At the beginning of the test, patient's SpO2 was 95% on room air, HR was 92 bpm. At the end of the test, patient's SpO2 was 93% on room air, HR was 96 bpm.  He walked 840 feet, 57% predicted.        Delmis Garcia MD  Pulmonary and Critical Care Medicine  [facility]

## 2022-11-01 ENCOUNTER — OFFICE VISIT (OUTPATIENT)
Dept: SLEEP MEDICINE | Facility: MEDICAL CENTER | Age: 57
End: 2022-11-01
Payer: COMMERCIAL

## 2022-11-01 VITALS
DIASTOLIC BLOOD PRESSURE: 86 MMHG | OXYGEN SATURATION: 98 % | WEIGHT: 293 LBS | SYSTOLIC BLOOD PRESSURE: 134 MMHG | HEIGHT: 67 IN | RESPIRATION RATE: 16 BRPM | BODY MASS INDEX: 45.99 KG/M2 | HEART RATE: 79 BPM

## 2022-11-01 DIAGNOSIS — Z91.09 ENVIRONMENTAL ALLERGIES: ICD-10-CM

## 2022-11-01 DIAGNOSIS — U07.1 COVID-19: ICD-10-CM

## 2022-11-01 DIAGNOSIS — Z23 NEED FOR VACCINATION: ICD-10-CM

## 2022-11-01 DIAGNOSIS — J96.01 ACUTE RESPIRATORY FAILURE WITH HYPOXIA (HCC): ICD-10-CM

## 2022-11-01 PROCEDURE — 99214 OFFICE O/P EST MOD 30 MIN: CPT | Mod: 25 | Performed by: INTERNAL MEDICINE

## 2022-11-01 PROCEDURE — 90471 IMMUNIZATION ADMIN: CPT | Performed by: INTERNAL MEDICINE

## 2022-11-01 PROCEDURE — 90686 IIV4 VACC NO PRSV 0.5 ML IM: CPT | Performed by: INTERNAL MEDICINE

## 2022-11-01 ASSESSMENT — ENCOUNTER SYMPTOMS
STRIDOR: 0
CHILLS: 0
PND: 0
SPUTUM PRODUCTION: 0
FALLS: 0
FOCAL WEAKNESS: 0
MYALGIAS: 0
EYE DISCHARGE: 0
DIAPHORESIS: 0
PHOTOPHOBIA: 0
SORE THROAT: 0
VOMITING: 0
SHORTNESS OF BREATH: 0
TREMORS: 0
DEPRESSION: 0
CLAUDICATION: 0
DOUBLE VISION: 0
NAUSEA: 0
HEMOPTYSIS: 0
ABDOMINAL PAIN: 0
HEARTBURN: 0
NECK PAIN: 0
WEAKNESS: 0
WEIGHT LOSS: 0
WHEEZING: 0
BACK PAIN: 0
ORTHOPNEA: 0
SINUS PAIN: 0
EYE PAIN: 0
BLURRED VISION: 0
DIARRHEA: 0
COUGH: 0
DIZZINESS: 0
EYE REDNESS: 0
CONSTIPATION: 0
PALPITATIONS: 0
HEADACHES: 0
FEVER: 0
SPEECH CHANGE: 0

## 2022-11-01 ASSESSMENT — FIBROSIS 4 INDEX: FIB4 SCORE: 1.16

## 2022-11-01 NOTE — PROGRESS NOTES
Chief Complaint   Patient presents with    Follow-Up     Last seen 4/27/22     Results     PFT, 6MW 10/27/22          HPI: This patient is a 57 y.o. female whom is followed in our clinic for acute hypoxic respiratory failure following covid 19 pna last seen by me on 4/27/22.  Patient's past medical history significant for type 2 diabetes, hypertension, dyslipidemia, chronic left lower extremity lymphedema.  She is a lifelong non-smoker.   The patient presented to [facility] in March 2021 with fatigue, decreased appetite, fever, cough with associated wheezing, shortness of breath and loss of taste.  She was sent to the emergency department where chest x-ray showed bilateral infiltrates and she was noted to have hypoxic respiratory failure.  She tested positive for COVID-19 was admitted to the hospital and treated with Decadron but did not meet criteria for remdesivir.  She did have mild leukopenia at the time.  Procalcitonin level was not elevated.  She was discharged on supplemental oxygen but has been off daytime O2 for some time now. She did still have mild hypoxemia on OPO on RA 11/2021. She has continued to use O2 at night with benefit.  She did have mild restriction on PFTs in October 2021 but normal DLCO. Repeat PFTs on 10/27 show improved FVC from 2.49 to 2.61, improved TLC from 3.93 to 4.03 and low normal DLCO (slightly decreased since 10/21). She does have a hx of anemia and has orders for updated CBC. Overall she has continued to have improvement in respiratory status. She has albuterol that she has used recently due to changes in the weather and allergies.  She is participating in PT for R foot injury. She had bradycardia at our last visit but has since seen cardiology with unremarkable event monitor, TTE and stress test.     Past Medical History:   Diagnosis Date    Anemia     Diabetes (HCC)     High cholesterol     Hypertension        Social History     Socioeconomic History    Marital status: Single      Spouse name: Not on file    Number of children: Not on file    Years of education: Not on file    Highest education level: Not on file   Occupational History    Not on file   Tobacco Use    Smoking status: Never     Passive exposure: Never    Smokeless tobacco: Never   Vaping Use    Vaping Use: Never used   Substance and Sexual Activity    Alcohol use: No    Drug use: No    Sexual activity: Not on file   Other Topics Concern    Not on file   Social History Narrative    Not on file     Social Determinants of Health     Financial Resource Strain: Not on file   Food Insecurity: Not on file   Transportation Needs: Not on file   Physical Activity: Not on file   Stress: Not on file   Social Connections: Not on file   Intimate Partner Violence: Not on file   Housing Stability: Not on file       Family History   Problem Relation Age of Onset    Heart Disease Mother     Heart Attack Father     Heart Failure Brother        Current Outpatient Medications on File Prior to Visit   Medication Sig Dispense Refill    atorvastatin (LIPITOR) 80 MG tablet Take 1 Tablet by mouth at bedtime. 90 Tablet 3    insulin glargine (LANTUS SOLOSTAR) 100 UNIT/ML Solution Pen-injector injection Inject 15 Units under the skin every evening.      liraglutide (VICTOZA) 18 MG/3ML Solution Pen-injector Inject  under the skin every day.      montelukast (SINGULAIR) 10 MG Tab Take 1 Tablet by mouth every evening. 30 Tablet 11    albuterol 108 (90 Base) MCG/ACT Aero Soln inhalation aerosol Inhale 1-2 Puffs every four hours as needed. 1 Each 3    spironolactone (ALDACTONE) 50 MG Tab Take 50 mg by mouth every day.      carvedilol (COREG) 12.5 MG Tab Take 12.5 mg by mouth 2 times a day.      HUMALOG KWIKPEN 100 UNIT/ML Solution Pen-injector injection PEN INJECT 2 UNITS SUBCUTANEOUSLY PER 10G CARB AS NEEDED WITH MEALS      ASPIRIN 81 PO Take  by mouth.      metFORMIN (GLUCOPHAGE) 500 MG Tab Take 500 mg by mouth 2 times a day.      losartan (COZAAR) 100 MG  "Tab Take 100 mg by mouth every day.      Empagliflozin (JARDIANCE) 25 MG Tab Take 25 mg by mouth every day.       No current facility-administered medications on file prior to visit.       Percocet [oxycodone-acetaminophen] and Latex      ROS:   Review of Systems   Constitutional:  Negative for chills, diaphoresis, fever, malaise/fatigue and weight loss.   HENT:  Negative for congestion, ear discharge, ear pain, hearing loss, nosebleeds, sinus pain, sore throat and tinnitus.    Eyes:  Negative for blurred vision, double vision, photophobia, pain, discharge and redness.   Respiratory:  Negative for cough, hemoptysis, sputum production, shortness of breath, wheezing and stridor.    Cardiovascular:  Negative for chest pain, palpitations, orthopnea, claudication, leg swelling and PND.   Gastrointestinal:  Negative for abdominal pain, constipation, diarrhea, heartburn, nausea and vomiting.   Genitourinary:  Negative for dysuria and urgency.   Musculoskeletal:  Negative for back pain, falls, joint pain, myalgias and neck pain.   Skin:  Negative for itching and rash.   Neurological:  Negative for dizziness, tremors, speech change, focal weakness, weakness and headaches.   Endo/Heme/Allergies:  Negative for environmental allergies.   Psychiatric/Behavioral:  Negative for depression.      /86 (BP Location: Right arm, Patient Position: Sitting, BP Cuff Size: Large adult)   Pulse 79   Resp 16   Ht 1.702 m (5' 7\")   Wt (!) 133 kg (294 lb)   SpO2 98%   Physical Exam  Constitutional:       General: She is not in acute distress.     Appearance: Normal appearance. She is well-developed and normal weight.   HENT:      Head: Normocephalic and atraumatic.      Right Ear: External ear normal.      Left Ear: External ear normal.      Nose: Nose normal. No congestion.      Mouth/Throat:      Mouth: Mucous membranes are moist.      Pharynx: Oropharynx is clear. No oropharyngeal exudate.   Eyes:      General: No scleral " icterus.     Extraocular Movements: Extraocular movements intact.      Conjunctiva/sclera: Conjunctivae normal.      Pupils: Pupils are equal, round, and reactive to light.   Neck:      Vascular: No JVD.      Trachea: No tracheal deviation.   Cardiovascular:      Rate and Rhythm: Normal rate and regular rhythm.      Heart sounds: Normal heart sounds. No murmur heard.    No friction rub. No gallop.   Pulmonary:      Effort: Pulmonary effort is normal. No accessory muscle usage or respiratory distress.      Breath sounds: Normal breath sounds. No wheezing or rales.   Abdominal:      General: There is no distension.      Palpations: Abdomen is soft.      Tenderness: There is no abdominal tenderness.   Musculoskeletal:         General: No tenderness or deformity. Normal range of motion.      Cervical back: Normal range of motion and neck supple.      Right lower leg: No edema.      Left lower leg: No edema.   Lymphadenopathy:      Cervical: No cervical adenopathy.   Skin:     General: Skin is warm and dry.      Findings: No rash.      Nails: There is no clubbing.   Neurological:      Mental Status: She is alert and oriented to person, place, and time.      Cranial Nerves: No cranial nerve deficit.      Gait: Gait normal.   Psychiatric:         Behavior: Behavior normal.       PFTs as reviewed by me personally: as er hPI    Imaging as reviewed by me personally:  8/2021 improved infiltrates compared to 3/2021    Assessment:  1. Acute respiratory failure with hypoxia (HCC)        2. COVID-19        3. Environmental allergies        4. Need for vaccination  INFLUENZA VACCINE QUAD INJ (PF)          Plan:  Acute and 2/2 covid. She did still need O2 at night after OPO in November. She feels she is still benefiting from this therefore will continue for now. Consider repeat OPO at f/u on RA  Severe but she has recovered clinically and radiographically. Continue best supportive care  Chronic. Continue montelukast, intranasal  steroids and prn antihistamine. Prn LONG  Pt given influenza vaccine today  Return in about 6 months (around 5/1/2023) for nocturnal hypoxia, post covid.

## 2023-01-17 ENCOUNTER — OFFICE VISIT (OUTPATIENT)
Dept: URGENT CARE | Facility: PHYSICIAN GROUP | Age: 58
End: 2023-01-17
Payer: COMMERCIAL

## 2023-01-17 VITALS
SYSTOLIC BLOOD PRESSURE: 120 MMHG | HEIGHT: 66 IN | OXYGEN SATURATION: 99 % | HEART RATE: 66 BPM | TEMPERATURE: 96.9 F | BODY MASS INDEX: 47.09 KG/M2 | DIASTOLIC BLOOD PRESSURE: 78 MMHG | WEIGHT: 293 LBS | RESPIRATION RATE: 18 BRPM

## 2023-01-17 DIAGNOSIS — T14.8XXA HEMATOMA: ICD-10-CM

## 2023-01-17 PROCEDURE — 99213 OFFICE O/P EST LOW 20 MIN: CPT | Performed by: FAMILY MEDICINE

## 2023-01-17 ASSESSMENT — FIBROSIS 4 INDEX: FIB4 SCORE: 1.16

## 2023-01-17 NOTE — PROGRESS NOTES
"Subjective:      57 y.o. female presents to urgent care for lower extremity abnormalities.  She had a mechanical fall 12/27/2022 and landed on her knees bilaterally.  She subsequently developed a bruise on her left calf, this is tender to palpation.  She does have a history of lymphedema, which places her at high risk for cellulitis.  Her PCP encouraged her to come get her leg checked out to ensure there was no infection.  She remains afebrile.    She denies any other questions or concerns at this time.    Current problem list, medication, and past medical/surgical history were reviewed in Epic.    ROS  See HPI     Objective:      /78 (BP Location: Right arm, Patient Position: Sitting, BP Cuff Size: Large adult)   Pulse 66   Temp 36.1 °C (96.9 °F) (Temporal)   Resp 18   Ht 1.676 m (5' 6\")   Wt (!) 138 kg (304 lb)   LMP 04/19/2003   SpO2 99%   BMI 49.07 kg/m²     Physical Exam  Constitutional:       General: She is not in acute distress.     Appearance: She is not diaphoretic.   Cardiovascular:      Rate and Rhythm: Normal rate and regular rhythm.      Heart sounds: Normal heart sounds.   Pulmonary:      Effort: Pulmonary effort is normal. No respiratory distress.      Breath sounds: Normal breath sounds.   Musculoskeletal:      Right lower leg: Edema (at baseline, per patient) present.      Left lower leg: Edema (at baseline, per patient) present.   Skin:     Comments: Faint bruise to her left calf, this is tender to palpation.  No open areas or discharge.  No streaking.   Neurological:      Mental Status: She is alert.   Psychiatric:         Mood and Affect: Affect normal.         Judgment: Judgment normal.     Assessment/Plan:     1. Hematoma  Normally healing appearing hematoma. patient's edema is at baseline, calf is nontender to palpation, no erythema.  Will not work-up for DVT.      Instructed to return to Urgent Care or nearest Emergency Department if symptoms fail to improve, for any change in " condition, further concerns, or new concerning symptoms. Patient states understanding of the plan of care and discharge instructions.    Holly Strickland M.D.

## 2023-03-31 ENCOUNTER — OFFICE VISIT (OUTPATIENT)
Dept: URGENT CARE | Facility: PHYSICIAN GROUP | Age: 58
End: 2023-03-31
Payer: COMMERCIAL

## 2023-03-31 VITALS
OXYGEN SATURATION: 97 % | WEIGHT: 293 LBS | BODY MASS INDEX: 45.99 KG/M2 | RESPIRATION RATE: 14 BRPM | TEMPERATURE: 97.6 F | HEART RATE: 85 BPM | HEIGHT: 67 IN

## 2023-03-31 DIAGNOSIS — M54.12 CERVICAL RADICULOPATHY: ICD-10-CM

## 2023-03-31 PROCEDURE — 99213 OFFICE O/P EST LOW 20 MIN: CPT | Performed by: FAMILY MEDICINE

## 2023-03-31 RX ORDER — CYCLOBENZAPRINE HCL 5 MG
5-10 TABLET ORAL 3 TIMES DAILY PRN
Qty: 30 TABLET | Refills: 0 | Status: SHIPPED | OUTPATIENT
Start: 2023-03-31 | End: 2023-08-27

## 2023-03-31 NOTE — PROGRESS NOTES
7Subjective:      Chief Complaint   Patient presents with    Nerve Pain     Pinched nerve, affecting R arm and R hand, x 1 week                  Neck Pain   This is a  new problem. Episode onset: 1 wk ago.     The problem occurs constantly. The problem has been unchanged.  . The pain is present in the right side. The quality of the pain is described as   aching.  The pain often radiates down rt armThe pain is moderate. The symptoms are aggravated by position.         Associated symptoms include: none. Pertinent negatives include no fever, headaches     . Pt has tried nothing for the symptoms.         Social History     Tobacco Use    Smoking status: Never     Passive exposure: Never    Smokeless tobacco: Never   Vaping Use    Vaping Use: Never used   Substance Use Topics    Alcohol use: No    Drug use: No       Current Outpatient Medications on File Prior to Visit   Medication Sig Dispense Refill    atorvastatin (LIPITOR) 80 MG tablet Take 1 Tablet by mouth at bedtime. 90 Tablet 3    insulin glargine (LANTUS SOLOSTAR) 100 UNIT/ML Solution Pen-injector injection Inject 15 Units under the skin every evening.      liraglutide (VICTOZA) 18 MG/3ML Solution Pen-injector Inject  under the skin every day.      montelukast (SINGULAIR) 10 MG Tab Take 1 Tablet by mouth every evening. 30 Tablet 11    albuterol 108 (90 Base) MCG/ACT Aero Soln inhalation aerosol Inhale 1-2 Puffs every four hours as needed. 1 Each 3    spironolactone (ALDACTONE) 50 MG Tab Take 50 mg by mouth every day.      carvedilol (COREG) 12.5 MG Tab Take 12.5 mg by mouth 2 times a day.      HUMALOG KWIKPEN 100 UNIT/ML Solution Pen-injector injection PEN INJECT 2 UNITS SUBCUTANEOUSLY PER 10G CARB AS NEEDED WITH MEALS      ASPIRIN 81 PO Take  by mouth.      metFORMIN (GLUCOPHAGE) 500 MG Tab Take 500 mg by mouth 2 times a day.      losartan (COZAAR) 100 MG Tab Take 100 mg by mouth every day.      Empagliflozin (JARDIANCE) 25 MG Tab Take 25 mg by mouth every day.  "      No current facility-administered medications on file prior to visit.         Past Medical History:   Diagnosis Date    Anemia     Diabetes (HCC)     High cholesterol     Hypertension          Review of Systems   Constitutional: Negative for fever.   Musculoskeletal: Positive for neck pain.   Neurological:  . Negative for tingling, weakness, numbnes.          Objective:     BP (!) 144/82   Pulse 85   Temp 36.4 °C (97.6 °F)   Resp 14   Ht 1.702 m (5' 7\")   Wt (!) 139 kg (307 lb)   SpO2 97%       Physical Exam   Constitutional: pt is oriented to person, place, and time. Pt appears well-developed and well-nourished. No distress.   HENT:   Head: Normocephalic and atraumatic.   Eyes: Conjunctivae are normal.   Cardiovascular: Normal rate and regular rhythm.    Pulmonary/Chest: Effort normal and breath sounds normal. No respiratory distress. Pt has no wheezes.   Musculoskeletal:        Cervical spine: pt exhibits decreased range of motion, tenderness and spasm (right). Pt exhibits no swelling.   Neurological: pt is alert and oriented to person, place, and time.   Strength:    Deltoid - 4/5 on right  5/5 on left     - 5/5 on right, 5/5 on left   Skin: Skin is warm. Pt is not diaphoretic. No erythema.   Psychiatric:  Pt's behavior is normal.   Nursing note and vitals reviewed.              Assessment/Plan:             1. Cervical radiculopathy     - MR-CERVICAL SPINE-W/O; Future  - cyclobenzaprine (FLEXERIL) 5 mg tablet; Take 1-2 Tablets by mouth 3 times a day as needed for Muscle Spasms.  Dispense: 30 Tablet; Refill: 0  - diclofenac sodium (VOLTAREN) 1 % Gel; Apply 4 g topically in the morning, at noon, and at bedtime.  Dispense: 50 g; Refill: 0       Follow up in one week if no improvement, sooner if symptoms worsen.     "

## 2023-04-03 ENCOUNTER — TELEPHONE (OUTPATIENT)
Dept: CARDIOLOGY | Facility: MEDICAL CENTER | Age: 58
End: 2023-04-03
Payer: COMMERCIAL

## 2023-04-04 ENCOUNTER — APPOINTMENT (OUTPATIENT)
Dept: ADMISSIONS | Facility: MEDICAL CENTER | Age: 58
End: 2023-04-04
Attending: UROLOGY
Payer: COMMERCIAL

## 2023-04-07 ENCOUNTER — PRE-ADMISSION TESTING (OUTPATIENT)
Dept: ADMISSIONS | Facility: MEDICAL CENTER | Age: 58
End: 2023-04-07
Attending: UROLOGY
Payer: COMMERCIAL

## 2023-04-07 RX ORDER — CETIRIZINE HYDROCHLORIDE 10 MG/1
TABLET ORAL
COMMUNITY
End: 2023-05-02

## 2023-04-07 NOTE — OR NURSING
RN telephone preadmission appointment completed with Cyndee Calvert. Cyndee states she has a LATEX allergy. I notified Tawnya, the  for Francisco Tristan. Cyndee states Dr. Ceron told her to stop aspirin 81mg for one week prior to surgery. I told Cyndee to notify her prescribing doctor who is Deanna Mccollum regarding insulin administration instructions prior to surgery and to let Dr. Mccollum know that she should hold Empagliflozin 4 days prior to surgery. I also told her to hold liraglutide, metformin and spironolactone the day of surgery and to hold losartan for 24 hours prior to surgery per anesthesia guidelines. Cyndee verbalized understanding of medication administration instructions.

## 2023-04-17 ENCOUNTER — PRE-ADMISSION TESTING (OUTPATIENT)
Dept: ADMISSIONS | Facility: MEDICAL CENTER | Age: 58
End: 2023-04-17
Attending: UROLOGY
Payer: COMMERCIAL

## 2023-04-17 DIAGNOSIS — Z01.810 PRE-OPERATIVE CARDIOVASCULAR EXAMINATION: ICD-10-CM

## 2023-04-17 DIAGNOSIS — Z01.812 PRE-OPERATIVE LABORATORY EXAMINATION: ICD-10-CM

## 2023-04-17 LAB
ANION GAP SERPL CALC-SCNC: 11 MMOL/L (ref 7–16)
APPEARANCE UR: CLEAR
BACTERIA #/AREA URNS HPF: ABNORMAL /HPF
BASOPHILS # BLD AUTO: 0.8 % (ref 0–1.8)
BASOPHILS # BLD: 0.05 K/UL (ref 0–0.12)
BILIRUB UR QL STRIP.AUTO: NEGATIVE
BUN SERPL-MCNC: 19 MG/DL (ref 8–22)
CALCIUM SERPL-MCNC: 8.4 MG/DL (ref 8.5–10.5)
CHLORIDE SERPL-SCNC: 110 MMOL/L (ref 96–112)
CO2 SERPL-SCNC: 20 MMOL/L (ref 20–33)
COLOR UR: YELLOW
CREAT SERPL-MCNC: 1.56 MG/DL (ref 0.5–1.4)
EKG IMPRESSION: NORMAL
EOSINOPHIL # BLD AUTO: 0.12 K/UL (ref 0–0.51)
EOSINOPHIL NFR BLD: 1.9 % (ref 0–6.9)
EPI CELLS #/AREA URNS HPF: ABNORMAL /HPF
ERYTHROCYTE [DISTWIDTH] IN BLOOD BY AUTOMATED COUNT: 46.5 FL (ref 35.9–50)
GFR SERPLBLD CREATININE-BSD FMLA CKD-EPI: 38 ML/MIN/1.73 M 2
GLUCOSE SERPL-MCNC: 90 MG/DL (ref 65–99)
GLUCOSE UR STRIP.AUTO-MCNC: >=1000 MG/DL
HCT VFR BLD AUTO: 41.1 % (ref 37–47)
HGB BLD-MCNC: 13.1 G/DL (ref 12–16)
HYALINE CASTS #/AREA URNS LPF: ABNORMAL /LPF
IMM GRANULOCYTES # BLD AUTO: 0.02 K/UL (ref 0–0.11)
IMM GRANULOCYTES NFR BLD AUTO: 0.3 % (ref 0–0.9)
INR PPP: 1.15 (ref 0.87–1.13)
KETONES UR STRIP.AUTO-MCNC: NEGATIVE MG/DL
LEUKOCYTE ESTERASE UR QL STRIP.AUTO: ABNORMAL
LYMPHOCYTES # BLD AUTO: 2.47 K/UL (ref 1–4.8)
LYMPHOCYTES NFR BLD: 38.4 % (ref 22–41)
MCH RBC QN AUTO: 30.6 PG (ref 27–33)
MCHC RBC AUTO-ENTMCNC: 31.9 G/DL (ref 33.6–35)
MCV RBC AUTO: 96 FL (ref 81.4–97.8)
MICRO URNS: ABNORMAL
MONOCYTES # BLD AUTO: 0.41 K/UL (ref 0–0.85)
MONOCYTES NFR BLD AUTO: 6.4 % (ref 0–13.4)
NEUTROPHILS # BLD AUTO: 3.37 K/UL (ref 2–7.15)
NEUTROPHILS NFR BLD: 52.2 % (ref 44–72)
NITRITE UR QL STRIP.AUTO: NEGATIVE
NRBC # BLD AUTO: 0 K/UL
NRBC BLD-RTO: 0 /100 WBC
PH UR STRIP.AUTO: 5.5 [PH] (ref 5–8)
PLATELET # BLD AUTO: 168 K/UL (ref 164–446)
PMV BLD AUTO: 12.3 FL (ref 9–12.9)
POTASSIUM SERPL-SCNC: 4.3 MMOL/L (ref 3.6–5.5)
PROT UR QL STRIP: NEGATIVE MG/DL
PROTHROMBIN TIME: 14.5 SEC (ref 12–14.6)
RBC # BLD AUTO: 4.28 M/UL (ref 4.2–5.4)
RBC # URNS HPF: ABNORMAL /HPF
RBC UR QL AUTO: ABNORMAL
SODIUM SERPL-SCNC: 141 MMOL/L (ref 135–145)
SP GR UR STRIP.AUTO: 1.03
UROBILINOGEN UR STRIP.AUTO-MCNC: 0.2 MG/DL
WBC # BLD AUTO: 6.4 K/UL (ref 4.8–10.8)
WBC #/AREA URNS HPF: ABNORMAL /HPF

## 2023-04-17 PROCEDURE — 36415 COLL VENOUS BLD VENIPUNCTURE: CPT

## 2023-04-17 PROCEDURE — 80048 BASIC METABOLIC PNL TOTAL CA: CPT

## 2023-04-17 PROCEDURE — 93005 ELECTROCARDIOGRAM TRACING: CPT

## 2023-04-17 PROCEDURE — 81001 URINALYSIS AUTO W/SCOPE: CPT

## 2023-04-17 PROCEDURE — 85025 COMPLETE CBC W/AUTO DIFF WBC: CPT

## 2023-04-17 PROCEDURE — 87086 URINE CULTURE/COLONY COUNT: CPT

## 2023-04-17 PROCEDURE — 85610 PROTHROMBIN TIME: CPT

## 2023-04-17 PROCEDURE — 93010 ELECTROCARDIOGRAM REPORT: CPT | Performed by: INTERNAL MEDICINE

## 2023-04-19 LAB
BACTERIA UR CULT: NORMAL
SIGNIFICANT IND 70042: NORMAL
SITE SITE: NORMAL
SOURCE SOURCE: NORMAL

## 2023-04-30 ENCOUNTER — ANESTHESIA EVENT (OUTPATIENT)
Dept: SURGERY | Facility: MEDICAL CENTER | Age: 58
End: 2023-04-30
Payer: COMMERCIAL

## 2023-05-01 ENCOUNTER — ANESTHESIA (OUTPATIENT)
Dept: SURGERY | Facility: MEDICAL CENTER | Age: 58
End: 2023-05-01
Payer: COMMERCIAL

## 2023-05-01 ENCOUNTER — HOSPITAL ENCOUNTER (OUTPATIENT)
Facility: MEDICAL CENTER | Age: 58
End: 2023-05-01
Attending: UROLOGY | Admitting: UROLOGY
Payer: COMMERCIAL

## 2023-05-01 ENCOUNTER — APPOINTMENT (OUTPATIENT)
Dept: RADIOLOGY | Facility: MEDICAL CENTER | Age: 58
End: 2023-05-01
Attending: UROLOGY
Payer: COMMERCIAL

## 2023-05-01 VITALS
BODY MASS INDEX: 45.99 KG/M2 | DIASTOLIC BLOOD PRESSURE: 79 MMHG | SYSTOLIC BLOOD PRESSURE: 173 MMHG | OXYGEN SATURATION: 93 % | TEMPERATURE: 96.6 F | HEART RATE: 69 BPM | HEIGHT: 67 IN | RESPIRATION RATE: 18 BRPM | WEIGHT: 293 LBS

## 2023-05-01 LAB
GLUCOSE BLD STRIP.AUTO-MCNC: 111 MG/DL (ref 65–99)
GLUCOSE BLD STRIP.AUTO-MCNC: 84 MG/DL (ref 65–99)
GLUCOSE BLD STRIP.AUTO-MCNC: 95 MG/DL (ref 65–99)
GLUCOSE BLD STRIP.AUTO-MCNC: 98 MG/DL (ref 65–99)

## 2023-05-01 PROCEDURE — 160036 HCHG PACU - EA ADDL 30 MINS PHASE I: Performed by: UROLOGY

## 2023-05-01 PROCEDURE — 160046 HCHG PACU - 1ST 60 MINS PHASE II: Performed by: UROLOGY

## 2023-05-01 PROCEDURE — 160002 HCHG RECOVERY MINUTES (STAT): Performed by: UROLOGY

## 2023-05-01 PROCEDURE — 160028 HCHG SURGERY MINUTES - 1ST 30 MINS LEVEL 3: Performed by: UROLOGY

## 2023-05-01 PROCEDURE — 82962 GLUCOSE BLOOD TEST: CPT | Mod: 91

## 2023-05-01 PROCEDURE — 00910 ANES TRANSURETHRAL PX NOS: CPT | Performed by: ANESTHESIOLOGY

## 2023-05-01 PROCEDURE — C1769 GUIDE WIRE: HCPCS | Performed by: UROLOGY

## 2023-05-01 PROCEDURE — 160047 HCHG PACU  - EA ADDL 30 MINS PHASE II: Performed by: UROLOGY

## 2023-05-01 PROCEDURE — 160009 HCHG ANES TIME/MIN: Performed by: UROLOGY

## 2023-05-01 PROCEDURE — 700111 HCHG RX REV CODE 636 W/ 250 OVERRIDE (IP): Performed by: ANESTHESIOLOGY

## 2023-05-01 PROCEDURE — A9270 NON-COVERED ITEM OR SERVICE: HCPCS | Performed by: ANESTHESIOLOGY

## 2023-05-01 PROCEDURE — 160048 HCHG OR STATISTICAL LEVEL 1-5: Performed by: UROLOGY

## 2023-05-01 PROCEDURE — 700105 HCHG RX REV CODE 258: Performed by: UROLOGY

## 2023-05-01 PROCEDURE — 700101 HCHG RX REV CODE 250: Performed by: ANESTHESIOLOGY

## 2023-05-01 PROCEDURE — 160039 HCHG SURGERY MINUTES - EA ADDL 1 MIN LEVEL 3: Performed by: UROLOGY

## 2023-05-01 PROCEDURE — 700102 HCHG RX REV CODE 250 W/ 637 OVERRIDE(OP): Performed by: ANESTHESIOLOGY

## 2023-05-01 PROCEDURE — 160035 HCHG PACU - 1ST 60 MINS PHASE I: Performed by: UROLOGY

## 2023-05-01 PROCEDURE — 700101 HCHG RX REV CODE 250: Performed by: UROLOGY

## 2023-05-01 PROCEDURE — 160025 RECOVERY II MINUTES (STATS): Performed by: UROLOGY

## 2023-05-01 RX ORDER — OXYCODONE HCL 5 MG/5 ML
10 SOLUTION, ORAL ORAL
Status: COMPLETED | OUTPATIENT
Start: 2023-05-01 | End: 2023-05-01

## 2023-05-01 RX ORDER — LABETALOL HYDROCHLORIDE 5 MG/ML
10 INJECTION, SOLUTION INTRAVENOUS
Status: DISCONTINUED | OUTPATIENT
Start: 2023-05-01 | End: 2023-05-01 | Stop reason: HOSPADM

## 2023-05-01 RX ORDER — HYDROMORPHONE HYDROCHLORIDE 1 MG/ML
0.4 INJECTION, SOLUTION INTRAMUSCULAR; INTRAVENOUS; SUBCUTANEOUS
Status: DISCONTINUED | OUTPATIENT
Start: 2023-05-01 | End: 2023-05-01 | Stop reason: HOSPADM

## 2023-05-01 RX ORDER — SODIUM CHLORIDE, SODIUM LACTATE, POTASSIUM CHLORIDE, CALCIUM CHLORIDE 600; 310; 30; 20 MG/100ML; MG/100ML; MG/100ML; MG/100ML
INJECTION, SOLUTION INTRAVENOUS CONTINUOUS
Status: ACTIVE | OUTPATIENT
Start: 2023-05-01 | End: 2023-05-01

## 2023-05-01 RX ORDER — LABETALOL HYDROCHLORIDE 5 MG/ML
INJECTION, SOLUTION INTRAVENOUS PRN
Status: DISCONTINUED | OUTPATIENT
Start: 2023-05-01 | End: 2023-05-01 | Stop reason: SURG

## 2023-05-01 RX ORDER — LIDOCAINE HYDROCHLORIDE 20 MG/ML
JELLY TOPICAL
Status: DISCONTINUED | OUTPATIENT
Start: 2023-05-01 | End: 2023-05-01 | Stop reason: HOSPADM

## 2023-05-01 RX ORDER — HALOPERIDOL 5 MG/ML
1 INJECTION INTRAMUSCULAR
Status: DISCONTINUED | OUTPATIENT
Start: 2023-05-01 | End: 2023-05-01 | Stop reason: HOSPADM

## 2023-05-01 RX ORDER — MEPERIDINE HYDROCHLORIDE 25 MG/ML
12.5 INJECTION INTRAMUSCULAR; INTRAVENOUS; SUBCUTANEOUS
Status: DISCONTINUED | OUTPATIENT
Start: 2023-05-01 | End: 2023-05-01 | Stop reason: HOSPADM

## 2023-05-01 RX ORDER — ONDANSETRON 2 MG/ML
4 INJECTION INTRAMUSCULAR; INTRAVENOUS
Status: COMPLETED | OUTPATIENT
Start: 2023-05-01 | End: 2023-05-01

## 2023-05-01 RX ORDER — ONDANSETRON 2 MG/ML
INJECTION INTRAMUSCULAR; INTRAVENOUS PRN
Status: DISCONTINUED | OUTPATIENT
Start: 2023-05-01 | End: 2023-05-01 | Stop reason: SURG

## 2023-05-01 RX ORDER — DEXAMETHASONE SODIUM PHOSPHATE 4 MG/ML
INJECTION, SOLUTION INTRA-ARTICULAR; INTRALESIONAL; INTRAMUSCULAR; INTRAVENOUS; SOFT TISSUE PRN
Status: DISCONTINUED | OUTPATIENT
Start: 2023-05-01 | End: 2023-05-01 | Stop reason: SURG

## 2023-05-01 RX ORDER — OXYCODONE HCL 5 MG/5 ML
5 SOLUTION, ORAL ORAL
Status: COMPLETED | OUTPATIENT
Start: 2023-05-01 | End: 2023-05-01

## 2023-05-01 RX ORDER — CEFAZOLIN SODIUM 1 G/3ML
INJECTION, POWDER, FOR SOLUTION INTRAMUSCULAR; INTRAVENOUS PRN
Status: DISCONTINUED | OUTPATIENT
Start: 2023-05-01 | End: 2023-05-01 | Stop reason: SURG

## 2023-05-01 RX ORDER — HYDROMORPHONE HYDROCHLORIDE 1 MG/ML
0.1 INJECTION, SOLUTION INTRAMUSCULAR; INTRAVENOUS; SUBCUTANEOUS
Status: DISCONTINUED | OUTPATIENT
Start: 2023-05-01 | End: 2023-05-01 | Stop reason: HOSPADM

## 2023-05-01 RX ORDER — HYDROMORPHONE HYDROCHLORIDE 1 MG/ML
0.2 INJECTION, SOLUTION INTRAMUSCULAR; INTRAVENOUS; SUBCUTANEOUS
Status: DISCONTINUED | OUTPATIENT
Start: 2023-05-01 | End: 2023-05-01 | Stop reason: HOSPADM

## 2023-05-01 RX ORDER — DIPHENHYDRAMINE HYDROCHLORIDE 50 MG/ML
12.5 INJECTION INTRAMUSCULAR; INTRAVENOUS
Status: DISCONTINUED | OUTPATIENT
Start: 2023-05-01 | End: 2023-05-01 | Stop reason: HOSPADM

## 2023-05-01 RX ADMIN — ONDANSETRON 4 MG: 2 INJECTION INTRAMUSCULAR; INTRAVENOUS at 14:03

## 2023-05-01 RX ADMIN — SODIUM CHLORIDE, POTASSIUM CHLORIDE, SODIUM LACTATE AND CALCIUM CHLORIDE: 600; 310; 30; 20 INJECTION, SOLUTION INTRAVENOUS at 12:24

## 2023-05-01 RX ADMIN — LABETALOL HYDROCHLORIDE 10 MG: 5 INJECTION, SOLUTION INTRAVENOUS at 13:28

## 2023-05-01 RX ADMIN — LABETALOL HYDROCHLORIDE 10 MG: 5 INJECTION INTRAVENOUS at 13:49

## 2023-05-01 RX ADMIN — FENTANYL CITRATE 25 MCG: 50 INJECTION, SOLUTION INTRAMUSCULAR; INTRAVENOUS at 14:04

## 2023-05-01 RX ADMIN — LABETALOL HYDROCHLORIDE 10 MG: 5 INJECTION INTRAVENOUS at 14:49

## 2023-05-01 RX ADMIN — LABETALOL HYDROCHLORIDE 10 MG: 5 INJECTION INTRAVENOUS at 14:15

## 2023-05-01 RX ADMIN — CEFAZOLIN 2 G: 330 INJECTION, POWDER, FOR SOLUTION INTRAMUSCULAR; INTRAVENOUS at 13:08

## 2023-05-01 RX ADMIN — FENTANYL CITRATE 100 MCG: 50 INJECTION, SOLUTION INTRAMUSCULAR; INTRAVENOUS at 13:11

## 2023-05-01 RX ADMIN — FENTANYL CITRATE 25 MCG: 50 INJECTION, SOLUTION INTRAMUSCULAR; INTRAVENOUS at 16:03

## 2023-05-01 RX ADMIN — LABETALOL HYDROCHLORIDE 20 MG: 5 INJECTION, SOLUTION INTRAVENOUS at 13:20

## 2023-05-01 RX ADMIN — LIDOCAINE HYDROCHLORIDE 0.5 ML: 10 INJECTION, SOLUTION EPIDURAL; INFILTRATION; INTRACAUDAL; PERINEURAL at 12:24

## 2023-05-01 RX ADMIN — MEPERIDINE HYDROCHLORIDE 12.5 MG: 25 INJECTION INTRAMUSCULAR; INTRAVENOUS; SUBCUTANEOUS at 14:09

## 2023-05-01 RX ADMIN — ONDANSETRON 4 MG: 2 INJECTION INTRAMUSCULAR; INTRAVENOUS at 13:27

## 2023-05-01 RX ADMIN — SUGAMMADEX 200 MG: 100 INJECTION, SOLUTION INTRAVENOUS at 13:26

## 2023-05-01 RX ADMIN — PROPOFOL 150 MG: 10 INJECTION, EMULSION INTRAVENOUS at 13:15

## 2023-05-01 RX ADMIN — OXYCODONE HYDROCHLORIDE 10 MG: 5 SOLUTION ORAL at 14:00

## 2023-05-01 RX ADMIN — DEXAMETHASONE SODIUM PHOSPHATE 4 MG: 4 INJECTION, SOLUTION INTRA-ARTICULAR; INTRALESIONAL; INTRAMUSCULAR; INTRAVENOUS; SOFT TISSUE at 13:27

## 2023-05-01 RX ADMIN — Medication 40 MG: at 13:15

## 2023-05-01 RX ADMIN — ROCURONIUM BROMIDE 50 MG: 10 INJECTION, SOLUTION INTRAVENOUS at 13:18

## 2023-05-01 ASSESSMENT — PAIN SCALES - GENERAL: PAIN_LEVEL: 2

## 2023-05-01 ASSESSMENT — PAIN DESCRIPTION - PAIN TYPE
TYPE: SURGICAL PAIN

## 2023-05-01 ASSESSMENT — FIBROSIS 4 INDEX: FIB4 SCORE: 1.11

## 2023-05-01 NOTE — OR NURSING
1335: pt arrived to PACU in stable condition. Pt hypertensive. Orders received from anesthesia.     1400: pt feeling increased pain. Discussed allergy with pt and pt stated allergy listed (percocet) is more of intolerance than an allergy and that it just makes her feel nauseous. Pt would like to take the oral oxycodone that is ordered. Pt medicated per MAR.     1425: pt son and daughter in law updated on pt status.     1500: pt BP responding to medication and pt is below . Pt states pain has improved and is tolerable to go home. No nausea reported.     1518: Report called to Blake ARELLANO. All pertinent pt information has been discussed.

## 2023-05-01 NOTE — OP REPORT
[facility] Operative Report    Pre-operative Diagnosis: 1.  Retained right ureteral stent   Post-operative Diagnosis: Same as above   Procedure: 1.  Cystoscopy with removal of right ureteral stent    2.  Right ureteroscopy   Surgeon: Javier Ceron M.D., MD   Assistant: None   Anesthesia: General  Anesthesiologist: Matt Hall M.D.    Estimated Blood Loss: Minimal   IV fluids Less than 1000 cc crystalloid   Specimens: 1.  None   Drains: None   Complications: None   Wound class Clean contaminated   Condition: Stable, procedure well tolerated    Disposition:  Stable to PACU    Findings: Minimally encrusted right ureteral stent which was easily withdrawn.  Ureteroscopy showed no evidence of intraureteral stone or mass.  No additional stent was left in place     Indications for Procedure:  Cyndee Calvert is a 57 y.o. female who underwent placement of right ureteral stent for unclear reasons approximately 5 years ago.  She was told she had blood and pus present and needed that drained.  She is not aware of having had a stone at that time.  Recent KUB had not shown any stones alongside the course of the stent or within the kidney.  She is taken now for removal of the stent.    Procedure in Detail:  The patient was identified in the holding area.  She was taken to the operative suite.  General anesthesia was administered by Dr. Hall.  She was positioned in the dorsal lithotomy position and sterilely prepped and draped.  Timeout was called.  Correct patient and site of surgery was confirmed after administering cefazolin intravenously.    A 25 [place] cystoscope would not advance easily through the urethral meatus.  A 20 [place] scope was then advanced and the lumen of the bladder inspected.  A minimally encrusted stent was seen at the right ureteral orifice.  No additional foreign bodies, tumors, or stones were noted.    The 20 [place] cystoscope was replaced with a 25 [place] sheath with cystoscope.   The stent was grasped and withdrawn to the urethral meatus with minimal resistance.  I did observe under fluoroscopy and at the proximal coil uncoiled nicely.  I then withdrew the stent without resistance.  The stent was intact and minimally encrusted.    I advanced a sensor wire into the right ureteral orifice and coiled in the right upper quadrant in the renal pelvis.  I then advanced a semirigid ureteroscope alongside this and inspected the ureter up to the ureteropelvic junction.  No stones or masses were noted within the ureter.  Small amount of blood clot was irrigated free.    The ureter was inspected as the ureteroscope was withdrawn confirming the above findings of no stone or mass.  The ureteroscope was withdrawn the bladder was emptied the sensor wire was removed and the procedure was terminated.    2% lidocaine jelly was placed into the urethra and the bladder at the end of the procedure and the patient then sent to recovery room in stable condition.  She suffered no intraoperative complications.         Javier Ceron M.D.   [address] [facility] [place] [address]   [phone]

## 2023-05-01 NOTE — ANESTHESIA PREPROCEDURE EVALUATION
Case: [id number] Date/Time: 05/01/23 9765    Procedures:       CYSTOSCOPY, RIGHT STENT REMOVAL POSSIBLE CYSTOSCOPY, WITH RIGHT URETEROSCOPY, WITH LITHOTRIPSY, WITH POSSIBLE INSERTION OF RIGHT URETERAL STENT      URETEROSCOPY      LITHOTRIPSY, USING LASER    Pre-op diagnosis: RETAINED URETERIC STENT    Location: [facility] OR 10 / SURGERY [facility]    Surgeons: Javier Ceron M.D.          Relevant Problems   PULMONARY   (positive) Dyspnea on exertion   (positive) Pneumonia due to COVID-19 virus      NEURO   (positive) Tension type headache      CARDIAC   (positive) Dyspnea on exertion   (positive) HTN (hypertension)         (positive) ANOOP (acute kidney injury) (HCC)       Physical Exam    Airway   Mallampati: II  TM distance: >3 FB  Neck ROM: full       Cardiovascular - normal exam  Rhythm: regular  Rate: normal  (-) murmur     Dental - normal exam           Pulmonary - normal exam  Breath sounds clear to auscultation     Abdominal    Neurological - normal exam                 Anesthesia Plan    ASA 3       Plan - general       Airway plan will be LMA          Induction: intravenous    Postoperative Plan: Postoperative administration of opioids is intended.    Pertinent diagnostic labs and testing reviewed    Informed Consent:    Anesthetic plan and risks discussed with patient.    Use of blood products discussed with: patient whom consented to blood products.

## 2023-05-01 NOTE — ANESTHESIA PROCEDURE NOTES
Airway    Date/Time: 5/1/2023 1:15 PM  Performed by: Matt Hall M.D.  Authorized by: Matt Hall M.D.     Location:  OR  Urgency:  Elective  Indications for Airway Management:  Anesthesia      Spontaneous Ventilation: absent    Sedation Level:  Deep  Preoxygenated: Yes    Final Airway Type:  Supraglottic airway  Final Supraglottic Airway:  Standard LMA    SGA Size:  4  Number of Attempts at Approach:  1

## 2023-05-01 NOTE — ANESTHESIA POSTPROCEDURE EVALUATION
Patient: Cyndee Calvert    Procedure Summary     Date: 05/01/23 Room / Location: [place] [address] / SURGERY [facility]    Anesthesia Start: 1301 Anesthesia Stop: 1340    Procedure: RIGHT URETEROSCOPY;  RIGHT STENT REMOVAL (Right: Ureter) Diagnosis: (RETAINED RIGHT URETERAL STENT)    Surgeons: Javier Ceron M.D. Responsible Provider: Matt Hall M.D.    Anesthesia Type: general ASA Status: 3          Final Anesthesia Type: general  Last vitals  BP   Blood Pressure: (!) 220/100 (RN notified.)    Temp   36.3 °C (97.4 °F)    Pulse   74   Resp   18    SpO2   94 %      Anesthesia Post Evaluation    Patient location during evaluation: PACU  Patient participation: complete - patient participated  Level of consciousness: awake and alert  Pain score: 2    Airway patency: patent  Anesthetic complications: no  Cardiovascular status: hemodynamically stable  Respiratory status: acceptable  Hydration status: euvolemic    PONV: none          No notable events documented.     Nurse Pain Score: 1 (NPRS)

## 2023-05-01 NOTE — DISCHARGE INSTRUCTIONS
HOME CARE INSTRUCTIONS    ACTIVITY: Rest and take it easy for the first 24 hours.  A responsible adult is recommended to remain with you during that time.  It is normal to feel sleepy.  We encourage you to not do anything that requires balance, judgment or coordination.    FOR 24 HOURS DO NOT:  Drive, operate machinery or run household appliances.  Drink beer or alcoholic beverages.  Make important decisions or sign legal documents.    SPECIAL INSTRUCTIONS:   See attachment.    DIET: To avoid nausea, slowly advance diet as tolerated, avoiding spicy or greasy foods for the first day.  Add more substantial food to your diet according to your physician's instructions.  Babies can be fed formula or breast milk as soon as they are hungry.  INCREASE FLUIDS AND FIBER TO AVOID CONSTIPATION.    SURGICAL DRESSING/BATHING:   Okay to shower or bathe.  No dressings.    MEDICATIONS: Resume taking daily medication.  Take prescribed pain medication with food.  If no medication is prescribed, you may take non-aspirin pain medication if needed.  PAIN MEDICATION CAN BE VERY CONSTIPATING.  Take a stool softener or laxative such as senokot, pericolace, or milk of magnesia if needed.    No new prescriptions.  Last pain medication given at 2 pm (oxycodone).    A follow-up appointment should be arranged with your doctor as scheduled; call to schedule.    You should CALL YOUR PHYSICIAN if you develop:  Fever greater than 101 degrees F.  Pain not relieved by medication, or persistent nausea or vomiting.  Excessive bleeding (blood soaking through dressing) or unexpected drainage from the wound.  Extreme redness or swelling around the incision site, drainage of pus or foul smelling drainage.  Inability to urinate or empty your bladder within 8 hours.  Problems with breathing or chest pain.    You should call 911 if you develop problems with breathing or chest pain.  If you are unable to contact your doctor or surgical center, you should go to  the nearest emergency room or urgent care center.  Physician's telephone #: Dr. Ceron [phone]    MILD FLU-LIKE SYMPTOMS ARE NORMAL.  YOU MAY EXPERIENCE GENERALIZED MUSCLE ACHES, THROAT IRRITATION, HEADACHE AND/OR SOME NAUSEA.    If any questions arise, call your doctor.  If your doctor is not available, please feel free to call the Surgical Center at [phone].  The Center is open Monday through Friday from 7AM to 7PM.      A registered nurse may call you a few days after your surgery to see how you are doing after your procedure.    You may also receive a survey in the mail within the next two weeks and we ask that you take a few moments to complete the survey and return it to us.  Our goal is to provide you with very good care and we value your comments.     Depression / Suicide Risk    As you are discharged from this [facility] facility, it is important to learn how to keep safe from harming yourself.    Recognize the warning signs:  Abrupt changes in personality, positive or negative- including increase in energy   Giving away possessions  Change in eating patterns- significant weight changes-  positive or negative  Change in sleeping patterns- unable to sleep or sleeping all the time   Unwillingness or inability to communicate  Depression  Unusual sadness, discouragement and loneliness  Talk of wanting to die  Neglect of personal appearance   Rebelliousness- reckless behavior  Withdrawal from people/activities they love  Confusion- inability to concentrate     If you or a loved one observes any of these behaviors or has concerns about self-harm, here's what you can do:  Talk about it- your feelings and reasons for harming yourself  Remove any means that you might use to hurt yourself (examples: pills, rope, extension cords, firearm)  Get professional help from the community (Mental Health, Substance Abuse, psychological counseling)  Do not be alone:Call your Safe Contact- someone whom you trust  who will be there for you.  Call your local CRISIS HOTLINE 566-5764 or 973-021-4908  Call your local Children's Mobile Crisis Response Team [place] (317) 053-2889 or www.[place]  Call the toll free National Suicide Prevention Hotlines   National Suicide Prevention Lifeline 323-704-PDUO (7565)  [facility] Line Network 800-ZPVXAPM (254-4171)    I acknowledge receipt and understanding of these Home Care instructions.

## 2023-05-01 NOTE — ANESTHESIA TIME REPORT
Anesthesia Start and Stop Event Times     Date Time Event    5/1/2023 1254 Ready for Procedure     1301 Anesthesia Start     1340 Anesthesia Stop        Responsible Staff  05/01/23    Name Role Begin End    Matt Hall M.D. Anesth 1301 1340        Overtime Reason:  no overtime (within assigned shift)    Comments:

## 2023-05-01 NOTE — OR NURSING
Preop complete. Iv placed and infusing. All questions answered. Belongings to be placed in locker along with oxygen. Bed in low position and call light in reach.

## 2023-05-02 ENCOUNTER — OFFICE VISIT (OUTPATIENT)
Dept: SLEEP MEDICINE | Facility: MEDICAL CENTER | Age: 58
End: 2023-05-02
Attending: INTERNAL MEDICINE
Payer: COMMERCIAL

## 2023-05-02 VITALS
RESPIRATION RATE: 16 BRPM | HEIGHT: 67 IN | WEIGHT: 293 LBS | SYSTOLIC BLOOD PRESSURE: 120 MMHG | OXYGEN SATURATION: 94 % | BODY MASS INDEX: 45.99 KG/M2 | DIASTOLIC BLOOD PRESSURE: 76 MMHG | HEART RATE: 66 BPM

## 2023-05-02 DIAGNOSIS — U07.1 COVID-19: ICD-10-CM

## 2023-05-02 DIAGNOSIS — Z23 NEED FOR VACCINATION: ICD-10-CM

## 2023-05-02 DIAGNOSIS — Z91.09 ENVIRONMENTAL ALLERGIES: ICD-10-CM

## 2023-05-02 DIAGNOSIS — J96.01 ACUTE RESPIRATORY FAILURE WITH HYPOXIA (HCC): ICD-10-CM

## 2023-05-02 PROCEDURE — 99214 OFFICE O/P EST MOD 30 MIN: CPT | Performed by: INTERNAL MEDICINE

## 2023-05-02 PROCEDURE — 99212 OFFICE O/P EST SF 10 MIN: CPT | Performed by: INTERNAL MEDICINE

## 2023-05-02 PROCEDURE — 90677 PCV20 VACCINE IM: CPT | Performed by: INTERNAL MEDICINE

## 2023-05-02 RX ORDER — LORATADINE 10 MG/1
10 CAPSULE, LIQUID FILLED ORAL DAILY
Qty: 90 CAPSULE | Refills: 3 | Status: SHIPPED | OUTPATIENT
Start: 2023-05-02 | End: 2023-08-27

## 2023-05-02 ASSESSMENT — ENCOUNTER SYMPTOMS
SINUS PAIN: 0
PND: 0
ABDOMINAL PAIN: 0
EYE DISCHARGE: 0
HEMOPTYSIS: 0
EYE REDNESS: 0
PHOTOPHOBIA: 0
HEADACHES: 0
WEIGHT LOSS: 0
HEARTBURN: 0
FEVER: 0
TREMORS: 0
CHILLS: 0
DIZZINESS: 0
WHEEZING: 0
NAUSEA: 0
FOCAL WEAKNESS: 0
SPEECH CHANGE: 0
SHORTNESS OF BREATH: 0
DIAPHORESIS: 0
DEPRESSION: 0
SORE THROAT: 0
NECK PAIN: 0
MYALGIAS: 0
BLURRED VISION: 0
BACK PAIN: 0
SPUTUM PRODUCTION: 0
CLAUDICATION: 0
CONSTIPATION: 0
COUGH: 0
ORTHOPNEA: 0
VOMITING: 0
WEAKNESS: 0
DOUBLE VISION: 0
PALPITATIONS: 0
EYE PAIN: 0
FALLS: 0
DIARRHEA: 0
STRIDOR: 0

## 2023-05-02 ASSESSMENT — FIBROSIS 4 INDEX: FIB4 SCORE: 1.11

## 2023-05-02 NOTE — PROGRESS NOTES
Chief Complaint   Patient presents with    Follow-Up     Last seen 11/1/22          HPI: This patient is a 57 y.o. female whom is followed in our clinic for hypoxemic respiratory failure following covid 19  last seen by me on 11/2/22.  Patient's past medical history significant for type 2 diabetes, hypertension, dyslipidemia, chronic left lower extremity lymphedema.  She is a lifelong non-smoker.   The patient presented to [facility] in March 2021 with fatigue, decreased appetite, fever, cough with associated wheezing, shortness of breath and loss of taste.  She was sent to the emergency department where chest x-ray showed bilateral infiltrates and she was noted to have hypoxic respiratory failure.  She tested positive for COVID-19 was admitted to the hospital and treated with Decadron but did not meet criteria for remdesivir.  She did have mild leukopenia at the time.  Procalcitonin level was not elevated.  She was discharged on supplemental oxygen but has been off daytime O2 for some time now. She did still have mild hypoxemia on OPO on RA 11/2021. She has continued to use O2 at night and prn during the day with benefit.  She did have mild restriction on PFTs in October 2021 but normal DLCO. Repeat PFTs from October 2022 showed improved FVC from 2.49 to 2.61, improved TLC from 3.93 to 4.03 and low normal DLCO (slightly decreased since 10/21). She has no acute concerns today other than allergy sxs are increased with onset of spring.     Past Medical History:   Diagnosis Date    Allergies     Anemia     Breath shortness 03/2021    After having covid    Cataract 2/2023    Surgery 4/13. And 4/24/ 2023    Diabetes (HCC)     High cholesterol     Hypertension     Oxygen dependent     .5-1 L as needed. wears consistently at night    Pneumonia 3/2021    Had pneumonia  and covid       Social History     Socioeconomic History    Marital status: Single     Spouse name: Not on file    Number of children: Not on file    Years  of education: Not on file    Highest education level: Not on file   Occupational History    Not on file   Tobacco Use    Smoking status: Never     Passive exposure: Never    Smokeless tobacco: Never   Vaping Use    Vaping Use: Never used   Substance and Sexual Activity    Alcohol use: No    Drug use: No    Sexual activity: Not on file   Other Topics Concern    Not on file   Social History Narrative    Not on file     Social Determinants of Health     Financial Resource Strain: Not on file   Food Insecurity: Not on file   Transportation Needs: Not on file   Physical Activity: Not on file   Stress: Not on file   Social Connections: Not on file   Intimate Partner Violence: Not on file   Housing Stability: Not on file       Family History   Problem Relation Age of Onset    Heart Disease Mother     Hypertension Mother     Hyperlipidemia Mother     Heart Attack Father     Heart Disease Father     Stroke Father     Heart Failure Brother     Cancer Brother     Heart Disease Brother     Heart Disease Paternal Grandmother     Stroke Paternal Grandmother     Glaucoma Paternal Grandmother        Current Outpatient Medications on File Prior to Visit   Medication Sig Dispense Refill    cyclobenzaprine (FLEXERIL) 5 mg tablet Take 1-2 Tablets by mouth 3 times a day as needed for Muscle Spasms. 30 Tablet 0    atorvastatin (LIPITOR) 80 MG tablet Take 1 Tablet by mouth at bedtime. 90 Tablet 3    insulin glargine (LANTUS SOLOSTAR) 100 UNIT/ML Solution Pen-injector injection Inject 15 Units under the skin 2 times a day.      liraglutide (VICTOZA) 18 MG/3ML Solution Pen-injector Inject  under the skin every day.      montelukast (SINGULAIR) 10 MG Tab Take 1 Tablet by mouth every evening. 30 Tablet 11    albuterol 108 (90 Base) MCG/ACT Aero Soln inhalation aerosol Inhale 1-2 Puffs every four hours as needed. 1 Each 3    spironolactone (ALDACTONE) 50 MG Tab Take 50 mg by mouth 2 times a day.      carvedilol (COREG) 12.5 MG Tab Take 12.5 mg  "by mouth 2 times a day.      HUMALOG KWIKPEN 100 UNIT/ML Solution Pen-injector injection PEN INJECT 2 UNITS SUBCUTANEOUSLY PER 10G CARB AS NEEDED WITH MEALS      ASPIRIN 81 PO Take 81 mg by mouth every day.      metFORMIN (GLUCOPHAGE) 500 MG Tab Take 500 mg by mouth every day.      losartan (COZAAR) 100 MG Tab Take 100 mg by mouth every day.      Empagliflozin (JARDIANCE) 25 MG Tab Take 25 mg by mouth every day.       No current facility-administered medications on file prior to visit.       Percocet [oxycodone-acetaminophen] and Latex      ROS:   Review of Systems   Constitutional:  Negative for chills, diaphoresis, fever, malaise/fatigue and weight loss.   HENT:  Positive for congestion. Negative for ear discharge, ear pain, hearing loss, nosebleeds, sinus pain, sore throat and tinnitus.    Eyes:  Negative for blurred vision, double vision, photophobia, pain, discharge and redness.   Respiratory:  Negative for cough, hemoptysis, sputum production, shortness of breath, wheezing and stridor.    Cardiovascular:  Negative for chest pain, palpitations, orthopnea, claudication, leg swelling and PND.   Gastrointestinal:  Negative for abdominal pain, constipation, diarrhea, heartburn, nausea and vomiting.   Genitourinary:  Negative for dysuria and urgency.   Musculoskeletal:  Negative for back pain, falls, joint pain, myalgias and neck pain.   Skin:  Negative for itching and rash.   Neurological:  Negative for dizziness, tremors, speech change, focal weakness, weakness and headaches.   Endo/Heme/Allergies:  Negative for environmental allergies.   Psychiatric/Behavioral:  Negative for depression.      /76 (BP Location: Right arm, Patient Position: Sitting, BP Cuff Size: Large adult)   Pulse 66   Resp 16   Ht 1.702 m (5' 7\")   Wt (!) 141 kg (310 lb)   SpO2 94%   Physical Exam  Constitutional:       General: She is not in acute distress.     Appearance: Normal appearance. She is well-developed. She is obese. "   HENT:      Head: Normocephalic and atraumatic.      Right Ear: External ear normal.      Left Ear: External ear normal.      Nose: Nose normal. No congestion.      Mouth/Throat:      Mouth: Mucous membranes are moist.      Pharynx: Oropharynx is clear. No oropharyngeal exudate.   Eyes:      General: No scleral icterus.     Extraocular Movements: Extraocular movements intact.      Conjunctiva/sclera: Conjunctivae normal.      Pupils: Pupils are equal, round, and reactive to light.   Neck:      Vascular: No JVD.      Trachea: No tracheal deviation.   Cardiovascular:      Rate and Rhythm: Normal rate and regular rhythm.      Heart sounds: Normal heart sounds. No murmur heard.    No friction rub. No gallop.   Pulmonary:      Effort: Pulmonary effort is normal. No accessory muscle usage or respiratory distress.      Breath sounds: Normal breath sounds. No wheezing or rales.   Abdominal:      General: There is no distension.      Palpations: Abdomen is soft.      Tenderness: There is no abdominal tenderness.   Musculoskeletal:         General: No tenderness or deformity. Normal range of motion.      Cervical back: Normal range of motion and neck supple.      Right lower leg: No edema.      Left lower leg: No edema.   Lymphadenopathy:      Cervical: No cervical adenopathy.   Skin:     General: Skin is warm and dry.      Findings: No rash.      Nails: There is no clubbing.   Neurological:      Mental Status: She is alert and oriented to person, place, and time.      Cranial Nerves: No cranial nerve deficit.      Gait: Gait normal.   Psychiatric:         Behavior: Behavior normal.       PFTs as reviewed by me personally: as per hPI    Imagaing as reviewed by me personally:  CXR from 8/2021 showed clear lung fields    Assessment:  1. Environmental allergies  Loratadine 10 MG Cap    PULMONARY FUNCTION TESTS -Test requested: Complete Pulmonary Function Test      2. Acute respiratory failure with hypoxia (HCC)  PULMONARY  FUNCTION TESTS -Test requested: Complete Pulmonary Function Test      3. COVID-19  PULMONARY FUNCTION TESTS -Test requested: Complete Pulmonary Function Test      4. Need for vaccination  Pneumococcal Conjugate Vaccine 20-Valent (19 yrs+)          Plan:  Pt would like to try alternative antihistamine.   Pt still had mild O2 requirement when last checked and feels she is still benefiting from 2LPM at night and prn with activity which we will continue for now and update PFT next visit. Pending results will consider HRCT vs. Repeat OPO if continued improvement.  Moderate to severe now resolved. Continue supportive care. F/u CXR from 8/2021 showed clearing of infiltrates  PCV 20 given today  Return in about 6 months (around 11/2/2023) for PFT at time of f/u .

## 2023-05-04 ENCOUNTER — HOSPITAL ENCOUNTER (OUTPATIENT)
Dept: RADIOLOGY | Facility: MEDICAL CENTER | Age: 58
End: 2023-05-04
Attending: OBSTETRICS & GYNECOLOGY
Payer: COMMERCIAL

## 2023-05-04 DIAGNOSIS — M54.12 CERVICAL RADICULOPATHY: ICD-10-CM

## 2023-05-04 DIAGNOSIS — Z12.31 VISIT FOR SCREENING MAMMOGRAM: ICD-10-CM

## 2023-05-04 PROCEDURE — 77063 BREAST TOMOSYNTHESIS BI: CPT

## 2023-05-10 DIAGNOSIS — Z91.09 ENVIRONMENTAL ALLERGIES: ICD-10-CM

## 2023-05-10 RX ORDER — MONTELUKAST SODIUM 10 MG/1
TABLET ORAL
Qty: 30 TABLET | Refills: 11 | Status: SHIPPED | OUTPATIENT
Start: 2023-05-10 | End: 2023-08-27

## 2023-05-10 NOTE — TELEPHONE ENCOUNTER
Have we ever prescribed this med? Yes.  If yes, what date? 4/27/2022    Last OV: 5/2/2023 - DR. FIELD    Next OV: 11/10/2023 - DR. FIELD    DX: hypoxemic respiratory failure    Medications: montelukast (SINGULAIR) 10 MG Tab

## 2023-05-13 ENCOUNTER — HOSPITAL ENCOUNTER (OUTPATIENT)
Dept: RADIOLOGY | Facility: MEDICAL CENTER | Age: 58
End: 2023-05-13
Attending: FAMILY MEDICINE
Payer: COMMERCIAL

## 2023-05-13 DIAGNOSIS — M54.12 CERVICAL RADICULOPATHY: ICD-10-CM

## 2023-05-13 PROCEDURE — 72040 X-RAY EXAM NECK SPINE 2-3 VW: CPT

## 2023-06-05 ENCOUNTER — APPOINTMENT (OUTPATIENT)
Dept: PHYSICAL THERAPY | Facility: REHABILITATION | Age: 58
End: 2023-06-05
Attending: PHYSICIAN ASSISTANT
Payer: COMMERCIAL

## 2023-06-12 ENCOUNTER — PHYSICAL THERAPY (OUTPATIENT)
Dept: PHYSICAL THERAPY | Facility: REHABILITATION | Age: 58
End: 2023-06-12
Attending: PHYSICIAN ASSISTANT
Payer: COMMERCIAL

## 2023-06-12 DIAGNOSIS — I89.0 LYMPHEDEMA OF BOTH LOWER EXTREMITIES: ICD-10-CM

## 2023-06-12 PROCEDURE — 97162 PT EVAL MOD COMPLEX 30 MIN: CPT

## 2023-06-12 NOTE — OP THERAPY EVALUATION
Outpatient Physical Therapy  LYMPHEDEMA THERAPY INITIAL EVALUATION    [facility]  [address]  [address]  [facility] [address]  Phone:  [phone]  Fax:  [phone]    Date of Evaluation: 06/12/2023    Patient: Cyndee Calvert  YOB: 1965  MRN: [id number]     Referring Provider: Deanna Mccollum P.A.-C.  [address] [place] [address]   Referring Diagnosis Lymphedema, not elsewhere classified [I89.0]     Time Calculation    Start time: 1501  Stop time: 1542 Time Calculation (min): 41 minutes             Chief Complaint: Lipolymphedema    Visit Diagnoses     ICD-10-CM   1. 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 Apparently she went to [facility] in December and fell. She hit both knees. Legs were sore and stiff. They began swelling significantly since then. Her legs have been giving out on her too. She unfortunately has outswelled her current compression. Her legs have also been quite painful such that she can only comfortably walk less than household distances. Has been rubbing her legs and utilizing ginger as well.  She arrives today with hopes to reduce her legs.       Past Medical History:   Diagnosis Date    Allergies     Anemia     Breath shortness 03/2021    After having covid    Cataract 2/2023    Surgery 4/13. And 4/24/ 2023    Diabetes (HCC)     High cholesterol     Hypertension     Oxygen dependent     .5-1 L as needed. wears consistently at night    Pneumonia 3/2021    Had pneumonia  and covid     Past Surgical History:   Procedure Laterality Date    CYSTO STENT PLACEMNT PRE SURG Right 5/1/2023    Procedure: RIGHT URETEROSCOPY;  RIGHT STENT REMOVAL;  Surgeon: Javier Ceron M.D.;  Location: SURGERY [facility];  Service: Urology    BLADDER BIOPSY WITH CYSTOSCOPY  09/02/2008     Performed by LILLIE WILLIAM at SURGERY [facility]    HYSTERECTOMY, TOTAL ABDOMINAL      OTHER      Do remember  dates    TONSILLECTOMY      URETEROSCOPY Right     stent insertion right side     Social History     Tobacco Use    Smoking status: Never     Passive exposure: Never    Smokeless tobacco: Never   Vaping Use    Vaping Use: Never used   Substance Use Topics    Alcohol use: No     Family and Occupational History     Socioeconomic History    Marital status: Single     Spouse name: Not on file    Number of children: Not on file    Years of education: Not on file    Highest education level: Not on file   Occupational History    Not on file       Lymphedema Objective    Visit type  Lipolymphedema    Left Lower Extremity Circumferential Measurements  Waist: cm  Hip: cm  Scrotum: cm  Ground/Upper Thigh: cm  Mid Thigh: 67.4 cm  Knee: 51.4 cm  Upper Calf: 64.8 cm  Mid Calf: 42.5 cm  Ankle: 34.9 cm  Heel to Foot: 24.1 cm  Total: 285.1 cm    Right Lower Extremity Circumferential Measurements  Waist: cm  Hip: cm  Scrotum: cm  Ground/Upper Thigh: cm  Mid Thigh: 61.1 cm  Knee: 46.2 cm  Upper Calf: 60.2 cm  Mid Calf: 46.4 cm  Ankle: 34.6 cm  Heel to Foot: 24.6 cm  Total: 273.1 cm    Other Notes  Left Lower Extremity Circumferential Measurements 7/22  Mid Thigh: 64.3 cm  Knee: 49.7 cm  Upper Calf: 61.1 cm  Mid Calf: 46.3 cm  Ankle: 39.5 cm  Heel to Foot: 25 cm  Total: 285.9 cm     Right Lower Extremity Circumferential Measurements 7/22  Mid Thigh: 57.7 cm  Knee: 44.6 cm  Upper Calf: 59.3 cm  Mid Calf: 48.6 cm  Ankle: 38.7 cm  Heel to Foot: 25.3 cm  Total: 274.2 cm        Therapeutic Treatments and Modalities:     Therapeutic Treatment and Modalities Summary: Provided education regarding Complete Decongestive Therapy (CDT). CDT is a noninvasive, multi-component approach to treat lymphedema. As there is no cure for lymphedema, the goal of treatment is to return the lymphedema to a stage of latency utilizing healthy  lymph vessels and other lymphatic pathways. CDT consists of a combination of manual lymphatic drainage, compression therapy, decongestive exercises, and skin care. This will assist in maintaining the normal or near-normal size of the limb and prevent re-accumulation of lymph fluid. Additional goals of CDT include prevention and elimination of infections and reduction and removal of fibrotic tissues. Handout has been provided with this information.      Discussed compression garments with pt. She does have her thigh highs and Velcro but she left her Velcro in [place] and current thigh highs do not fit.     Time-based treatments/modalities:           Assessment and Plan:   Functional Impairments: swelling    Assessment details:  Cyndee is a 56yo F who is known to this therapist from previous treatment. She was controlling her swelling with pump and garments, but unfortunately suffered a fall in December and has been struggling with her legs since then. She will benefit from resumption of CDT. Patient has tried elevation, self OTC compression garments, diuresis and a low sodium diet, all of which were unsuccessful at treating her swelling.  Skilled lymphedema treatment is unable to be carried out by the patient and unskilled caregivers. Services will be provided by a certified lymphedema therapist.  Complete decongestive therapy is considered the gold standard of medical practice for lymphedema treatment and has proven to be effective for reduction in limb volume size and decrease risk of complications secondary to swelling (such as wounds and infections).    Patient to be seen until decongestion occurs. Physical therapy interventions to include manual lymphatic drainage, compression bandaging, skin care education, wound care if applicable, therapeutic exercises, custom garment fitting and lymphedema education to improve skin integrity, decrease lymphedema swelling, improve joint arthrokinematics and range of motion  and improve quality of life.    Spontaneous recovery is not expected without skilled completed decongestive lymphedema therapy.    The patient was informed of evaluation findings and intervention plan and agrees to participate in the plan as outlined.   Prognosis: fair      Goals:   Short Term Goals:  1. Pt will achieve a total limb circumference reduction of 10cm in order to decrease risk for infection and progression of disease process.  2. Pt will be independent with self-MLD to facilitate fluid migration to healthy tissues and lymph nodes.   3. Pt will consistently perform exercises with bandages on to facilitate muscle pump of fluid from affected extremity.     Short term goal time span:  2-4 weeks    Long Term Goals:  1. Pt will have a reduction in total limb circumference of 15cm in order to decrease risk for infection and progression of disease process.   2. Patient will be independent with maintenance phase management of lymphedema including: compression garments, skin care education, risk reduction strategies, manual lymphatic drainage, and therapeutic exercise.     Long term goal time span:  4-6 weeks    Plan:  Therapy options:  Physical therapy treatment to continue  Planned therapy interventions:  Compression bandaging, caregiver education, compression stocking, decongestive exercises, home exercise program, intermittent compression, manual lymph drainage, myofascial release techniques, orthotic measurements/fitting, patient education, postural exercises, range of motion exercises, referral for compression garment & instructions for don/doffing, self-care/training (CPT 82570), sequential compression pump, short stretch bandages, skin/wound care and Velcro wraps  Planned education:  Functional anatomy and physiology of the lymphatic system, pathophysiology of lymphedema, lymphedema exercise, lymphedema precautions, proper skin care/nutrition, compression bandaging, self massage, infection prevention,  scar tissue management, activity guidelines, dietary guidelines, skin care guidelines, home pump use, bandage removal and long term self-management of lymphedema  Frequency:  2x week  Duration in weeks:  8  Discussed with:  Patient      Functional Assessment Used    LLIS    Referring provider co-signature:  I have reviewed this plan of care and my co-signature certifies the need for services.    Certification Period: 06/12/2023 to  08/07/23    Physician Signature: ________________________________ Date: ______________

## 2023-06-15 ENCOUNTER — PHYSICAL THERAPY (OUTPATIENT)
Dept: PHYSICAL THERAPY | Facility: REHABILITATION | Age: 58
End: 2023-06-15
Attending: PHYSICIAN ASSISTANT
Payer: COMMERCIAL

## 2023-06-15 DIAGNOSIS — I89.0 LYMPHEDEMA OF BOTH LOWER EXTREMITIES: ICD-10-CM

## 2023-06-15 PROCEDURE — 97140 MANUAL THERAPY 1/> REGIONS: CPT

## 2023-06-15 NOTE — OP THERAPY DAILY TREATMENT
Outpatient Physical Therapy  LYMPHEDEMA THERAPY DAILY TREATMENT     [facility] Physical Therapy [address]  [address]  [place] [address]  Phone:  [phone]  Fax:  [phone]    Date: 06/15/2023    Patient: Cyndee Calvert  YOB: 1965  MRN: [id number]     Time Calculation    Start time: 1501  Stop time: 1544 Time Calculation (min): 43 minutes         Chief Complaint: Lipolymphedema    Visit #: 12    Subjective:   History of Present Illness:     Mechanism of injury:  Pt removed her wraps last night and her distal LE were feeling sore and achy. Put cold towels on and wiped them down.       Lymphedema Objective    Left Lower Extremity Circumferential Measurements  Waist: cm  Hip: cm  Scrotum: cm  Ground/Upper Thigh: cm  Mid Thigh: 66.4 cm  Knee: 51.3 cm  Upper Calf: 63 cm  Mid Calf: 42.1 cm  Ankle: 34.3 cm  Heel to Foot: 25.7 cm  Total: 282.8 cm    Right Lower Extremity Circumferential Measurements  Waist: cm  Hip: cm  Scrotum: cm  Ground/Upper Thigh: cm  Mid Thigh: 60.4 cm  Knee: 46 cm  Upper Calf: 62.2 cm  Mid Calf: 43.3 cm  Ankle: 35.2 cm  Heel to Foot: 25 cm  Total: 272.1 cm        Left Lower Extremity Circumferential Measurements 6/12  Waist: cm  Hip: cm  Scrotum: cm  Ground/Upper Thigh: cm  Mid Thigh: 67.4 cm  Knee: 51.4 cm  Upper Calf: 64.8 cm  Mid Calf: 42.5 cm  Ankle: 34.9 cm  Heel to Foot: 24.1 cm  Total: 285.1 cm     Right Lower Extremity Circumferential Measurements 6/12  Waist: cm  Hip: cm  Scrotum: cm  Ground/Upper Thigh: cm  Mid Thigh: 61.1 cm  Knee: 46.2 cm  Upper Calf: 60.2 cm  Mid Calf: 46.4 cm  Ankle: 34.6 cm  Heel to Foot: 24.6 cm  Total: 273.1 cm     Other Notes  Left Lower Extremity Circumferential Measurements 7/22  Mid Thigh: 64.3 cm  Knee: 49.7 cm  Upper Calf: 61.1 cm  Mid Calf: 46.3 cm  Ankle: 39.5 cm  Heel to Foot: 25 cm  Total: 285.9 cm     Right Lower Extremity Circumferential Measurements 7/22  Mid Thigh: 57.7 cm  Knee: 44.6 cm  Upper Calf:  59.3 cm  Mid Calf: 48.6 cm  Ankle: 38.7 cm  Heel to Foot: 25.3 cm  Total: 274.2 cm       Therapeutic Treatments and Modalities:     1. Manual Therapy (CPT 95901)    Therapeutic Treatment and Modalities Summary: MLD to L LE without trunk involvement. Focus on L LE and accompanying anastomoses.   The purpose of Manual Lymph Drainage (MLD) is to reduce lymph volume in the affected limb by increasing intake of lymphatic load into the lymphatic system, increasing the volume of transported lymph fluid, moving lymph fluid in superficial lymph vessels to collateral lymph collectors, anastomoses, or tissue channels, and increasing venous return. The goal of MLD is to re-route the lymph flow around blocked areas into more centrally located healthy lymph vessels, which drain into the venous system.      Time-based treatments/modalities:    Physical Therapy Timed Treatment Charges  Manual therapy minutes (CPT 67963): 43 minutes      Assessment and Plan:   Assessment details:  Cyndee has reduced her L LE by 3cm and R LE by 1cm. She will benefit from further intervention to decongest her B LE.     Plan:  Therapy options:  Physical therapy treatment to continue  Discussed with:  Patient

## 2023-06-19 ENCOUNTER — PHYSICAL THERAPY (OUTPATIENT)
Dept: PHYSICAL THERAPY | Facility: REHABILITATION | Age: 58
End: 2023-06-19
Attending: PHYSICIAN ASSISTANT
Payer: COMMERCIAL

## 2023-06-19 DIAGNOSIS — I89.0 LYMPHEDEMA OF BOTH LOWER EXTREMITIES: ICD-10-CM

## 2023-06-19 PROCEDURE — 97140 MANUAL THERAPY 1/> REGIONS: CPT

## 2023-06-19 NOTE — OP THERAPY DAILY TREATMENT
Outpatient Physical Therapy  LYMPHEDEMA THERAPY DAILY TREATMENT     [facility] Physical Therapy [address].  [address] [facility]  [place] [address]  Phone:  [phone]  Fax:  [phone]    Date: 06/19/2023    Patient: Cyndee Calvert  YOB: 1965  MRN: [id number]     Time Calculation    Start time: 0730  Stop time: 0802 Time Calculation (min): 32 minutes         Chief Complaint: Lipolymphedema    Visit #: 3    Subjective:   History of Present Illness:     Mechanism of injury:  Had some leg cramps over the weekend.       Lymphedema Objective    Left Lower Extremity Circumferential Measurements  Waist: cm  Hip: cm  Scrotum: cm  Ground/Upper Thigh: cm  Mid Thigh: 66.8 cm  Knee: 51.8 cm  Upper Calf: 62.6 cm  Mid Calf: 40.5 cm  Ankle: 33.5 cm  Heel to Foot: 24.2 cm  Total: 279.4 cm    Right Lower Extremity Circumferential Measurements  Waist: cm  Hip: cm  Scrotum: cm  Ground/Upper Thigh: cm  Mid Thigh: 59.7 cm  Knee: 45.8 cm  Upper Calf: 61.6 cm  Mid Calf: 41.5 cm  Ankle: 33.8 cm  Heel to Foot: 24.9 cm  Total: 267.3 cm        Left Lower Extremity Circumferential Measurements 6/15  Waist: cm  Hip: cm  Scrotum: cm  Ground/Upper Thigh: cm  Mid Thigh: 66.4 cm  Knee: 51.3 cm  Upper Calf: 63 cm  Mid Calf: 42.1 cm  Ankle: 34.3 cm  Heel to Foot: 25.7 cm  Total: 282.8 cm     Right Lower Extremity Circumferential Measurements 6/15  Waist: cm  Hip: cm  Scrotum: cm  Ground/Upper Thigh: cm  Mid Thigh: 60.4 cm  Knee: 46 cm  Upper Calf: 62.2 cm  Mid Calf: 43.3 cm  Ankle: 35.2 cm  Heel to Foot: 25 cm  Total: 272.1 cm          Left Lower Extremity Circumferential Measurements 6/12  Waist: cm  Hip: cm  Scrotum: cm  Ground/Upper Thigh: cm  Mid Thigh: 67.4 cm  Knee: 51.4 cm  Upper Calf: 64.8 cm  Mid Calf: 42.5 cm  Ankle: 34.9 cm  Heel to Foot: 24.1 cm  Total: 285.1 cm     Right Lower Extremity Circumferential Measurements 6/12  Waist: cm  Hip: cm  Scrotum: cm  Ground/Upper Thigh: cm  Mid Thigh: 61.1  cm  Knee: 46.2 cm  Upper Calf: 60.2 cm  Mid Calf: 46.4 cm  Ankle: 34.6 cm  Heel to Foot: 24.6 cm  Total: 273.1 cm     Other Notes  Left Lower Extremity Circumferential Measurements 7/22  Mid Thigh: 64.3 cm  Knee: 49.7 cm  Upper Calf: 61.1 cm  Mid Calf: 46.3 cm  Ankle: 39.5 cm  Heel to Foot: 25 cm  Total: 285.9 cm     Right Lower Extremity Circumferential Measurements 7/22  Mid Thigh: 57.7 cm  Knee: 44.6 cm  Upper Calf: 59.3 cm  Mid Calf: 48.6 cm  Ankle: 38.7 cm  Heel to Foot: 25.3 cm  Total: 274.2 cm    Therapeutic Treatments and Modalities:     1. Manual Therapy (CPT 82055)    Therapeutic Treatment and Modalities Summary: MLD to L LE without trunk involvement. Focus on L LE and accompanying anastomoses.   The purpose of Manual Lymph Drainage (MLD) is to reduce lymph volume in the affected limb by increasing intake of lymphatic load into the lymphatic system, increasing the volume of transported lymph fluid, moving lymph fluid in superficial lymph vessels to collateral lymph collectors, anastomoses, or tissue channels, and increasing venous return. The goal of MLD is to re-route the lymph flow around blocked areas into more centrally located healthy lymph vessels, which drain into the venous system.      Re-applied compression distally.    Time-based treatments/modalities:    Physical Therapy Timed Treatment Charges  Manual therapy minutes (CPT 99527): 32 minutes      Assessment and Plan:   Assessment details:  Cyndee has reduced her B LE by an additional 3cm. She will benefit from further intervention to achieve decongestion.     Plan:  Therapy options:  Physical therapy treatment to continue  Discussed with:  Patient

## 2023-06-21 ENCOUNTER — PHYSICAL THERAPY (OUTPATIENT)
Dept: PHYSICAL THERAPY | Facility: REHABILITATION | Age: 58
End: 2023-06-21
Attending: PHYSICIAN ASSISTANT
Payer: COMMERCIAL

## 2023-06-21 DIAGNOSIS — I89.0 LYMPHEDEMA OF BOTH LOWER EXTREMITIES: ICD-10-CM

## 2023-06-21 PROCEDURE — 97140 MANUAL THERAPY 1/> REGIONS: CPT

## 2023-06-21 NOTE — OP THERAPY DAILY TREATMENT
Outpatient Physical Therapy  LYMPHEDEMA THERAPY DAILY TREATMENT     [facility] Physical Therapy [address].  [address] [facility]  [place] [address]  Phone:  [phone]  Fax:  [phone]    Date: 06/21/2023    Patient: Cyndee Calvert  YOB: 1965  MRN: [id number]     Time Calculation    Start time: 1502  Stop time: 1542 Time Calculation (min): 40 minutes         Chief Complaint: Lipolymphedema    Visit #: 14    Subjective:   History of Present Illness:     Mechanism of injury:  L leg is quite sore today. Unsure why. Compression was not too tight.       Lymphedema Objective    Left Lower Extremity Circumferential Measurements  Waist: cm  Hip: cm  Scrotum: cm  Ground/Upper Thigh: cm  Mid Thigh: 67.1 cm  Knee: 51.8 cm  Upper Calf: 63.8 cm  Mid Calf: 41.8 cm  Ankle: 34.5 cm  Heel to Foot: 24.1 cm  Total: 283.1 cm    Right Lower Extremity Circumferential Measurements  Waist: cm  Hip: cm  Scrotum: cm  Ground/Upper Thigh: cm  Mid Thigh: 58 cm  Knee: 46.2 cm  Upper Calf: 60.8 cm  Mid Calf: 43.5 cm  Ankle: 34.1 cm  Heel to Foot: 24.3 cm  Total: 266.9 cm        Left Lower Extremity Circumferential Measurements 6/19  Waist: cm  Hip: cm  Scrotum: cm  Ground/Upper Thigh: cm  Mid Thigh: 66.8 cm  Knee: 51.8 cm  Upper Calf: 62.6 cm  Mid Calf: 40.5 cm  Ankle: 33.5 cm  Heel to Foot: 24.2 cm  Total: 279.4 cm     Right Lower Extremity Circumferential Measurements 6/19  Waist: cm  Hip: cm  Scrotum: cm  Ground/Upper Thigh: cm  Mid Thigh: 59.7 cm  Knee: 45.8 cm  Upper Calf: 61.6 cm  Mid Calf: 41.5 cm  Ankle: 33.8 cm  Heel to Foot: 24.9 cm  Total: 267.3 cm          Left Lower Extremity Circumferential Measurements 6/15  Waist: cm  Hip: cm  Scrotum: cm  Ground/Upper Thigh: cm  Mid Thigh: 66.4 cm  Knee: 51.3 cm  Upper Calf: 63 cm  Mid Calf: 42.1 cm  Ankle: 34.3 cm  Heel to Foot: 25.7 cm  Total: 282.8 cm     Right Lower Extremity Circumferential Measurements 6/15  Waist: cm  Hip: cm  Scrotum:  cm  Ground/Upper Thigh: cm  Mid Thigh: 60.4 cm  Knee: 46 cm  Upper Calf: 62.2 cm  Mid Calf: 43.3 cm  Ankle: 35.2 cm  Heel to Foot: 25 cm  Total: 272.1 cm          Left Lower Extremity Circumferential Measurements 6/12  Waist: cm  Hip: cm  Scrotum: cm  Ground/Upper Thigh: cm  Mid Thigh: 67.4 cm  Knee: 51.4 cm  Upper Calf: 64.8 cm  Mid Calf: 42.5 cm  Ankle: 34.9 cm  Heel to Foot: 24.1 cm  Total: 285.1 cm     Right Lower Extremity Circumferential Measurements 6/12  Waist: cm  Hip: cm  Scrotum: cm  Ground/Upper Thigh: cm  Mid Thigh: 61.1 cm  Knee: 46.2 cm  Upper Calf: 60.2 cm  Mid Calf: 46.4 cm  Ankle: 34.6 cm  Heel to Foot: 24.6 cm  Total: 273.1 cm     Other Notes  Left Lower Extremity Circumferential Measurements 7/22  Mid Thigh: 64.3 cm  Knee: 49.7 cm  Upper Calf: 61.1 cm  Mid Calf: 46.3 cm  Ankle: 39.5 cm  Heel to Foot: 25 cm  Total: 285.9 cm     Right Lower Extremity Circumferential Measurements 7/22  Mid Thigh: 57.7 cm  Knee: 44.6 cm  Upper Calf: 59.3 cm  Mid Calf: 48.6 cm  Ankle: 38.7 cm  Heel to Foot: 25.3 cm  Total: 274.2 cm    Therapeutic Treatments and Modalities:     1. Manual Therapy (CPT 31330)    Therapeutic Treatment and Modalities Summary: MLD to L LE without trunk involvement. Focus on L LE and accompanying anastomoses.   The purpose of Manual Lymph Drainage (MLD) is to reduce lymph volume in the affected limb by increasing intake of lymphatic load into the lymphatic system, increasing the volume of transported lymph fluid, moving lymph fluid in superficial lymph vessels to collateral lymph collectors, anastomoses, or tissue channels, and increasing venous return. The goal of MLD is to re-route the lymph flow around blocked areas into more centrally located healthy lymph vessels, which drain into the venous system.      Re-applied compression distally.    Time-based treatments/modalities:    Physical Therapy Timed Treatment Charges  Manual therapy minutes (CPT 12341): 40 minutes      Assessment and  Plan:   Assessment details:  Cyndee is slowly reducing her R LE; L LE has sustained an increase, unclear reasoning, but it is cold to touch in distal aspect. It is possible L LE will require extended duration to decongest due to hx of wound to the inguinal area of that side which may have allowed scar tissue to block lymphatic flow.     Plan:  Therapy options:  Physical therapy treatment to continue  Discussed with:  Patient

## 2023-06-26 ENCOUNTER — PHYSICAL THERAPY (OUTPATIENT)
Dept: PHYSICAL THERAPY | Facility: REHABILITATION | Age: 58
End: 2023-06-26
Attending: PHYSICIAN ASSISTANT
Payer: COMMERCIAL

## 2023-06-26 DIAGNOSIS — I89.0 LYMPHEDEMA OF BOTH LOWER EXTREMITIES: ICD-10-CM

## 2023-06-26 PROCEDURE — 97140 MANUAL THERAPY 1/> REGIONS: CPT

## 2023-06-26 NOTE — OP THERAPY DAILY TREATMENT
Outpatient Physical Therapy  LYMPHEDEMA THERAPY DAILY TREATMENT     [facility] Physical Therapy [address].  Suite [facility]  [place] [address]  Phone:  [phone]  Fax:  [phone]    Date: 06/26/2023    Patient: Cyndee Calvert  YOB: 1965  MRN: [id number]     Time Calculation    Start time: 0730  Stop time: 0803 Time Calculation (min): 33 minutes         Chief Complaint: Lipolymphedema    Visit #: 4    Subjective:   History of Present Illness:     Mechanism of injury:  L leg was cold. Brought leggings today        Lymphedema Objective    Left Lower Extremity Circumferential Measurements  Waist: cm  Hip: cm  Scrotum: cm  Ground/Upper Thigh: cm  Mid Thigh: 62.2 cm  Knee: 54.3 cm  Upper Calf: 64.3 cm  Mid Calf: 42 cm  Ankle: 31.2 cm  Heel to Foot: 22.1 cm  Total: 276.1 cm    Right Lower Extremity Circumferential Measurements  Waist: cm  Hip: cm  Scrotum: cm  Ground/Upper Thigh: cm  Mid Thigh: 60.3 cm  Knee: 46.2 cm  Upper Calf: 60.8 cm  Mid Calf: 41.9 cm  Ankle: 32.2 cm  Heel to Foot: 23.2 cm  Total: 264.6 cm        Left Lower Extremity Circumferential Measurements 6/21  Waist: cm  Hip: cm  Scrotum: cm  Ground/Upper Thigh: cm  Mid Thigh: 67.1 cm  Knee: 51.8 cm  Upper Calf: 63.8 cm  Mid Calf: 41.8 cm  Ankle: 34.5 cm  Heel to Foot: 24.1 cm  Total: 283.1 cm     Right Lower Extremity Circumferential Measurements 6/21  Waist: cm  Hip: cm  Scrotum: cm  Ground/Upper Thigh: cm  Mid Thigh: 58 cm  Knee: 46.2 cm  Upper Calf: 60.8 cm  Mid Calf: 43.5 cm  Ankle: 34.1 cm  Heel to Foot: 24.3 cm  Total: 266.9 cm          Left Lower Extremity Circumferential Measurements 6/19  Waist: cm  Hip: cm  Scrotum: cm  Ground/Upper Thigh: cm  Mid Thigh: 66.8 cm  Knee: 51.8 cm  Upper Calf: 62.6 cm  Mid Calf: 40.5 cm  Ankle: 33.5 cm  Heel to Foot: 24.2 cm  Total: 279.4 cm     Right Lower Extremity Circumferential Measurements 6/19  Waist: cm  Hip: cm  Scrotum: cm  Ground/Upper Thigh: cm  Mid Thigh: 59.7  cm  Knee: 45.8 cm  Upper Calf: 61.6 cm  Mid Calf: 41.5 cm  Ankle: 33.8 cm  Heel to Foot: 24.9 cm  Total: 267.3 cm          Left Lower Extremity Circumferential Measurements 6/15  Waist: cm  Hip: cm  Scrotum: cm  Ground/Upper Thigh: cm  Mid Thigh: 66.4 cm  Knee: 51.3 cm  Upper Calf: 63 cm  Mid Calf: 42.1 cm  Ankle: 34.3 cm  Heel to Foot: 25.7 cm  Total: 282.8 cm     Right Lower Extremity Circumferential Measurements 6/15  Waist: cm  Hip: cm  Scrotum: cm  Ground/Upper Thigh: cm  Mid Thigh: 60.4 cm  Knee: 46 cm  Upper Calf: 62.2 cm  Mid Calf: 43.3 cm  Ankle: 35.2 cm  Heel to Foot: 25 cm  Total: 272.1 cm          Left Lower Extremity Circumferential Measurements 6/12  Waist: cm  Hip: cm  Scrotum: cm  Ground/Upper Thigh: cm  Mid Thigh: 67.4 cm  Knee: 51.4 cm  Upper Calf: 64.8 cm  Mid Calf: 42.5 cm  Ankle: 34.9 cm  Heel to Foot: 24.1 cm  Total: 285.1 cm     Right Lower Extremity Circumferential Measurements 6/12  Waist: cm  Hip: cm  Scrotum: cm  Ground/Upper Thigh: cm  Mid Thigh: 61.1 cm  Knee: 46.2 cm  Upper Calf: 60.2 cm  Mid Calf: 46.4 cm  Ankle: 34.6 cm  Heel to Foot: 24.6 cm  Total: 273.1 cm     Other Notes  Left Lower Extremity Circumferential Measurements 7/22  Mid Thigh: 64.3 cm  Knee: 49.7 cm  Upper Calf: 61.1 cm  Mid Calf: 46.3 cm  Ankle: 39.5 cm  Heel to Foot: 25 cm  Total: 285.9 cm     Right Lower Extremity Circumferential Measurements 7/22  Mid Thigh: 57.7 cm  Knee: 44.6 cm  Upper Calf: 59.3 cm  Mid Calf: 48.6 cm  Ankle: 38.7 cm  Heel to Foot: 25.3 cm  Total: 274.2 cm       Therapeutic Treatments and Modalities:     1. Manual Therapy (CPT 33870)    Therapeutic Treatment and Modalities Summary: MLD to L LE without trunk involvement. Focus on L LE and accompanying anastomoses.   The purpose of Manual Lymph Drainage (MLD) is to reduce lymph volume in the affected limb by increasing intake of lymphatic load into the lymphatic system, increasing the volume of transported lymph fluid, moving lymph fluid in  superficial lymph vessels to collateral lymph collectors, anastomoses, or tissue channels, and increasing venous return. The goal of MLD is to re-route the lymph flow around blocked areas into more centrally located healthy lymph vessels, which drain into the venous system.      Re-applied compression distally.    Time-based treatments/modalities:    Physical Therapy Timed Treatment Charges  Manual therapy minutes (CPT 60895): 33 minutes      Assessment and Plan:   Assessment details:  Pt has reduced her L LE by 7cm and her R LE by 2cm. She will benefit from further intervention to achieve decongestion.     Plan:  Therapy options:  Physical therapy treatment to continue  Discussed with:  Patient

## 2023-06-28 ENCOUNTER — PHYSICAL THERAPY (OUTPATIENT)
Dept: PHYSICAL THERAPY | Facility: REHABILITATION | Age: 58
End: 2023-06-28
Attending: PHYSICIAN ASSISTANT
Payer: COMMERCIAL

## 2023-06-28 DIAGNOSIS — I89.0 LYMPHEDEMA OF BOTH LOWER EXTREMITIES: ICD-10-CM

## 2023-06-28 PROCEDURE — 97140 MANUAL THERAPY 1/> REGIONS: CPT

## 2023-06-28 NOTE — OP THERAPY DAILY TREATMENT
Outpatient Physical Therapy  LYMPHEDEMA THERAPY DAILY TREATMENT     [facility] Physical Therapy [address].  [address]  [place] [address]  Phone:  [phone]  Fax:  [phone]    Date: 06/28/2023    Patient: Cyndee Calvert  YOB: 1965  MRN: [id number]     Time Calculation    Start time: 1546  Stop time: 1629 Time Calculation (min): 43 minutes         Chief Complaint: Lymphedema Aquired    Visit #: 6    Subjective:   History of Present Illness:     Mechanism of injury:  Distal LE are sore. Possibly thinks due to crossing legs. Wraps don't bother her.       Lymphedema Objective    Left Lower Extremity Circumferential Measurements  Waist: cm  Hip: cm  Scrotum: cm  Ground/Upper Thigh: cm  Mid Thigh: 66.4 cm  Knee: 51 cm  Upper Calf: 64.8 cm  Mid Calf: 43.1 cm  Ankle: 33.4 cm  Heel to Foot: 24.5 cm  Total: 283.2 cm    Right Lower Extremity Circumferential Measurements  Waist: cm  Hip: cm  Scrotum: cm  Ground/Upper Thigh: cm  Mid Thigh: 58.4 cm  Knee: 46.5 cm  Upper Calf: 62.7 cm  Mid Calf: 45 cm  Ankle: 34 cm  Heel to Foot: 25.3 cm  Total: 271.9 cm        Left Lower Extremity Circumferential Measurements 6/26  Waist: cm  Hip: cm  Scrotum: cm  Ground/Upper Thigh: cm  Mid Thigh: 62.2 cm  Knee: 54.3 cm  Upper Calf: 64.3 cm  Mid Calf: 42 cm  Ankle: 31.2 cm  Heel to Foot: 22.1 cm  Total: 276.1 cm     Right Lower Extremity Circumferential Measurements 6/26  Waist: cm  Hip: cm  Scrotum: cm  Ground/Upper Thigh: cm  Mid Thigh: 60.3 cm  Knee: 46.2 cm  Upper Calf: 60.8 cm  Mid Calf: 41.9 cm  Ankle: 32.2 cm  Heel to Foot: 23.2 cm  Total: 264.6 cm          Left Lower Extremity Circumferential Measurements 6/21  Waist: cm  Hip: cm  Scrotum: cm  Ground/Upper Thigh: cm  Mid Thigh: 67.1 cm  Knee: 51.8 cm  Upper Calf: 63.8 cm  Mid Calf: 41.8 cm  Ankle: 34.5 cm  Heel to Foot: 24.1 cm  Total: 283.1 cm     Right Lower Extremity Circumferential Measurements 6/21  Waist: cm  Hip: cm  Scrotum:  cm  Ground/Upper Thigh: cm  Mid Thigh: 58 cm  Knee: 46.2 cm  Upper Calf: 60.8 cm  Mid Calf: 43.5 cm  Ankle: 34.1 cm  Heel to Foot: 24.3 cm  Total: 266.9 cm          Left Lower Extremity Circumferential Measurements 6/19  Waist: cm  Hip: cm  Scrotum: cm  Ground/Upper Thigh: cm  Mid Thigh: 66.8 cm  Knee: 51.8 cm  Upper Calf: 62.6 cm  Mid Calf: 40.5 cm  Ankle: 33.5 cm  Heel to Foot: 24.2 cm  Total: 279.4 cm     Right Lower Extremity Circumferential Measurements 6/19  Waist: cm  Hip: cm  Scrotum: cm  Ground/Upper Thigh: cm  Mid Thigh: 59.7 cm  Knee: 45.8 cm  Upper Calf: 61.6 cm  Mid Calf: 41.5 cm  Ankle: 33.8 cm  Heel to Foot: 24.9 cm  Total: 267.3 cm          Left Lower Extremity Circumferential Measurements 6/15  Waist: cm  Hip: cm  Scrotum: cm  Ground/Upper Thigh: cm  Mid Thigh: 66.4 cm  Knee: 51.3 cm  Upper Calf: 63 cm  Mid Calf: 42.1 cm  Ankle: 34.3 cm  Heel to Foot: 25.7 cm  Total: 282.8 cm     Right Lower Extremity Circumferential Measurements 6/15  Waist: cm  Hip: cm  Scrotum: cm  Ground/Upper Thigh: cm  Mid Thigh: 60.4 cm  Knee: 46 cm  Upper Calf: 62.2 cm  Mid Calf: 43.3 cm  Ankle: 35.2 cm  Heel to Foot: 25 cm  Total: 272.1 cm          Left Lower Extremity Circumferential Measurements 6/12  Waist: cm  Hip: cm  Scrotum: cm  Ground/Upper Thigh: cm  Mid Thigh: 67.4 cm  Knee: 51.4 cm  Upper Calf: 64.8 cm  Mid Calf: 42.5 cm  Ankle: 34.9 cm  Heel to Foot: 24.1 cm  Total: 285.1 cm     Right Lower Extremity Circumferential Measurements 6/12  Waist: cm  Hip: cm  Scrotum: cm  Ground/Upper Thigh: cm  Mid Thigh: 61.1 cm  Knee: 46.2 cm  Upper Calf: 60.2 cm  Mid Calf: 46.4 cm  Ankle: 34.6 cm  Heel to Foot: 24.6 cm  Total: 273.1 cm     Other Notes  Left Lower Extremity Circumferential Measurements 7/22  Mid Thigh: 64.3 cm  Knee: 49.7 cm  Upper Calf: 61.1 cm  Mid Calf: 46.3 cm  Ankle: 39.5 cm  Heel to Foot: 25 cm  Total: 285.9 cm     Right Lower Extremity Circumferential Measurements 7/22  Mid Thigh: 57.7 cm  Knee: 44.6  cm  Upper Calf: 59.3 cm  Mid Calf: 48.6 cm  Ankle: 38.7 cm  Heel to Foot: 25.3 cm  Total: 274.2 cm    Therapeutic Treatments and Modalities:     1. Manual Therapy (CPT 74309)    Therapeutic Treatment and Modalities Summary: MLD to L LE without trunk involvement. Focus on L LE and accompanying anastomoses.   The purpose of Manual Lymph Drainage (MLD) is to reduce lymph volume in the affected limb by increasing intake of lymphatic load into the lymphatic system, increasing the volume of transported lymph fluid, moving lymph fluid in superficial lymph vessels to collateral lymph collectors, anastomoses, or tissue channels, and increasing venous return. The goal of MLD is to re-route the lymph flow around blocked areas into more centrally located healthy lymph vessels, which drain into the venous system.      Re-applied compression distally.    Time-based treatments/modalities:    Physical Therapy Timed Treatment Charges  Manual therapy minutes (CPT 87086): 43 minutes      Assessment and Plan:   Assessment details:  Cyndee is demonstrating an increase to her B LE circumferences. However, her appts are usually in the morning and the change in appointment times could be influencing her current swelling. She will benefit from further intervention to achieve decongestion.     Plan:  Therapy options:  Physical therapy treatment to continue  Discussed with:  Patient

## 2023-07-05 ENCOUNTER — PHYSICAL THERAPY (OUTPATIENT)
Dept: PHYSICAL THERAPY | Facility: REHABILITATION | Age: 58
End: 2023-07-05
Attending: PHYSICIAN ASSISTANT
Payer: COMMERCIAL

## 2023-07-05 DIAGNOSIS — I89.0 LYMPHEDEMA OF BOTH LOWER EXTREMITIES: ICD-10-CM

## 2023-07-05 PROCEDURE — 97140 MANUAL THERAPY 1/> REGIONS: CPT

## 2023-07-05 NOTE — OP THERAPY DAILY TREATMENT
Outpatient Physical Therapy  LYMPHEDEMA THERAPY DAILY TREATMENT     [facility] [address].  Suite [facility]  [place] [address]  Phone:  [phone]  Fax:  [phone]    Date: 07/05/2023    Patient: Cyndee Calvert  YOB: 1965  MRN: [id number]     Time Calculation    Start time: 0730  Stop time: 0804 Time Calculation (min): 34 minutes         Chief Complaint: Lipolymphedema    Visit #: 17    Subjective:   History of Present Illness:     Mechanism of injury:  Legs seem swollen. Walked a lot yesterday.       Lymphedema Objective    Left Lower Extremity Circumferential Measurements  Waist: cm  Hip: cm  Scrotum: cm  Ground/Upper Thigh: cm  Mid Thigh: 66.5 cm  Knee: 49.4 cm  Upper Calf: 63.7 cm  Mid Calf: 43.5 cm  Ankle: 33.7 cm  Heel to Foot: 24.5 cm  Total: 281.3 cm    Right Lower Extremity Circumferential Measurements  Waist: cm  Hip: cm  Scrotum: cm  Ground/Upper Thigh: cm  Mid Thigh: 56.5 cm  Knee: 45.6 cm  Upper Calf: 59.7 cm  Mid Calf: 43.8 cm  Ankle: 33.7 cm  Heel to Foot: 24.8 cm  Total: 264.1 cm        Left Lower Extremity Circumferential Measurements 6/28  Waist: cm  Hip: cm  Scrotum: cm  Ground/Upper Thigh: cm  Mid Thigh: 66.4 cm  Knee: 51 cm  Upper Calf: 64.8 cm  Mid Calf: 43.1 cm  Ankle: 33.4 cm  Heel to Foot: 24.5 cm  Total: 283.2 cm     Right Lower Extremity Circumferential Measurements 6/28  Waist: cm  Hip: cm  Scrotum: cm  Ground/Upper Thigh: cm  Mid Thigh: 58.4 cm  Knee: 46.5 cm  Upper Calf: 62.7 cm  Mid Calf: 45 cm  Ankle: 34 cm  Heel to Foot: 25.3 cm  Total: 271.9 cm          Left Lower Extremity Circumferential Measurements 6/26  Waist: cm  Hip: cm  Scrotum: cm  Ground/Upper Thigh: cm  Mid Thigh: 62.2 cm  Knee: 54.3 cm  Upper Calf: 64.3 cm  Mid Calf: 42 cm  Ankle: 31.2 cm  Heel to Foot: 22.1 cm  Total: 276.1 cm     Right Lower Extremity Circumferential Measurements 6/26  Waist: cm  Hip: cm  Scrotum: cm  Ground/Upper Thigh: cm  Mid Thigh: 60.3  cm  Knee: 46.2 cm  Upper Calf: 60.8 cm  Mid Calf: 41.9 cm  Ankle: 32.2 cm  Heel to Foot: 23.2 cm  Total: 264.6 cm          Left Lower Extremity Circumferential Measurements 6/21  Waist: cm  Hip: cm  Scrotum: cm  Ground/Upper Thigh: cm  Mid Thigh: 67.1 cm  Knee: 51.8 cm  Upper Calf: 63.8 cm  Mid Calf: 41.8 cm  Ankle: 34.5 cm  Heel to Foot: 24.1 cm  Total: 283.1 cm     Right Lower Extremity Circumferential Measurements 6/21  Waist: cm  Hip: cm  Scrotum: cm  Ground/Upper Thigh: cm  Mid Thigh: 58 cm  Knee: 46.2 cm  Upper Calf: 60.8 cm  Mid Calf: 43.5 cm  Ankle: 34.1 cm  Heel to Foot: 24.3 cm  Total: 266.9 cm          Left Lower Extremity Circumferential Measurements 6/19  Waist: cm  Hip: cm  Scrotum: cm  Ground/Upper Thigh: cm  Mid Thigh: 66.8 cm  Knee: 51.8 cm  Upper Calf: 62.6 cm  Mid Calf: 40.5 cm  Ankle: 33.5 cm  Heel to Foot: 24.2 cm  Total: 279.4 cm     Right Lower Extremity Circumferential Measurements 6/19  Waist: cm  Hip: cm  Scrotum: cm  Ground/Upper Thigh: cm  Mid Thigh: 59.7 cm  Knee: 45.8 cm  Upper Calf: 61.6 cm  Mid Calf: 41.5 cm  Ankle: 33.8 cm  Heel to Foot: 24.9 cm  Total: 267.3 cm          Left Lower Extremity Circumferential Measurements 6/15  Waist: cm  Hip: cm  Scrotum: cm  Ground/Upper Thigh: cm  Mid Thigh: 66.4 cm  Knee: 51.3 cm  Upper Calf: 63 cm  Mid Calf: 42.1 cm  Ankle: 34.3 cm  Heel to Foot: 25.7 cm  Total: 282.8 cm     Right Lower Extremity Circumferential Measurements 6/15  Waist: cm  Hip: cm  Scrotum: cm  Ground/Upper Thigh: cm  Mid Thigh: 60.4 cm  Knee: 46 cm  Upper Calf: 62.2 cm  Mid Calf: 43.3 cm  Ankle: 35.2 cm  Heel to Foot: 25 cm  Total: 272.1 cm          Left Lower Extremity Circumferential Measurements 6/12  Waist: cm  Hip: cm  Scrotum: cm  Ground/Upper Thigh: cm  Mid Thigh: 67.4 cm  Knee: 51.4 cm  Upper Calf: 64.8 cm  Mid Calf: 42.5 cm  Ankle: 34.9 cm  Heel to Foot: 24.1 cm  Total: 285.1 cm     Right Lower Extremity Circumferential Measurements 6/12  Waist: cm  Hip: cm  Scrotum:  cm  Ground/Upper Thigh: cm  Mid Thigh: 61.1 cm  Knee: 46.2 cm  Upper Calf: 60.2 cm  Mid Calf: 46.4 cm  Ankle: 34.6 cm  Heel to Foot: 24.6 cm  Total: 273.1 cm     Other Notes  Left Lower Extremity Circumferential Measurements 7/22  Mid Thigh: 64.3 cm  Knee: 49.7 cm  Upper Calf: 61.1 cm  Mid Calf: 46.3 cm  Ankle: 39.5 cm  Heel to Foot: 25 cm  Total: 285.9 cm     Right Lower Extremity Circumferential Measurements 7/22  Mid Thigh: 57.7 cm  Knee: 44.6 cm  Upper Calf: 59.3 cm  Mid Calf: 48.6 cm  Ankle: 38.7 cm  Heel to Foot: 25.3 cm  Total: 274.2 cm    Therapeutic Treatments and Modalities:     1. Manual Therapy (CPT 87774)    Therapeutic Treatment and Modalities Summary: MLD to L LE without trunk involvement. Focus on L LE and accompanying anastomoses.   The purpose of Manual Lymph Drainage (MLD) is to reduce lymph volume in the affected limb by increasing intake of lymphatic load into the lymphatic system, increasing the volume of transported lymph fluid, moving lymph fluid in superficial lymph vessels to collateral lymph collectors, anastomoses, or tissue channels, and increasing venous return. The goal of MLD is to re-route the lymph flow around blocked areas into more centrally located healthy lymph vessels, which drain into the venous system.      Re-applied compression distally.    Time-based treatments/modalities:    Physical Therapy Timed Treatment Charges  Manual therapy minutes (CPT 23729): 34 minutes      Assessment and Plan:   Assessment details:  Cyndee has reduced her B LE since previous session. It appears that her R LE is sustaining its measurements with some more differentiation at the L LE. She will benefit from further intervention to reduce her B LE and allow her to transition back into garments.     Plan:  Therapy options:  Physical therapy treatment to continue  Discussed with:  Patient

## 2023-07-07 ENCOUNTER — PHYSICAL THERAPY (OUTPATIENT)
Dept: PHYSICAL THERAPY | Facility: REHABILITATION | Age: 58
End: 2023-07-07
Attending: PHYSICIAN ASSISTANT
Payer: COMMERCIAL

## 2023-07-07 DIAGNOSIS — I89.0 LYMPHEDEMA OF BOTH LOWER EXTREMITIES: ICD-10-CM

## 2023-07-07 PROCEDURE — 97140 MANUAL THERAPY 1/> REGIONS: CPT

## 2023-07-07 NOTE — OP THERAPY DAILY TREATMENT
Outpatient Physical Therapy  LYMPHEDEMA THERAPY DAILY TREATMENT     [facility] Physical [address].  Suite [facility]  [place] [address]  Phone:  [phone]  Fax:  [phone]    Date: 07/07/2023    Patient: Cyndee Calvert  YOB: 1965  MRN: [id number]     Time Calculation    Start time: 0900  Stop time: 0941 Time Calculation (min): 41 minutes         Chief Complaint: Lipolymphedema    Visit #: 8    Subjective:   History of Present Illness:     Mechanism of injury:  Legs feel more sensitive and L leg seems more full      Lymphedema Objective    Left Lower Extremity Circumferential Measurements  Waist: cm  Hip: cm  Scrotum: cm  Ground/Upper Thigh: cm  Mid Thigh: 65.3 cm  Knee: 50.2 cm  Upper Calf: 65 cm  Mid Calf: 42.2 cm  Ankle: 32.2 cm  Heel to Foot: 25.2 cm  Total: 280.1 cm    Right Lower Extremity Circumferential Measurements  Waist: cm  Hip: cm  Scrotum: cm  Ground/Upper Thigh: cm  Mid Thigh: 58.6 cm  Knee: 45.4 cm  Upper Calf: 63.7 cm  Mid Calf: 42.7 cm  Ankle: 32.6 cm  Heel to Foot: 25.7 cm  Total: 268.7 cm        Left Lower Extremity Circumferential Measurements  Waist: cm  Hip: cm  Scrotum: cm  Ground/Upper Thigh: cm  Mid Thigh: 66.5 cm  Knee: 49.4 cm  Upper Calf: 63.7 cm  Mid Calf: 43.5 cm  Ankle: 33.7 cm  Heel to Foot: 24.5 cm  Total: 281.3 cm     Right Lower Extremity Circumferential Measurements  Waist: cm  Hip: cm  Scrotum: cm  Ground/Upper Thigh: cm  Mid Thigh: 56.5 cm  Knee: 45.6 cm  Upper Calf: 59.7 cm  Mid Calf: 43.8 cm  Ankle: 33.7 cm  Heel to Foot: 24.8 cm  Total: 264.1 cm          Left Lower Extremity Circumferential Measurements 6/28  Waist: cm  Hip: cm  Scrotum: cm  Ground/Upper Thigh: cm  Mid Thigh: 66.4 cm  Knee: 51 cm  Upper Calf: 64.8 cm  Mid Calf: 43.1 cm  Ankle: 33.4 cm  Heel to Foot: 24.5 cm  Total: 283.2 cm     Right Lower Extremity Circumferential Measurements 6/28  Waist: cm  Hip: cm  Scrotum: cm  Ground/Upper Thigh: cm  Mid Thigh: 58.4  cm  Knee: 46.5 cm  Upper Calf: 62.7 cm  Mid Calf: 45 cm  Ankle: 34 cm  Heel to Foot: 25.3 cm  Total: 271.9 cm          Left Lower Extremity Circumferential Measurements 6/26  Waist: cm  Hip: cm  Scrotum: cm  Ground/Upper Thigh: cm  Mid Thigh: 62.2 cm  Knee: 54.3 cm  Upper Calf: 64.3 cm  Mid Calf: 42 cm  Ankle: 31.2 cm  Heel to Foot: 22.1 cm  Total: 276.1 cm     Right Lower Extremity Circumferential Measurements 6/26  Waist: cm  Hip: cm  Scrotum: cm  Ground/Upper Thigh: cm  Mid Thigh: 60.3 cm  Knee: 46.2 cm  Upper Calf: 60.8 cm  Mid Calf: 41.9 cm  Ankle: 32.2 cm  Heel to Foot: 23.2 cm  Total: 264.6 cm          Left Lower Extremity Circumferential Measurements 6/21  Waist: cm  Hip: cm  Scrotum: cm  Ground/Upper Thigh: cm  Mid Thigh: 67.1 cm  Knee: 51.8 cm  Upper Calf: 63.8 cm  Mid Calf: 41.8 cm  Ankle: 34.5 cm  Heel to Foot: 24.1 cm  Total: 283.1 cm     Right Lower Extremity Circumferential Measurements 6/21  Waist: cm  Hip: cm  Scrotum: cm  Ground/Upper Thigh: cm  Mid Thigh: 58 cm  Knee: 46.2 cm  Upper Calf: 60.8 cm  Mid Calf: 43.5 cm  Ankle: 34.1 cm  Heel to Foot: 24.3 cm  Total: 266.9 cm          Left Lower Extremity Circumferential Measurements 6/19  Waist: cm  Hip: cm  Scrotum: cm  Ground/Upper Thigh: cm  Mid Thigh: 66.8 cm  Knee: 51.8 cm  Upper Calf: 62.6 cm  Mid Calf: 40.5 cm  Ankle: 33.5 cm  Heel to Foot: 24.2 cm  Total: 279.4 cm     Right Lower Extremity Circumferential Measurements 6/19  Waist: cm  Hip: cm  Scrotum: cm  Ground/Upper Thigh: cm  Mid Thigh: 59.7 cm  Knee: 45.8 cm  Upper Calf: 61.6 cm  Mid Calf: 41.5 cm  Ankle: 33.8 cm  Heel to Foot: 24.9 cm  Total: 267.3 cm          Left Lower Extremity Circumferential Measurements 6/15  Waist: cm  Hip: cm  Scrotum: cm  Ground/Upper Thigh: cm  Mid Thigh: 66.4 cm  Knee: 51.3 cm  Upper Calf: 63 cm  Mid Calf: 42.1 cm  Ankle: 34.3 cm  Heel to Foot: 25.7 cm  Total: 282.8 cm     Right Lower Extremity Circumferential Measurements 6/15  Waist: cm  Hip: cm  Scrotum:  cm  Ground/Upper Thigh: cm  Mid Thigh: 60.4 cm  Knee: 46 cm  Upper Calf: 62.2 cm  Mid Calf: 43.3 cm  Ankle: 35.2 cm  Heel to Foot: 25 cm  Total: 272.1 cm          Left Lower Extremity Circumferential Measurements 6/12  Waist: cm  Hip: cm  Scrotum: cm  Ground/Upper Thigh: cm  Mid Thigh: 67.4 cm  Knee: 51.4 cm  Upper Calf: 64.8 cm  Mid Calf: 42.5 cm  Ankle: 34.9 cm  Heel to Foot: 24.1 cm  Total: 285.1 cm     Right Lower Extremity Circumferential Measurements 6/12  Waist: cm  Hip: cm  Scrotum: cm  Ground/Upper Thigh: cm  Mid Thigh: 61.1 cm  Knee: 46.2 cm  Upper Calf: 60.2 cm  Mid Calf: 46.4 cm  Ankle: 34.6 cm  Heel to Foot: 24.6 cm  Total: 273.1 cm     Other Notes  Left Lower Extremity Circumferential Measurements 7/22  Mid Thigh: 64.3 cm  Knee: 49.7 cm  Upper Calf: 61.1 cm  Mid Calf: 46.3 cm  Ankle: 39.5 cm  Heel to Foot: 25 cm  Total: 285.9 cm     Right Lower Extremity Circumferential Measurements 7/22  Mid Thigh: 57.7 cm  Knee: 44.6 cm  Upper Calf: 59.3 cm  Mid Calf: 48.6 cm  Ankle: 38.7 cm  Heel to Foot: 25.3 cm  Total: 274.2 cm    Therapeutic Treatments and Modalities:     1. Manual Therapy (CPT 97566)    Therapeutic Treatment and Modalities Summary: MLD to L LE without trunk involvement. Focus on L LE and accompanying anastomoses.   The purpose of Manual Lymph Drainage (MLD) is to reduce lymph volume in the affected limb by increasing intake of lymphatic load into the lymphatic system, increasing the volume of transported lymph fluid, moving lymph fluid in superficial lymph vessels to collateral lymph collectors, anastomoses, or tissue channels, and increasing venous return. The goal of MLD is to re-route the lymph flow around blocked areas into more centrally located healthy lymph vessels, which drain into the venous system.      Re-applied compression distally.    Time-based treatments/modalities:    Physical Therapy Timed Treatment Charges  Manual therapy minutes (CPT 31868): 41 minutes      Assessment and  Plan:   Assessment details:  Cyndee has had some fluid migration on her L LE from distal to proximal. R LE is sustaining its measurements. Will trial transitioning her back into compression leggings next week.     Plan:  Therapy options:  Physical therapy treatment to continue  Discussed with:  Patient

## 2023-07-11 ENCOUNTER — PHYSICAL THERAPY (OUTPATIENT)
Dept: PHYSICAL THERAPY | Facility: REHABILITATION | Age: 58
End: 2023-07-11
Attending: PHYSICIAN ASSISTANT
Payer: COMMERCIAL

## 2023-07-11 DIAGNOSIS — I89.0 LYMPHEDEMA OF BOTH LOWER EXTREMITIES: ICD-10-CM

## 2023-07-11 PROCEDURE — 97140 MANUAL THERAPY 1/> REGIONS: CPT

## 2023-07-11 NOTE — OP THERAPY DAILY TREATMENT
Outpatient Physical Therapy  LYMPHEDEMA THERAPY DAILY TREATMENT     [facility] Physical Therapy [address]  Suite [facility]  [place] [address]  Phone:  [phone]  Fax:  [phone]    Date: 07/11/2023    Patient: Cyndee Calvert  YOB: 1965  MRN: [id number]     Time Calculation    Start time: 0901  Stop time: 0927 Time Calculation (min): 26 minutes         Chief Complaint: Lipolymphedema    Visit #:9    Subjective    Lymphedema Objective    Left Lower Extremity Circumferential Measurements  Waist: cm  Hip: cm  Scrotum: cm  Ground/Upper Thigh: cm  Mid Thigh: 65.3 cm  Knee: 48.4 cm  Upper Calf: 62.8 cm  Mid Calf: 43.2 cm  Ankle: 33.3 cm  Heel to Foot: 24.4 cm  Total: 277.4 cm    Right Lower Extremity Circumferential Measurements  Waist: cm  Hip: cm  Scrotum: cm  Ground/Upper Thigh: cm  Mid Thigh: 58.3 cm  Knee: 47 cm  Upper Calf: 63.9 cm  Mid Calf: 43.6 cm  Ankle: 33.4 cm  Heel to Foot: 25 cm  Total: 271.2 cm        Left Lower Extremity Circumferential Measurements 7/7  Waist: cm  Hip: cm  Scrotum: cm  Ground/Upper Thigh: cm  Mid Thigh: 65.3 cm  Knee: 50.2 cm  Upper Calf: 65 cm  Mid Calf: 42.2 cm  Ankle: 32.2 cm  Heel to Foot: 25.2 cm  Total: 280.1 cm     Right Lower Extremity Circumferential Measurements 7/7  Waist: cm  Hip: cm  Scrotum: cm  Ground/Upper Thigh: cm  Mid Thigh: 58.6 cm  Knee: 45.4 cm  Upper Calf: 63.7 cm  Mid Calf: 42.7 cm  Ankle: 32.6 cm  Heel to Foot: 25.7 cm  Total: 268.7 cm          Left Lower Extremity Circumferential Measurements 7/5  Waist: cm  Hip: cm  Scrotum: cm  Ground/Upper Thigh: cm  Mid Thigh: 66.5 cm  Knee: 49.4 cm  Upper Calf: 63.7 cm  Mid Calf: 43.5 cm  Ankle: 33.7 cm  Heel to Foot: 24.5 cm  Total: 281.3 cm     Right Lower Extremity Circumferential Measurements 7/5  Waist: cm  Hip: cm  Scrotum: cm  Ground/Upper Thigh: cm  Mid Thigh: 56.5 cm  Knee: 45.6 cm  Upper Calf: 59.7 cm  Mid Calf: 43.8 cm  Ankle: 33.7 cm  Heel to Foot: 24.8 cm  Total:  264.1 cm          Left Lower Extremity Circumferential Measurements 6/28  Waist: cm  Hip: cm  Scrotum: cm  Ground/Upper Thigh: cm  Mid Thigh: 66.4 cm  Knee: 51 cm  Upper Calf: 64.8 cm  Mid Calf: 43.1 cm  Ankle: 33.4 cm  Heel to Foot: 24.5 cm  Total: 283.2 cm     Right Lower Extremity Circumferential Measurements 6/28  Waist: cm  Hip: cm  Scrotum: cm  Ground/Upper Thigh: cm  Mid Thigh: 58.4 cm  Knee: 46.5 cm  Upper Calf: 62.7 cm  Mid Calf: 45 cm  Ankle: 34 cm  Heel to Foot: 25.3 cm  Total: 271.9 cm          Left Lower Extremity Circumferential Measurements 6/26  Waist: cm  Hip: cm  Scrotum: cm  Ground/Upper Thigh: cm  Mid Thigh: 62.2 cm  Knee: 54.3 cm  Upper Calf: 64.3 cm  Mid Calf: 42 cm  Ankle: 31.2 cm  Heel to Foot: 22.1 cm  Total: 276.1 cm     Right Lower Extremity Circumferential Measurements 6/26  Waist: cm  Hip: cm  Scrotum: cm  Ground/Upper Thigh: cm  Mid Thigh: 60.3 cm  Knee: 46.2 cm  Upper Calf: 60.8 cm  Mid Calf: 41.9 cm  Ankle: 32.2 cm  Heel to Foot: 23.2 cm  Total: 264.6 cm          Left Lower Extremity Circumferential Measurements 6/21  Waist: cm  Hip: cm  Scrotum: cm  Ground/Upper Thigh: cm  Mid Thigh: 67.1 cm  Knee: 51.8 cm  Upper Calf: 63.8 cm  Mid Calf: 41.8 cm  Ankle: 34.5 cm  Heel to Foot: 24.1 cm  Total: 283.1 cm     Right Lower Extremity Circumferential Measurements 6/21  Waist: cm  Hip: cm  Scrotum: cm  Ground/Upper Thigh: cm  Mid Thigh: 58 cm  Knee: 46.2 cm  Upper Calf: 60.8 cm  Mid Calf: 43.5 cm  Ankle: 34.1 cm  Heel to Foot: 24.3 cm  Total: 266.9 cm          Left Lower Extremity Circumferential Measurements 6/19  Waist: cm  Hip: cm  Scrotum: cm  Ground/Upper Thigh: cm  Mid Thigh: 66.8 cm  Knee: 51.8 cm  Upper Calf: 62.6 cm  Mid Calf: 40.5 cm  Ankle: 33.5 cm  Heel to Foot: 24.2 cm  Total: 279.4 cm     Right Lower Extremity Circumferential Measurements 6/19  Waist: cm  Hip: cm  Scrotum: cm  Ground/Upper Thigh: cm  Mid Thigh: 59.7 cm  Knee: 45.8 cm  Upper Calf: 61.6 cm  Mid Calf: 41.5  cm  Ankle: 33.8 cm  Heel to Foot: 24.9 cm  Total: 267.3 cm          Left Lower Extremity Circumferential Measurements 6/15  Waist: cm  Hip: cm  Scrotum: cm  Ground/Upper Thigh: cm  Mid Thigh: 66.4 cm  Knee: 51.3 cm  Upper Calf: 63 cm  Mid Calf: 42.1 cm  Ankle: 34.3 cm  Heel to Foot: 25.7 cm  Total: 282.8 cm     Right Lower Extremity Circumferential Measurements 6/15  Waist: cm  Hip: cm  Scrotum: cm  Ground/Upper Thigh: cm  Mid Thigh: 60.4 cm  Knee: 46 cm  Upper Calf: 62.2 cm  Mid Calf: 43.3 cm  Ankle: 35.2 cm  Heel to Foot: 25 cm  Total: 272.1 cm          Left Lower Extremity Circumferential Measurements 6/12  Waist: cm  Hip: cm  Scrotum: cm  Ground/Upper Thigh: cm  Mid Thigh: 67.4 cm  Knee: 51.4 cm  Upper Calf: 64.8 cm  Mid Calf: 42.5 cm  Ankle: 34.9 cm  Heel to Foot: 24.1 cm  Total: 285.1 cm     Right Lower Extremity Circumferential Measurements 6/12  Waist: cm  Hip: cm  Scrotum: cm  Ground/Upper Thigh: cm  Mid Thigh: 61.1 cm  Knee: 46.2 cm  Upper Calf: 60.2 cm  Mid Calf: 46.4 cm  Ankle: 34.6 cm  Heel to Foot: 24.6 cm  Total: 273.1 cm     Other Notes  Left Lower Extremity Circumferential Measurements 7/22  Mid Thigh: 64.3 cm  Knee: 49.7 cm  Upper Calf: 61.1 cm  Mid Calf: 46.3 cm  Ankle: 39.5 cm  Heel to Foot: 25 cm  Total: 285.9 cm     Right Lower Extremity Circumferential Measurements 7/22  Mid Thigh: 57.7 cm  Knee: 44.6 cm  Upper Calf: 59.3 cm  Mid Calf: 48.6 cm  Ankle: 38.7 cm  Heel to Foot: 25.3 cm  Total: 274.2 cm    Therapeutic Treatments and Modalities:     1. Manual Therapy (CPT 96559)    Therapeutic Treatment and Modalities Summary: MLD to L LE without trunk involvement. Focus on L LE and accompanying anastomoses.   The purpose of Manual Lymph Drainage (MLD) is to reduce lymph volume in the affected limb by increasing intake of lymphatic load into the lymphatic system, increasing the volume of transported lymph fluid, moving lymph fluid in superficial lymph vessels to collateral lymph collectors,  anastomoses, or tissue channels, and increasing venous return. The goal of MLD is to re-route the lymph flow around blocked areas into more centrally located healthy lymph vessels, which drain into the venous system.      Assisted pt with donning of compression leggings.     Time-based treatments/modalities:    Physical Therapy Timed Treatment Charges  Manual therapy minutes (CPT 63898): 26 minutes      Assessment and Plan:   Assessment details:  Cyndee has reduced her L LE but R LE has increased slightly. Today, transitioned her back into her compression leggings. Will evaluate for sustaining her measurements with leggings next time.     Plan:  Therapy options:  Physical therapy treatment to continue  Discussed with:  Patient

## 2023-07-26 ENCOUNTER — APPOINTMENT (OUTPATIENT)
Dept: PHYSICAL THERAPY | Facility: REHABILITATION | Age: 58
End: 2023-07-26
Attending: PHYSICIAN ASSISTANT
Payer: COMMERCIAL

## 2023-07-26 NOTE — OP THERAPY DAILY TREATMENT
Outpatient Physical Therapy  LYMPHEDEMA THERAPY DAILY TREATMENT     [facility] Physical [address].  Suite [facility]  [place] [address]  Phone:  [phone]  Fax:  [phone]    Date: 07/26/2023    Patient: Cyndee Calvert  YOB: 1965  MRN: [id number]     Time Calculation                   Chief Complaint: No chief complaint on file.    Visit #:10    Subjective    Lymphedema Objective    Left Lower Extremity Circumferential Measurements 7/11  Waist: cm  Hip: cm  Scrotum: cm  Ground/Upper Thigh: cm  Mid Thigh: 65.3 cm  Knee: 48.4 cm  Upper Calf: 62.8 cm  Mid Calf: 43.2 cm  Ankle: 33.3 cm  Heel to Foot: 24.4 cm  Total: 277.4 cm     Right Lower Extremity Circumferential Measurements 7/11  Waist: cm  Hip: cm  Scrotum: cm  Ground/Upper Thigh: cm  Mid Thigh: 58.3 cm  Knee: 47 cm  Upper Calf: 63.9 cm  Mid Calf: 43.6 cm  Ankle: 33.4 cm  Heel to Foot: 25 cm  Total: 271.2 cm          Left Lower Extremity Circumferential Measurements 7/7  Waist: cm  Hip: cm  Scrotum: cm  Ground/Upper Thigh: cm  Mid Thigh: 65.3 cm  Knee: 50.2 cm  Upper Calf: 65 cm  Mid Calf: 42.2 cm  Ankle: 32.2 cm  Heel to Foot: 25.2 cm  Total: 280.1 cm     Right Lower Extremity Circumferential Measurements 7/7  Waist: cm  Hip: cm  Scrotum: cm  Ground/Upper Thigh: cm  Mid Thigh: 58.6 cm  Knee: 45.4 cm  Upper Calf: 63.7 cm  Mid Calf: 42.7 cm  Ankle: 32.6 cm  Heel to Foot: 25.7 cm  Total: 268.7 cm          Left Lower Extremity Circumferential Measurements 7/5  Waist: cm  Hip: cm  Scrotum: cm  Ground/Upper Thigh: cm  Mid Thigh: 66.5 cm  Knee: 49.4 cm  Upper Calf: 63.7 cm  Mid Calf: 43.5 cm  Ankle: 33.7 cm  Heel to Foot: 24.5 cm  Total: 281.3 cm     Right Lower Extremity Circumferential Measurements 7/5  Waist: cm  Hip: cm  Scrotum: cm  Ground/Upper Thigh: cm  Mid Thigh: 56.5 cm  Knee: 45.6 cm  Upper Calf: 59.7 cm  Mid Calf: 43.8 cm  Ankle: 33.7 cm  Heel to Foot: 24.8 cm  Total: 264.1 cm          Left Lower Extremity  Circumferential Measurements 6/28  Waist: cm  Hip: cm  Scrotum: cm  Ground/Upper Thigh: cm  Mid Thigh: 66.4 cm  Knee: 51 cm  Upper Calf: 64.8 cm  Mid Calf: 43.1 cm  Ankle: 33.4 cm  Heel to Foot: 24.5 cm  Total: 283.2 cm     Right Lower Extremity Circumferential Measurements 6/28  Waist: cm  Hip: cm  Scrotum: cm  Ground/Upper Thigh: cm  Mid Thigh: 58.4 cm  Knee: 46.5 cm  Upper Calf: 62.7 cm  Mid Calf: 45 cm  Ankle: 34 cm  Heel to Foot: 25.3 cm  Total: 271.9 cm          Left Lower Extremity Circumferential Measurements 6/26  Waist: cm  Hip: cm  Scrotum: cm  Ground/Upper Thigh: cm  Mid Thigh: 62.2 cm  Knee: 54.3 cm  Upper Calf: 64.3 cm  Mid Calf: 42 cm  Ankle: 31.2 cm  Heel to Foot: 22.1 cm  Total: 276.1 cm     Right Lower Extremity Circumferential Measurements 6/26  Waist: cm  Hip: cm  Scrotum: cm  Ground/Upper Thigh: cm  Mid Thigh: 60.3 cm  Knee: 46.2 cm  Upper Calf: 60.8 cm  Mid Calf: 41.9 cm  Ankle: 32.2 cm  Heel to Foot: 23.2 cm  Total: 264.6 cm          Left Lower Extremity Circumferential Measurements 6/21  Waist: cm  Hip: cm  Scrotum: cm  Ground/Upper Thigh: cm  Mid Thigh: 67.1 cm  Knee: 51.8 cm  Upper Calf: 63.8 cm  Mid Calf: 41.8 cm  Ankle: 34.5 cm  Heel to Foot: 24.1 cm  Total: 283.1 cm     Right Lower Extremity Circumferential Measurements 6/21  Waist: cm  Hip: cm  Scrotum: cm  Ground/Upper Thigh: cm  Mid Thigh: 58 cm  Knee: 46.2 cm  Upper Calf: 60.8 cm  Mid Calf: 43.5 cm  Ankle: 34.1 cm  Heel to Foot: 24.3 cm  Total: 266.9 cm          Left Lower Extremity Circumferential Measurements 6/19  Waist: cm  Hip: cm  Scrotum: cm  Ground/Upper Thigh: cm  Mid Thigh: 66.8 cm  Knee: 51.8 cm  Upper Calf: 62.6 cm  Mid Calf: 40.5 cm  Ankle: 33.5 cm  Heel to Foot: 24.2 cm  Total: 279.4 cm     Right Lower Extremity Circumferential Measurements 6/19  Waist: cm  Hip: cm  Scrotum: cm  Ground/Upper Thigh: cm  Mid Thigh: 59.7 cm  Knee: 45.8 cm  Upper Calf: 61.6 cm  Mid Calf: 41.5 cm  Ankle: 33.8 cm  Heel to Foot: 24.9  cm  Total: 267.3 cm          Left Lower Extremity Circumferential Measurements 6/15  Waist: cm  Hip: cm  Scrotum: cm  Ground/Upper Thigh: cm  Mid Thigh: 66.4 cm  Knee: 51.3 cm  Upper Calf: 63 cm  Mid Calf: 42.1 cm  Ankle: 34.3 cm  Heel to Foot: 25.7 cm  Total: 282.8 cm     Right Lower Extremity Circumferential Measurements 6/15  Waist: cm  Hip: cm  Scrotum: cm  Ground/Upper Thigh: cm  Mid Thigh: 60.4 cm  Knee: 46 cm  Upper Calf: 62.2 cm  Mid Calf: 43.3 cm  Ankle: 35.2 cm  Heel to Foot: 25 cm  Total: 272.1 cm          Left Lower Extremity Circumferential Measurements 6/12  Waist: cm  Hip: cm  Scrotum: cm  Ground/Upper Thigh: cm  Mid Thigh: 67.4 cm  Knee: 51.4 cm  Upper Calf: 64.8 cm  Mid Calf: 42.5 cm  Ankle: 34.9 cm  Heel to Foot: 24.1 cm  Total: 285.1 cm     Right Lower Extremity Circumferential Measurements 6/12  Waist: cm  Hip: cm  Scrotum: cm  Ground/Upper Thigh: cm  Mid Thigh: 61.1 cm  Knee: 46.2 cm  Upper Calf: 60.2 cm  Mid Calf: 46.4 cm  Ankle: 34.6 cm  Heel to Foot: 24.6 cm  Total: 273.1 cm     Other Notes  Left Lower Extremity Circumferential Measurements 7/22  Mid Thigh: 64.3 cm  Knee: 49.7 cm  Upper Calf: 61.1 cm  Mid Calf: 46.3 cm  Ankle: 39.5 cm  Heel to Foot: 25 cm  Total: 285.9 cm     Right Lower Extremity Circumferential Measurements 7/22  Mid Thigh: 57.7 cm  Knee: 44.6 cm  Upper Calf: 59.3 cm  Mid Calf: 48.6 cm  Ankle: 38.7 cm  Heel to Foot: 25.3 cm  Total: 274.2 cm  Exercises/Treatment  Time-based treatments/modalities:           LYMPHEDEMA ASSESSMENT AND PLAN

## 2023-08-02 ENCOUNTER — PHYSICAL THERAPY (OUTPATIENT)
Dept: PHYSICAL THERAPY | Facility: REHABILITATION | Age: 58
End: 2023-08-02
Attending: PHYSICIAN ASSISTANT
Payer: COMMERCIAL

## 2023-08-02 DIAGNOSIS — I89.0 LYMPHEDEMA OF BOTH LOWER EXTREMITIES: ICD-10-CM

## 2023-08-02 PROCEDURE — 97140 MANUAL THERAPY 1/> REGIONS: CPT

## 2023-08-02 NOTE — OP THERAPY DISCHARGE SUMMARY
Outpatient Physical Therapy  DISCHARGE SUMMARY NOTE      [facility] Physical Therapy [facility]  [address]  [address]  [facility] [address]  Phone:  [phone]  Fax:  [phone]    Date of Visit: 08/02/2023    Patient: Cyndee Calvert  YOB: 1965  MRN: [id number]     Referring Provider: Deanna Mccollum P.A.-C.  [address] [place] [address]   Referring Diagnosis Lymphedema, not elsewhere classified [I89.0]           Your patient is being discharged from Physical Therapy with the following comments:   Goals met    Comments:  Cyndee was seen to reduce her B LE swelling and was able to reduce each leg by 8cm each. She has sustained her measurements over the past month and at this point does not require further intervention at this time.       Thank you for the referral,    Mojgan Kumar, PT, DPT, CLT    Date: 8/2/2023

## 2023-08-02 NOTE — OP THERAPY DAILY TREATMENT
Outpatient Physical Therapy  LYMPHEDEMA THERAPY DAILY TREATMENT     [facility] Physical Therapy [address]  [address]  [place] [address]  Phone:  [phone]  Fax:  [phone]    Date: 08/02/2023    Patient: Cyndee Calvert  YOB: 1965  MRN: [id number]     Time Calculation    Start time: 0730  Stop time: 0803 Time Calculation (min): 33 minutes         Chief Complaint: Lipolymphedema    Visit #:10    Subjective:   History of Present Illness:     Mechanism of injury:  Feeling off. Had some kidney issues and is receiving treatment.       Lymphedema Objective    Left Lower Extremity Circumferential Measurements  Waist: cm  Hip: cm  Scrotum: cm  Ground/Upper Thigh: cm  Mid Thigh: 65.7 cm  Knee: 50.3 cm  Upper Calf: 62.7 cm  Mid Calf: 41.5 cm  Ankle: 34.1 cm  Heel to Foot: 24.3 cm  Total: 278.6 cm    Right Lower Extremity Circumferential Measurements  Waist: cm  Hip: cm  Scrotum: cm  Ground/Upper Thigh: cm  Mid Thigh: 59.4 cm  Knee: 44.3 cm  Upper Calf: 60.5 cm  Mid Calf: 43.6 cm  Ankle: 33.4 cm  Heel to Foot: 25.2 cm  Total: 266.4 cm        Left Lower Extremity Circumferential Measurements  Waist: cm  Hip: cm  Scrotum: cm  Ground/Upper Thigh: cm  Mid Thigh: 65.3 cm  Knee: 48.4 cm  Upper Calf: 62.8 cm  Mid Calf: 43.2 cm  Ankle: 33.3 cm  Heel to Foot: 24.4 cm  Total: 277.4 cm     Right Lower Extremity Circumferential Measurements  Waist: cm  Hip: cm  Scrotum: cm  Ground/Upper Thigh: cm  Mid Thigh: 58.3 cm  Knee: 47 cm  Upper Calf: 63.9 cm  Mid Calf: 43.6 cm  Ankle: 33.4 cm  Heel to Foot: 25 cm  Total: 271.2 cm          Left Lower Extremity Circumferential Measurements 7/7  Waist: cm  Hip: cm  Scrotum: cm  Ground/Upper Thigh: cm  Mid Thigh: 65.3 cm  Knee: 50.2 cm  Upper Calf: 65 cm  Mid Calf: 42.2 cm  Ankle: 32.2 cm  Heel to Foot: 25.2 cm  Total: 280.1 cm     Right Lower Extremity Circumferential Measurements 7/7  Waist: cm  Hip: cm  Scrotum: cm  Ground/Upper Thigh: cm  Mid  Thigh: 58.6 cm  Knee: 45.4 cm  Upper Calf: 63.7 cm  Mid Calf: 42.7 cm  Ankle: 32.6 cm  Heel to Foot: 25.7 cm  Total: 268.7 cm          Left Lower Extremity Circumferential Measurements 7/5  Waist: cm  Hip: cm  Scrotum: cm  Ground/Upper Thigh: cm  Mid Thigh: 66.5 cm  Knee: 49.4 cm  Upper Calf: 63.7 cm  Mid Calf: 43.5 cm  Ankle: 33.7 cm  Heel to Foot: 24.5 cm  Total: 281.3 cm     Right Lower Extremity Circumferential Measurements 7/5  Waist: cm  Hip: cm  Scrotum: cm  Ground/Upper Thigh: cm  Mid Thigh: 56.5 cm  Knee: 45.6 cm  Upper Calf: 59.7 cm  Mid Calf: 43.8 cm  Ankle: 33.7 cm  Heel to Foot: 24.8 cm  Total: 264.1 cm          Left Lower Extremity Circumferential Measurements 6/28  Waist: cm  Hip: cm  Scrotum: cm  Ground/Upper Thigh: cm  Mid Thigh: 66.4 cm  Knee: 51 cm  Upper Calf: 64.8 cm  Mid Calf: 43.1 cm  Ankle: 33.4 cm  Heel to Foot: 24.5 cm  Total: 283.2 cm     Right Lower Extremity Circumferential Measurements 6/28  Waist: cm  Hip: cm  Scrotum: cm  Ground/Upper Thigh: cm  Mid Thigh: 58.4 cm  Knee: 46.5 cm  Upper Calf: 62.7 cm  Mid Calf: 45 cm  Ankle: 34 cm  Heel to Foot: 25.3 cm  Total: 271.9 cm          Left Lower Extremity Circumferential Measurements 6/26  Waist: cm  Hip: cm  Scrotum: cm  Ground/Upper Thigh: cm  Mid Thigh: 62.2 cm  Knee: 54.3 cm  Upper Calf: 64.3 cm  Mid Calf: 42 cm  Ankle: 31.2 cm  Heel to Foot: 22.1 cm  Total: 276.1 cm     Right Lower Extremity Circumferential Measurements 6/26  Waist: cm  Hip: cm  Scrotum: cm  Ground/Upper Thigh: cm  Mid Thigh: 60.3 cm  Knee: 46.2 cm  Upper Calf: 60.8 cm  Mid Calf: 41.9 cm  Ankle: 32.2 cm  Heel to Foot: 23.2 cm  Total: 264.6 cm          Left Lower Extremity Circumferential Measurements 6/21  Waist: cm  Hip: cm  Scrotum: cm  Ground/Upper Thigh: cm  Mid Thigh: 67.1 cm  Knee: 51.8 cm  Upper Calf: 63.8 cm  Mid Calf: 41.8 cm  Ankle: 34.5 cm  Heel to Foot: 24.1 cm  Total: 283.1 cm     Right Lower Extremity Circumferential Measurements 6/21  Waist: cm  Hip:  cm  Scrotum: cm  Ground/Upper Thigh: cm  Mid Thigh: 58 cm  Knee: 46.2 cm  Upper Calf: 60.8 cm  Mid Calf: 43.5 cm  Ankle: 34.1 cm  Heel to Foot: 24.3 cm  Total: 266.9 cm          Left Lower Extremity Circumferential Measurements 6/19  Waist: cm  Hip: cm  Scrotum: cm  Ground/Upper Thigh: cm  Mid Thigh: 66.8 cm  Knee: 51.8 cm  Upper Calf: 62.6 cm  Mid Calf: 40.5 cm  Ankle: 33.5 cm  Heel to Foot: 24.2 cm  Total: 279.4 cm     Right Lower Extremity Circumferential Measurements 6/19  Waist: cm  Hip: cm  Scrotum: cm  Ground/Upper Thigh: cm  Mid Thigh: 59.7 cm  Knee: 45.8 cm  Upper Calf: 61.6 cm  Mid Calf: 41.5 cm  Ankle: 33.8 cm  Heel to Foot: 24.9 cm  Total: 267.3 cm          Left Lower Extremity Circumferential Measurements 6/15  Waist: cm  Hip: cm  Scrotum: cm  Ground/Upper Thigh: cm  Mid Thigh: 66.4 cm  Knee: 51.3 cm  Upper Calf: 63 cm  Mid Calf: 42.1 cm  Ankle: 34.3 cm  Heel to Foot: 25.7 cm  Total: 282.8 cm     Right Lower Extremity Circumferential Measurements 6/15  Waist: cm  Hip: cm  Scrotum: cm  Ground/Upper Thigh: cm  Mid Thigh: 60.4 cm  Knee: 46 cm  Upper Calf: 62.2 cm  Mid Calf: 43.3 cm  Ankle: 35.2 cm  Heel to Foot: 25 cm  Total: 272.1 cm          Left Lower Extremity Circumferential Measurements 6/12  Waist: cm  Hip: cm  Scrotum: cm  Ground/Upper Thigh: cm  Mid Thigh: 67.4 cm  Knee: 51.4 cm  Upper Calf: 64.8 cm  Mid Calf: 42.5 cm  Ankle: 34.9 cm  Heel to Foot: 24.1 cm  Total: 285.1 cm     Right Lower Extremity Circumferential Measurements 6/12  Waist: cm  Hip: cm  Scrotum: cm  Ground/Upper Thigh: cm  Mid Thigh: 61.1 cm  Knee: 46.2 cm  Upper Calf: 60.2 cm  Mid Calf: 46.4 cm  Ankle: 34.6 cm  Heel to Foot: 24.6 cm  Total: 273.1 cm     Other Notes  Left Lower Extremity Circumferential Measurements 7/22  Mid Thigh: 64.3 cm  Knee: 49.7 cm  Upper Calf: 61.1 cm  Mid Calf: 46.3 cm  Ankle: 39.5 cm  Heel to Foot: 25 cm  Total: 285.9 cm     Right Lower Extremity Circumferential Measurements 7/22  Mid Thigh: 57.7  cm  Knee: 44.6 cm  Upper Calf: 59.3 cm  Mid Calf: 48.6 cm  Ankle: 38.7 cm  Heel to Foot: 25.3 cm  Total: 274.2 cm    Therapeutic Treatments and Modalities:     1. Manual Therapy (CPT 24935)    Therapeutic Treatment and Modalities Summary: MLD to L LE without trunk involvement. Focus on L LE and accompanying anastomoses.   The purpose of Manual Lymph Drainage (MLD) is to reduce lymph volume in the affected limb by increasing intake of lymphatic load into the lymphatic system, increasing the volume of transported lymph fluid, moving lymph fluid in superficial lymph vessels to collateral lymph collectors, anastomoses, or tissue channels, and increasing venous return. The goal of MLD is to re-route the lymph flow around blocked areas into more centrally located healthy lymph vessels, which drain into the venous system.      Time-based treatments/modalities:    Physical Therapy Timed Treatment Charges  Manual therapy minutes (CPT 66367): 33 minutes      Assessment and Plan:   Assessment details:  Cyndee has sustained her measurements since her previous visit. It appears she is managing her legs well with combination of pump and leggings. At this time, she does not require further intervention and can be DC'd.     Plan:  Therapy options:  Discharged due to accomplished goals  Discussed with:  Patient

## 2023-08-27 ENCOUNTER — HOSPITAL ENCOUNTER (EMERGENCY)
Facility: MEDICAL CENTER | Age: 58
End: 2023-08-27
Attending: EMERGENCY MEDICINE
Payer: COMMERCIAL

## 2023-08-27 ENCOUNTER — APPOINTMENT (OUTPATIENT)
Dept: RADIOLOGY | Facility: MEDICAL CENTER | Age: 58
End: 2023-08-27
Attending: EMERGENCY MEDICINE
Payer: COMMERCIAL

## 2023-08-27 VITALS
OXYGEN SATURATION: 97 % | RESPIRATION RATE: 16 BRPM | TEMPERATURE: 97 F | SYSTOLIC BLOOD PRESSURE: 119 MMHG | BODY MASS INDEX: 45.99 KG/M2 | HEIGHT: 67 IN | HEART RATE: 71 BPM | DIASTOLIC BLOOD PRESSURE: 85 MMHG | WEIGHT: 293 LBS

## 2023-08-27 DIAGNOSIS — N18.9 CHRONIC KIDNEY DISEASE, UNSPECIFIED CKD STAGE: ICD-10-CM

## 2023-08-27 DIAGNOSIS — Z87.442 HISTORY OF NEPHROLITHIASIS: ICD-10-CM

## 2023-08-27 DIAGNOSIS — R10.9 RIGHT FLANK PAIN: ICD-10-CM

## 2023-08-27 LAB
ALBUMIN SERPL BCP-MCNC: 4.1 G/DL (ref 3.2–4.9)
ALBUMIN/GLOB SERPL: 1.2 G/DL
ALP SERPL-CCNC: 103 U/L (ref 30–99)
ALT SERPL-CCNC: 138 U/L (ref 2–50)
ANION GAP SERPL CALC-SCNC: 10 MMOL/L (ref 7–16)
APPEARANCE UR: ABNORMAL
AST SERPL-CCNC: 96 U/L (ref 12–45)
BACTERIA #/AREA URNS HPF: ABNORMAL /HPF
BASOPHILS # BLD AUTO: 0.6 % (ref 0–1.8)
BASOPHILS # BLD: 0.04 K/UL (ref 0–0.12)
BILIRUB SERPL-MCNC: 0.9 MG/DL (ref 0.1–1.5)
BILIRUB UR QL STRIP.AUTO: NEGATIVE
BUN SERPL-MCNC: 15 MG/DL (ref 8–22)
CALCIUM ALBUM COR SERPL-MCNC: 8.8 MG/DL (ref 8.5–10.5)
CALCIUM SERPL-MCNC: 8.9 MG/DL (ref 8.4–10.2)
CHLORIDE SERPL-SCNC: 106 MMOL/L (ref 96–112)
CO2 SERPL-SCNC: 22 MMOL/L (ref 20–33)
COLOR UR: YELLOW
CREAT SERPL-MCNC: 1.41 MG/DL (ref 0.5–1.4)
EOSINOPHIL # BLD AUTO: 0.29 K/UL (ref 0–0.51)
EOSINOPHIL NFR BLD: 4.4 % (ref 0–6.9)
EPI CELLS #/AREA URNS HPF: ABNORMAL /HPF
ERYTHROCYTE [DISTWIDTH] IN BLOOD BY AUTOMATED COUNT: 45.8 FL (ref 35.9–50)
GFR SERPLBLD CREATININE-BSD FMLA CKD-EPI: 43 ML/MIN/1.73 M 2
GLOBULIN SER CALC-MCNC: 3.4 G/DL (ref 1.9–3.5)
GLUCOSE SERPL-MCNC: 135 MG/DL (ref 65–99)
GLUCOSE UR STRIP.AUTO-MCNC: >=1000 MG/DL
HCT VFR BLD AUTO: 41.8 % (ref 37–47)
HGB BLD-MCNC: 13.5 G/DL (ref 12–16)
IMM GRANULOCYTES # BLD AUTO: 0.01 K/UL (ref 0–0.11)
IMM GRANULOCYTES NFR BLD AUTO: 0.2 % (ref 0–0.9)
KETONES UR STRIP.AUTO-MCNC: NEGATIVE MG/DL
LEUKOCYTE ESTERASE UR QL STRIP.AUTO: NEGATIVE
LIPASE SERPL-CCNC: 74 U/L (ref 11–82)
LYMPHOCYTES # BLD AUTO: 2.9 K/UL (ref 1–4.8)
LYMPHOCYTES NFR BLD: 43.9 % (ref 22–41)
MCH RBC QN AUTO: 31.1 PG (ref 27–33)
MCHC RBC AUTO-ENTMCNC: 32.3 G/DL (ref 32.2–35.5)
MCV RBC AUTO: 96.3 FL (ref 81.4–97.8)
MICRO URNS: ABNORMAL
MONOCYTES # BLD AUTO: 0.37 K/UL (ref 0–0.85)
MONOCYTES NFR BLD AUTO: 5.6 % (ref 0–13.4)
NEUTROPHILS # BLD AUTO: 2.99 K/UL (ref 1.82–7.42)
NEUTROPHILS NFR BLD: 45.3 % (ref 44–72)
NITRITE UR QL STRIP.AUTO: NEGATIVE
NRBC # BLD AUTO: 0 K/UL
NRBC BLD-RTO: 0 /100 WBC (ref 0–0.2)
PH UR STRIP.AUTO: 5.5 [PH] (ref 5–8)
PLATELET # BLD AUTO: 173 K/UL (ref 164–446)
PMV BLD AUTO: 12.5 FL (ref 9–12.9)
POTASSIUM SERPL-SCNC: 4.6 MMOL/L (ref 3.6–5.5)
PROT SERPL-MCNC: 7.5 G/DL (ref 6–8.2)
PROT UR QL STRIP: NEGATIVE MG/DL
RBC # BLD AUTO: 4.34 M/UL (ref 4.2–5.4)
RBC # URNS HPF: ABNORMAL /HPF
RBC UR QL AUTO: NEGATIVE
SODIUM SERPL-SCNC: 138 MMOL/L (ref 135–145)
SP GR UR STRIP.AUTO: 1.01
WBC # BLD AUTO: 6.6 K/UL (ref 4.8–10.8)
WBC #/AREA URNS HPF: ABNORMAL /HPF

## 2023-08-27 PROCEDURE — 74176 CT ABD & PELVIS W/O CONTRAST: CPT

## 2023-08-27 PROCEDURE — 83690 ASSAY OF LIPASE: CPT

## 2023-08-27 PROCEDURE — 99284 EMERGENCY DEPT VISIT MOD MDM: CPT

## 2023-08-27 PROCEDURE — 36415 COLL VENOUS BLD VENIPUNCTURE: CPT

## 2023-08-27 PROCEDURE — 700111 HCHG RX REV CODE 636 W/ 250 OVERRIDE (IP): Mod: JZ | Performed by: EMERGENCY MEDICINE

## 2023-08-27 PROCEDURE — 96375 TX/PRO/DX INJ NEW DRUG ADDON: CPT

## 2023-08-27 PROCEDURE — 80053 COMPREHEN METABOLIC PANEL: CPT

## 2023-08-27 PROCEDURE — 81001 URINALYSIS AUTO W/SCOPE: CPT

## 2023-08-27 PROCEDURE — 85025 COMPLETE CBC W/AUTO DIFF WBC: CPT

## 2023-08-27 PROCEDURE — 94760 N-INVAS EAR/PLS OXIMETRY 1: CPT

## 2023-08-27 PROCEDURE — 96374 THER/PROPH/DIAG INJ IV PUSH: CPT

## 2023-08-27 RX ORDER — METOPROLOL SUCCINATE 25 MG/1
25 TABLET, EXTENDED RELEASE ORAL EVERY EVENING
Status: SHIPPED | COMMUNITY
End: 2023-08-29

## 2023-08-27 RX ORDER — ONDANSETRON 2 MG/ML
4 INJECTION INTRAMUSCULAR; INTRAVENOUS ONCE
Status: COMPLETED | OUTPATIENT
Start: 2023-08-27 | End: 2023-08-27

## 2023-08-27 RX ORDER — KETOROLAC TROMETHAMINE 30 MG/ML
30 INJECTION, SOLUTION INTRAMUSCULAR; INTRAVENOUS ONCE
Status: COMPLETED | OUTPATIENT
Start: 2023-08-27 | End: 2023-08-27

## 2023-08-27 RX ORDER — ACETAMINOPHEN 500 MG
1000 TABLET ORAL EVERY 6 HOURS PRN
COMMUNITY

## 2023-08-27 RX ADMIN — KETOROLAC TROMETHAMINE 30 MG: 30 INJECTION, SOLUTION INTRAMUSCULAR; INTRAVENOUS at 08:43

## 2023-08-27 RX ADMIN — FENTANYL CITRATE 50 MCG: 50 INJECTION, SOLUTION INTRAMUSCULAR; INTRAVENOUS at 08:43

## 2023-08-27 RX ADMIN — ONDANSETRON 4 MG: 2 INJECTION INTRAMUSCULAR; INTRAVENOUS at 08:43

## 2023-08-27 ASSESSMENT — PAIN DESCRIPTION - DESCRIPTORS: DESCRIPTORS: SORE;OTHER (COMMENT)

## 2023-08-27 ASSESSMENT — PAIN DESCRIPTION - PAIN TYPE: TYPE: ACUTE PAIN

## 2023-08-27 ASSESSMENT — FIBROSIS 4 INDEX: FIB4 SCORE: 1.11

## 2023-08-27 NOTE — ED PROVIDER NOTES
ED Provider Note    CHIEF COMPLAINT  Chief Complaint   Patient presents with    Flank Pain       EXTERNAL RECORDS REVIEWED  Inpatient Notes 5/1/2023 operative note right ureteroscopy, right stent removal for retained right ureteral stent.  Ureteroscopy showed no evidence of intraureteral stone or mass.    HPI/ROS  LIMITATION TO HISTORY   Select: : None  OUTSIDE HISTORIAN(S):  None    Cyndee Calvert is a 57 y.o. female who presents to the emergency department through triage for right flank pain.  Patient states pain has been persistent if not now progressive over the last week.  4 out of 10 at this time, but 7 out of 10 earlier this morning.  Right flank pain radiating to the right lower quadrant.  Nausea without vomiting.  Decreased appetite but tolerating food and fluids.  Denies fever or chills.  Denies dysuria, hematuria or frequency.    History of kidney stones, she has had stent placed in the right ureter previously, was retained for some time but removed in May.  She states she was seen at [facility] last month for left flank pain which required left ureteral stent which has since been removed.  Followed by Dr. Ceron.    PAST MEDICAL HISTORY   has a past medical history of Allergies, Anemia, Breath shortness (03/2021), Cataract (2/2023), Diabetes (HCC), High cholesterol, Hypertension, Oxygen dependent, and Pneumonia (3/2021).    SURGICAL HISTORY   has a past surgical history that includes hysterectomy, total abdominal; tonsillectomy; bladder biopsy with cystoscopy (09/02/2008); other; ureteroscopy (Right); and cysto stent placemnt pre surg (Right, 5/1/2023).    FAMILY HISTORY  Family History   Problem Relation Age of Onset    Heart Disease Mother     Hypertension Mother     Hyperlipidemia Mother     Heart Attack Father     Heart Disease Father     Stroke Father     Heart Failure Brother     Cancer Brother     Heart Disease Brother     Heart Disease Paternal Grandmother      "Stroke Paternal Grandmother     Glaucoma Paternal Grandmother        SOCIAL HISTORY  Social History     Tobacco Use    Smoking status: Never     Passive exposure: Never    Smokeless tobacco: Never   Vaping Use    Vaping Use: Never used   Substance and Sexual Activity    Alcohol use: No    Drug use: No    Sexual activity: Not on file       CURRENT MEDICATIONS  Home Medications    **Home medications have not yet been reviewed for this encounter**         ALLERGIES  Allergies   Allergen Reactions    Percocet [Oxycodone-Acetaminophen] Shortness of Breath, Vomiting and Swelling    Latex Swelling and Unspecified     hives       PHYSICAL EXAM  VITAL SIGNS: /85   Pulse 71   Temp 36.1 °C (97 °F) (Temporal)   Resp 16   Ht 1.702 m (5' 7\")   Wt (!) 141 kg (310 lb 10.1 oz)   LMP 04/19/2003   SpO2 97%   BMI 48.65 kg/m²    Pulse ox interpretation: I interpret this pulse ox as normal.  Constitutional: Alert in no apparent distress.  HENT: Normocephalic, atraumatic. Bilateral external ears normal, Nose normal.   Eyes: Pupils are equal and reactive, Conjunctiva normal.   Neck: Normal range of motion, Supple  Lymphatic: No lymphadenopathy noted.   Cardiovascular: Regular rate and rhythm, no murmurs. Distal pulses intact.    Thorax & Lungs: Normal breath sounds.  No wheezing/rales/ronchi. No increased work of breathing  Abdomen: Obese, soft, nondistended.  Mild tenderness to palpation in the right lower quadrant without rebound, guarding or peritonitis.  Mild right CVA tenderness percussion.  Skin: Warm, Dry  Musculoskeletal: Good range of motion in all major joints.   Neurologic: Alert and orient x4.  Speech clear and cohesive  Psychiatric: Affect normal, Judgment normal, Mood normal.       DIAGNOSTIC STUDIES / PROCEDURES  LABS  Results for orders placed or performed during the hospital encounter of 08/27/23   CBC WITH DIFFERENTIAL   Result Value Ref Range    WBC 6.6 4.8 - 10.8 K/uL    RBC 4.34 4.20 - 5.40 M/uL    " Hemoglobin 13.5 12.0 - 16.0 g/dL    Hematocrit 41.8 37.0 - 47.0 %    MCV 96.3 81.4 - 97.8 fL    MCH 31.1 27.0 - 33.0 pg    MCHC 32.3 32.2 - 35.5 g/dL    RDW 45.8 35.9 - 50.0 fL    Platelet Count 173 164 - 446 K/uL    MPV 12.5 9.0 - 12.9 fL    Neutrophils-Polys 45.30 44.00 - 72.00 %    Lymphocytes 43.90 (H) 22.00 - 41.00 %    Monocytes 5.60 0.00 - 13.40 %    Eosinophils 4.40 0.00 - 6.90 %    Basophils 0.60 0.00 - 1.80 %    Immature Granulocytes 0.20 0.00 - 0.90 %    Nucleated RBC 0.00 0.00 - 0.20 /100 WBC    Neutrophils (Absolute) 2.99 1.82 - 7.42 K/uL    Lymphs (Absolute) 2.90 1.00 - 4.80 K/uL    Monos (Absolute) 0.37 0.00 - 0.85 K/uL    Eos (Absolute) 0.29 0.00 - 0.51 K/uL    Baso (Absolute) 0.04 0.00 - 0.12 K/uL    Immature Granulocytes (abs) 0.01 0.00 - 0.11 K/uL    NRBC (Absolute) 0.00 K/uL   COMP METABOLIC PANEL   Result Value Ref Range    Sodium 138 135 - 145 mmol/L    Potassium 4.6 3.6 - 5.5 mmol/L    Chloride 106 96 - 112 mmol/L    Co2 22 20 - 33 mmol/L    Anion Gap 10.0 7.0 - 16.0    Glucose 135 (H) 65 - 99 mg/dL    Bun 15 8 - 22 mg/dL    Creatinine 1.41 (H) 0.50 - 1.40 mg/dL    Calcium 8.9 8.4 - 10.2 mg/dL    Correct Calcium 8.8 8.5 - 10.5 mg/dL    AST(SGOT) 96 (H) 12 - 45 U/L    ALT(SGPT) 138 (H) 2 - 50 U/L    Alkaline Phosphatase 103 (H) 30 - 99 U/L    Total Bilirubin 0.9 0.1 - 1.5 mg/dL    Albumin 4.1 3.2 - 4.9 g/dL    Total Protein 7.5 6.0 - 8.2 g/dL    Globulin 3.4 1.9 - 3.5 g/dL    A-G Ratio 1.2 g/dL   LIPASE   Result Value Ref Range    Lipase 74 11 - 82 U/L   URINALYSIS (UA)    Specimen: Urine   Result Value Ref Range    Color Yellow     Character Hazy (A)     Specific Gravity 1.015 <1.035    Ph 5.5 5.0 - 8.0    Glucose >=1000 (A) Negative mg/dL    Ketones Negative Negative mg/dL    Protein Negative Negative mg/dL    Bilirubin Negative Negative    Nitrite Negative Negative    Leukocyte Esterase Negative Negative    Occult Blood Negative Negative    Micro Urine Req Microscopic    URINE MICROSCOPIC  (W/UA)   Result Value Ref Range    WBC 0-2 /hpf    RBC 0-2 /hpf    Bacteria Few (A) None /hpf    Epithelial Cells Moderate (A) Few /hpf   ESTIMATED GFR   Result Value Ref Range    GFR (CKD-EPI) 43 (A) >60 mL/min/1.73 m 2     RADIOLOGY  I have independently interpreted the diagnostic imaging associated with this visit and am waiting the final reading from the radiologist.   My preliminary interpretation is as follows:     Radiologist interpretation:   CT-RENAL COLIC EVALUATION(A/P W/O)   Final Result         No evidence of  urinary tract calculus or hydronephrosis.  No evidence of peritoneal inflammation.      3 CM HIATAL HERNIA IS IDENTIFIED.        COURSE & MEDICAL DECISION MAKING    ED Observation Status? Yes; I am placing the patient in to an observation status due to a diagnostic uncertainty as well as therapeutic intensity. Patient placed in observation status at 8:28 AM, 8/27/2023.     Observation plan is as follows: Labs, imaging, pain control before final disposition can be made    Upon Reevaluation, the patient's condition has: Improved; and will be discharged.    Patient discharged from ED Observation status at 10:57 AM  (Time) 8/27/23 (Date).     INITIAL ASSESSMENT, COURSE AND PLAN  Care Narrative:   Seen evaluated bedside.  Right flank pain.  History of nephrolithiasis, ureteral calculus and stenting.  Add labs, urine, imaging and pain control.    Labs were quite unrevealing, no leukocytosis, left shift or bandemia.  No anemia.  Chronic renal insufficiency unchanged from previous.  Mild nonspecific transaminitis with normal total bilirubin and lipase.  Patient describes recent history of the same and is being worked up by primary care for such.  No right upper quadrant abdominal pain.  No alcohol use.  No new medications.  Stable for further outpatient work-up.  Urinalysis with glucose, moderate epithelials and few bacteria.  No evidence for infection nor microscopic hematuria.    Symptoms well  controlled, pain nearly resolved after single dose Toradol, fentanyl.    CT is unrevealing for urinary tract calculus or hydronephrosis.  No evidence of peritoneal inflammation.  She does have a hiatal hernia, but unlikely the cause of her right flank pain.    ADDITIONAL PROBLEM LIST    DISPOSITION AND DISCUSSIONS  Evaluation for right flank pain is unrevealing.  She although she does have history of for nephroureterolithiasis there is no evidence for/or obstruction today.  Urinalysis is unremarkable, no clinical evidence for pyelonephritis or sepsis.  Abdominal exam with mild right lower quadrant tenderness, no guarding or peritonitis.  Clinically atypical for appendicitis, also without leukocytosis or evidence for such on CT.  No cutaneous changes to suggest zoster.  Cannot exclude musculoskeletal etiology or recently passed stone.  She is comfortable in the emergency department after single dose medications.  After review of records identified chronic renal insufficiency, advised against ongoing use of NSAIDs.  Hemodynamically stable throughout.  Will follow-up with primary care, urology as scheduled    Escalation of care considered, and ultimately not performed:acute inpatient care management, however at this time, the patient is most appropriate for outpatient management    The patient is stable for discharge home, anticipatory guidance provided, continue home medications as previously indicated, Tylenol for discomfort, close follow-up is encouraged and strict return instructions have been discussed. Patient is agreeable to the disposition and plan.    FINAL DIAGNOSIS  1. Right flank pain    2. History of nephrolithiasis    3. Chronic kidney disease, unspecified CKD stage           Electronically signed by: Elizabeth Montes D.O., 8/27/2023 8:28 AM

## 2023-08-27 NOTE — DISCHARGE INSTRUCTIONS
Follow-up with primary care or urology this week for reevaluation, medication management.    Continue home medications as previously indicated.    Tylenol every 4-6 hours as needed for discomfort.    Limited use of NSAIDs (Advil, Motrin, ibuprofen) recommended due to your abnormal kidney function.    Encourage oral fluid hydration.  Otherwise diet and activity as tolerated.    Return to the emergency department for persistent or worsening flank pain, abdominal pain, syncope, fever, vomiting, bloody urine or other new concerns.

## 2023-08-27 NOTE — ED NOTES
Discharge instructions given and discussed.  Pt educated to come back to ER for new or worsening symptoms and follow up with urology  as instructed. Pt verbalized understanding. VSS. Pt  Discharged in stable condition.

## 2023-08-27 NOTE — ED TRIAGE NOTES
Chief Complaint   Patient presents with    Flank Pain      Complains of right flank pain. Pt had recent kidney stone with stent on left side. Also,  Pt had a stent that was recently removed from her right kidney as well. Denies dysuria, positive for nausea, no fevers, body aches or chills.

## 2023-08-27 NOTE — ED NOTES
Med rec updated and complete, per pt   Allergies reviewed, per pt  Pt reports that her doctor took her off her METFORMIN 500MG and CARVEDILOL 12.5MG on 8/14/2023.  Pt reports that she started taking METOPROLOL Succ 25MG on 8/15/2023.  Pt reports that she took TAMSULOSIN 0.4MG on 7/16/2023 for 14 day course, pt did finish course.

## 2023-08-29 ENCOUNTER — OFFICE VISIT (OUTPATIENT)
Dept: CARDIOLOGY | Facility: MEDICAL CENTER | Age: 58
End: 2023-08-29
Payer: COMMERCIAL

## 2023-08-29 VITALS
BODY MASS INDEX: 45.99 KG/M2 | HEART RATE: 85 BPM | SYSTOLIC BLOOD PRESSURE: 138 MMHG | HEIGHT: 67 IN | RESPIRATION RATE: 12 BRPM | OXYGEN SATURATION: 93 % | WEIGHT: 293 LBS | DIASTOLIC BLOOD PRESSURE: 86 MMHG

## 2023-08-29 DIAGNOSIS — E78.5 DYSLIPIDEMIA: ICD-10-CM

## 2023-08-29 DIAGNOSIS — I10 PRIMARY HYPERTENSION: ICD-10-CM

## 2023-08-29 PROBLEM — N17.9 AKI (ACUTE KIDNEY INJURY) (HCC): Status: RESOLVED | Noted: 2018-04-18 | Resolved: 2023-08-29

## 2023-08-29 PROCEDURE — 3079F DIAST BP 80-89 MM HG: CPT

## 2023-08-29 PROCEDURE — 99213 OFFICE O/P EST LOW 20 MIN: CPT

## 2023-08-29 PROCEDURE — 99214 OFFICE O/P EST MOD 30 MIN: CPT

## 2023-08-29 PROCEDURE — 3075F SYST BP GE 130 - 139MM HG: CPT

## 2023-08-29 RX ORDER — FUROSEMIDE 40 MG/1
TABLET ORAL
COMMUNITY
End: 2023-08-29

## 2023-08-29 RX ORDER — HYDROCHLOROTHIAZIDE 12.5 MG/1
TABLET ORAL
COMMUNITY
End: 2023-08-29

## 2023-08-29 RX ORDER — MELOXICAM 7.5 MG/1
TABLET ORAL
COMMUNITY

## 2023-08-29 ASSESSMENT — ENCOUNTER SYMPTOMS
PALPITATIONS: 0
EYES NEGATIVE: 1
SHORTNESS OF BREATH: 0
DEPRESSION: 0
MUSCULOSKELETAL NEGATIVE: 1
GASTROINTESTINAL NEGATIVE: 1
NERVOUS/ANXIOUS: 0
ORTHOPNEA: 0
CONSTITUTIONAL NEGATIVE: 1
NEUROLOGICAL NEGATIVE: 1
PND: 0

## 2023-08-29 ASSESSMENT — FIBROSIS 4 INDEX: FIB4 SCORE: 2.79

## 2023-08-29 NOTE — PROGRESS NOTES
Chief Complaint   Patient presents with    Follow-Up     FV Dx: Irregular heart rate       Subjective     Cyndee Calvert is a 57 y.o. female who presents today for follow up of her HTN. They have a history of morbid obesity, type 2 diabetes, hypertension, and hyperlipidemia. Her father, mother, and brother all have coronary artery disease in her father underwent bypass surgery.    Last seen by Dr. Alva on 7/1/2022, for evaluation for bradycardia that was discovered during a 6-minute walk test at a pulmonology visit.  She was ordered a 2-week heart monitor, and a stress test for evaluation of her bradycardia and dyspnea on exertion.    Her event monitor did not show significant bradycardia episodes but did have just under 5% PVCs.  Stress test was normal, echocardiogram showed no significant abnormalities.     Today 8/29/2023 she has been having some very labile blood pressures with lows in the 90s and highs up into the 200s.  Additionally she has chronic kidney disease and has been dealing with kidney stones.    No symptoms of chest pain, palpitations, shortness of breath, exercise intolerance, or lower extremity edema.      Past Medical History:   Diagnosis Date    Allergies     Anemia     Breath shortness 03/2021    After having covid    Cataract 2/2023    Surgery 4/13. And 4/24/ 2023    Diabetes (HCC)     High cholesterol     Hypertension     Oxygen dependent     .5-1 L as needed. wears consistently at night    Pneumonia 3/2021    Had pneumonia  and covid     Past Surgical History:   Procedure Laterality Date    CYSTO STENT PLACEMNT PRE SURG Right 5/1/2023    Procedure: RIGHT URETEROSCOPY;  RIGHT STENT REMOVAL;  Surgeon: Javier Ceron M.D.;  Location: [facility];  Service: Urology    BLADDER BIOPSY WITH CYSTOSCOPY  09/02/2008    Performed by LILLIE WILLIAM at [facility] ORS    HYSTERECTOMY, TOTAL ABDOMINAL      OTHER      Do remember  dates    TONSILLECTOMY      URETEROSCOPY  Right     stent insertion right side     Family History   Problem Relation Age of Onset    Heart Disease Mother     Hypertension Mother     Hyperlipidemia Mother     Heart Attack Father     Heart Disease Father     Stroke Father     Heart Failure Brother     Cancer Brother     Heart Disease Brother     Heart Disease Paternal Grandmother     Stroke Paternal Grandmother     Glaucoma Paternal Grandmother      Social History     Socioeconomic History    Marital status: Single     Spouse name: Not on file    Number of children: Not on file    Years of education: Not on file    Highest education level: Not on file   Occupational History    Not on file   Tobacco Use    Smoking status: Never     Passive exposure: Never    Smokeless tobacco: Never   Vaping Use    Vaping Use: Never used   Substance and Sexual Activity    Alcohol use: No    Drug use: No    Sexual activity: Not on file   Other Topics Concern    Not on file   Social History Narrative    Not on file     Social Determinants of Health     Financial Resource Strain: Not on file   Food Insecurity: Not on file   Transportation Needs: Not on file   Physical Activity: Not on file   Stress: Not on file   Social Connections: Not on file   Intimate Partner Violence: Not on file   Housing Stability: Not on file     Allergies   Allergen Reactions    Percocet [Oxycodone-Acetaminophen] Shortness of Breath, Vomiting and Swelling    Latex Swelling and Unspecified     hives     Outpatient Encounter Medications as of 8/29/2023   Medication Sig Dispense Refill    meloxicam (MOBIC) 7.5 MG Tab       acetaminophen (TYLENOL) 500 MG Tab Take 1,000 mg by mouth every 6 hours as needed for Moderate Pain.      atorvastatin (LIPITOR) 80 MG tablet Take 1 Tablet by mouth at bedtime. 90 Tablet 3    insulin glargine (LANTUS SOLOSTAR) 100 UNIT/ML Solution Pen-injector injection Inject 20 Units under the skin 2 times a day.      liraglutide (VICTOZA) 18 MG/3ML Solution Pen-injector Inject 1.8 mg  "under the skin every evening.      albuterol 108 (90 Base) MCG/ACT Aero Soln inhalation aerosol Inhale 1-2 Puffs every four hours as needed. (Patient taking differently: Inhale 1-2 Puffs every 6 hours as needed for Shortness of Breath.) 1 Each 3    spironolactone (ALDACTONE) 50 MG Tab Take 50 mg by mouth every day.      carvedilol (COREG) 12.5 MG Tab Take 12.5 mg by mouth 2 times a day.      HUMALOG KWIKPEN 100 UNIT/ML Solution Pen-injector injection PEN Inject 2-8 Units under the skin 2 times daily with meals as needed. 1 unit for every 10g of carb's      ASPIRIN 81 PO Take 81 mg by mouth every day.      losartan (COZAAR) 100 MG Tab Take 100 mg by mouth every evening.      Empagliflozin (JARDIANCE) 25 MG Tab Take 25 mg by mouth every day.      [DISCONTINUED] hydroCHLOROthiazide (HYDRODIURIL) 12.5 MG tablet       [DISCONTINUED] furosemide (LASIX) 40 MG Tab furosemide 40 mg tablet   TAKE ONE TABLET BY MOUTH DAILY AS NEEDED      metFORMIN (GLUCOPHAGE) 500 MG Tab Take 500 mg by mouth every day. (Patient not taking: Reported on 8/29/2023)      [DISCONTINUED] metoprolol SR (TOPROL XL) 25 MG TABLET SR 24 HR Take 25 mg by mouth every evening. (Patient not taking: Reported on 8/29/2023)       No facility-administered encounter medications on file as of 8/29/2023.     Review of Systems   Constitutional: Negative.    HENT: Negative.     Eyes: Negative.    Respiratory:  Negative for shortness of breath.    Cardiovascular:  Negative for chest pain, palpitations, orthopnea, leg swelling and PND.   Gastrointestinal: Negative.    Genitourinary: Negative.    Musculoskeletal: Negative.    Skin: Negative.    Neurological: Negative.    Endo/Heme/Allergies: Negative.    Psychiatric/Behavioral:  Negative for depression. The patient is not nervous/anxious.               Objective     /86 (BP Location: Left arm, Patient Position: Sitting, BP Cuff Size: Adult)   Pulse 85   Resp 12   Ht 1.702 m (5' 7\")   Wt (!) 143 kg (315 lb)   " LMP 04/19/2003   SpO2 93%   BMI 49.34 kg/m²     Physical Exam  Constitutional:       Appearance: Normal appearance. She is obese.   HENT:      Head: Normocephalic and atraumatic.   Neck:      Vascular: No JVD.   Cardiovascular:      Rate and Rhythm: Normal rate and regular rhythm.      Pulses: Normal pulses.      Heart sounds: Normal heart sounds. No murmur heard.     No friction rub.   Pulmonary:      Effort: Pulmonary effort is normal. No respiratory distress.      Breath sounds: Normal breath sounds.   Abdominal:      General: There is no distension.      Palpations: Abdomen is soft.   Musculoskeletal:      Right lower leg: No edema.      Left lower leg: No edema.   Skin:     General: Skin is warm and dry.   Neurological:      General: No focal deficit present.      Mental Status: She is alert and oriented to person, place, and time.   Psychiatric:         Mood and Affect: Mood normal.         Behavior: Behavior normal.            Lab Results   Component Value Date/Time    CHOLSTRLTOT 141 03/16/2021 06:19 AM    LDL 90 03/16/2021 06:19 AM    HDL 31 (A) 03/16/2021 06:19 AM    TRIGLYCERIDE 98 03/16/2021 06:19 AM       Lab Results   Component Value Date/Time    SODIUM 138 08/27/2023 08:19 AM    POTASSIUM 4.6 08/27/2023 08:19 AM    CHLORIDE 106 08/27/2023 08:19 AM    CO2 22 08/27/2023 08:19 AM    GLUCOSE 135 (H) 08/27/2023 08:19 AM    BUN 15 08/27/2023 08:19 AM    CREATININE 1.41 (H) 08/27/2023 08:19 AM    CREATININE 1.2 09/18/2008 03:57 AM     Lab Results   Component Value Date/Time    ALKPHOSPHAT 103 (H) 08/27/2023 08:19 AM    ASTSGOT 96 (H) 08/27/2023 08:19 AM    ALTSGPT 138 (H) 08/27/2023 08:19 AM    TBILIRUBIN 0.9 08/27/2023 08:19 AM          Stress test 7/18/2022  Myocardial Perfusion   Report   NUCLEAR IMAGING INTERPRETATION   No scintigraphic evidence of significant prior infarct or active ischemia.   Normal left ventricular systolic function.   Normal TID ratio.   ECG INTERPRETATION   Non-diagnostic due to  use of pharmacological stress agent.    Cardiac event monitor 7/25/2022  1.  No sustained rob or tachyarrhythmias were detected.   2.  Very rare PACs and very rare PVCs were detected.   3.  There were 11 brief episodes of NSVT detected   4.  There were 3 patient triggered events recorded. Two episodes correlated with normal sinus rhythm.  One episode correlated with bigeminal PVCs.     Echocardiogram 8/17/2022  CONCLUSIONS  Normal left ventricular systolic function.  The left ventricular ejection fraction is visually estimated to be 55-  60%.  Mild concentric left ventricular hypertrophy.  Grade I diastolic dysfunction.  The right ventricle is normal in size and systolic function.  Estimated right ventricular systolic pressure is 27 mmHg.  The left atrium is normal in size.  Trace to mild eccentric mitral regurgitation.  Normal inferior vena cava size and inspiratory collapse.    Assessment & Plan     1. Primary hypertension        2. Dyslipidemia            Medical Decision Making: Today's Assessment/Status/Plan:          Hypertension  -Very labile blood pressures with lows in the 90s and highs above 200 systolic  -She is currently taking carvedilol 12.5 mg twice daily, losartan 100 mg in the evening, spironolactone 50 mg daily.  -She had been recommended to take metoprolol as needed for systolic blood pressure greater than 150, I do not think that that is the best treatment for her labile blood pressures at this time, additionally she is already on carvedilol so a second beta-blocker is not indicated  -I have recommended that she check her blood pressure before taking her morning and evening doses of her blood pressure medications and holding her dose if her systolic blood pressure is less than 90, additionally rechecking her blood pressure after she takes her medications  -I have placed a referral to vascular medicine for management of her labile blood pressures    Hyperlipidemia  -Most recent  (August  12th)  -Continue atorvastatin 80 mg daily  -She will likely need to be started on a PSK 9 inhibitor, I will defer this to vascular medicine    Follow-up with vascular medicine and PCP, no need for cardiology follow-up at this time    This note was dictated using Dragon speech recognition software.

## 2023-08-30 DIAGNOSIS — E78.5 DYSLIPIDEMIA: ICD-10-CM

## 2023-08-31 RX ORDER — ATORVASTATIN CALCIUM 80 MG/1
80 TABLET, FILM COATED ORAL
Qty: 90 TABLET | Refills: 3 | Status: SHIPPED | OUTPATIENT
Start: 2023-08-31

## 2023-08-31 NOTE — TELEPHONE ENCOUNTER
Is the patient due for a refill? Yes    Was the patient seen the past year? Yes    Date of last office visit: 8/29/2023    Does the patient have an upcoming appointment?  No   If yes, When?     Provider to refill:LH    Does the patients insurance require a 100 day supply?  No

## 2023-09-19 ENCOUNTER — TELEPHONE (OUTPATIENT)
Dept: VASCULAR LAB | Facility: MEDICAL CENTER | Age: 58
End: 2023-09-19

## 2023-09-19 NOTE — TELEPHONE ENCOUNTER
Pt stated they have [employer] insurance . Out of network. She will be seeking treatment else where.    Advised pt referral is good for a year.    Staff message sent to Dr.Bloch in regards

## 2023-10-16 ENCOUNTER — APPOINTMENT (OUTPATIENT)
Dept: SLEEP MEDICINE | Facility: MEDICAL CENTER | Age: 58
End: 2023-10-16
Attending: INTERNAL MEDICINE

## 2024-03-05 NOTE — OR NURSING
1523 Pt arrives in phase I. Reporting headache and bp elevated. Dr. Hall updated on bp via voalte. Asked for clarification on goal bp for dc. BS checked in phase II 84. Given dev crackers.     1555 BS recheck 98. Pt reporting headache and throbbing bladder pain. Encouraged to eat crackers and juice. Bs trending up. Pt reports this is low for her. Will continue to monitor. Medicated for pain via fentanyl.     Report given to ADAN Gomez. Pt reporting pain improved after fentanyl.   28-May-2023

## 2024-03-12 ENCOUNTER — OFFICE VISIT (OUTPATIENT)
Dept: CARDIOLOGY | Facility: MEDICAL CENTER | Age: 59
End: 2024-03-12
Payer: COMMERCIAL

## 2024-03-12 VITALS
OXYGEN SATURATION: 99 % | SYSTOLIC BLOOD PRESSURE: 134 MMHG | RESPIRATION RATE: 16 BRPM | DIASTOLIC BLOOD PRESSURE: 90 MMHG | HEIGHT: 67 IN | HEART RATE: 77 BPM | WEIGHT: 293 LBS | BODY MASS INDEX: 45.99 KG/M2

## 2024-03-12 DIAGNOSIS — E78.5 DYSLIPIDEMIA: ICD-10-CM

## 2024-03-12 DIAGNOSIS — I10 PRIMARY HYPERTENSION: ICD-10-CM

## 2024-03-12 PROCEDURE — 99214 OFFICE O/P EST MOD 30 MIN: CPT

## 2024-03-12 PROCEDURE — 3075F SYST BP GE 130 - 139MM HG: CPT

## 2024-03-12 PROCEDURE — 3080F DIAST BP >= 90 MM HG: CPT

## 2024-03-12 PROCEDURE — 99213 OFFICE O/P EST LOW 20 MIN: CPT

## 2024-03-12 RX ORDER — HYDROCHLOROTHIAZIDE 12.5 MG/1
12.5 CAPSULE, GELATIN COATED ORAL DAILY
Qty: 30 CAPSULE | Refills: 12 | Status: SHIPPED | OUTPATIENT
Start: 2024-03-12

## 2024-03-12 ASSESSMENT — ENCOUNTER SYMPTOMS
NERVOUS/ANXIOUS: 0
NEUROLOGICAL NEGATIVE: 1
EYES NEGATIVE: 1
GASTROINTESTINAL NEGATIVE: 1
PALPITATIONS: 0
DEPRESSION: 0
MUSCULOSKELETAL NEGATIVE: 1
PND: 0
SHORTNESS OF BREATH: 1
ORTHOPNEA: 0
CONSTITUTIONAL NEGATIVE: 1

## 2024-03-12 ASSESSMENT — FIBROSIS 4 INDEX: FIB4 SCORE: 2.83

## 2024-03-12 NOTE — PROGRESS NOTES
Chief Complaint   Patient presents with    Hypertension     Primary hypertension      Hypercholesterolemia Follow-up     Dislipidemia       Subjective     Cyndee Calvert is a 58 y.o. female who presents today for follow up of her HTN. They have a history of morbid obesity, type 2 diabetes, hypertension, and hyperlipidemia. Her father, mother, and brother all have coronary artery disease in her father underwent bypass surgery.     Seen by Dr. Alva on 7/1/2022, for evaluation for bradycardia that was discovered during a 6-minute walk test at a pulmonology visit.  She was ordered a 2-week heart monitor, and a stress test for evaluation of her bradycardia and dyspnea on exertion.     Her event monitor did not show significant bradycardia episodes but did have just under 5% PVCs.  Stress test was normal, echocardiogram showed no significant abnormalities.      At visit with myself on 8/29/2023 she has been having some very labile blood pressures with lows in the 90s and highs up into the 200s.  Additionally she has chronic kidney disease and has been dealing with kidney stones.  I discontinued her metoprolol she was already on carvedilol, recommended her to take blood pressures before and after her medications, also placed referral to vascular medicine.  Unfortunately her insurance will be out of network so she elected to not follow-up with vascular medicine    Today 3/12/2024 Her blood pressure continues to be very labile SBP .  She has chronic lymphedema in her legs and hands.  She also has chronic shortness of breath from COVID infection.          Past Medical History:   Diagnosis Date    Allergies     Anemia     Breath shortness 03/2021    After having covid    Cataract 2/2023    Surgery 4/13. And 4/24/ 2023    Diabetes (HCC)     High cholesterol     Hypertension     Oxygen dependent     .5-1 L as needed. wears consistently at night    Pneumonia 3/2021    Had pneumonia  and covid     Past Surgical  History:   Procedure Laterality Date    CYSTO STENT PLACEMNT PRE SURG Right 5/1/2023    Procedure: RIGHT URETEROSCOPY;  RIGHT STENT REMOVAL;  Surgeon: Javier Ceron M.D.;  Location: [facility];  Service: Urology    BLADDER BIOPSY WITH CYSTOSCOPY  09/02/2008    Performed by LILLIE WILLIAM at SURGERY [facility] ORS    HYSTERECTOMY, TOTAL ABDOMINAL      OTHER      Do remember  dates    TONSILLECTOMY      URETEROSCOPY Right     stent insertion right side     Family History   Problem Relation Age of Onset    Heart Disease Mother     Hypertension Mother     Hyperlipidemia Mother     Heart Attack Father     Heart Disease Father     Stroke Father     Heart Failure Brother     Cancer Brother     Heart Disease Brother     Heart Disease Paternal Grandmother     Stroke Paternal Grandmother     Glaucoma Paternal Grandmother      Social History     Socioeconomic History    Marital status: Single     Spouse name: Not on file    Number of children: Not on file    Years of education: Not on file    Highest education level: Not on file   Occupational History    Not on file   Tobacco Use    Smoking status: Never     Passive exposure: Never    Smokeless tobacco: Never   Vaping Use    Vaping Use: Never used   Substance and Sexual Activity    Alcohol use: No    Drug use: No    Sexual activity: Not on file   Other Topics Concern    Not on file   Social History Narrative    Not on file     Social Determinants of Health     Financial Resource Strain: Not on file   Food Insecurity: Not on file   Transportation Needs: Not on file   Physical Activity: Not on file   Stress: Not on file   Social Connections: Not on file   Intimate Partner Violence: Not on file   Housing Stability: Not on file     Allergies   Allergen Reactions    Percocet [Oxycodone-Acetaminophen] Shortness of Breath, Vomiting and Swelling    Latex Swelling and Unspecified     hives     Outpatient Encounter Medications as of 3/12/2024   Medication Sig Dispense  Refill    atorvastatin (LIPITOR) 80 MG tablet TAKE 1 TABLET BY MOUTH EVERYDAY AT BEDTIME 90 Tablet 3    acetaminophen (TYLENOL) 500 MG Tab Take 1,000 mg by mouth every 6 hours as needed for Moderate Pain.      insulin glargine (LANTUS SOLOSTAR) 100 UNIT/ML Solution Pen-injector injection Inject 20 Units under the skin 2 times a day.      liraglutide (VICTOZA) 18 MG/3ML Solution Pen-injector Inject 1.8 mg under the skin every evening.      albuterol 108 (90 Base) MCG/ACT Aero Soln inhalation aerosol Inhale 1-2 Puffs every four hours as needed. (Patient taking differently: Inhale 1-2 Puffs every 6 hours as needed for Shortness of Breath.) 1 Each 3    spironolactone (ALDACTONE) 50 MG Tab Take 50 mg by mouth every day.      carvedilol (COREG) 12.5 MG Tab Take 12.5 mg by mouth 2 times a day.      HUMALOG KWIKPEN 100 UNIT/ML Solution Pen-injector injection PEN Inject 2-8 Units under the skin 2 times daily with meals as needed. 1 unit for every 10g of carb's      ASPIRIN 81 PO Take 81 mg by mouth every day.      losartan (COZAAR) 100 MG Tab Take 100 mg by mouth every evening.      meloxicam (MOBIC) 7.5 MG Tab  (Patient not taking: Reported on 3/12/2024)      [DISCONTINUED] metFORMIN (GLUCOPHAGE) 500 MG Tab Take 500 mg by mouth every day. (Patient not taking: Reported on 8/29/2023)      Empagliflozin (JARDIANCE) 25 MG Tab Take 25 mg by mouth every day. (Patient not taking: Reported on 3/12/2024)       No facility-administered encounter medications on file as of 3/12/2024.     Review of Systems   Constitutional: Negative.    HENT: Negative.     Eyes: Negative.    Respiratory:  Positive for shortness of breath.    Cardiovascular:  Positive for leg swelling. Negative for chest pain, palpitations, orthopnea and PND.   Gastrointestinal: Negative.    Genitourinary: Negative.    Musculoskeletal: Negative.    Skin: Negative.    Neurological: Negative.    Endo/Heme/Allergies: Negative.    Psychiatric/Behavioral:  Negative for  "depression. The patient is not nervous/anxious.               Objective     BP (!) 134/90 (BP Location: Left arm, Patient Position: Sitting, BP Cuff Size: Adult)   Pulse 77   Resp 16   Ht 1.702 m (5' 7\")   Wt (!) 137 kg (302 lb)   LMP 04/19/2003   SpO2 99%   BMI 47.30 kg/m²     Physical Exam  Constitutional:       Appearance: Normal appearance.   HENT:      Head: Normocephalic and atraumatic.   Neck:      Vascular: No JVD.   Cardiovascular:      Rate and Rhythm: Normal rate and regular rhythm.      Pulses: Normal pulses.      Heart sounds: Normal heart sounds. No murmur heard.     No friction rub.   Pulmonary:      Effort: Pulmonary effort is normal. No respiratory distress.      Breath sounds: Normal breath sounds.   Abdominal:      General: There is no distension.      Palpations: Abdomen is soft.   Musculoskeletal:      Right lower leg: No edema.      Left lower leg: No edema.   Skin:     General: Skin is warm and dry.   Neurological:      General: No focal deficit present.      Mental Status: She is alert and oriented to person, place, and time.   Psychiatric:         Mood and Affect: Mood normal.         Behavior: Behavior normal.            Lab Results   Component Value Date/Time    CHOLSTRLTOT 141 03/16/2021 06:19 AM    LDL 90 03/16/2021 06:19 AM    HDL 31 (A) 03/16/2021 06:19 AM    TRIGLYCERIDE 98 03/16/2021 06:19 AM       Lab Results   Component Value Date/Time    SODIUM 138 08/27/2023 08:19 AM    POTASSIUM 4.6 08/27/2023 08:19 AM    CHLORIDE 106 08/27/2023 08:19 AM    CO2 22 08/27/2023 08:19 AM    GLUCOSE 135 (H) 08/27/2023 08:19 AM    BUN 15 08/27/2023 08:19 AM    CREATININE 1.41 (H) 08/27/2023 08:19 AM    CREATININE 1.2 09/18/2008 03:57 AM     Lab Results   Component Value Date/Time    ALKPHOSPHAT 103 (H) 08/27/2023 08:19 AM    ASTSGOT 96 (H) 08/27/2023 08:19 AM    ALTSGPT 138 (H) 08/27/2023 08:19 AM    TBILIRUBIN 0.9 08/27/2023 08:19 AM      Stress test 7/18/2022  Myocardial Perfusion   Report   " NUCLEAR IMAGING INTERPRETATION   No scintigraphic evidence of significant prior infarct or active ischemia.   Normal left ventricular systolic function.   Normal TID ratio.   ECG INTERPRETATION   Non-diagnostic due to use of pharmacological stress agent.     Cardiac event monitor 7/25/2022  1.  No sustained rob or tachyarrhythmias were detected.   2.  Very rare PACs and very rare PVCs were detected.   3.  There were 11 brief episodes of NSVT detected   4.  There were 3 patient triggered events recorded. Two episodes correlated with normal sinus rhythm.  One episode correlated with bigeminal PVCs.      Echocardiogram 8/17/2022  CONCLUSIONS  Normal left ventricular systolic function.  The left ventricular ejection fraction is visually estimated to be 55-  60%.  Mild concentric left ventricular hypertrophy.  Grade I diastolic dysfunction.  The right ventricle is normal in size and systolic function.  Estimated right ventricular systolic pressure is 27 mmHg.  The left atrium is normal in size.  Trace to mild eccentric mitral regurgitation.  Normal inferior vena cava size and inspiratory collapse.    Assessment & Plan     1. Dyslipidemia        2. Primary hypertension            Medical Decision Making: Today's Assessment/Status/Plan:        Hypertension  -Very labile blood pressures at home  -She [facility] which she is able to show me from her phone (we will request from [facility]), her potassium level was 5.4, we will stop spironolactone and start her on hydrochlorothiazide 12.5 mg daily, continue losartan 100 mg daily, continue carvedilol 12.5 mg twice daily  -Given that she does occasionally have blood pressures down into the 70s I am hesitant to increase her current medications.  -She will continue to keep close track of her blood pressures at home checking before and 2 hours after medication administration.  Holding antihypertensives for systolic of less than 100  -Thankfully she was able to get in  with Dr. Bloch and has an upcoming appointment with him in May  - goal < 130/80  -Repeat BMP in 1 week  -I will check in with her in 2 weeks    Hyperlipidemia  -Most recent   -Continue atorvastatin 80 mg daily  -Consider PSK 9 inhibitor at future visit    Surgical clearance for oral surgery  -Patient is likely a moderate cardiovascular risk for intended oral surgery  -RCRI score 1 point, class II risk  -Patient to continue aspirin/antiplatelet therapy as long as possible.  If deemed necessary for surgery, patient may hold ASA/antiplatelet therapy for 5 days prior to surgery, then restart ASAP once cleared after surgery.  -Biggest concern for patient undergoing anesthesia is with her labile blood pressures, every precaution should be taken to monitor patient's blood pressure before and after procedure       Follow-up with Dr. White in May, RADHAMES Myers in 6 months     This note was dictated using Dragon speech recognition software.

## 2024-03-23 ENCOUNTER — HOSPITAL ENCOUNTER (OUTPATIENT)
Dept: LAB | Facility: MEDICAL CENTER | Age: 59
End: 2024-03-23
Payer: COMMERCIAL

## 2024-03-23 DIAGNOSIS — I10 PRIMARY HYPERTENSION: ICD-10-CM

## 2024-03-23 LAB
ANION GAP SERPL CALC-SCNC: 15 MMOL/L (ref 7–16)
BUN SERPL-MCNC: 29 MG/DL (ref 8–22)
CALCIUM SERPL-MCNC: 8.9 MG/DL (ref 8.5–10.5)
CHLORIDE SERPL-SCNC: 100 MMOL/L (ref 96–112)
CO2 SERPL-SCNC: 20 MMOL/L (ref 20–33)
CREAT SERPL-MCNC: 1.96 MG/DL (ref 0.5–1.4)
GFR SERPLBLD CREATININE-BSD FMLA CKD-EPI: 29 ML/MIN/1.73 M 2
GLUCOSE SERPL-MCNC: 395 MG/DL (ref 65–99)
POTASSIUM SERPL-SCNC: 5.6 MMOL/L (ref 3.6–5.5)
SODIUM SERPL-SCNC: 135 MMOL/L (ref 135–145)

## 2024-03-23 PROCEDURE — 80048 BASIC METABOLIC PNL TOTAL CA: CPT

## 2024-03-23 PROCEDURE — 36415 COLL VENOUS BLD VENIPUNCTURE: CPT

## 2024-03-25 ENCOUNTER — PATIENT MESSAGE (OUTPATIENT)
Dept: CARDIOLOGY | Facility: MEDICAL CENTER | Age: 59
End: 2024-03-25
Payer: COMMERCIAL

## 2024-03-25 ENCOUNTER — TELEPHONE (OUTPATIENT)
Dept: CARDIOLOGY | Facility: MEDICAL CENTER | Age: 59
End: 2024-03-25
Payer: COMMERCIAL

## 2024-03-25 DIAGNOSIS — R79.89 ELEVATED SERUM CREATININE: ICD-10-CM

## 2024-03-25 NOTE — TELEPHONE ENCOUNTER
----- Message from ALEXANDRO Pascal sent at 3/25/2024  8:33 AM PDT -----  Potassium is persistently high, and had bump in creatinine, please refer her to nephrology

## 2024-03-27 ENCOUNTER — PATIENT MESSAGE (OUTPATIENT)
Dept: CARDIOLOGY | Facility: MEDICAL CENTER | Age: 59
End: 2024-03-27
Payer: COMMERCIAL

## 2024-03-27 NOTE — PATIENT COMMUNICATION
To LH: please advise on low BP readings; did advise patient to hold BP medication at this time; thank you!

## 2024-03-27 NOTE — TELEPHONE ENCOUNTER
Her blood pressures historically have been very labile.  Please have her continue to check her blood pressure before she takes any of her antihypertensive medications and to hold it for a systolic blood pressure less than 100.  Please have her continue to keep blood pressure log and send us the readings in 2 weeks.  Can you also please make sure that she has a nephrology appointment coming up?  I do not see 1 listed on her appointments

## 2024-04-30 ENCOUNTER — NON-PROVIDER VISIT (OUTPATIENT)
Dept: SLEEP MEDICINE | Facility: MEDICAL CENTER | Age: 59
End: 2024-04-30
Attending: INTERNAL MEDICINE
Payer: COMMERCIAL

## 2024-04-30 ENCOUNTER — TELEPHONE (OUTPATIENT)
Dept: NEPHROLOGY | Facility: MEDICAL CENTER | Age: 59
End: 2024-04-30

## 2024-04-30 VITALS — HEIGHT: 67 IN | BODY MASS INDEX: 45.99 KG/M2 | WEIGHT: 293 LBS

## 2024-04-30 DIAGNOSIS — U07.1 COVID-19: ICD-10-CM

## 2024-04-30 DIAGNOSIS — Z91.09 ENVIRONMENTAL ALLERGIES: ICD-10-CM

## 2024-04-30 DIAGNOSIS — J96.01 ACUTE RESPIRATORY FAILURE WITH HYPOXIA (HCC): ICD-10-CM

## 2024-04-30 PROCEDURE — 94060 EVALUATION OF WHEEZING: CPT | Mod: 26 | Performed by: INTERNAL MEDICINE

## 2024-04-30 PROCEDURE — 94726 PLETHYSMOGRAPHY LUNG VOLUMES: CPT | Mod: 26 | Performed by: INTERNAL MEDICINE

## 2024-04-30 PROCEDURE — 94729 DIFFUSING CAPACITY: CPT | Mod: 26 | Performed by: INTERNAL MEDICINE

## 2024-04-30 ASSESSMENT — PULMONARY FUNCTION TESTS
FVC_PREDICTED: 2.99
FEV1: 2.04
FEV1/FVC_PERCENT_PREDICTED: 109
FVC_PERCENT_PREDICTED: 79
FVC_LLN: 2.50
FEV1_PERCENT_PREDICTED: 86
FEV1/FVC_PERCENT_CHANGE: 2
FVC: 2.38
FEV1/FVC_PERCENT_LLN: 66
FEV1: 1.98
FEV1/FVC: 85.71
FEV1/FVC: 83
FEV1/FVC_PERCENT_PREDICTED: 79
FEV1/FVC: 83
FEV1/FVC_PERCENT_PREDICTED: 107
FEV1/FVC_PERCENT_PREDICTED: 105
FEV1_PERCENT_CHANGE: 2
FEV1_PERCENT_PREDICTED: 83
FVC: 2.38
FEV1/FVC_PERCENT_PREDICTED: 104
FEV1_PERCENT_CHANGE: 0
FEV1/FVC: 85
FEV1/FVC_PREDICTED: 79
FEV1_PREDICTED: 2.36
FEV1_LLN: 1.97
FVC_PERCENT_PREDICTED: 79

## 2024-04-30 ASSESSMENT — FIBROSIS 4 INDEX: FIB4 SCORE: 2.83

## 2024-04-30 NOTE — TELEPHONE ENCOUNTER
Pt called and asked if she is going to need to get labs done before her appt on 6/5/24? The last ones done were in 2/24, before the referral was placed. If she does, she asked if they can be sent in within the next few weeks please?

## 2024-04-30 NOTE — PROCEDURES
Technician: Cristina Du RRT, CPFT  Good patient effort & cooperation.  The results of this test meet the ATS/ERS standards for acceptability & reproducibility.  Test was performed on the Chlorogen Body Plethysmograph-Elite DX system.  Predicted equations for Spirometry are GLI-2012, ITS for lung volumes, and GLI-2017 for DLCO.  The DLCO was uncorrected for Hgb.  A bronchodilator of Ventolin HFA -2puffs via spacer administered.  DLCO performed during dilation period.    Interpretation  1.  There is no obstruction  2.  There is no bronchodilator response  3.  TLC is mildly reduced to 4.11 L / 74%, ERV is reduced to 31%  4.  DLCO is normal  5.  Flow volume loop consistent with mild restriction    Mild restriction, suspect due to body habitus as ERV is also reduced.  Clinically correlate.    Tiffanie Bedoya, DO   Pulmonary and Critical Care

## 2024-05-09 DIAGNOSIS — I10 PRIMARY HYPERTENSION: ICD-10-CM

## 2024-05-09 DIAGNOSIS — E55.9 VITAMIN D DEFICIENCY: ICD-10-CM

## 2024-05-09 DIAGNOSIS — N18.32 STAGE 3B CHRONIC KIDNEY DISEASE: ICD-10-CM

## 2024-05-09 DIAGNOSIS — E11.29 MICROALBUMINURIA DUE TO TYPE 2 DIABETES MELLITUS (HCC): ICD-10-CM

## 2024-05-09 DIAGNOSIS — D64.9 ANEMIA, UNSPECIFIED TYPE: ICD-10-CM

## 2024-05-09 DIAGNOSIS — R80.9 MICROALBUMINURIA DUE TO TYPE 2 DIABETES MELLITUS (HCC): ICD-10-CM

## 2024-05-18 ENCOUNTER — HOSPITAL ENCOUNTER (OUTPATIENT)
Dept: LAB | Facility: MEDICAL CENTER | Age: 59
End: 2024-05-18
Attending: FAMILY MEDICINE
Payer: COMMERCIAL

## 2024-05-18 ENCOUNTER — HOSPITAL ENCOUNTER (OUTPATIENT)
Dept: LAB | Facility: MEDICAL CENTER | Age: 59
End: 2024-05-18
Attending: INTERNAL MEDICINE
Payer: COMMERCIAL

## 2024-05-18 DIAGNOSIS — N18.32 STAGE 3B CHRONIC KIDNEY DISEASE: ICD-10-CM

## 2024-05-18 DIAGNOSIS — E55.9 VITAMIN D DEFICIENCY: ICD-10-CM

## 2024-05-18 DIAGNOSIS — E11.29 MICROALBUMINURIA DUE TO TYPE 2 DIABETES MELLITUS (HCC): ICD-10-CM

## 2024-05-18 DIAGNOSIS — R80.9 MICROALBUMINURIA DUE TO TYPE 2 DIABETES MELLITUS (HCC): ICD-10-CM

## 2024-05-18 DIAGNOSIS — D64.9 ANEMIA, UNSPECIFIED TYPE: ICD-10-CM

## 2024-05-18 LAB
ALBUMIN SERPL BCP-MCNC: 3.8 G/DL (ref 3.2–4.9)
ALBUMIN/GLOB SERPL: 1 G/DL
ALP SERPL-CCNC: 93 U/L (ref 30–99)
ALT SERPL-CCNC: 40 U/L (ref 2–50)
AMYLASE SERPL-CCNC: 91 U/L (ref 20–103)
ANION GAP SERPL CALC-SCNC: 13 MMOL/L (ref 7–16)
ANION GAP SERPL CALC-SCNC: 14 MMOL/L (ref 7–16)
AST SERPL-CCNC: 36 U/L (ref 12–45)
BILIRUB SERPL-MCNC: 0.7 MG/DL (ref 0.1–1.5)
BUN SERPL-MCNC: 25 MG/DL (ref 8–22)
BUN SERPL-MCNC: 25 MG/DL (ref 8–22)
CALCIUM ALBUM COR SERPL-MCNC: 9.2 MG/DL (ref 8.5–10.5)
CALCIUM SERPL-MCNC: 9 MG/DL (ref 8.5–10.5)
CALCIUM SERPL-MCNC: 9.1 MG/DL (ref 8.5–10.5)
CHLORIDE SERPL-SCNC: 104 MMOL/L (ref 96–112)
CHLORIDE SERPL-SCNC: 105 MMOL/L (ref 96–112)
CO2 SERPL-SCNC: 19 MMOL/L (ref 20–33)
CO2 SERPL-SCNC: 20 MMOL/L (ref 20–33)
CREAT SERPL-MCNC: 1.9 MG/DL (ref 0.5–1.4)
CREAT SERPL-MCNC: 1.91 MG/DL (ref 0.5–1.4)
CREAT UR-MCNC: 170.59 MG/DL
ERYTHROCYTE [DISTWIDTH] IN BLOOD BY AUTOMATED COUNT: 43.1 FL (ref 35.9–50)
FASTING STATUS PATIENT QL REPORTED: NORMAL
FASTING STATUS PATIENT QL REPORTED: NORMAL
FERRITIN SERPL-MCNC: 189 NG/ML (ref 10–291)
GFR SERPLBLD CREATININE-BSD FMLA CKD-EPI: 30 ML/MIN/1.73 M 2
GFR SERPLBLD CREATININE-BSD FMLA CKD-EPI: 30 ML/MIN/1.73 M 2
GGT SERPL-CCNC: 26 U/L (ref 7–34)
GLOBULIN SER CALC-MCNC: 4 G/DL (ref 1.9–3.5)
GLUCOSE SERPL-MCNC: 156 MG/DL (ref 65–99)
GLUCOSE SERPL-MCNC: 158 MG/DL (ref 65–99)
HBV CORE AB SERPL QL IA: NONREACTIVE
HBV SURFACE AB SERPL IA-ACNC: <3.5 MIU/ML (ref 0–10)
HCT VFR BLD AUTO: 40.7 % (ref 37–47)
HCV AB SER QL: NORMAL
HGB BLD-MCNC: 13 G/DL (ref 12–16)
IRON SATN MFR SERPL: 22 % (ref 15–55)
IRON SERPL-MCNC: 56 UG/DL (ref 40–170)
LIPASE SERPL-CCNC: 95 U/L (ref 11–82)
MAGNESIUM SERPL-MCNC: 1.9 MG/DL (ref 1.5–2.5)
MCH RBC QN AUTO: 30.7 PG (ref 27–33)
MCHC RBC AUTO-ENTMCNC: 31.9 G/DL (ref 32.2–35.5)
MCV RBC AUTO: 96.2 FL (ref 81.4–97.8)
MICROALBUMIN UR-MCNC: <1.2 MG/DL
MICROALBUMIN/CREAT UR: NORMAL MG/G (ref 0–30)
PLATELET # BLD AUTO: 183 K/UL (ref 164–446)
PMV BLD AUTO: 12.6 FL (ref 9–12.9)
POTASSIUM SERPL-SCNC: 4.4 MMOL/L (ref 3.6–5.5)
POTASSIUM SERPL-SCNC: 4.4 MMOL/L (ref 3.6–5.5)
PROT SERPL-MCNC: 7.8 G/DL (ref 6–8.2)
PTH-INTACT SERPL-MCNC: 299 PG/ML (ref 14–72)
RBC # BLD AUTO: 4.23 M/UL (ref 4.2–5.4)
SODIUM SERPL-SCNC: 137 MMOL/L (ref 135–145)
SODIUM SERPL-SCNC: 138 MMOL/L (ref 135–145)
TIBC SERPL-MCNC: 259 UG/DL (ref 250–450)
UIBC SERPL-MCNC: 203 UG/DL (ref 110–370)
WBC # BLD AUTO: 7.6 K/UL (ref 4.8–10.8)

## 2024-05-20 LAB — HAV AB SER QL IA: NEGATIVE

## 2024-05-22 ENCOUNTER — HOSPITAL ENCOUNTER (OUTPATIENT)
Dept: RADIOLOGY | Facility: MEDICAL CENTER | Age: 59
End: 2024-05-22
Attending: FAMILY MEDICINE
Payer: COMMERCIAL

## 2024-05-22 DIAGNOSIS — Z12.31 VISIT FOR SCREENING MAMMOGRAM: ICD-10-CM

## 2024-05-28 ENCOUNTER — TELEPHONE (OUTPATIENT)
Dept: PHARMACY | Facility: MEDICAL CENTER | Age: 59
End: 2024-05-28

## 2024-05-28 ENCOUNTER — OFFICE VISIT (OUTPATIENT)
Dept: VASCULAR LAB | Facility: MEDICAL CENTER | Age: 59
End: 2024-05-28
Attending: INTERNAL MEDICINE
Payer: COMMERCIAL

## 2024-05-28 VITALS
DIASTOLIC BLOOD PRESSURE: 63 MMHG | WEIGHT: 293 LBS | BODY MASS INDEX: 45.99 KG/M2 | HEIGHT: 67 IN | SYSTOLIC BLOOD PRESSURE: 112 MMHG | HEART RATE: 51 BPM

## 2024-05-28 DIAGNOSIS — I10 PRIMARY HYPERTENSION: ICD-10-CM

## 2024-05-28 DIAGNOSIS — I47.10 SVT (SUPRAVENTRICULAR TACHYCARDIA) (HCC): ICD-10-CM

## 2024-05-28 DIAGNOSIS — E78.5 DYSLIPIDEMIA: ICD-10-CM

## 2024-05-28 DIAGNOSIS — E11.628 TYPE 2 DIABETES MELLITUS WITH OTHER SKIN COMPLICATION, WITHOUT LONG-TERM CURRENT USE OF INSULIN (HCC): ICD-10-CM

## 2024-05-28 RX ORDER — INSULIN GLARGINE 100 [IU]/ML
15 INJECTION, SOLUTION SUBCUTANEOUS 2 TIMES DAILY
Status: SHIPPED
Start: 2024-05-28

## 2024-05-28 RX ORDER — LOSARTAN POTASSIUM 100 MG/1
100 TABLET ORAL DAILY
Status: SHIPPED
Start: 2024-05-28

## 2024-05-28 RX ORDER — EZETIMIBE 10 MG/1
10 TABLET ORAL DAILY
Qty: 30 TABLET | Refills: 11 | Status: SHIPPED | OUTPATIENT
Start: 2024-05-28

## 2024-05-28 ASSESSMENT — FIBROSIS 4 INDEX: FIB4 SCORE: 1.87

## 2024-05-28 NOTE — TELEPHONE ENCOUNTER
Received New Start request via [employer]  for Ezetimibe 10mg Tablet. (Quantity:90, Day Supply:90)     Insurance: [employer] / [employer]  Member ID:  [id number]  BIN: [id number]  PCN: [id number]  Group: [id number]     Ran Test claim via [place] & medication Pays for a $0 copay. Will outreach to patient to offer specialty pharmacy services and or release to preferred pharmacy    Thank you,  Deedee Aguirre  Pharmacy Liaison  [facility]  [phone]

## 2024-05-28 NOTE — PROGRESS NOTES
VASCULAR MEDICINE CLINIC - INITIAL VISIT  05/28/24     Cyndee Calvert is a 58 y.o. female  who has been referred for vascular medicine evaluation and management   Referring provider: Yanni Chatterjee, *     Subjective      HPI:   Patient referred for e/m of labile hypertension in setting of htn, dm, lymphedema, and obesity.  Patient with complicated medical history as outlined below    Lymphedema and lipedema  Long history  Has seen our lymphedema physical therapist routinely in the past  Uses stockings and pumps every day  Swelling is well-controlled  No skin breakdown    Complicated rhythm disturbance  Patient with long history of irregular heartbeat  She sees cardiology regularly  She has never been diagnosed with atrial fibrillation  She has ventricular trigeminy on her most recent ECG  Previous rhythm monitor suggested rare supraventricular ectopy but frequent ventricular ectopy  She states she has no palpitations since being on carvedilol for many years  Denies history of heart failure    Dyslipidemia  Denies known history of coronary disease  Has been on atorvastatin for many years  Dose increased from 40 mg to 80 mg about a year ago  No myalgias    Hypertension/ckd  Patient states that she has had high blood pressure for decades  She had many years of poor control  She has had fluctuating renal function  She denies use of NSAIDs  She has been recently referred to nephrology but has not yet established with them  She stopped spironolactone due to relative hyperkalemia and worsening renal function  She is back on a low-dose of hydrochlorothiazide  She has been on carvedilol and losartan for many years  BPs at home 90s-140s/50s-70s.    She states that most of her readings are in the 120s over 70 systolic  Does feel a bit of fatigue when low  No symptoms when high    Diabetes  Has had diabetes since her 30s  Was on metformin for many years  Feels that she was likely poor control for quite some  time  She is now following up with her primary at the [facility] clinic on a regular basis  Her A1c was as high as 13 just a few months  She is on decreasing doses of insulin and increasing doses of Victoza  She states she was told to stop metformin due to her renal function  She takes Jardiance without side effect  Denies hypoglycemia    Chronic lung disease  She states she needs to take oxygen because of a couple bad COVID infections  She does see pulmonary regularly  She states she has never been tested for sleep apnea    Patient Active Problem List    Diagnosis Date Noted    Irregular heart rate 07/01/2022    Dyspnea on exertion 07/01/2022    Dyslipidemia 07/01/2022    Lymphedema 06/29/2022    Lipedema 06/29/2022    Pneumonia due to COVID-19 virus 03/15/2021    On deep vein thrombosis (DVT) prophylaxis 03/15/2021    Hypomagnesemia 04/26/2018    Tension type headache 04/20/2018    Diabetes mellitus (HCC) 04/18/2018    Groin abscess 04/18/2018    Class 3 obesity with serious comorbidity and body mass index (BMI) of 45.0 to 49.9 in adult 11/26/2013    HTN (hypertension)     Anemia       Past Surgical History:   Procedure Laterality Date    CYSTO STENT PLACEMNT PRE SURG Right 5/1/2023    Procedure: RIGHT URETEROSCOPY;  RIGHT STENT REMOVAL;  Surgeon: Javier Ceron M.D.;  Location: [facility];  Service: Urology    BLADDER BIOPSY WITH CYSTOSCOPY  09/02/2008    Performed by LILLIE WILLIAM at [facility] ORS    HYSTERECTOMY, TOTAL ABDOMINAL      OTHER      Do remember  dates    TONSILLECTOMY      URETEROSCOPY Right     stent insertion right side      Family History   Problem Relation Age of Onset    Heart Disease Mother     Hypertension Mother     Hyperlipidemia Mother     Heart Attack Father     Heart Disease Father     Stroke Father     Heart Failure Brother     Cancer Brother     Heart Disease Brother     Heart Disease Paternal Grandmother     Stroke Paternal Grandmother     Glaucoma Paternal  "Grandmother       Current Outpatient Medications on File Prior to Visit   Medication Sig Dispense Refill    hydrochlorothiazide (MICROZIDE) 12.5 MG capsule Take 1 Capsule by mouth every day. 30 Capsule 12    atorvastatin (LIPITOR) 80 MG tablet TAKE 1 TABLET BY MOUTH EVERYDAY AT BEDTIME 90 Tablet 3    acetaminophen (TYLENOL) 500 MG Tab Take 1,000 mg by mouth every 6 hours as needed for Moderate Pain.      liraglutide (VICTOZA) 18 MG/3ML Solution Pen-injector Inject 1.8 mg under the skin every evening.      albuterol 108 (90 Base) MCG/ACT Aero Soln inhalation aerosol Inhale 1-2 Puffs every four hours as needed. (Patient taking differently: Inhale 1-2 Puffs every 6 hours as needed for Shortness of Breath.) 1 Each 3    carvedilol (COREG) 12.5 MG Tab Take 12.5 mg by mouth 2 times a day.      HUMALOG KWIKPEN 100 UNIT/ML Solution Pen-injector injection PEN Inject 2-8 Units under the skin 2 times daily with meals as needed. 1 unit for every 10g of carb's      ASPIRIN 81 PO Take 81 mg by mouth every day.      losartan (COZAAR) 100 MG Tab Take 100 mg by mouth every evening.      Empagliflozin (JARDIANCE) 25 MG Tab Take 25 mg by mouth every day.       No current facility-administered medications on file prior to visit.      ALLERGIES  Percocet [oxycodone-acetaminophen] and Latex    Social History     Tobacco Use    Smoking status: Never     Passive exposure: Never    Smokeless tobacco: Never   Vaping Use    Vaping status: Never Used   Substance Use Topics    Alcohol use: No    Drug use: No     DIET AND EXERCISE:  Weight Change: She is lost 7 or 8 pounds since being back on Victoza  Diet: Try to follow a diabetic diet  Exercise:  Doing some walking          Objective    Vitals:    05/28/24 1444   BP: 112/63   BP Location: Left arm   Patient Position: Sitting   BP Cuff Size: Large adult   Pulse: (!) 51   Weight: (!) 135 kg (298 lb)   Height: 1.702 m (5' 7\")      BP Readings from Last 4 Encounters:   05/28/24 112/63   03/12/24 " (!) 134/90   08/29/23 138/86   08/27/23 119/85      Body mass index is 46.67 kg/m².   Wt Readings from Last 4 Encounters:   05/28/24 (!) 135 kg (298 lb)   04/30/24 (!) 135 kg (298 lb)   03/12/24 (!) 137 kg (302 lb)   08/29/23 (!) 143 kg (315 lb)      Physical Exam  Vitals reviewed.   Constitutional:       General: She is not in acute distress.     Appearance: She is not diaphoretic.   HENT:      Head: Normocephalic and atraumatic.   Eyes:      General: No scleral icterus.     Conjunctiva/sclera: Conjunctivae normal.   Neck:      Vascular: No carotid bruit.   Cardiovascular:      Rate and Rhythm: Normal rate. Rhythm irregular.      Heart sounds: Normal heart sounds. No murmur heard.     Comments: regularly irregular rhythm  Pulmonary:      Effort: Pulmonary effort is normal. No respiratory distress.      Breath sounds: Normal breath sounds. No wheezing or rales.   Musculoskeletal:         General: Swelling present.      Comments: Significant leg edema but no true edema or pitting   Skin:     Coloration: Skin is not pale.   Neurological:      General: No focal deficit present.      Mental Status: She is alert and oriented to person, place, and time.      Cranial Nerves: No cranial nerve deficit.      Coordination: Coordination normal.      Gait: Gait is intact. Gait normal.   Psychiatric:         Mood and Affect: Mood and affect normal.         Behavior: Behavior normal.        DATA REVIEW    Lab Results   Component Value Date/Time    CHOLSTRLTOT 141 03/16/2021 06:19 AM    LDL 90 03/16/2021 06:19 AM    HDL 31 (A) 03/16/2021 06:19 AM    TRIGLYCERIDE 98 03/16/2021 06:19 AM       Lab Results   Component Value Date/Time    SODIUM 138 05/18/2024 07:34 AM    POTASSIUM 4.4 05/18/2024 07:34 AM    CHLORIDE 105 05/18/2024 07:34 AM    CO2 20 05/18/2024 07:34 AM    GLUCOSE 158 (H) 05/18/2024 07:34 AM    BUN 25 (H) 05/18/2024 07:34 AM    CREATININE 1.91 (H) 05/18/2024 07:34 AM    CREATININE 1.2 09/18/2008 03:57 AM     Lab Results    Component Value Date/Time    ALKPHOSPHAT 93 05/18/2024 07:34 AM    ASTSGOT 36 05/18/2024 07:34 AM    ALTSGPT 40 05/18/2024 07:34 AM    TBILIRUBIN 0.7 05/18/2024 07:34 AM       Lab Results   Component Value Date/Time    HBA1C 10.6 (H) 03/16/2021 06:19 AM       Lab Results   Component Value Date/Time    MALBCRT see below 05/18/2024 07:31 AM    MICROALBUR <1.2 05/18/2024 07:31 AM       Additional blood work February 2024  Glucose 152, A1c 13.7  GFR 37, sodium 141, potassium 5.4  Blood pressure 160, triglycerides 80, HDL 41,   Albumin creatinine ratio in the urine is 6    Cardiovascular Imaging:    Heart rhythm monitor July 2022  1.  No sustained rob or tachyarrhythmias were detected.  2.  Very rare PACs and very rare PVCs were detected.  3.  There were 11 brief episodes of NSVT detected  4.  There were 3 patient triggered events recorded. Two episodes correlated with normal sinus rhythm.  One episode correlated with bigeminal PVCs.     MPI July 2022   No scintigraphic evidence of significant prior infarct or active ischemia.   Normal left ventricular systolic function.   Normal TID ratio.   ECG INTERPRETATION   Non-diagnostic due to use of pharmacological stress agent.    Echocardiogram August 2022  Normal left ventricular systolic function.  The left ventricular ejection fraction is visually estimated to be 55-  60%.  Mild concentric left ventricular hypertrophy.  Grade I diastolic dysfunction.  The right ventricle is normal in size and systolic function.  Estimated right ventricular systolic pressure is 27 mmHg.  The left atrium is normal in size.  Trace to mild eccentric mitral regurgitation.  Normal inferior vena cava size and inspiratory collapse.    ECG April 2024  Sinus rhythm   Ventricular trigeminy   Probable left atrial enlargement   Nonspecific T abnormalities, lateral leads   Baseline wander in lead(s) V1   Compared to ECG 07/01/2022 07:53:17   Ventricular premature complex(es) now present    T-wave abnormality now present       Medical Decision Making:  Today's Assessment / Status / Plan:     1. Type 2 diabetes mellitus with other skin complication, without long-term current use of insulin (HCC)        2. Dyslipidemia  ezetimibe (ZETIA) 10 MG Tab    Lipid Profile    LIPOPROTEIN A      3. Primary hypertension  losartan (COZAAR) 100 MG Tab    Basic Metabolic Panel    METANEPHRINES PLASMA    ALDOSTERONE    RENIN ACTIVITY    TSH    THYROXINE (TOTAL)      4. SVT (supraventricular tachycardia) (HCC)  EC-ECHOCARDIOGRAM COMPLETE W/O CONT         Etiology of Established CVD if Present:     Lymphedema/lipedema -her lymphedema seems exceptionally well-controlled and her leg edema appears stable.  She should continue with aggressive lifestyle modification including use of her pumps and stockings and let us know if she has any worsening of symptoms    2. Complicated rhythm disturbance -appears to be mostly ventricular ectopy.  Patient asymptomatic.  Will check echocardiogram to rule out structural heart disease.  No obvious ischemia on previous MPI.  Will defer further workup and management to cardiology    LIPID MANAGEMENT  Qualifies for Statin Therapy Based on 2018 ACC/AHA Guidelines: yes, Diabetes  Major ASCVD events: None    High-risk conditions: Diabetes   Risk-enhancers: Metabolic syndrome and family history  Currently on Statin: Yes  Tx goals: LDL-C <100   At goal? No  Patient also with family history of premature ASCVD in father  Plan:   -Continue atorvastatin 80 mg daily  -Add ezetimibe 10 mg a day  -Recheck fasting lipid panel and lipoprotein a    BLOOD PRESSURE MANAGEMENT  BP Goal ACC/AHA (2017) goal <130/80  Home BP at goal:  yes  Office BP at goal:  yes  24h ABPM:  not ordered to date   RDN candidate? NO  Contributing factors: ckd, dm, arrythmia  Patient does have considerable blood pressure lability however overall she seems to have reasonable control  I suspect a good part of her lability is due to  autonomic neuropathy due to diabetes  I do think some of her lower readings are likely artifact due to her significant arrhythmia  Spironolactone discontinued due to hyperkalemia  Plan:   Monitoring:   - continue home BP monitoring, reviewed correct technique, provide BP log and instructions  -Repeat GFR, electrolytes, urine for albumin  -Follow-up with nephrology  Medications:  -Continue losartan 100 mg a day  -Continue carvedilol 12.5 mg twice daily for both blood pressure and rhythm pending any further recommendations from cardiology  -Continue hydrochlorothiazide 12.5 mg daily for now although if her blood pressures are elevated we may want to switch her to either chlorthalidone or loop diuretic given her renal function  -Avoid spironolactone given history of hyperkalemia  -Could consider peripheral alpha-blocker to reduce sympathetic hyperactivity if blood pressure difficult to control in the future    GLYCEMIC MANAGEMENT Diabetic  Very poorly controlled earlier this year with A1c greater than 13  Now with improving glycemic control  Plan:  -Continue Jardiance  -Continue up titration of Victoza per PCP  -Continue adjustment of insulin dose per PCP  -Defer follow-up A1c to PCP  -Can consider referral to diabetes pharmacotherapy service here at [facility] if PCP thinks that would be helpful    ANTITHROMBOTIC THERAPY -not currently indicated    LIFESTYLE INTERVENTIONS:    Tobacco Use: Never smoker  - continued complete avoidance of all tobacco products     Physical Activity: Continue walking.  Legs up while seated    Weight Management and/or Nutrition: Continue slow steady weight loss and good diabetic diet    OTHER:     -CKD apparently with fluctuating renal function but most recently consistent with stage IIIb -most likely diabetic nephropathy although lack of albuminuria is interesting.  Continue ARB and blood pressure control as above.  Patient is to establish with nephrology next week.  Will defer further workup  management and surveillance to nephrology    - Chronic lung disease status post COVID pneumonia -apparently she is on oxygen at home.  Not sure if RIYA has been considered, but that certainly could contribute to blood pressure lability of present.  Defer further workup and management to pulmonary    Studies to Be Obtained: Echocardiogram  Labs to Be Obtained: As above prior to next visit    Follow up in: 2 months    Total time: 51 min - chart review/prep, review of other providers' records, imaging/lab review, face-to-face time for history/examination, ordering, prescribing,  review of results/meds/ treatment plan with patient/family/caregiver, documentation in EMR, care coordination (as needed)     Michael J Bloch, M.D.   [facility]  [facility]  [phone]    Cc:   PIERO Mccollum

## 2024-05-30 NOTE — TELEPHONE ENCOUNTER
Called and spoke to Pt today to set up her first fill with [facility] pharmacy. Per pt, she opted in to fill with us due to mail order services. She asked how much is her copay and confirmed that her copay is $0/90DS.  Pt would like to receive her first fill on 5/31 via . Notified and updated pharmacy for the status.   Pt also opted in to receive calls from Rx coordinator once due for refills.  Provided my contact information if in case she has questions/concerns.    Updated pt's next refill call reminder on 8/15.     RADHA Morales, PhT  Vascular Pharmacy Liaison (Rx Coordinator)  P: [phone]  5/30/2024 4:38 PM

## 2024-05-31 ENCOUNTER — PHARMACY VISIT (OUTPATIENT)
Dept: PHARMACY | Facility: MEDICAL CENTER | Age: 59
End: 2024-05-31
Payer: COMMERCIAL

## 2024-05-31 PROCEDURE — RXMED WILLOW AMBULATORY MEDICATION CHARGE: Performed by: INTERNAL MEDICINE

## 2024-06-05 ENCOUNTER — TELEPHONE (OUTPATIENT)
Dept: VASCULAR LAB | Facility: MEDICAL CENTER | Age: 59
End: 2024-06-05

## 2024-06-05 ENCOUNTER — OFFICE VISIT (OUTPATIENT)
Dept: NEPHROLOGY | Facility: MEDICAL CENTER | Age: 59
End: 2024-06-05
Payer: COMMERCIAL

## 2024-06-05 VITALS
DIASTOLIC BLOOD PRESSURE: 60 MMHG | OXYGEN SATURATION: 97 % | TEMPERATURE: 97.3 F | WEIGHT: 284 LBS | SYSTOLIC BLOOD PRESSURE: 122 MMHG | BODY MASS INDEX: 44.57 KG/M2 | HEART RATE: 47 BPM | HEIGHT: 67 IN

## 2024-06-05 DIAGNOSIS — D64.9 ANEMIA, UNSPECIFIED TYPE: ICD-10-CM

## 2024-06-05 DIAGNOSIS — R80.9 MICROALBUMINURIA DUE TO TYPE 2 DIABETES MELLITUS (HCC): ICD-10-CM

## 2024-06-05 DIAGNOSIS — I10 PRIMARY HYPERTENSION: ICD-10-CM

## 2024-06-05 DIAGNOSIS — E21.3 HYPERPARATHYROIDISM (HCC): ICD-10-CM

## 2024-06-05 DIAGNOSIS — E55.9 VITAMIN D DEFICIENCY: ICD-10-CM

## 2024-06-05 DIAGNOSIS — N18.32 STAGE 3B CHRONIC KIDNEY DISEASE: ICD-10-CM

## 2024-06-05 DIAGNOSIS — E11.29 MICROALBUMINURIA DUE TO TYPE 2 DIABETES MELLITUS (HCC): ICD-10-CM

## 2024-06-05 PROCEDURE — 3078F DIAST BP <80 MM HG: CPT | Performed by: INTERNAL MEDICINE

## 2024-06-05 PROCEDURE — 99204 OFFICE O/P NEW MOD 45 MIN: CPT | Performed by: INTERNAL MEDICINE

## 2024-06-05 PROCEDURE — 3074F SYST BP LT 130 MM HG: CPT | Performed by: INTERNAL MEDICINE

## 2024-06-05 ASSESSMENT — FIBROSIS 4 INDEX: FIB4 SCORE: 1.87

## 2024-06-05 ASSESSMENT — ENCOUNTER SYMPTOMS
NAUSEA: 1
FEVER: 0
VOMITING: 1
SINUS PAIN: 0
ABDOMINAL PAIN: 0
WEIGHT LOSS: 0
CHILLS: 0
PALPITATIONS: 0
SHORTNESS OF BREATH: 0
COUGH: 0
HEMOPTYSIS: 0
ORTHOPNEA: 0
FLANK PAIN: 0
EYES NEGATIVE: 1
WHEEZING: 0

## 2024-06-05 NOTE — TELEPHONE ENCOUNTER
[facility] MED    Caller: Cyndee Calvert    Topic/issue: MEDICAL ADVICE    Cyndee would like to know if Dr. Bloch wants her to rescheudle her appointment until after her ECHO. Please advise.    Thank you,  Orestes VO    Callback Number: [phone]

## 2024-06-05 NOTE — PROGRESS NOTES
Subjective     Cyndee Calvert is a 58 y.o. female who presents with Abnormal Labs            HPI  Cyndee is coming today for initial evaluation of CKD IIIb  Long term hx/of Nephrolithiasis, s/p ureteral stenting, HTN, DM II  C/o episodes of nausea/vemiting -scheduled for GI evaluation  No dysuria/hematuria/flank pain  HTN: BP well controlled -monitored at home  CKD: baseline creat level in [id number] at 1.4-1.6 -now stays at 1.9 likely new baseline  No NSAID's  Review of Systems   Constitutional:  Negative for chills, fever, malaise/fatigue and weight loss.   HENT:  Negative for congestion, hearing loss and sinus pain.    Eyes: Negative.    Respiratory:  Negative for cough, hemoptysis, shortness of breath and wheezing.    Cardiovascular:  Negative for chest pain, palpitations, orthopnea and leg swelling.   Gastrointestinal:  Positive for nausea and vomiting. Negative for abdominal pain.   Genitourinary:  Negative for dysuria, flank pain, frequency, hematuria and urgency.   Skin: Negative.    All other systems reviewed and are negative.      Past Medical History:   Diagnosis Date    Allergies     Anemia     Breath shortness 03/2021    After having covid    Cataract 2/2023    Surgery 4/13. And 4/24/ 2023    Diabetes (HCC)     High cholesterol     Hypertension     Oxygen dependent     .5-1 L as needed. wears consistently at night    Pneumonia 3/2021    Had pneumonia  and covid       Family History   Problem Relation Age of Onset    Heart Disease Mother     Hypertension Mother     Hyperlipidemia Mother     Heart Attack Father     Heart Disease Father     Stroke Father     Heart Failure Brother     Cancer Brother     Heart Disease Brother     Heart Disease Paternal Grandmother     Stroke Paternal Grandmother     Glaucoma Paternal Grandmother        Social History     Socioeconomic History    Marital status: Single   Tobacco Use    Smoking status: Never     Passive exposure: Never    Smokeless tobacco: Never  "  Vaping Use    Vaping status: Never Used   Substance and Sexual Activity    Alcohol use: No    Drug use: No            Objective     /60 (BP Location: Right arm, Patient Position: Sitting, BP Cuff Size: Adult long)   Pulse (!) 47   Temp 36.3 °C (97.3 °F) (Temporal)   Ht 1.702 m (5' 7\")   Wt (!) 129 kg (284 lb)   LMP 04/19/2003   SpO2 97%   BMI 44.48 kg/m²      Physical Exam  Vitals reviewed.   Constitutional:       General: She is not in acute distress.     Appearance: Normal appearance. She is well-developed. She is obese. She is not diaphoretic.   HENT:      Head: Normocephalic and atraumatic.      Nose: Nose normal.      Mouth/Throat:      Mouth: Mucous membranes are moist.      Pharynx: Oropharynx is clear.   Eyes:      Extraocular Movements: Extraocular movements intact.      Conjunctiva/sclera: Conjunctivae normal.      Pupils: Pupils are equal, round, and reactive to light.   Cardiovascular:      Rate and Rhythm: Normal rate and regular rhythm.      Pulses: Normal pulses.      Heart sounds: Normal heart sounds.   Pulmonary:      Effort: Pulmonary effort is normal. No respiratory distress.      Breath sounds: Normal breath sounds. No wheezing or rales.   Abdominal:      General: Bowel sounds are normal. There is no distension.      Palpations: Abdomen is soft. There is no mass.      Tenderness: There is no abdominal tenderness. There is no right CVA tenderness or left CVA tenderness.   Musculoskeletal:      Cervical back: Normal range of motion and neck supple.      Right lower leg: No edema.      Left lower leg: No edema.   Skin:     General: Skin is warm.      Coloration: Skin is not pale.      Findings: No erythema or rash.   Neurological:      General: No focal deficit present.      Mental Status: She is alert and oriented to person, place, and time.      Cranial Nerves: No cranial nerve deficit.      Coordination: Coordination normal.   Psychiatric:         Mood and Affect: Mood normal.    "      Behavior: Behavior normal.         Thought Content: Thought content normal.         Judgment: Judgment normal.          Laboratory results/imaging reviewed: d/w Pt        Latest Reference Range & Units 04/17/23 08:56 08/27/23 08:19 05/18/24 07:31   WBC 4.8 - 10.8 K/uL 6.4 6.6 7.6   RBC 4.20 - 5.40 M/uL 4.28 4.34 4.23   Hemoglobin 12.0 - 16.0 g/dL 13.1 13.5 13.0   Hematocrit 37.0 - 47.0 % 41.1 41.8 40.7   MCV 81.4 - 97.8 fL 96.0 96.3 96.2   MCH 27.0 - 33.0 pg 30.6 31.1 30.7   MCHC 32.2 - 35.5 g/dL 31.9 (L) 32.3 31.9 (L)   RDW 35.9 - 50.0 fL 46.5 45.8 43.1   Platelet Count 164 - 446 K/uL 168 173 183   MPV 9.0 - 12.9 fL 12.3 12.5 12.6   Neutrophils-Polys 44.00 - 72.00 % 52.20 45.30    Neutrophils (Absolute) 1.82 - 7.42 K/uL 3.37 2.99    Lymphocytes 22.00 - 41.00 % 38.40 43.90 (H)    Lymphs (Absolute) 1.00 - 4.80 K/uL 2.47 2.90    Monocytes 0.00 - 13.40 % 6.40 5.60    Monos (Absolute) 0.00 - 0.85 K/uL 0.41 0.37    Eosinophils 0.00 - 6.90 % 1.90 4.40    Eos (Absolute) 0.00 - 0.51 K/uL 0.12 0.29    Basophils 0.00 - 1.80 % 0.80 0.60    Baso (Absolute) 0.00 - 0.12 K/uL 0.05 0.04    Immature Granulocytes 0.00 - 0.90 % 0.30 0.20    Immature Granulocytes (abs) 0.00 - 0.11 K/uL 0.02 0.01    Nucleated RBC 0.00 - 0.20 /100 WBC 0.00 0.00    NRBC (Absolute) K/uL 0.00 0.00    (L): Data is abnormally low  (H): Data is abnormally high   Latest Reference Range & Units 08/27/23 08:19 03/23/24 07:42 05/18/24 07:31 05/18/24 07:34   Sodium 135 - 145 mmol/L 138 135 137 138   Potassium 3.6 - 5.5 mmol/L 4.6 5.6 (H) 4.4 4.4   Chloride 96 - 112 mmol/L 106 100 104 105   Co2 20 - 33 mmol/L 22 20 19 (L) 20   Anion Gap 7.0 - 16.0  10.0 15.0 14.0 13.0   Glucose 65 - 99 mg/dL 135 (H) 395 (H) 156 (H) 158 (H)   Bun 8 - 22 mg/dL 15 29 (H) 25 (H) 25 (H)   Creatinine 0.50 - 1.40 mg/dL 1.41 (H) 1.96 (H) 1.90 (H) 1.91 (H)   GFR (CKD-EPI) >60 mL/min/1.73 m 2 43 ! 29 ! 30 ! 30 !   Calcium 8.5 - 10.5 mg/dL 8.9 8.9 9.1 9.0   Correct Calcium 8.5 - 10.5  mg/dL 8.8   9.2   AST(SGOT) 12 - 45 U/L 96 (H)   36   ALT(SGPT) 2 - 50 U/L 138 (H)   40   Alkaline Phosphatase 30 - 99 U/L 103 (H)   93   Total Bilirubin 0.1 - 1.5 mg/dL 0.9   0.7   Albumin 3.2 - 4.9 g/dL 4.1   3.8   Total Protein 6.0 - 8.2 g/dL 7.5   7.8   Globulin 1.9 - 3.5 g/dL 3.4   4.0 (H)   A-G Ratio g/dL 1.2   1.0   (H): Data is abnormally high  (L): Data is abnormally low  !: Data is abnormal   Latest Reference Range & Units 05/18/24 07:31   Pth, Intact 14.0 - 72.0 pg/mL 299.0 (H)   (H): Data is abnormally high  CT renal  IMPRESSION:     No evidence of  urinary tract calculus or hydronephrosis.  No evidence of peritoneal inflammation.    Assessment & Plan        1. Stage 3b chronic kidney disease      Creat level stable at new baseline -to monitor      Keep well hydrated  - Basic Metabolic Panel; Future  - URINALYSIS; Future  - NM-PARATHYROID (SESTAMIBI) SCAN; Future    2. Anemia, unspecified type     Hb stable at normal range    3. Primary hypertension      BP well controlled -to montior at home  - Basic Metabolic Panel; Future    4. Vitamin D deficiency      To monitor  - VITAMIN D 25-HYDROXY    5. Microalbuminuria due to type 2 diabetes mellitus (HCC)      negative    6. Hyperparathyroidism (HCC)        - PTH INTACT (PTH ONLY); Future  - NM-PARATHYROID (SESTAMIBI) SCAN; Future        Recs:    Continue current treatment  Keep well hydrated  Monitor BP  Low salt diet  Complete parathyroid nuclear scan  F/u with Urology  F/u in 1 month    Thank you for the consult

## 2024-06-05 NOTE — PATIENT INSTRUCTIONS
Continue current treatment  Keep well hydrated  Monitor BP  Low salt diet  Complete parathyroid nuclear scan  F/u with Urology

## 2024-06-11 ENCOUNTER — HOSPITAL ENCOUNTER (OUTPATIENT)
Dept: RADIOLOGY | Facility: MEDICAL CENTER | Age: 59
End: 2024-06-11
Attending: FAMILY MEDICINE
Payer: COMMERCIAL

## 2024-06-11 DIAGNOSIS — R79.89 ELEVATED LIVER FUNCTION TESTS: ICD-10-CM

## 2024-07-02 ENCOUNTER — APPOINTMENT (OUTPATIENT)
Dept: RADIOLOGY | Facility: MEDICAL CENTER | Age: 59
End: 2024-07-02
Attending: INTERNAL MEDICINE
Payer: COMMERCIAL

## 2024-07-02 DIAGNOSIS — E21.3 HYPERPARATHYROIDISM (HCC): ICD-10-CM

## 2024-07-02 DIAGNOSIS — N18.32 STAGE 3B CHRONIC KIDNEY DISEASE: ICD-10-CM

## 2024-07-02 PROCEDURE — A9500 TC99M SESTAMIBI: HCPCS

## 2024-07-13 ENCOUNTER — HOSPITAL ENCOUNTER (OUTPATIENT)
Dept: LAB | Facility: MEDICAL CENTER | Age: 59
End: 2024-07-13
Attending: INTERNAL MEDICINE
Payer: COMMERCIAL

## 2024-07-13 DIAGNOSIS — I10 PRIMARY HYPERTENSION: ICD-10-CM

## 2024-07-13 DIAGNOSIS — N18.32 STAGE 3B CHRONIC KIDNEY DISEASE: ICD-10-CM

## 2024-07-13 DIAGNOSIS — E21.3 HYPERPARATHYROIDISM (HCC): ICD-10-CM

## 2024-07-13 LAB
25(OH)D3 SERPL-MCNC: 6 NG/ML (ref 30–100)
ANION GAP SERPL CALC-SCNC: 10 MMOL/L (ref 7–16)
APPEARANCE UR: ABNORMAL
BACTERIA #/AREA URNS HPF: ABNORMAL /HPF
BILIRUB UR QL STRIP.AUTO: NEGATIVE
BUN SERPL-MCNC: 16 MG/DL (ref 8–22)
CALCIUM SERPL-MCNC: 8.5 MG/DL (ref 8.5–10.5)
CHLORIDE SERPL-SCNC: 108 MMOL/L (ref 96–112)
CO2 SERPL-SCNC: 22 MMOL/L (ref 20–33)
COLOR UR: YELLOW
CREAT SERPL-MCNC: 1.52 MG/DL (ref 0.5–1.4)
EPI CELLS #/AREA URNS HPF: ABNORMAL /HPF
GFR SERPLBLD CREATININE-BSD FMLA CKD-EPI: 39 ML/MIN/1.73 M 2
GLUCOSE SERPL-MCNC: 99 MG/DL (ref 65–99)
GLUCOSE UR STRIP.AUTO-MCNC: >=1000 MG/DL
HYALINE CASTS #/AREA URNS LPF: ABNORMAL /LPF
KETONES UR STRIP.AUTO-MCNC: ABNORMAL MG/DL
LEUKOCYTE ESTERASE UR QL STRIP.AUTO: NEGATIVE
MICRO URNS: ABNORMAL
NITRITE UR QL STRIP.AUTO: NEGATIVE
PH UR STRIP.AUTO: 5.5 [PH] (ref 5–8)
POTASSIUM SERPL-SCNC: 4 MMOL/L (ref 3.6–5.5)
PROT UR QL STRIP: NEGATIVE MG/DL
PTH-INTACT SERPL-MCNC: 252 PG/ML (ref 14–72)
RBC # URNS HPF: ABNORMAL /HPF
RBC UR QL AUTO: NEGATIVE
SODIUM SERPL-SCNC: 140 MMOL/L (ref 135–145)
SP GR UR STRIP.AUTO: 1.03
UROBILINOGEN UR STRIP.AUTO-MCNC: 1 MG/DL
WBC #/AREA URNS HPF: ABNORMAL /HPF

## 2024-07-13 PROCEDURE — 80048 BASIC METABOLIC PNL TOTAL CA: CPT

## 2024-07-13 PROCEDURE — 83970 ASSAY OF PARATHORMONE: CPT

## 2024-07-13 PROCEDURE — 36415 COLL VENOUS BLD VENIPUNCTURE: CPT

## 2024-07-13 PROCEDURE — 82306 VITAMIN D 25 HYDROXY: CPT

## 2024-07-13 PROCEDURE — 81001 URINALYSIS AUTO W/SCOPE: CPT

## 2024-07-16 ENCOUNTER — OFFICE VISIT (OUTPATIENT)
Dept: NEPHROLOGY | Facility: MEDICAL CENTER | Age: 59
End: 2024-07-16
Payer: COMMERCIAL

## 2024-07-16 VITALS
BODY MASS INDEX: 44.26 KG/M2 | OXYGEN SATURATION: 96 % | HEIGHT: 67 IN | DIASTOLIC BLOOD PRESSURE: 70 MMHG | TEMPERATURE: 98.3 F | HEART RATE: 50 BPM | WEIGHT: 282 LBS | SYSTOLIC BLOOD PRESSURE: 118 MMHG

## 2024-07-16 DIAGNOSIS — E55.9 VITAMIN D DEFICIENCY: ICD-10-CM

## 2024-07-16 DIAGNOSIS — D64.9 ANEMIA, UNSPECIFIED TYPE: ICD-10-CM

## 2024-07-16 DIAGNOSIS — N18.31 STAGE 3A CHRONIC KIDNEY DISEASE: ICD-10-CM

## 2024-07-16 DIAGNOSIS — R80.9 MICROALBUMINURIA DUE TO TYPE 2 DIABETES MELLITUS (HCC): ICD-10-CM

## 2024-07-16 DIAGNOSIS — I10 PRIMARY HYPERTENSION: ICD-10-CM

## 2024-07-16 DIAGNOSIS — E21.3 HYPERPARATHYROIDISM (HCC): ICD-10-CM

## 2024-07-16 DIAGNOSIS — E21.1 HYPERPARATHYROIDISM DUE TO VITAMIN D DEFICIENCY (HCC): ICD-10-CM

## 2024-07-16 DIAGNOSIS — E11.29 MICROALBUMINURIA DUE TO TYPE 2 DIABETES MELLITUS (HCC): ICD-10-CM

## 2024-07-16 PROCEDURE — 3078F DIAST BP <80 MM HG: CPT | Performed by: INTERNAL MEDICINE

## 2024-07-16 PROCEDURE — 3074F SYST BP LT 130 MM HG: CPT | Performed by: INTERNAL MEDICINE

## 2024-07-16 PROCEDURE — 99214 OFFICE O/P EST MOD 30 MIN: CPT | Performed by: INTERNAL MEDICINE

## 2024-07-16 RX ORDER — ERGOCALCIFEROL 1.25 MG/1
50000 CAPSULE ORAL
Qty: 15 CAPSULE | Refills: 3 | Status: SHIPPED | OUTPATIENT
Start: 2024-07-16

## 2024-07-16 ASSESSMENT — ENCOUNTER SYMPTOMS
COUGH: 0
ABDOMINAL PAIN: 0
NAUSEA: 0
FEVER: 0
FLANK PAIN: 0
SHORTNESS OF BREATH: 0
EYES NEGATIVE: 1
WHEEZING: 0
VOMITING: 0
CHILLS: 0
ORTHOPNEA: 0
WEIGHT LOSS: 0
HEMOPTYSIS: 0
PALPITATIONS: 0
SINUS PAIN: 0

## 2024-07-16 ASSESSMENT — FIBROSIS 4 INDEX: FIB4 SCORE: 1.87

## 2024-08-15 ENCOUNTER — HOSPITAL ENCOUNTER (OUTPATIENT)
Dept: LAB | Facility: MEDICAL CENTER | Age: 59
End: 2024-08-15
Attending: FAMILY MEDICINE
Payer: COMMERCIAL

## 2024-08-15 ENCOUNTER — TELEPHONE (OUTPATIENT)
Dept: CARDIOLOGY | Facility: MEDICAL CENTER | Age: 59
End: 2024-08-15
Payer: COMMERCIAL

## 2024-08-15 ENCOUNTER — HOSPITAL ENCOUNTER (OUTPATIENT)
Dept: LAB | Facility: MEDICAL CENTER | Age: 59
End: 2024-08-15
Attending: INTERNAL MEDICINE
Payer: COMMERCIAL

## 2024-08-15 DIAGNOSIS — I10 PRIMARY HYPERTENSION: ICD-10-CM

## 2024-08-15 LAB
ALBUMIN SERPL BCP-MCNC: 3.5 G/DL (ref 3.2–4.9)
ALP SERPL-CCNC: 90 U/L (ref 30–99)
ALT SERPL-CCNC: 42 U/L (ref 2–50)
ANION GAP SERPL CALC-SCNC: 12 MMOL/L (ref 7–16)
AST SERPL-CCNC: 44 U/L (ref 12–45)
BILIRUB CONJ SERPL-MCNC: <0.2 MG/DL (ref 0.1–0.5)
BILIRUB INDIRECT SERPL-MCNC: NORMAL MG/DL (ref 0–1)
BILIRUB SERPL-MCNC: 0.8 MG/DL (ref 0.1–1.5)
BUN SERPL-MCNC: 18 MG/DL (ref 8–22)
CALCIUM SERPL-MCNC: 8.8 MG/DL (ref 8.5–10.5)
CHLORIDE SERPL-SCNC: 106 MMOL/L (ref 96–112)
CHOLEST SERPL-MCNC: 121 MG/DL (ref 100–199)
CO2 SERPL-SCNC: 21 MMOL/L (ref 20–33)
CREAT SERPL-MCNC: 1.25 MG/DL (ref 0.5–1.4)
EST. AVERAGE GLUCOSE BLD GHB EST-MCNC: 148 MG/DL
FASTING STATUS PATIENT QL REPORTED: NORMAL
GFR SERPLBLD CREATININE-BSD FMLA CKD-EPI: 49 ML/MIN/1.73 M 2
GGT SERPL-CCNC: 46 U/L (ref 7–34)
GLUCOSE SERPL-MCNC: 116 MG/DL (ref 65–99)
HBA1C MFR BLD: 6.8 % (ref 4–5.6)
HDLC SERPL-MCNC: 31 MG/DL
LDLC SERPL CALC-MCNC: 71 MG/DL
LIPASE SERPL-CCNC: 138 U/L (ref 11–82)
POTASSIUM SERPL-SCNC: 3.9 MMOL/L (ref 3.6–5.5)
PROT SERPL-MCNC: 8 G/DL (ref 6–8.2)
SODIUM SERPL-SCNC: 139 MMOL/L (ref 135–145)
T4 SERPL-MCNC: 10.9 UG/DL (ref 4–12)
TRIGL SERPL-MCNC: 95 MG/DL (ref 0–149)
TSH SERPL-ACNC: 1.72 UIU/ML (ref 0.35–5.5)

## 2024-08-15 PROCEDURE — 84443 ASSAY THYROID STIM HORMONE: CPT

## 2024-08-15 PROCEDURE — 80048 BASIC METABOLIC PNL TOTAL CA: CPT

## 2024-08-15 PROCEDURE — 80061 LIPID PANEL: CPT

## 2024-08-15 PROCEDURE — 82088 ASSAY OF ALDOSTERONE: CPT

## 2024-08-15 PROCEDURE — 83690 ASSAY OF LIPASE: CPT

## 2024-08-15 PROCEDURE — 84436 ASSAY OF TOTAL THYROXINE: CPT

## 2024-08-15 PROCEDURE — 80076 HEPATIC FUNCTION PANEL: CPT

## 2024-08-15 PROCEDURE — 36415 COLL VENOUS BLD VENIPUNCTURE: CPT

## 2024-08-15 PROCEDURE — 83835 ASSAY OF METANEPHRINES: CPT

## 2024-08-15 PROCEDURE — 84244 ASSAY OF RENIN: CPT

## 2024-08-15 PROCEDURE — 82977 ASSAY OF GGT: CPT

## 2024-08-15 PROCEDURE — 83036 HEMOGLOBIN GLYCOSYLATED A1C: CPT

## 2024-08-15 NOTE — TELEPHONE ENCOUNTER
Established patient  Chart prep for upcoming appointment.    Any pending/incomplete orders from last visit? Yes Labs and Imaging Echo  Was patient called and reminded to complete pending orders? Yes  Were any records requested?  No    Referral up to date? Yes  Referral attached to appointment (renewals and New patients only)? N/A (established with up-to-date referral)  Virtual appointment? No    [facility]  [facility]  Ph. [phone]  Fx. [phone]

## 2024-08-16 ENCOUNTER — APPOINTMENT (OUTPATIENT)
Dept: CARDIOLOGY | Facility: MEDICAL CENTER | Age: 59
End: 2024-08-16
Attending: INTERNAL MEDICINE
Payer: COMMERCIAL

## 2024-08-19 LAB
ALDOST SERPL-MCNC: 13.1 NG/DL
METANEPHS SERPL-SCNC: 0.18 NMOL/L (ref 0–0.49)
NORMETANEPHRINE SERPL-SCNC: 0.59 NMOL/L (ref 0–0.89)
RENIN PLAS-CCNC: 1.3 NG/ML/HR

## 2024-08-22 ENCOUNTER — OFFICE VISIT (OUTPATIENT)
Dept: CARDIOLOGY | Facility: MEDICAL CENTER | Age: 59
End: 2024-08-22
Attending: INTERNAL MEDICINE
Payer: COMMERCIAL

## 2024-08-22 VITALS
DIASTOLIC BLOOD PRESSURE: 72 MMHG | HEART RATE: 40 BPM | WEIGHT: 280 LBS | SYSTOLIC BLOOD PRESSURE: 115 MMHG | HEIGHT: 67 IN | BODY MASS INDEX: 43.95 KG/M2

## 2024-08-22 DIAGNOSIS — I89.0 LYMPHEDEMA: ICD-10-CM

## 2024-08-22 DIAGNOSIS — E78.5 DYSLIPIDEMIA: ICD-10-CM

## 2024-08-22 DIAGNOSIS — I10 PRIMARY HYPERTENSION: ICD-10-CM

## 2024-08-22 DIAGNOSIS — E11.628 TYPE 2 DIABETES MELLITUS WITH OTHER SKIN COMPLICATION, WITHOUT LONG-TERM CURRENT USE OF INSULIN (HCC): ICD-10-CM

## 2024-08-22 PROCEDURE — 3078F DIAST BP <80 MM HG: CPT | Performed by: INTERNAL MEDICINE

## 2024-08-22 PROCEDURE — 3074F SYST BP LT 130 MM HG: CPT | Performed by: INTERNAL MEDICINE

## 2024-08-22 PROCEDURE — 99214 OFFICE O/P EST MOD 30 MIN: CPT | Performed by: INTERNAL MEDICINE

## 2024-08-22 PROCEDURE — 99212 OFFICE O/P EST SF 10 MIN: CPT

## 2024-08-22 ASSESSMENT — FIBROSIS 4 INDEX: FIB4 SCORE: 2.23

## 2024-08-22 NOTE — PROGRESS NOTES
VASCULAR MEDICINE CLINIC - Follow up VISIT  08/22/24    Cyndee Calvert is a 58 y.o. female    Referring provider: Yanni Chatterjee, *    Subjective      HPI:   Here for follow-up of labile hypertension in setting of htn, dm, lymphedema, and obesity.  Patient with complicated medical history as outlined below    Lymphedema and lipedema  Long history  Has seen our lymphedema physical therapist routinely in the past  Uses stockings and pumps every day  Swelling is well-controlled  No skin breakdown    Complicated rhythm disturbance  Patient with long history of irregular heartbeat  She sees cardiology regularly  She has never been diagnosed with atrial fibrillation  She has ventricular trigeminy on her most recent ECG  Previous rhythm monitor suggested rare supraventricular ectopy but frequent ventricular ectopy  She states she has no palpitations since being on carvedilol for many years  Denies history of heart failure  She has a follow-up scheduled with cardiology    Dyslipidemia  Denies known history of coronary disease  Has been on atorvastatin for many years  Remains on 80 mg atorvastatin  No myalgias  Started on ezetimibe since last visit    Hypertension/ckd  Patient states that she has had high blood pressure for decades  She had many years of poor control  She has had fluctuating renal function  She denies use of NSAIDs  She recently established with nephrology.  Her renal functions improved.  They made no changes  She stopped spironolactone due to relative hyperkalemia and worsening renal function  She is back on a low-dose of hydrochlorothiazide  She has been on carvedilol and losartan for many years  BPs at home 90s-140s/50s-70s.    Most of her readings are less than 130/80  She does have maybe once or twice a month that systolic is less than 100  Occasionally greater than 130 but rarely above the 140s  Does feel a bit of fatigue when low  No symptoms when high    Diabetes  Has had diabetes  "since her 30s  Was on metformin for many years  Feels that she was likely poor control for quite some time  She is now following up with her primary at the [facility] on a regular basis  Her A1c was as high as 13 just a few months ago  Now significantly improved  She states she was told to stop metformin due to her renal function  She stopped Victoza because of concerns that it may have been contributing to elevated lipase  She takes Jardiance without side effect  She is on a stable dose of insulin  Denies hypoglycemia    Chronic lung disease  She states she needs to take oxygen because of a couple bad COVID infections  She does see pulmonary regularly  She states she has never been tested for sleep apnea    Social History     Tobacco Use    Smoking status: Never     Passive exposure: Never    Smokeless tobacco: Never   Vaping Use    Vaping status: Never Used   Substance Use Topics    Alcohol use: No    Drug use: No     DIET AND EXERCISE:  Weight Change: She is lost 15 pounds on victoza, but has gained back a few since stopping  Diet: Try to follow a diabetic diet  Exercise:  Doing some walking          Objective    Vitals:    08/22/24 1502   BP: 115/72   BP Location: Left arm   Patient Position: Sitting   BP Cuff Size: Thigh   Pulse: (!) 40   Weight: (!) 127 kg (280 lb)   Height: 1.702 m (5' 7\")        BP Readings from Last 4 Encounters:   08/22/24 115/72   07/16/24 118/70   06/05/24 122/60   05/28/24 112/63      Body mass index is 43.85 kg/m².   Wt Readings from Last 4 Encounters:   08/22/24 (!) 127 kg (280 lb)   07/16/24 (!) 128 kg (282 lb)   06/05/24 (!) 129 kg (284 lb)   05/28/24 (!) 135 kg (298 lb)      Physical Exam  Vitals reviewed.   Constitutional:       General: She is not in acute distress.     Appearance: She is not diaphoretic.   HENT:      Head: Normocephalic and atraumatic.   Eyes:      General: No scleral icterus.     Conjunctiva/sclera: Conjunctivae normal.   Neck:      Vascular: No carotid " "bruit.   Cardiovascular:      Rate and Rhythm: Normal rate. Rhythm irregular.      Heart sounds: Normal heart sounds. No murmur heard.     Comments: regularly irregular rhythm  Pulmonary:      Effort: Pulmonary effort is normal. No respiratory distress.      Breath sounds: Normal breath sounds. No wheezing or rales.   Musculoskeletal:         General: Swelling present.      Comments: Significant leg edema but no true edema or pitting   Skin:     Coloration: Skin is not pale.   Neurological:      General: No focal deficit present.      Mental Status: She is alert and oriented to person, place, and time.      Cranial Nerves: No cranial nerve deficit.      Coordination: Coordination normal.      Gait: Gait is intact. Gait normal.   Psychiatric:         Mood and Affect: Mood and affect normal.         Behavior: Behavior normal.        DATA REVIEW    Lab Results   Component Value Date/Time    CHOLSTRLTOT 121 08/15/2024 11:35 AM    LDL 71 08/15/2024 11:35 AM    HDL 31 (A) 08/15/2024 11:35 AM    TRIGLYCERIDE 95 08/15/2024 11:35 AM       No results found for: \"LIPOPROTA\"   No results found for: \"APOB\"   No results found for: \"CRPHIGHSEN\"     Lab Results   Component Value Date/Time    SODIUM 139 08/15/2024 11:38 AM    POTASSIUM 3.9 08/15/2024 11:38 AM    CHLORIDE 106 08/15/2024 11:38 AM    CO2 21 08/15/2024 11:38 AM    GLUCOSE 116 (H) 08/15/2024 11:38 AM    BUN 18 08/15/2024 11:38 AM    CREATININE 1.25 08/15/2024 11:38 AM    CREATININE 1.2 09/18/2008 03:57 AM     Lab Results   Component Value Date/Time    ALKPHOSPHAT 90 08/15/2024 11:35 AM    ASTSGOT 44 08/15/2024 11:35 AM    ALTSGPT 42 08/15/2024 11:35 AM    TBILIRUBIN 0.8 08/15/2024 11:35 AM       Lab Results   Component Value Date/Time    HBA1C 6.8 (H) 08/15/2024 11:35 AM       Lab Results   Component Value Date/Time    MALBCRT see below 05/18/2024 07:31 AM    MICROALBUR <1.2 05/18/2024 07:31 AM       Additional blood work February 2024  Glucose 152, A1c 13.7  GFR 37, " sodium 141, potassium 5.4  Blood pressure 160, triglycerides 80, HDL 41,   Albumin creatinine ratio in the urine is 6    Cardiovascular Imaging:    Heart rhythm monitor July 2022  1.  No sustained rob or tachyarrhythmias were detected.  2.  Very rare PACs and very rare PVCs were detected.  3.  There were 11 brief episodes of NSVT detected  4.  There were 3 patient triggered events recorded. Two episodes correlated with normal sinus rhythm.  One episode correlated with bigeminal PVCs.     MPI July 2022   No scintigraphic evidence of significant prior infarct or active ischemia.   Normal left ventricular systolic function.   Normal TID ratio.   ECG INTERPRETATION   Non-diagnostic due to use of pharmacological stress agent.    Echocardiogram August 2022  Normal left ventricular systolic function.  The left ventricular ejection fraction is visually estimated to be 55-  60%.  Mild concentric left ventricular hypertrophy.  Grade I diastolic dysfunction.  The right ventricle is normal in size and systolic function.  Estimated right ventricular systolic pressure is 27 mmHg.  The left atrium is normal in size.  Trace to mild eccentric mitral regurgitation.  Normal inferior vena cava size and inspiratory collapse.    ECG April 2024  Sinus rhythm   Ventricular trigeminy   Probable left atrial enlargement   Nonspecific T abnormalities, lateral leads   Baseline wander in lead(s) V1   Compared to ECG 07/01/2022 07:53:17   Ventricular premature complex(es) now present   T-wave abnormality now present       Medical Decision Making:  Today's Assessment / Status / Plan:     1. Dyslipidemia        2. Primary hypertension        3. Type 2 diabetes mellitus with other skin complication, without long-term current use of insulin (HCC)        4. Lymphedema           Etiology of Established CVD if Present:     Lymphedema/lipedema -her lymphedema seems exceptionally well-controlled and her leg edema appears stable.  She should  continue with aggressive lifestyle modification including use of her pumps and stockings and let us know if she has any worsening of symptoms    2. Complicated rhythm disturbance -appears to be mostly ventricular ectopy.  Patient asymptomatic.  Will check echocardiogram to rule out structural heart disease.  No obvious ischemia on previous MPI.  Will defer further workup and management to cardiology    LIPID MANAGEMENT  Qualifies for Statin Therapy Based on 2018 ACC/AHA Guidelines: yes, Diabetes  Major ASCVD events: None    High-risk conditions: Diabetes   Risk-enhancers: Metabolic syndrome and family history  Currently on Statin: Yes  Tx goals: LDL-C <100 and non-HDL is 130   At goal?  Yes  Patient also with family history of premature ASCVD in father  Persistently low HDL, and diabetes  Plan:   -Continue atorvastatin 80 mg daily  -Continue ezetimibe 10 mg a day  -Recheck fasting lipid panel and lipoprotein a and a apolipoprotein B in the future    BLOOD PRESSURE MANAGEMENT  BP Goal ACC/AHA (2017) goal <130/80  Home BP at goal:  yes  Office BP at goal:  yes  24h ABPM:  not ordered to date   RDN candidate? NO  Contributing factors: ckd, dm, arrythmia  Patient does have considerable blood pressure lability however overall she seems to have reasonable control  I suspect a good part of her lability is due to autonomic neuropathy due to diabetes  I do think some of her lower readings are likely artifact due to her significant arrhythmia  Spironolactone discontinued due to hyperkalemia  Plan:   Monitoring:   - continue home BP monitoring and bring in written log again  -Follow-up with nephrology  Medications:  -Continue losartan 100 mg a day  -Continue carvedilol 12.5 mg twice daily for both blood pressure and rhythm pending any further recommendations from cardiology  -Continue hydrochlorothiazide 12.5 mg daily for now although if her blood pressures are elevated we may want to switch her to either chlorthalidone or  loop diuretic given her renal function  -Avoid spironolactone given history of hyperkalemia  -Avoid dihydropyridine calcium channel blockers due to risk of worsening leg swelling  -Could consider peripheral alpha-blocker to reduce sympathetic hyperactivity if blood pressure difficult to control in the future    GLYCEMIC MANAGEMENT Diabetic  Very poorly controlled earlier this year with A1c greater than 13  Now with improving glycemic control  Victoza recently stopped due to increased lipase  Plan:  -Continue Jardiance  -Continue adjustment of insulin dose per PCP  -Defer follow-up A1c to PCP  -Can consider referral to diabetes pharmacotherapy service here at [facility] if PCP thinks that would be helpful    ANTITHROMBOTIC THERAPY -not currently indicated    LIFESTYLE INTERVENTIONS:    Tobacco Use: Never smoker  - continued complete avoidance of all tobacco products     Physical Activity: Continue walking.  Legs up while seated    Weight Management and/or Nutrition: She had lost 15 pounds while taking Victoza.  She is now gained back about 3 or 4 continue slow steady weight loss and good diabetic diet    OTHER:     -CKD apparently with fluctuating renal function but most recently consistent with stage IIIb -most likely diabetic nephropathy although lack of albuminuria is interesting.  Most recent renal function improved.  Continue ARB and blood pressure control as above.  Will defer further workup management and surveillance to nephrology    - Chronic lung disease status post COVID pneumonia -apparently she is on oxygen at home.  Not sure if RIYA has been considered, but that certainly could contribute to blood pressure lability of present.  Defer further workup and management to pulmonary    Studies to Be Obtained: Per cardiology  Labs to Be Obtained: Will defer to other providers at present    Follow up in: 4 months    Michael J Bloch, M.D.   [facility]  [facility]  [phone]    Cc:   MIKY Mccollum

## 2024-08-23 PROCEDURE — RXMED WILLOW AMBULATORY MEDICATION CHARGE: Performed by: INTERNAL MEDICINE

## 2024-08-29 ENCOUNTER — APPOINTMENT (OUTPATIENT)
Dept: SLEEP MEDICINE | Facility: MEDICAL CENTER | Age: 59
End: 2024-08-29
Attending: NURSE PRACTITIONER
Payer: COMMERCIAL

## 2024-08-29 ENCOUNTER — PHARMACY VISIT (OUTPATIENT)
Dept: PHARMACY | Facility: MEDICAL CENTER | Age: 59
End: 2024-08-29
Payer: COMMERCIAL

## 2024-09-05 NOTE — PROGRESS NOTES
Pulmonary Clinic Note    Date of Visit: 9/6/2024     Chief Complaint:  Chief Complaint   Patient presents with    Follow-Up     Last seen 5/2/23 Dr. Houston    Results     PFT 4/30/24     HPI:   Cyndee Calvert is a very pleasant 58 y.o. year old female never smoker, with a PMHx of respiratory failure following COVID-19 infection, T2DM, HTN, DSL, lymphedema who presented to the Pulmonary Clinic for a regular follow up. Last seen in the office on 5/2/2023 with Dr. Houston.     Patient is followed by the pulmonary office for respiratory failure following COVID-19 infection  In 2021.  CXR at the time showed bilateral infiltrates and was noted to be hypoxic.  She did test positive for COVID-19 at the time and was admitted to the hospital and treated with Decadron, but unfortunately she does not meet criteria for remdesivir.  Patient was discharged on oxygen at the time but since then has been able to titrate off.  PFTs in October 2021 showed mild restriction but normal DLCO.  Repeat PFTs in October 2022 showed improved FVC from 2.49 to 2.61, improved TLC from 3.93 to 4.03 and low normal DLCO.  PFTs in 2024 show an FVC of 2.3 L or 79%, TLC 74%, DLCO 88% predicted.  Patient does have an echocardiogram ordered, but has not been completed yet.  Patient uses 1 LPM of oxygen as needed throughout the day and LPM of oxygen at night.    Interval events:   9/6/2024-patient states that she is short of breath with mMRC of 3 but denies any significant cough, sputum production, or wheezing.  She will use her albuterol a couple times during the day depending on her exertion level.    Oxygen use:  1 LPM PRN and nocturnally    MMRC Grade: 3- Stops to catch breath on level ground after 100m    Past Medical History:   Diagnosis Date    Allergies     Anemia     Breath shortness 03/2021    After having covid    Cataract 2/2023    Surgery 4/13. And 4/24/ 2023    Diabetes (HCC)     High cholesterol     Hypertension     Oxygen  dependent     .5-1 L as needed. wears consistently at night    Pneumonia 3/2021    Had pneumonia  and covid     Past Surgical History:   Procedure Laterality Date    CYSTO STENT PLACEMNT PRE SURG Right 5/1/2023    Procedure: RIGHT URETEROSCOPY;  RIGHT STENT REMOVAL;  Surgeon: Javier Ceron M.D.;  Location: SURGERY [facility];  Service: Urology    BLADDER BIOPSY WITH CYSTOSCOPY  09/02/2008    Performed by LILLIE WILLIAM at SURGERY [facility]    HYSTERECTOMY, TOTAL ABDOMINAL      OTHER      Do remember  dates    TONSILLECTOMY      URETEROSCOPY Right     stent insertion right side     Social History     Socioeconomic History    Marital status: Single     Spouse name: Not on file    Number of children: Not on file    Years of education: Not on file    Highest education level: Not on file   Occupational History    Not on file   Tobacco Use    Smoking status: Never     Passive exposure: Never    Smokeless tobacco: Never   Vaping Use    Vaping status: Never Used   Substance and Sexual Activity    Alcohol use: No    Drug use: No    Sexual activity: Not on file   Other Topics Concern    Not on file   Social History Narrative    Not on file     Social Determinants of Health     Financial Resource Strain: Not on file   Food Insecurity: Not on file   Transportation Needs: Not on file   Physical Activity: Not on file   Stress: Not on file   Social Connections: Not on file   Intimate Partner Violence: Not on file   Housing Stability: Not on file        Family History   Problem Relation Age of Onset    Heart Disease Mother     Hypertension Mother     Hyperlipidemia Mother     Heart Attack Father     Heart Disease Father     Stroke Father     Heart Failure Brother     Cancer Brother     Heart Disease Brother     Heart Disease Paternal Grandmother     Stroke Paternal Grandmother     Glaucoma Paternal Grandmother      Current Outpatient Medications on File Prior to Visit   Medication Sig Dispense Refill    ergocalciferol  (DRISDOL) 03458 UNIT capsule Take 1 Capsule by mouth every 7 days. 15 Capsule 3    insulin glargine (LANTUS SOLOSTAR) 100 UNIT/ML Solution Pen-injector injection Inject 15 Units under the skin 2 times a day.      ezetimibe (ZETIA) 10 MG Tab Take 1 Tablet by mouth every day. 30 Tablet 11    hydrochlorothiazide (MICROZIDE) 12.5 MG capsule Take 1 Capsule by mouth every day. 30 Capsule 12    atorvastatin (LIPITOR) 80 MG tablet TAKE 1 TABLET BY MOUTH EVERYDAY AT BEDTIME 90 Tablet 3    acetaminophen (TYLENOL) 500 MG Tab Take 1,000 mg by mouth every 6 hours as needed for Moderate Pain.      albuterol 108 (90 Base) MCG/ACT Aero Soln inhalation aerosol Inhale 1-2 Puffs every four hours as needed. (Patient taking differently: Inhale 1-2 Puffs every 6 hours as needed for Shortness of Breath.) 1 Each 3    carvedilol (COREG) 12.5 MG Tab Take 12.5 mg by mouth 2 times a day.      HUMALOG KWIKPEN 100 UNIT/ML Solution Pen-injector injection PEN Inject 2-8 Units under the skin 2 times daily with meals as needed. 1 unit for every 10g of carb's      ASPIRIN 81 PO Take 81 mg by mouth every day.      losartan (COZAAR) 100 MG Tab Take 100 mg by mouth every evening.      Empagliflozin (JARDIANCE) 25 MG Tab Take 25 mg by mouth every day.       No current facility-administered medications on file prior to visit.     Allergies: Percocet [oxycodone-acetaminophen] and Latex    ROS:   Review of Systems   Constitutional:  Negative for chills, diaphoresis, fever and malaise/fatigue.   HENT:  Negative for congestion and sinus pain.    Respiratory:  Positive for shortness of breath. Negative for cough, hemoptysis, sputum production and wheezing.    Cardiovascular:  Negative for chest pain, palpitations and leg swelling.   Gastrointestinal:  Negative for diarrhea, heartburn, nausea and vomiting.   Musculoskeletal:  Negative for falls and myalgias.   Neurological:  Negative for dizziness, weakness and headaches.     Vitals:  /72 (BP Location:  "Right arm, Patient Position: Sitting, BP Cuff Size: Adult)   Pulse 63   Ht 1.702 m (5' 7\")   Wt (!) 129 kg (284 lb)   SpO2 95%     Physical Exam  Constitutional:       General: She is not in acute distress.     Appearance: Normal appearance. She is not ill-appearing, toxic-appearing or diaphoretic.   Cardiovascular:      Rate and Rhythm: Normal rate and regular rhythm.      Heart sounds: No murmur heard.     No friction rub. No gallop.   Pulmonary:      Effort: No respiratory distress.      Breath sounds: Normal breath sounds. No stridor. No wheezing, rhonchi or rales.   Musculoskeletal:         General: No swelling.      Right lower leg: No edema.      Left lower leg: No edema.   Skin:     General: Skin is warm.   Neurological:      General: No focal deficit present.      Mental Status: She is alert and oriented to person, place, and time.   Psychiatric:         Mood and Affect: Mood normal.         Behavior: Behavior normal.         Thought Content: Thought content normal.         Judgment: Judgment normal.         Laboratory Data:  Multioximetry (Date: 9/6/2024)-       PFTs: (Date: 4/30/2024)-      Impression:  1.  There is no obstruction  2.  There is no bronchodilator response  3.  TLC is mildly reduced to 4.11 L / 74%, ERV is reduced to 31%  4.  DLCO is normal  5.  Flow volume loop consistent with mild restriction     Mild restriction, suspect due to body habitus as ERV is also reduced.  Clinically correlate.      Assessment and Plan:    Problem List Items Addressed This Visit       Restrictive lung disease     PFTs in 2024 show mild restriction with a FVC of 2.3 L or 79%, TLC 74%, DLCO 88% predicted.  ERV is also low, which indicates the body habitus is playing a role in her restriction.  Patient reports that she is short of breath with mMRC of 3.  She will occasionally use albuterol on exertion.  She is also using 1 LPM of oxygen during the day as needed and 1 LPM at night.  --Advised patient to increase " exercise as tolerated and weight loss  --Advised patient to continue to use albuterol before exertion  --Patient also has echocardiogram ordered, but has not yet been completed.  I did advise patient to complete the echocardiogram.  --Advised patient to remain up-to-date on all vaccines         Chronic respiratory failure with hypoxia (HCC)     Patient uses 1 LPM of oxygen as needed throughout the day and 1 PM oxygen at night.    --Multiox today in the office does not show the need for oxygen at rest or on exertion  -- OPO on 1 LPM          Other Visit Diagnoses       Shortness of breath        Relevant Orders    Multiple Oximetry    Overnight Oximetry          Diagnostic studies have been reviewed with the patient.    Return in about 6 months (around 3/6/2025), or if symptoms worsen or fail to improve, for restrictive lung disease, with anyone .     This note was generated using voice recognition software which has a chance of producing errors of grammar and possibly content.  I have made every reasonable attempt to find and correct any obvious errors, but it should be expected that some may not be found prior to finalization of this note.    Time spent in record review prior to patient arrival, reviewing results, and in face-to-face encounter totaled 25 min.  __________  RADHAMES Ortiz  Pulmonary Medicine  Formerly [facility]

## 2024-09-06 ENCOUNTER — APPOINTMENT (OUTPATIENT)
Dept: SLEEP MEDICINE | Facility: MEDICAL CENTER | Age: 59
End: 2024-09-06
Payer: COMMERCIAL

## 2024-09-06 VITALS
HEIGHT: 67 IN | BODY MASS INDEX: 44.57 KG/M2 | DIASTOLIC BLOOD PRESSURE: 72 MMHG | HEART RATE: 63 BPM | SYSTOLIC BLOOD PRESSURE: 116 MMHG | OXYGEN SATURATION: 95 % | WEIGHT: 284 LBS

## 2024-09-06 DIAGNOSIS — J98.4 RESTRICTIVE LUNG DISEASE: ICD-10-CM

## 2024-09-06 DIAGNOSIS — R06.02 SHORTNESS OF BREATH: ICD-10-CM

## 2024-09-06 DIAGNOSIS — J96.11 CHRONIC RESPIRATORY FAILURE WITH HYPOXIA (HCC): ICD-10-CM

## 2024-09-06 PROCEDURE — 94761 N-INVAS EAR/PLS OXIMETRY MLT: CPT

## 2024-09-06 PROCEDURE — 99213 OFFICE O/P EST LOW 20 MIN: CPT | Mod: 25

## 2024-09-06 PROCEDURE — 3078F DIAST BP <80 MM HG: CPT

## 2024-09-06 PROCEDURE — 99213 OFFICE O/P EST LOW 20 MIN: CPT

## 2024-09-06 PROCEDURE — 3074F SYST BP LT 130 MM HG: CPT

## 2024-09-06 ASSESSMENT — ENCOUNTER SYMPTOMS
PALPITATIONS: 0
DIARRHEA: 0
COUGH: 0
VOMITING: 0
NAUSEA: 0
MYALGIAS: 0
HEARTBURN: 0
DIZZINESS: 0
CHILLS: 0
DIAPHORESIS: 0
SPUTUM PRODUCTION: 0
WHEEZING: 0
SHORTNESS OF BREATH: 1
FALLS: 0
WEAKNESS: 0
FEVER: 0
SINUS PAIN: 0
HEADACHES: 0
HEMOPTYSIS: 0

## 2024-09-06 ASSESSMENT — FIBROSIS 4 INDEX: FIB4 SCORE: 2.23

## 2024-09-06 NOTE — ASSESSMENT & PLAN NOTE
Patient uses 1 LPM of oxygen as needed throughout the day and 1 PM oxygen at night.    --Multiox today in the office does not show the need for oxygen at rest or on exertion  -- OPO on 1 LPM

## 2024-09-06 NOTE — PROCEDURES
Multi-Ox Readings  Multi Ox #1 Room air   O2 sat % at rest 99   O2 sat % on exertion 96   O2 sat average on exertion     Multi Ox #2     O2 sat % at rest     O2 sat % on exertion     O2 sat average on exertion       Oxygen Use     Oxygen Frequency With exertion and nocturnal   Duration of need     Is the patient mobile within the home?     CPAP Use?     BIPAP Use?     Servo Titration

## 2024-09-06 NOTE — ASSESSMENT & PLAN NOTE
PFTs in 2024 show mild restriction with a FVC of 2.3 L or 79%, TLC 74%, DLCO 88% predicted.  ERV is also low, which indicates the body habitus is playing a role in her restriction.  Patient reports that she is short of breath with mMRC of 3.  She will occasionally use albuterol on exertion.  She is also using 1 LPM of oxygen during the day as needed and 1 LPM at night.  --Advised patient to increase exercise as tolerated and weight loss  --Advised patient to continue to use albuterol before exertion  --Patient also has echocardiogram ordered, but has not yet been completed.  I did advise patient to complete the echocardiogram.  --Advised patient to remain up-to-date on all vaccines

## 2024-09-11 ENCOUNTER — PATIENT MESSAGE (OUTPATIENT)
Dept: CARDIOLOGY | Facility: MEDICAL CENTER | Age: 59
End: 2024-09-11
Payer: COMMERCIAL

## 2024-09-12 ENCOUNTER — APPOINTMENT (OUTPATIENT)
Dept: CARDIOLOGY | Facility: MEDICAL CENTER | Age: 59
End: 2024-09-12
Payer: COMMERCIAL

## 2024-10-08 ENCOUNTER — APPOINTMENT (OUTPATIENT)
Dept: SLEEP MEDICINE | Facility: MEDICAL CENTER | Age: 59
End: 2024-10-08
Payer: COMMERCIAL

## 2024-10-08 DIAGNOSIS — R06.02 SHORTNESS OF BREATH: ICD-10-CM

## 2024-10-08 PROCEDURE — 94762 N-INVAS EAR/PLS OXIMTRY CONT: CPT | Performed by: INTERNAL MEDICINE

## 2024-10-08 PROCEDURE — 94762 N-INVAS EAR/PLS OXIMTRY CONT: CPT | Performed by: STUDENT IN AN ORGANIZED HEALTH CARE EDUCATION/TRAINING PROGRAM

## 2024-10-28 ENCOUNTER — APPOINTMENT (OUTPATIENT)
Dept: CARDIOLOGY | Facility: MEDICAL CENTER | Age: 59
End: 2024-10-28
Payer: COMMERCIAL

## 2024-10-28 VITALS
HEART RATE: 50 BPM | WEIGHT: 284 LBS | HEIGHT: 67 IN | DIASTOLIC BLOOD PRESSURE: 66 MMHG | RESPIRATION RATE: 18 BRPM | BODY MASS INDEX: 44.57 KG/M2 | OXYGEN SATURATION: 96 % | SYSTOLIC BLOOD PRESSURE: 114 MMHG

## 2024-10-28 DIAGNOSIS — I10 PRIMARY HYPERTENSION: ICD-10-CM

## 2024-10-28 DIAGNOSIS — E78.5 DYSLIPIDEMIA: ICD-10-CM

## 2024-10-28 PROCEDURE — 99213 OFFICE O/P EST LOW 20 MIN: CPT

## 2024-10-28 PROCEDURE — 3074F SYST BP LT 130 MM HG: CPT

## 2024-10-28 PROCEDURE — 3078F DIAST BP <80 MM HG: CPT

## 2024-10-28 PROCEDURE — 99214 OFFICE O/P EST MOD 30 MIN: CPT

## 2024-10-28 RX ORDER — ATORVASTATIN CALCIUM 80 MG/1
80 TABLET, FILM COATED ORAL
Qty: 90 TABLET | Refills: 3 | Status: SHIPPED | OUTPATIENT
Start: 2024-10-28

## 2024-10-28 RX ORDER — HYDROCHLOROTHIAZIDE 12.5 MG/1
12.5 CAPSULE ORAL DAILY
Qty: 100 CAPSULE | Refills: 3 | Status: SHIPPED | OUTPATIENT
Start: 2024-10-28

## 2024-10-28 ASSESSMENT — ENCOUNTER SYMPTOMS
GASTROINTESTINAL NEGATIVE: 1
EYES NEGATIVE: 1
PND: 0
NEUROLOGICAL NEGATIVE: 1
NERVOUS/ANXIOUS: 0
DEPRESSION: 0
CONSTITUTIONAL NEGATIVE: 1
SHORTNESS OF BREATH: 0
MUSCULOSKELETAL NEGATIVE: 1
ROS GI COMMENTS: GI UPSET
ORTHOPNEA: 0
PALPITATIONS: 0

## 2024-10-28 ASSESSMENT — FIBROSIS 4 INDEX: FIB4 SCORE: 2.26

## 2024-11-08 ENCOUNTER — OFFICE VISIT (OUTPATIENT)
Dept: URGENT CARE | Facility: PHYSICIAN GROUP | Age: 59
End: 2024-11-08
Payer: COMMERCIAL

## 2024-11-08 VITALS
WEIGHT: 284 LBS | RESPIRATION RATE: 16 BRPM | SYSTOLIC BLOOD PRESSURE: 98 MMHG | HEART RATE: 56 BPM | TEMPERATURE: 97 F | HEIGHT: 67 IN | DIASTOLIC BLOOD PRESSURE: 62 MMHG | BODY MASS INDEX: 44.57 KG/M2 | OXYGEN SATURATION: 100 %

## 2024-11-08 DIAGNOSIS — J01.10 ACUTE NON-RECURRENT FRONTAL SINUSITIS: ICD-10-CM

## 2024-11-08 PROCEDURE — 99213 OFFICE O/P EST LOW 20 MIN: CPT | Performed by: FAMILY MEDICINE

## 2024-11-08 PROCEDURE — 3074F SYST BP LT 130 MM HG: CPT | Performed by: FAMILY MEDICINE

## 2024-11-08 PROCEDURE — 3078F DIAST BP <80 MM HG: CPT | Performed by: FAMILY MEDICINE

## 2024-11-08 RX ORDER — CLARITHROMYCIN 500 MG/1
500 TABLET ORAL 2 TIMES DAILY
Qty: 20 TABLET | Refills: 0 | Status: SHIPPED | OUTPATIENT
Start: 2024-11-08 | End: 2024-11-18

## 2024-11-08 ASSESSMENT — ENCOUNTER SYMPTOMS
SINUS PAIN: 1
GASTROINTESTINAL NEGATIVE: 1
CONSTITUTIONAL NEGATIVE: 1
RESPIRATORY NEGATIVE: 1
CARDIOVASCULAR NEGATIVE: 1
EYES NEGATIVE: 1

## 2024-11-08 ASSESSMENT — FIBROSIS 4 INDEX: FIB4 SCORE: 2.26

## 2024-11-08 NOTE — PROGRESS NOTES
"Subjective:   Cyndee Calvert is a 59 y.o. female who presents for Sinus Problem (sinus infection on left side keeps smelling something sour but only on left nostril, same side sometimes congested, tenderness on the (L) side of nose, slight headaches in the mornings and evenings  X couple of weeks )      Sinus Problem  Associated symptoms include congestion.       Review of Systems   Constitutional: Negative.    HENT:  Positive for congestion and sinus pain.    Eyes: Negative.    Respiratory: Negative.     Cardiovascular: Negative.    Gastrointestinal: Negative.    Genitourinary: Negative.        Medications, Allergies, and current problem list reviewed today in Epic.     Objective:     BP 98/62 (BP Location: Right arm, Patient Position: Sitting, BP Cuff Size: Large adult)   Pulse (!) 56   Temp 36.1 °C (97 °F) (Temporal)   Resp 16   Ht 1.702 m (5' 7\")   Wt (!) 129 kg (284 lb)   SpO2 100%     Physical Exam  Vitals and nursing note reviewed.   Constitutional:       Appearance: Normal appearance.   HENT:      Head: Normocephalic and atraumatic.      Right Ear: Tympanic membrane normal.      Left Ear: Tympanic membrane normal.      Nose: Congestion present.      Comments: Tenderness left side, swelling of the turbinates, odor  Eyes:      Extraocular Movements: Extraocular movements intact.      Pupils: Pupils are equal, round, and reactive to light.   Cardiovascular:      Rate and Rhythm: Normal rate and regular rhythm.      Pulses: Normal pulses.      Heart sounds: Normal heart sounds.   Pulmonary:      Effort: Pulmonary effort is normal.      Breath sounds: Normal breath sounds.   Abdominal:      General: Abdomen is flat. Bowel sounds are normal.      Palpations: Abdomen is soft.   Musculoskeletal:      Cervical back: Normal range of motion and neck supple.   Neurological:      Mental Status: She is alert.         Assessment/Plan:     Diagnosis and associated orders:     1. Acute non-recurrent frontal " sinusitis  clarithromycin (BIAXIN) 500 MG Tab         Comments/MDM:     Jade pot, afrin nasal spray, sudafed         Differential diagnosis, natural history, supportive care, and indications for immediate follow-up discussed.    Advised the patient to follow-up with the primary care physician for recheck, reevaluation, and consideration of further management.    Please note that this dictation was created using voice recognition software. I have made a reasonable attempt to correct obvious errors, but I expect that there are errors of grammar and possibly content that I did not discover before finalizing the note.    This note was electronically signed by Ayad Montiel M.D.

## 2024-11-17 NOTE — PROCEDURES
This is an overnight oximetry study performed on October 8, 2024 for duration of 7 hours and 41 minutes on 1LPM O2.     Basal SpO2 was 89.7%.  Total time spent below saturation of 88% was 85.7 minutes. There are areas of clustered desaturations suggestive of apneic events. Treatment of nocturnal hypoxemia appears inadequate, consider formal polysomnogram.

## 2024-11-20 PROCEDURE — RXMED WILLOW AMBULATORY MEDICATION CHARGE: Performed by: INTERNAL MEDICINE

## 2024-11-22 ENCOUNTER — HOSPITAL ENCOUNTER (OUTPATIENT)
Dept: LAB | Facility: MEDICAL CENTER | Age: 59
End: 2024-11-22
Attending: FAMILY MEDICINE
Payer: COMMERCIAL

## 2024-11-22 ENCOUNTER — PHARMACY VISIT (OUTPATIENT)
Dept: PHARMACY | Facility: MEDICAL CENTER | Age: 59
End: 2024-11-22
Payer: COMMERCIAL

## 2024-11-22 DIAGNOSIS — R80.9 MICROALBUMINURIA DUE TO TYPE 2 DIABETES MELLITUS (HCC): ICD-10-CM

## 2024-11-22 DIAGNOSIS — E11.29 MICROALBUMINURIA DUE TO TYPE 2 DIABETES MELLITUS (HCC): ICD-10-CM

## 2024-11-22 DIAGNOSIS — N18.31 STAGE 3A CHRONIC KIDNEY DISEASE: ICD-10-CM

## 2024-11-22 DIAGNOSIS — D64.9 ANEMIA, UNSPECIFIED TYPE: ICD-10-CM

## 2024-11-22 DIAGNOSIS — E21.1 HYPERPARATHYROIDISM DUE TO VITAMIN D DEFICIENCY (HCC): ICD-10-CM

## 2024-11-22 LAB
25(OH)D3 SERPL-MCNC: 40 NG/ML (ref 30–100)
ALBUMIN SERPL BCP-MCNC: 3.9 G/DL (ref 3.2–4.9)
ALBUMIN/GLOB SERPL: 1 G/DL
ALP SERPL-CCNC: 92 U/L (ref 30–99)
ALT SERPL-CCNC: 43 U/L (ref 2–50)
ANION GAP SERPL CALC-SCNC: 12 MMOL/L (ref 7–16)
ANION GAP SERPL CALC-SCNC: 13 MMOL/L (ref 7–16)
AST SERPL-CCNC: 54 U/L (ref 12–45)
BILIRUB SERPL-MCNC: 0.8 MG/DL (ref 0.1–1.5)
BUN SERPL-MCNC: 27 MG/DL (ref 8–22)
BUN SERPL-MCNC: 27 MG/DL (ref 8–22)
CALCIUM ALBUM COR SERPL-MCNC: 8.9 MG/DL (ref 8.5–10.5)
CALCIUM SERPL-MCNC: 8.8 MG/DL (ref 8.5–10.5)
CALCIUM SERPL-MCNC: 8.8 MG/DL (ref 8.5–10.5)
CHLORIDE SERPL-SCNC: 105 MMOL/L (ref 96–112)
CHLORIDE SERPL-SCNC: 105 MMOL/L (ref 96–112)
CHOLEST SERPL-MCNC: 145 MG/DL (ref 100–199)
CO2 SERPL-SCNC: 21 MMOL/L (ref 20–33)
CO2 SERPL-SCNC: 22 MMOL/L (ref 20–33)
CREAT SERPL-MCNC: 1.39 MG/DL (ref 0.5–1.4)
CREAT SERPL-MCNC: 1.41 MG/DL (ref 0.5–1.4)
ERYTHROCYTE [DISTWIDTH] IN BLOOD BY AUTOMATED COUNT: 47.2 FL (ref 35.9–50)
EST. AVERAGE GLUCOSE BLD GHB EST-MCNC: 157 MG/DL
GFR SERPLBLD CREATININE-BSD FMLA CKD-EPI: 42 ML/MIN/1.73 M 2
GFR SERPLBLD CREATININE-BSD FMLA CKD-EPI: 43 ML/MIN/1.73 M 2
GLOBULIN SER CALC-MCNC: 3.9 G/DL (ref 1.9–3.5)
GLUCOSE SERPL-MCNC: 90 MG/DL (ref 65–99)
GLUCOSE SERPL-MCNC: 92 MG/DL (ref 65–99)
HBA1C MFR BLD: 7.1 % (ref 4–5.6)
HCT VFR BLD AUTO: 39.7 % (ref 37–47)
HDLC SERPL-MCNC: 47 MG/DL
HGB BLD-MCNC: 12.6 G/DL (ref 12–16)
LDLC SERPL CALC-MCNC: 83 MG/DL
LIPASE SERPL-CCNC: 122 U/L (ref 11–82)
MCH RBC QN AUTO: 29.8 PG (ref 27–33)
MCHC RBC AUTO-ENTMCNC: 31.7 G/DL (ref 32.2–35.5)
MCV RBC AUTO: 93.9 FL (ref 81.4–97.8)
PLATELET # BLD AUTO: 157 K/UL (ref 164–446)
PMV BLD AUTO: 12 FL (ref 9–12.9)
POTASSIUM SERPL-SCNC: 4.4 MMOL/L (ref 3.6–5.5)
POTASSIUM SERPL-SCNC: 4.4 MMOL/L (ref 3.6–5.5)
PROT SERPL-MCNC: 7.8 G/DL (ref 6–8.2)
PTH-INTACT SERPL-MCNC: 239 PG/ML (ref 14–72)
RBC # BLD AUTO: 4.23 M/UL (ref 4.2–5.4)
SODIUM SERPL-SCNC: 139 MMOL/L (ref 135–145)
SODIUM SERPL-SCNC: 139 MMOL/L (ref 135–145)
TRIGL SERPL-MCNC: 74 MG/DL (ref 0–149)
WBC # BLD AUTO: 6.9 K/UL (ref 4.8–10.8)

## 2024-11-22 PROCEDURE — 83690 ASSAY OF LIPASE: CPT

## 2024-11-22 PROCEDURE — 80053 COMPREHEN METABOLIC PANEL: CPT

## 2024-11-22 PROCEDURE — 82306 VITAMIN D 25 HYDROXY: CPT

## 2024-11-22 PROCEDURE — 82043 UR ALBUMIN QUANTITATIVE: CPT

## 2024-11-22 PROCEDURE — 83036 HEMOGLOBIN GLYCOSYLATED A1C: CPT

## 2024-11-22 PROCEDURE — 83970 ASSAY OF PARATHORMONE: CPT

## 2024-11-22 PROCEDURE — 80048 BASIC METABOLIC PNL TOTAL CA: CPT

## 2024-11-22 PROCEDURE — 36415 COLL VENOUS BLD VENIPUNCTURE: CPT

## 2024-11-22 PROCEDURE — 85027 COMPLETE CBC AUTOMATED: CPT

## 2024-11-22 PROCEDURE — 80061 LIPID PANEL: CPT

## 2024-11-22 PROCEDURE — 82570 ASSAY OF URINE CREATININE: CPT

## 2024-11-23 LAB
CREAT UR-MCNC: 179.58 MG/DL
MICROALBUMIN UR-MCNC: 4.1 MG/DL
MICROALBUMIN/CREAT UR: 23 MG/G (ref 0–30)

## 2024-11-27 ENCOUNTER — OFFICE VISIT (OUTPATIENT)
Dept: NEPHROLOGY | Facility: MEDICAL CENTER | Age: 59
End: 2024-11-27
Payer: COMMERCIAL

## 2024-11-27 VITALS
DIASTOLIC BLOOD PRESSURE: 80 MMHG | BODY MASS INDEX: 45.2 KG/M2 | OXYGEN SATURATION: 99 % | SYSTOLIC BLOOD PRESSURE: 124 MMHG | HEART RATE: 62 BPM | HEIGHT: 67 IN | WEIGHT: 288 LBS | TEMPERATURE: 97.2 F

## 2024-11-27 DIAGNOSIS — N18.32 STAGE 3B CHRONIC KIDNEY DISEASE: ICD-10-CM

## 2024-11-27 DIAGNOSIS — I10 PRIMARY HYPERTENSION: ICD-10-CM

## 2024-11-27 DIAGNOSIS — R80.9 MICROALBUMINURIA DUE TO TYPE 2 DIABETES MELLITUS (HCC): ICD-10-CM

## 2024-11-27 DIAGNOSIS — E21.1 HYPERPARATHYROIDISM DUE TO VITAMIN D DEFICIENCY (HCC): ICD-10-CM

## 2024-11-27 DIAGNOSIS — D64.9 ANEMIA, UNSPECIFIED TYPE: ICD-10-CM

## 2024-11-27 DIAGNOSIS — E11.29 MICROALBUMINURIA DUE TO TYPE 2 DIABETES MELLITUS (HCC): ICD-10-CM

## 2024-11-27 DIAGNOSIS — E55.9 VITAMIN D DEFICIENCY: ICD-10-CM

## 2024-11-27 ASSESSMENT — ENCOUNTER SYMPTOMS
WHEEZING: 0
VOMITING: 0
HEMOPTYSIS: 0
DIARRHEA: 0
PALPITATIONS: 0
NAUSEA: 0
COUGH: 0
WEIGHT LOSS: 0
SHORTNESS OF BREATH: 0
FLANK PAIN: 0
CHILLS: 0
SINUS PAIN: 0
FEVER: 0
ORTHOPNEA: 0
ABDOMINAL PAIN: 0
EYES NEGATIVE: 1

## 2024-11-27 ASSESSMENT — FIBROSIS 4 INDEX: FIB4 SCORE: 3.2

## 2024-11-27 NOTE — PATIENT INSTRUCTIONS
Continue current treatment  Keep well hydrated  Monitor BP  Low salt diet  Ergocalciferol 50 000 units every other week  F/u with Urology

## 2024-11-27 NOTE — PROGRESS NOTES
Subjective     Cyndee Calvert is a 59 y.o. female who presents with Chronic Kidney Disease            HPI  yCndee is coming today for f/u of CKD IIIb  Long term hx/of Nephrolithiasis, s/p ureteral stenting -f/u with urology,   Long term hx/of HTN, DM II  Doing well, no complaints  No dysuria/hematuria/flank pain  HTN: BP well controlled -monitored at home  CKD: baseline creat level at 1.4-1.5- stable  Anemia: Hb stable at normal range  Review of Systems   Constitutional:  Negative for chills, fever, malaise/fatigue and weight loss.   HENT:  Negative for congestion, hearing loss and sinus pain.    Eyes: Negative.    Respiratory:  Negative for cough, hemoptysis, shortness of breath and wheezing.    Cardiovascular:  Negative for chest pain, palpitations, orthopnea and leg swelling.   Gastrointestinal:  Negative for abdominal pain, diarrhea, nausea and vomiting.   Genitourinary:  Negative for dysuria, flank pain, frequency, hematuria and urgency.   Skin: Negative.    All other systems reviewed and are negative.      Past Medical History:   Diagnosis Date    Allergies     Anemia     Breath shortness 03/2021    After having covid    Cataract 2/2023    Surgery 4/13. And 4/24/ 2023    Diabetes (HCC)     High cholesterol     Hypertension     Oxygen dependent     .5-1 L as needed. wears consistently at night    Pneumonia 3/2021    Had pneumonia  and covid       Family History   Problem Relation Age of Onset    Heart Disease Mother     Hypertension Mother     Hyperlipidemia Mother     Heart Attack Father     Heart Disease Father     Stroke Father     Heart Failure Brother     Cancer Brother     Heart Disease Brother     Heart Disease Paternal Grandmother     Stroke Paternal Grandmother     Glaucoma Paternal Grandmother        Social History     Socioeconomic History    Marital status: Single   Tobacco Use    Smoking status: Never     Passive exposure: Never    Smokeless tobacco: Never   Vaping Use    Vaping  "status: Never Used   Substance and Sexual Activity    Alcohol use: No    Drug use: No            Objective     /80 (BP Location: Right arm, Patient Position: Sitting, BP Cuff Size: Large adult)   Pulse 62   Temp 36.2 °C (97.2 °F) (Temporal)   Ht 1.702 m (5' 7\")   Wt (!) 131 kg (288 lb)   LMP 04/19/2003   SpO2 99%   BMI 45.11 kg/m²      Physical Exam  Vitals reviewed.   Constitutional:       General: She is not in acute distress.     Appearance: Normal appearance. She is well-developed. She is not diaphoretic.   HENT:      Head: Normocephalic and atraumatic.      Nose: Nose normal.      Mouth/Throat:      Mouth: Mucous membranes are moist.      Pharynx: Oropharynx is clear.   Eyes:      Extraocular Movements: Extraocular movements intact.      Conjunctiva/sclera: Conjunctivae normal.      Pupils: Pupils are equal, round, and reactive to light.   Cardiovascular:      Rate and Rhythm: Normal rate and regular rhythm.      Pulses: Normal pulses.      Heart sounds: Normal heart sounds.   Pulmonary:      Effort: Pulmonary effort is normal. No respiratory distress.      Breath sounds: Normal breath sounds. No wheezing or rales.   Abdominal:      General: Bowel sounds are normal. There is no distension.      Palpations: Abdomen is soft. There is no mass.      Tenderness: There is no abdominal tenderness. There is no right CVA tenderness or left CVA tenderness.   Musculoskeletal:      Cervical back: Normal range of motion and neck supple.      Right lower leg: No edema.      Left lower leg: No edema.   Skin:     General: Skin is warm.      Coloration: Skin is not pale.      Findings: No erythema or rash.   Neurological:      General: No focal deficit present.      Mental Status: She is alert and oriented to person, place, and time.      Cranial Nerves: No cranial nerve deficit.      Coordination: Coordination normal.   Psychiatric:         Mood and Affect: Mood normal.         Behavior: Behavior normal.         " Thought Content: Thought content normal.         Judgment: Judgment normal.          Laboratory results/imaging reviewed: d/w Pt   Latest Reference Range & Units 08/15/24 11:35 08/15/24 11:38 11/22/24 12:23 11/22/24 12:24   WBC 4.8 - 10.8 K/uL   6.9    RBC 4.20 - 5.40 M/uL   4.23    Hemoglobin 12.0 - 16.0 g/dL   12.6    Hematocrit 37.0 - 47.0 %   39.7    MCV 81.4 - 97.8 fL   93.9    MCH 27.0 - 33.0 pg   29.8    MCHC 32.2 - 35.5 g/dL   31.7 (L)    RDW 35.9 - 50.0 fL   47.2    Platelet Count 164 - 446 K/uL   157 (L)    MPV 9.0 - 12.9 fL   12.0    Sodium 135 - 145 mmol/L  139 139 139   Potassium 3.6 - 5.5 mmol/L  3.9 4.4 4.4   Chloride 96 - 112 mmol/L  106 105 105   Co2 20 - 33 mmol/L  21 21 22   Anion Gap 7.0 - 16.0   12.0 13.0 12.0   Glucose 65 - 99 mg/dL  116 (H) 92 90   Bun 8 - 22 mg/dL  18 27 (H) 27 (H)   Creatinine 0.50 - 1.40 mg/dL  1.25 1.39 1.41 (H)   GFR (CKD-EPI) >60 mL/min/1.73 m 2  49 ! 43 ! 42 !   Calcium 8.5 - 10.5 mg/dL  8.8 8.8 8.8   Correct Calcium 8.5 - 10.5 mg/dL    8.9   AST(SGOT) 12 - 45 U/L 44   54 (H)   ALT(SGPT) 2 - 50 U/L 42   43   Alkaline Phosphatase 30 - 99 U/L 90   92   Total Bilirubin 0.1 - 1.5 mg/dL 0.8   0.8   Direct Bilirubin 0.1 - 0.5 mg/dL <0.2      Indirect Bilirubin 0.0 - 1.0 mg/dL see below      Albumin 3.2 - 4.9 g/dL 3.5   3.9   Total Protein 6.0 - 8.2 g/dL 8.0   7.8   Globulin 1.9 - 3.5 g/dL    3.9 (H)   A-G Ratio g/dL    1.0   Lipase 11 - 82 U/L 138 (H)   122 (H)   Gamma Gt 7 - 34 U/L 46 (H)      Glycohemoglobin 4.0 - 5.6 % 6.8 (H)   7.1 (H)   Estim. Avg Glu mg/dL 148   157   Fasting Status  Fasting      Cholesterol,Tot 100 - 199 mg/dL 121   145   Triglycerides 0 - 149 mg/dL 95   74   HDL >=40 mg/dL 31 !   47   LDL <100 mg/dL 71   83   Micro Alb Creat Ratio 0 - 30 mg/g   23    Creatinine, Urine mg/dL   179.58    Microalbumin, Urine Random mg/dL   4.1    25-Hydroxy   Vitamin D 25 30 - 100 ng/mL   40    (L): Data is abnormally low  (H): Data is abnormally high  !: Data is  abnormal      CT renal  IMPRESSION:     No evidence of  urinary tract calculus or hydronephrosis.  No evidence of peritoneal inflammation.  MN parathyroid -sestamibi    IMPRESSION:     No evidence of parathyroid adenoma.      Assessment & Plan        1. Stage 3b chronic kidney disease       Creat level stable at baseline -to monitor      Keep well hydrated      2. Anemia, unspecified type     Hb stable at normal range    3. Primary hypertension      BP well controlled -to montior at home      4. Vitamin D deficiency      Low vit D level improving continue Ergocalciferol 50 000 units every other week    5. Microalbuminuria due to type 2 diabetes mellitus (HCC)      negative    6. Hyperparathyroidism (HCC)      Nuclear scan negative for adenoma      Elevated PTH improving      Calcium at normal range      Recs:    Continue current treatment  Keep well hydrated  Monitor BP  Low salt diet  Ergocalciferol 50 000 units every other week  F/u with Urology  F/u here in 4 months

## 2024-12-16 ENCOUNTER — TELEPHONE (OUTPATIENT)
Dept: VASCULAR LAB | Facility: MEDICAL CENTER | Age: 59
End: 2024-12-16
Payer: COMMERCIAL

## 2024-12-16 DIAGNOSIS — I10 PRIMARY HYPERTENSION: ICD-10-CM

## 2024-12-16 DIAGNOSIS — I47.10 SVT (SUPRAVENTRICULAR TACHYCARDIA) (HCC): ICD-10-CM

## 2024-12-16 DIAGNOSIS — E11.628 TYPE 2 DIABETES MELLITUS WITH OTHER SKIN COMPLICATION, WITHOUT LONG-TERM CURRENT USE OF INSULIN (HCC): ICD-10-CM

## 2024-12-16 DIAGNOSIS — E78.5 DYSLIPIDEMIA: ICD-10-CM

## 2024-12-16 NOTE — TELEPHONE ENCOUNTER
Established patient  Chart prep for upcoming appointment.    Any pending/incomplete orders from last visit? No, all orders completed.  Was patient called and reminded to complete pending orders? N/A orders complete  Were any records requested?  No    Referral up to date? Yes renewal ordered, sent to provider to co-sign, AND new referral attached to upcoming appointment.    Referral attached to appointment (renewals and New patients only)? Yes renewal ordered and sent to provider to co-sign   Virtual appointment? No    [facility]  [facility]  Ph. [phone]  Fx. [phone]

## 2024-12-23 ENCOUNTER — OFFICE VISIT (OUTPATIENT)
Dept: VASCULAR LAB | Facility: MEDICAL CENTER | Age: 59
End: 2024-12-23
Attending: INTERNAL MEDICINE
Payer: COMMERCIAL

## 2024-12-23 VITALS
HEIGHT: 67 IN | DIASTOLIC BLOOD PRESSURE: 80 MMHG | HEART RATE: 75 BPM | BODY MASS INDEX: 44.89 KG/M2 | SYSTOLIC BLOOD PRESSURE: 122 MMHG | WEIGHT: 286 LBS

## 2024-12-23 DIAGNOSIS — I47.10 SVT (SUPRAVENTRICULAR TACHYCARDIA) (HCC): ICD-10-CM

## 2024-12-23 DIAGNOSIS — I10 PRIMARY HYPERTENSION: ICD-10-CM

## 2024-12-23 DIAGNOSIS — E11.628 TYPE 2 DIABETES MELLITUS WITH OTHER SKIN COMPLICATION, WITHOUT LONG-TERM CURRENT USE OF INSULIN (HCC): ICD-10-CM

## 2024-12-23 DIAGNOSIS — I10 WHITE COAT SYNDROME WITH DIAGNOSIS OF HYPERTENSION: ICD-10-CM

## 2024-12-23 DIAGNOSIS — E78.5 DYSLIPIDEMIA: ICD-10-CM

## 2024-12-23 PROCEDURE — 3079F DIAST BP 80-89 MM HG: CPT | Performed by: INTERNAL MEDICINE

## 2024-12-23 PROCEDURE — 99212 OFFICE O/P EST SF 10 MIN: CPT

## 2024-12-23 PROCEDURE — 99214 OFFICE O/P EST MOD 30 MIN: CPT | Performed by: INTERNAL MEDICINE

## 2024-12-23 PROCEDURE — 3074F SYST BP LT 130 MM HG: CPT | Performed by: INTERNAL MEDICINE

## 2024-12-23 ASSESSMENT — FIBROSIS 4 INDEX: FIB4 SCORE: 3.2

## 2024-12-23 NOTE — PROGRESS NOTES
VASCULAR MEDICINE CLINIC - Follow up VISIT  12/23/24      Cyndee Calvert is a 58 y.o. female    Referring provider: Yanni Chatterjee, *    Subjective      HPI:   Here for follow-up of labile hypertension in setting of htn, dm, lymphedema, and obesity.  Patient with complicated medical history as outlined below    Lymphedema and lipedema  Long history  Has seen our lymphedema physical therapist routinely in the past  Uses stockings and pumps every day  Swelling is well-controlled  No skin breakdown    Complicated rhythm disturbance  Patient with long history of irregular heartbeat  She sees cardiology regularly  She has never been diagnosed with atrial fibrillation  She has ventricular trigeminy on her most recent ECG  Previous rhythm monitor suggested rare supraventricular ectopy but frequent ventricular ectopy  She states she has no palpitations since being on carvedilol for many years  Denies history of heart failure  She has a follow-up scheduled with cardiology    Dyslipidemia  Denies known history of coronary disease  Has been on atorvastatin for many years  Remains on 80 mg atorvastatin  No myalgias  Started on ezetimibe since last visit    Hypertension/ckd  Patient states that she has had high blood pressure for decades  She had many years of poor control  She has had fluctuating renal function  She denies use of NSAIDs  She recently established with nephrology.  Her renal functions improved.  They made no changes  She stopped spironolactone due to relative hyperkalemia and worsening renal function  She is back on a low-dose of hydrochlorothiazide  She has been on carvedilol and losartan for many years  BPs at home mostly 130s/80s - a bit higher than previous  Does feel a bit of fatigue when low  No symptoms when high    Diabetes  Has had diabetes since her 30s  Was on metformin for many years  Feels that she was likely poor control for quite some time  She is now following up with her  "primary at the [facility] clinic on a regular basis  Her A1c was as high as 13 just a few months ago  Now significantly improved  She states she was told to stop metformin due to her renal function  She stopped Victoza because of concerns that it may have been contributing to elevated lipase  She takes Jardiance without side effect  She is on a stable dose of insulin  Denies hypoglycemia    Chronic lung disease  She states she needs to take oxygen because of a couple bad COVID infections  She does see pulmonary regularly  She on higher dose of o2 at home at night  Going to be scheduled for sleep study.     Social History     Tobacco Use    Smoking status: Never     Passive exposure: Never    Smokeless tobacco: Never   Vaping Use    Vaping status: Never Used   Substance Use Topics    Alcohol use: No    Drug use: No     DIET AND EXERCISE:  Weight Change: She is lost 15 pounds on victoza, but has gained back a few since stopping - now relatively stable  Diet: Try to follow a diabetic diet  Exercise:  Doing some walking          Objective    Vitals:    12/23/24 1332 12/23/24 1336   BP: 133/81 122/80   BP Location: Left arm Left arm   Patient Position: Sitting Sitting   BP Cuff Size: Large adult Large adult   Pulse: 82 75   Weight: (!) 130 kg (286 lb)    Height: 1.702 m (5' 7\")         BP Readings from Last 4 Encounters:   12/23/24 122/80   11/27/24 124/80   11/08/24 98/62   10/28/24 114/66      Body mass index is 44.79 kg/m².   Wt Readings from Last 4 Encounters:   12/23/24 (!) 130 kg (286 lb)   11/27/24 (!) 131 kg (288 lb)   11/08/24 (!) 129 kg (284 lb)   10/28/24 (!) 129 kg (284 lb)      Physical Exam  Vitals reviewed.   Constitutional:       General: She is not in acute distress.     Appearance: She is not diaphoretic.   HENT:      Head: Normocephalic and atraumatic.   Eyes:      General: No scleral icterus.     Conjunctiva/sclera: Conjunctivae normal.   Neck:      Vascular: No carotid bruit.   Cardiovascular:      Rate " "and Rhythm: Normal rate. Rhythm irregular.      Heart sounds: Normal heart sounds. No murmur heard.     Comments: regularly irregular rhythm  Pulmonary:      Effort: Pulmonary effort is normal. No respiratory distress.      Breath sounds: Normal breath sounds. No wheezing or rales.   Musculoskeletal:         General: Swelling present.      Comments: Significant leg edema but no true edema or pitting   Skin:     Coloration: Skin is not pale.   Neurological:      General: No focal deficit present.      Mental Status: She is alert and oriented to person, place, and time.      Cranial Nerves: No cranial nerve deficit.      Coordination: Coordination normal.      Gait: Gait is intact. Gait normal.   Psychiatric:         Mood and Affect: Mood and affect normal.         Behavior: Behavior normal.        DATA REVIEW    Lab Results   Component Value Date/Time    CHOLSTRLTOT 145 11/22/2024 12:24 PM    LDL 83 11/22/2024 12:24 PM    HDL 47 11/22/2024 12:24 PM    TRIGLYCERIDE 74 11/22/2024 12:24 PM       No results found for: \"LIPOPROTA\"   No results found for: \"APOB\"   No results found for: \"CRPHIGHSEN\"     Lab Results   Component Value Date/Time    SODIUM 139 11/22/2024 12:24 PM    POTASSIUM 4.4 11/22/2024 12:24 PM    CHLORIDE 105 11/22/2024 12:24 PM    CO2 22 11/22/2024 12:24 PM    GLUCOSE 90 11/22/2024 12:24 PM    BUN 27 (H) 11/22/2024 12:24 PM    CREATININE 1.41 (H) 11/22/2024 12:24 PM    CREATININE 1.2 09/18/2008 03:57 AM     Lab Results   Component Value Date/Time    ALKPHOSPHAT 92 11/22/2024 12:24 PM    ASTSGOT 54 (H) 11/22/2024 12:24 PM    ALTSGPT 43 11/22/2024 12:24 PM    TBILIRUBIN 0.8 11/22/2024 12:24 PM       Lab Results   Component Value Date/Time    HBA1C 7.1 (H) 11/22/2024 12:24 PM       Lab Results   Component Value Date/Time    MALBCRT 23 11/22/2024 12:23 PM    MICROALBUR 4.1 11/22/2024 12:23 PM     TSH 1.7  Aldosterone 13.1  PRA 1.3  Plasma free metanephrines normal      Additional blood work February " 2024  Glucose 152, A1c 13.7  GFR 37, sodium 141, potassium 5.4  Blood pressure 160, triglycerides 80, HDL 41,   Albumin creatinine ratio in the urine is 6    Cardiovascular Imaging:    Heart rhythm monitor July 2022  1.  No sustained rob or tachyarrhythmias were detected.  2.  Very rare PACs and very rare PVCs were detected.  3.  There were 11 brief episodes of NSVT detected  4.  There were 3 patient triggered events recorded. Two episodes correlated with normal sinus rhythm.  One episode correlated with bigeminal PVCs.     MPI July 2022   No scintigraphic evidence of significant prior infarct or active ischemia.   Normal left ventricular systolic function.   Normal TID ratio.   ECG INTERPRETATION   Non-diagnostic due to use of pharmacological stress agent.    Echocardiogram August 2022  Normal left ventricular systolic function.  The left ventricular ejection fraction is visually estimated to be 55-  60%.  Mild concentric left ventricular hypertrophy.  Grade I diastolic dysfunction.  The right ventricle is normal in size and systolic function.  Estimated right ventricular systolic pressure is 27 mmHg.  The left atrium is normal in size.  Trace to mild eccentric mitral regurgitation.  Normal inferior vena cava size and inspiratory collapse.    ECG April 2024  Sinus rhythm   Ventricular trigeminy   Probable left atrial enlargement   Nonspecific T abnormalities, lateral leads   Baseline wander in lead(s) V1   Compared to ECG 07/01/2022 07:53:17   Ventricular premature complex(es) now present   T-wave abnormality now present       Medical Decision Making:  Today's Assessment / Status / Plan:     1. Type 2 diabetes mellitus with other skin complication, without long-term current use of insulin ([facility])        2. Dyslipidemia        3. Primary hypertension        4. SVT (supraventricular tachycardia) ([facility])        5. White coat syndrome with diagnosis of hypertension  Referral to 24-Hour Blood Pressure Monitoring          Etiology of Established CVD if Present:     Lymphedema/lipedema -her lymphedema seems exceptionally well-controlled and her leg edema appears stable.  She should continue with aggressive lifestyle modification including use of her pumps and stockings and let us know if she has any worsening of symptoms    2. Complicated rhythm disturbance -appears to be mostly ventricular ectopy.  Patient asymptomatic.  No evidence of structural heart disease on echo.  No obvious ischemia on previous MPI.  Will defer further workup and management to cardiology    LIPID MANAGEMENT  Qualifies for Statin Therapy Based on 2018 ACC/AHA Guidelines: yes, Diabetes  Major ASCVD events: None    High-risk conditions: Diabetes   Risk-enhancers: Metabolic syndrome and family history  Currently on Statin: Yes  Tx goals: LDL-C <100 and non-HDL is 130   At goal?  Yes  Patient also with family history of premature ASCVD in father  Persistently low HDL, and diabetes  Plan:   -Continue atorvastatin 80 mg daily  -Continue ezetimibe 10 mg a day  -Recheck fasting lipid panel and lipoprotein a and a apolipoprotein B in the future    BLOOD PRESSURE MANAGEMENT  BP Goal ACC/AHA (2017) goal <130/80  Home BP at goal: No - c/w masked hypertension  Office BP at goal:  yes  24h ABPM: Ordered today  RDN candidate? NO  Contributing factors: ckd, dm, arrythmia  Patient does have considerable blood pressure lability  I suspect a good part of her lability is due to autonomic neuropathy due to diabetes  I do think some of her lower readings are likely artifact due to her significant arrhythmia  Spironolactone discontinued due to hyperkalemia  Previous home readings suggest good control but have elevated more recently  Office readings under better control than previous  No evidence of primary aldosteronism, pheochromocytoma, thyroid disease  Plan:   Monitoring:   - continue home BP monitoring and bring in written log again  -Follow-up with nephrology  -Check  ABPM  Medications:  -Continue losartan 100 mg a day  -Continue carvedilol 12.5 mg twice daily for both blood pressure and rhythm pending any further recommendations from cardiology  -Continue hydrochlorothiazide 12.5 mg daily for now although if her ambulatory blood pressures are elevated we may want to switch her to either chlorthalidone or loop diuretic given her renal function  -Avoid spironolactone given history of hyperkalemia  -Avoid dihydropyridine calcium channel blockers due to risk of worsening leg swelling  -Could consider peripheral alpha-blocker to reduce sympathetic hyperactivity if blood pressure difficult to control in the future  -Consider renal artery duplex in the future    GLYCEMIC MANAGEMENT Diabetic  Very poorly controlled in the past with A1c greater than 13  Now with improving glycemic control  Victoza recently stopped due to increased lipase  Plan:  -Continue Jardiance  -Continue adjustment of insulin dose per PCP  -Defer follow-up A1c to PCP  -Can consider referral to diabetes pharmacotherapy service here at [facility] if PCP thinks that would be helpful    ANTITHROMBOTIC THERAPY -not currently indicated    LIFESTYLE INTERVENTIONS:    Tobacco Use: Never smoker  - continued complete avoidance of all tobacco products     Physical Activity: Continue walking.  Legs up while seated    Weight Management and/or Nutrition: She had lost 15 pounds while taking Victoza, but gained most of it back when she stopped.  She is now stabilized her weight.  She to concentrate on good diabetic diet and slow steady weight loss.    OTHER:     -CKD apparently with fluctuating renal function but most recently consistent with stage IIIb -most likely diabetic nephropathy although lack of albuminuria is interesting.  Most recent renal function improved.  Continue ARB and blood pressure control as above.  Will defer further workup management and surveillance to nephrology.  Consider renal artery duplex in the future if  not already performed    - Chronic lung disease status post COVID pneumonia  -continue home O2 at night.  Await sleep study as ordered by pulmonary.  Otherwise.  Defer further workup and management to pulmonary/sleep    Studies to Be Obtained: ABPM  Labs to Be Obtained: Will defer to other providers at present    Follow up in: 2 months    Michael J Bloch, M.D.   [facility]  [facility]  [phone]    Cc:   [address]

## 2024-12-27 ENCOUNTER — TELEPHONE (OUTPATIENT)
Dept: HEALTH INFORMATION MANAGEMENT | Facility: OTHER | Age: 59
End: 2024-12-27
Payer: COMMERCIAL

## 2025-01-20 ENCOUNTER — NON-PROVIDER VISIT (OUTPATIENT)
Dept: VASCULAR LAB | Facility: MEDICAL CENTER | Age: 60
End: 2025-01-20
Attending: INTERNAL MEDICINE
Payer: COMMERCIAL

## 2025-01-20 VITALS
SYSTOLIC BLOOD PRESSURE: 161 MMHG | HEIGHT: 67 IN | WEIGHT: 288 LBS | HEART RATE: 78 BPM | BODY MASS INDEX: 45.2 KG/M2 | DIASTOLIC BLOOD PRESSURE: 92 MMHG

## 2025-01-20 PROCEDURE — 93790 AMBL BP MNTR W/SW I&R: CPT | Performed by: INTERNAL MEDICINE

## 2025-01-20 PROCEDURE — 93786 AMBL BP MNTR W/SW REC ONLY: CPT

## 2025-01-20 PROCEDURE — 93788 AMBL BP MNTR W/SW A/R: CPT

## 2025-01-20 ASSESSMENT — FIBROSIS 4 INDEX: FIB4 SCORE: 3.2

## 2025-01-20 NOTE — NON-PROVIDER
ABPM placed on 01/20/25 at 10:59AM.  Used Large Adult Sized cuff    Patient educated on monitor care and procedure.    Patient to return device on 01/21/25 before 1700.    Verbal understanding provided.   [facility]  [facility]  Ph. [phone]  Fx. [phone]

## 2025-01-21 NOTE — PROGRESS NOTES
Ambulatory blood pressure monitor results reviewed  Full report under media tab  Reasonable data acquisition    Mean daytime: 138/72 c/w out of office daytime systolic above acc/aha recommended target    Nocturnal dip: appears well maintained    Clinical correlation needed.  Will discuss potential intensification of therapy with patient at f/u visit    Michael Bloch, MD  [facility]

## 2025-02-18 ENCOUNTER — OFFICE VISIT (OUTPATIENT)
Dept: VASCULAR LAB | Facility: MEDICAL CENTER | Age: 60
End: 2025-02-18
Payer: COMMERCIAL

## 2025-02-18 VITALS
BODY MASS INDEX: 45.72 KG/M2 | DIASTOLIC BLOOD PRESSURE: 78 MMHG | WEIGHT: 291.3 LBS | SYSTOLIC BLOOD PRESSURE: 131 MMHG | HEIGHT: 67 IN

## 2025-02-18 DIAGNOSIS — I10 PRIMARY HYPERTENSION: ICD-10-CM

## 2025-02-18 DIAGNOSIS — I89.0 LYMPHEDEMA: ICD-10-CM

## 2025-02-18 DIAGNOSIS — E78.5 DYSLIPIDEMIA: ICD-10-CM

## 2025-02-18 DIAGNOSIS — E11.628 TYPE 2 DIABETES MELLITUS WITH OTHER SKIN COMPLICATION, WITHOUT LONG-TERM CURRENT USE OF INSULIN (HCC): ICD-10-CM

## 2025-02-18 PROCEDURE — 99212 OFFICE O/P EST SF 10 MIN: CPT

## 2025-02-18 PROCEDURE — 3078F DIAST BP <80 MM HG: CPT

## 2025-02-18 PROCEDURE — 99214 OFFICE O/P EST MOD 30 MIN: CPT

## 2025-02-18 PROCEDURE — 3075F SYST BP GE 130 - 139MM HG: CPT

## 2025-02-18 RX ORDER — VALSARTAN 160 MG/1
160 TABLET ORAL DAILY
Qty: 100 TABLET | Refills: 3 | Status: SHIPPED | OUTPATIENT
Start: 2025-02-18 | End: 2026-03-25

## 2025-02-18 ASSESSMENT — FIBROSIS 4 INDEX: FIB4 SCORE: 3.2

## 2025-02-18 NOTE — PROGRESS NOTES
VASCULAR MEDICINE CLINIC - Follow up VISIT  2/18/2025      Cyndee Calvert is a 59 y.o. female    Referring provider: Yanni Chatterjee, *    Subjective      HPI:   Here for follow-up of labile hypertension in setting of htn, dm, lymphedema, and obesity.  Patient with complicated medical history as outlined below    Lymphedema and lipedema  Long history  Has previously seen our lymphedema physical therapist routinely in the past  Uses stockings and pumps every day  Swelling is well-controlled mostly  Has has some recent increase - thinks from increased standing and being on feet more recently.    No skin breakdown    Complicated rhythm disturbance  Patient with long history of irregular heartbeat  She sees cardiology regularly  She has never been diagnosed with atrial fibrillation  She has ventricular trigeminy on her most recent ECG  Previous rhythm monitor suggested rare supraventricular ectopy but frequent ventricular ectopy  She states she has no palpitations since being on carvedilol for many years  She has a follow-up scheduled with cardiology    Dyslipidemia  Remains on 80 mg atorvastatin  No myalgias  Remains on ezetimibe    Hypertension/ckd  She denies use of NSAIDs  Remains on low-dose of hydrochlorothiazide  She has been on carvedilol and losartan for many years  Previously on valsartan and tolerated   BPs at home mostly 100-170s/070-90s  Did ABPM - reviewed  Follows with nephrology - has upcoming appt      Diabetes  follows with her primary at the [facility] clinic on a regular basis  Now significantly improved  Remains on Jardiance and insulin  Denies hypoglycemia    Chronic lung disease  Follows with pulmonary regularly  She is on higher dose of o2 at home at night      Social History     Tobacco Use    Smoking status: Never     Passive exposure: Never    Smokeless tobacco: Never   Vaping Use    Vaping status: Never Used   Substance Use Topics    Alcohol use: No    Drug use: No     DIET AND  "EXERCISE:  Weight Change: She is lost 15 pounds on victoza, but has gained back a few since stopping - now relatively stable  Diet: Try to follow a diabetic diet  Exercise:  Doing some walking          Objective    Vitals:    02/18/25 1310   BP: 131/78   BP Location: Left arm   Patient Position: Sitting   Weight: (!) 132 kg (291 lb 4.8 oz)   Height: 1.702 m (5' 7\")        BP Readings from Last 4 Encounters:   02/18/25 131/78   01/20/25 (!) 161/92   12/23/24 122/80   11/27/24 124/80      Body mass index is 45.62 kg/m².   Wt Readings from Last 4 Encounters:   02/18/25 (!) 132 kg (291 lb 4.8 oz)   01/20/25 (!) 131 kg (288 lb)   12/23/24 (!) 130 kg (286 lb)   11/27/24 (!) 131 kg (288 lb)      Physical Exam  Vitals reviewed.   Constitutional:       General: She is not in acute distress.     Appearance: She is not diaphoretic.   HENT:      Head: Normocephalic and atraumatic.   Eyes:      General: No scleral icterus.     Conjunctiva/sclera: Conjunctivae normal.   Neck:      Vascular: No carotid bruit.   Cardiovascular:      Rate and Rhythm: Normal rate. Rhythm irregular.      Heart sounds: Normal heart sounds. No murmur heard.     Comments: regularly irregular rhythm  Pulmonary:      Effort: Pulmonary effort is normal. No respiratory distress.      Breath sounds: Normal breath sounds. No wheezing or rales.   Musculoskeletal:         General: Swelling present.      Comments: Significant leg edema but no true edema or pitting   Skin:     Coloration: Skin is not pale.   Neurological:      General: No focal deficit present.      Mental Status: She is alert and oriented to person, place, and time.      Cranial Nerves: No cranial nerve deficit.      Coordination: Coordination normal.      Gait: Gait is intact. Gait normal.   Psychiatric:         Mood and Affect: Mood and affect normal.         Behavior: Behavior normal.        DATA REVIEW    Lab Results   Component Value Date/Time    CHOLSTRLTOT 145 11/22/2024 12:24 PM    LDL 83 " "11/22/2024 12:24 PM    HDL 47 11/22/2024 12:24 PM    TRIGLYCERIDE 74 11/22/2024 12:24 PM       No results found for: \"LIPOPROTA\"   No results found for: \"APOB\"   No results found for: \"CRPHIGHSEN\"     Lab Results   Component Value Date/Time    SODIUM 139 11/22/2024 12:24 PM    POTASSIUM 4.4 11/22/2024 12:24 PM    CHLORIDE 105 11/22/2024 12:24 PM    CO2 22 11/22/2024 12:24 PM    GLUCOSE 90 11/22/2024 12:24 PM    BUN 27 (H) 11/22/2024 12:24 PM    CREATININE 1.41 (H) 11/22/2024 12:24 PM    CREATININE 1.2 09/18/2008 03:57 AM     Lab Results   Component Value Date/Time    ALKPHOSPHAT 92 11/22/2024 12:24 PM    ASTSGOT 54 (H) 11/22/2024 12:24 PM    ALTSGPT 43 11/22/2024 12:24 PM    TBILIRUBIN 0.8 11/22/2024 12:24 PM       Lab Results   Component Value Date/Time    HBA1C 7.1 (H) 11/22/2024 12:24 PM       Lab Results   Component Value Date/Time    MALBCRT 23 11/22/2024 12:23 PM    MICROALBUR 4.1 11/22/2024 12:23 PM     TSH 1.7  Aldosterone 13.1  PRA 1.3  Plasma free metanephrines normal      Additional blood work February 2024  Glucose 152, A1c 13.7  GFR 37, sodium 141, potassium 5.4  Blood pressure 160, triglycerides 80, HDL 41,   Albumin creatinine ratio in the urine is 6    Cardiovascular Imaging:    Heart rhythm monitor July 2022  1.  No sustained rob or tachyarrhythmias were detected.  2.  Very rare PACs and very rare PVCs were detected.  3.  There were 11 brief episodes of NSVT detected  4.  There were 3 patient triggered events recorded. Two episodes correlated with normal sinus rhythm.  One episode correlated with bigeminal PVCs.     MPI July 2022   No scintigraphic evidence of significant prior infarct or active ischemia.   Normal left ventricular systolic function.   Normal TID ratio.   ECG INTERPRETATION   Non-diagnostic due to use of pharmacological stress agent.    Echocardiogram August 2022  Normal left ventricular systolic function.  The left ventricular ejection fraction is visually estimated to be " 55-  60%.  Mild concentric left ventricular hypertrophy.  Grade I diastolic dysfunction.  The right ventricle is normal in size and systolic function.  Estimated right ventricular systolic pressure is 27 mmHg.  The left atrium is normal in size.  Trace to mild eccentric mitral regurgitation.  Normal inferior vena cava size and inspiratory collapse.    ECG April 2024  Sinus rhythm   Ventricular trigeminy   Probable left atrial enlargement   Nonspecific T abnormalities, lateral leads   Baseline wander in lead(s) V1   Compared to ECG 07/01/2022 07:53:17   Ventricular premature complex(es) now present   T-wave abnormality now present     ABPM 1/2025  Mean daytime: 138/72 c/w out of office daytime systolic above acc/aha recommended target  Nocturnal dip: appears well maintained        Medical Decision Making:  Today's Assessment / Status / Plan:     1. Primary hypertension  valsartan (DIOVAN) 160 MG Tab      2. Type 2 diabetes mellitus with other skin complication, without long-term current use of insulin (HCC)        3. Dyslipidemia        4. Lymphedema             Etiology of Established CVD if Present:     Lymphedema/lipedema -her lymphedema seems exceptionally well-controlled and her leg edema appears stable.  She should continue with aggressive lifestyle modification including use of her pumps and stockings and let us know if she has any worsening of symptoms    2. Complicated rhythm disturbance -appears to be mostly ventricular ectopy.  Patient asymptomatic.  No evidence of structural heart disease on echo.  No obvious ischemia on previous MPI.  Will defer further workup and management to cardiology    LIPID MANAGEMENT  Qualifies for Statin Therapy Based on 2018 ACC/AHA Guidelines: yes, Diabetes  Major ASCVD events: None    High-risk conditions: Diabetes   Risk-enhancers: Metabolic syndrome and family history  Currently on Statin: Yes  Tx goals: LDL-C <100 and non-HDL is 130   At goal?  Yes  Patient also with  family history of premature ASCVD in father  Persistently low HDL, and diabetes  Plan:   -Continue atorvastatin 80 mg daily  -Continue ezetimibe 10 mg a day  -Recheck fasting lipid panel and lipoprotein a and a apolipoprotein B in the future    BLOOD PRESSURE MANAGEMENT  BP Goal ACC/AHA (2017) goal <130/80  Home BP at goal: No - c/w masked hypertension  Office BP at goal:  yes  24h ABPM: 138/72 1/2025  RDN candidate? NO  Contributing factors: ckd, dm, arrythmia  Patient does have considerable blood pressure lability  I suspect a good part of her lability is due to autonomic neuropathy due to diabetes  I do think some of her lower readings are likely artifact due to her significant arrhythmia  Spironolactone discontinued due to hyperkalemia  Previous home readings suggest good control but have elevated more recently  Office readings under better control than previous  No evidence of primary aldosteronism, pheochromocytoma, thyroid disease  Plan:   Monitoring:   - continue home BP monitoring and bring in written log again  -Follow-up with nephrology  -consider recheck ABPM in future  Medications:  -Change losartan 100 mg to valsartan 160mg daily - can increase to 320 mg if bps remain elevated  -Continue carvedilol 12.5 mg twice daily for both blood pressure and rhythm pending any further recommendations from cardiology  -Continue hydrochlorothiazide 12.5 mg daily for now, consider switching her to either chlorthalidone or loop diuretic given her renal function  -Avoid spironolactone given history of hyperkalemia  -Avoid dihydropyridine calcium channel blockers due to risk of worsening leg swelling  -Could consider peripheral alpha-blocker to reduce sympathetic hyperactivity if blood pressure difficult to control in the future  -Consider renal artery duplex in the future    GLYCEMIC MANAGEMENT Diabetic  Very poorly controlled in the past with A1c greater than 13  Now with improving glycemic control  Victoza recently  stopped due to increased lipase  Plan:  -Continue Jardiance  -Continue adjustment of insulin dose per PCP  -Defer follow-up A1c to PCP  -Can consider referral to diabetes pharmacotherapy service here at [facility] if PCP thinks that would be helpful    ANTITHROMBOTIC THERAPY -not currently indicated    LIFESTYLE INTERVENTIONS:    Tobacco Use: Never smoker  - continued complete avoidance of all tobacco products     Physical Activity: Continue walking.  Legs up while seated    Weight Management and/or Nutrition: She had lost 15 pounds while taking Victoza, but gained most of it back when she stopped.  She is now stabilized her weight.  She to concentrate on good diabetic diet and slow steady weight loss.    OTHER:     -CKD apparently with fluctuating renal function but most recently consistent with stage IIIb -most likely diabetic nephropathy although lack of albuminuria is interesting.  Most recent renal function improved.  Continue ARB and blood pressure control as above.  Will defer further workup management and surveillance to nephrology.  Consider renal artery duplex in the future if not already performed    - Chronic lung disease status post COVID pneumonia  -continue home O2 at night.  Await sleep study as ordered by pulmonary.  Otherwise.  Defer further workup and management to pulmonary/sleep    Studies to Be Obtained: none  Labs to Be Obtained: Will defer to other providers at present (labs will be done prior to next appt for other providers)    Follow up in: 2 months to check bps    ALEXANDRO Mccrary   [facility]  [facility]  [phone]    Cc:   PREMA Way

## 2025-02-21 PROCEDURE — RXMED WILLOW AMBULATORY MEDICATION CHARGE: Performed by: INTERNAL MEDICINE

## 2025-02-24 ENCOUNTER — PHARMACY VISIT (OUTPATIENT)
Dept: PHARMACY | Facility: MEDICAL CENTER | Age: 60
End: 2025-02-24
Payer: COMMERCIAL

## 2025-03-14 ENCOUNTER — PATIENT MESSAGE (OUTPATIENT)
Dept: CARDIOLOGY | Facility: MEDICAL CENTER | Age: 60
End: 2025-03-14
Payer: COMMERCIAL

## 2025-03-14 NOTE — TELEPHONE ENCOUNTER
Carried over from alternate [email or web address] message encounter. Pt noted she is on her 3rd week of valsartan

## 2025-03-17 ENCOUNTER — PATIENT MESSAGE (OUTPATIENT)
Dept: CARDIOLOGY | Facility: MEDICAL CENTER | Age: 60
End: 2025-03-17
Payer: COMMERCIAL

## 2025-04-01 NOTE — Clinical Note
REFERRAL APPROVAL NOTICE         Sent on April 1, 2025                   Cyndee Calvert  [address] [address]                   Dear Ms. Calvert,    After a careful review of the medical information and benefit coverage, [facility] has processed your referral. See below for additional details.    If applicable, you must be actively enrolled with your insurance for coverage of the authorized service. If you have any questions regarding your coverage, please contact your insurance directly.    REFERRAL INFORMATION   Referral #:  [id number]  Referred-To Department    Referred-By Provider:  Nephrology    Elizabeth Way M.D.   [facility]      [address]  [place] [address]  [phone] [address]  [place] [id number] [phone]    Referral Start Date:  03/28/2025  Referral End Date:   03/28/2026             SCHEDULING  If you do not already have an appointment, please call [phone] to make an appointment.     MORE INFORMATION  If you do not already have a [employer] account, sign up at: [email or web address]  You can access your medical information, make appointments, see lab results, billing information, and more.  If you have questions regarding this referral, please contact  the [facility] Referrals department at:             [phone]. Monday - Friday 8:00AM - 5:00PM.     Sincerely,    [facility]

## 2025-04-08 ENCOUNTER — HOSPITAL ENCOUNTER (OUTPATIENT)
Dept: LAB | Facility: MEDICAL CENTER | Age: 60
End: 2025-04-08
Attending: INTERNAL MEDICINE
Payer: COMMERCIAL

## 2025-04-08 DIAGNOSIS — E55.9 VITAMIN D DEFICIENCY: ICD-10-CM

## 2025-04-08 DIAGNOSIS — E21.1 HYPERPARATHYROIDISM DUE TO VITAMIN D DEFICIENCY (HCC): ICD-10-CM

## 2025-04-08 DIAGNOSIS — N18.32 STAGE 3B CHRONIC KIDNEY DISEASE: ICD-10-CM

## 2025-04-08 LAB
APPEARANCE UR: ABNORMAL
BACTERIA #/AREA URNS HPF: ABNORMAL /HPF
BILIRUB UR QL STRIP.AUTO: NEGATIVE
CASTS URNS QL MICRO: ABNORMAL /LPF (ref 0–2)
COLOR UR: YELLOW
EPITHELIAL CELLS 1715: >20 /HPF (ref 0–5)
GLUCOSE UR STRIP.AUTO-MCNC: NEGATIVE MG/DL
KETONES UR STRIP.AUTO-MCNC: ABNORMAL MG/DL
LEUKOCYTE ESTERASE UR QL STRIP.AUTO: ABNORMAL
MICRO URNS: ABNORMAL
NITRITE UR QL STRIP.AUTO: NEGATIVE
PH UR STRIP.AUTO: 5.5 [PH] (ref 5–8)
PROT UR QL STRIP: 30 MG/DL
PTH-INTACT SERPL-MCNC: 113 PG/ML (ref 14–72)
RBC # URNS HPF: ABNORMAL /HPF (ref 0–2)
RBC UR QL AUTO: ABNORMAL
SP GR UR STRIP.AUTO: 1.02
UROBILINOGEN UR STRIP.AUTO-MCNC: 1 EU/DL
WBC #/AREA URNS HPF: >100 /HPF

## 2025-04-08 PROCEDURE — 36415 COLL VENOUS BLD VENIPUNCTURE: CPT

## 2025-04-08 PROCEDURE — 83970 ASSAY OF PARATHORMONE: CPT

## 2025-04-08 PROCEDURE — 80048 BASIC METABOLIC PNL TOTAL CA: CPT

## 2025-04-08 PROCEDURE — 81001 URINALYSIS AUTO W/SCOPE: CPT

## 2025-04-09 LAB
ANION GAP SERPL CALC-SCNC: 10 MMOL/L (ref 7–16)
BUN SERPL-MCNC: 15 MG/DL (ref 8–22)
CALCIUM SERPL-MCNC: 9 MG/DL (ref 8.5–10.5)
CHLORIDE SERPL-SCNC: 107 MMOL/L (ref 96–112)
CO2 SERPL-SCNC: 23 MMOL/L (ref 20–33)
CREAT SERPL-MCNC: 1.37 MG/DL (ref 0.5–1.4)
GFR SERPLBLD CREATININE-BSD FMLA CKD-EPI: 44 ML/MIN/1.73 M 2
GLUCOSE SERPL-MCNC: 79 MG/DL (ref 65–99)
POTASSIUM SERPL-SCNC: 4.6 MMOL/L (ref 3.6–5.5)
SODIUM SERPL-SCNC: 140 MMOL/L (ref 135–145)

## 2025-04-10 ENCOUNTER — APPOINTMENT (OUTPATIENT)
Dept: NEPHROLOGY | Facility: MEDICAL CENTER | Age: 60
End: 2025-04-10
Payer: COMMERCIAL

## 2025-04-10 ENCOUNTER — OFFICE VISIT (OUTPATIENT)
Dept: VASCULAR LAB | Facility: MEDICAL CENTER | Age: 60
End: 2025-04-10
Payer: COMMERCIAL

## 2025-04-10 VITALS
HEIGHT: 67 IN | HEART RATE: 82 BPM | WEIGHT: 286 LBS | BODY MASS INDEX: 44.89 KG/M2 | DIASTOLIC BLOOD PRESSURE: 83 MMHG | SYSTOLIC BLOOD PRESSURE: 131 MMHG

## 2025-04-10 VITALS
TEMPERATURE: 97.6 F | WEIGHT: 286 LBS | DIASTOLIC BLOOD PRESSURE: 78 MMHG | HEIGHT: 67 IN | HEART RATE: 79 BPM | OXYGEN SATURATION: 98 % | SYSTOLIC BLOOD PRESSURE: 126 MMHG | BODY MASS INDEX: 44.89 KG/M2

## 2025-04-10 DIAGNOSIS — E55.9 VITAMIN D DEFICIENCY: ICD-10-CM

## 2025-04-10 DIAGNOSIS — N18.32 STAGE 3B CHRONIC KIDNEY DISEASE: ICD-10-CM

## 2025-04-10 DIAGNOSIS — I10 PRIMARY HYPERTENSION: ICD-10-CM

## 2025-04-10 DIAGNOSIS — I47.10 SVT (SUPRAVENTRICULAR TACHYCARDIA) (HCC): ICD-10-CM

## 2025-04-10 DIAGNOSIS — E11.628 TYPE 2 DIABETES MELLITUS WITH OTHER SKIN COMPLICATION, WITHOUT LONG-TERM CURRENT USE OF INSULIN (HCC): ICD-10-CM

## 2025-04-10 DIAGNOSIS — E21.1 HYPERPARATHYROIDISM DUE TO VITAMIN D DEFICIENCY (HCC): ICD-10-CM

## 2025-04-10 DIAGNOSIS — D64.9 ANEMIA, UNSPECIFIED TYPE: ICD-10-CM

## 2025-04-10 DIAGNOSIS — I89.0 LYMPHEDEMA: ICD-10-CM

## 2025-04-10 DIAGNOSIS — E78.5 DYSLIPIDEMIA: ICD-10-CM

## 2025-04-10 DIAGNOSIS — E11.29 MICROALBUMINURIA DUE TO TYPE 2 DIABETES MELLITUS (HCC): ICD-10-CM

## 2025-04-10 DIAGNOSIS — I10 WHITE COAT SYNDROME WITH DIAGNOSIS OF HYPERTENSION: ICD-10-CM

## 2025-04-10 DIAGNOSIS — R80.9 MICROALBUMINURIA DUE TO TYPE 2 DIABETES MELLITUS (HCC): ICD-10-CM

## 2025-04-10 PROCEDURE — 3074F SYST BP LT 130 MM HG: CPT

## 2025-04-10 PROCEDURE — 99212 OFFICE O/P EST SF 10 MIN: CPT

## 2025-04-10 PROCEDURE — 3074F SYST BP LT 130 MM HG: CPT | Performed by: INTERNAL MEDICINE

## 2025-04-10 PROCEDURE — 3078F DIAST BP <80 MM HG: CPT | Performed by: INTERNAL MEDICINE

## 2025-04-10 PROCEDURE — 3079F DIAST BP 80-89 MM HG: CPT

## 2025-04-10 PROCEDURE — 99214 OFFICE O/P EST MOD 30 MIN: CPT

## 2025-04-10 PROCEDURE — 99214 OFFICE O/P EST MOD 30 MIN: CPT | Performed by: INTERNAL MEDICINE

## 2025-04-10 RX ORDER — LINACLOTIDE 72 UG/1
1 CAPSULE, GELATIN COATED ORAL
COMMUNITY

## 2025-04-10 RX ORDER — ESOMEPRAZOLE MAGNESIUM 2.5 MG/1
FOR SUSPENSION ORAL
COMMUNITY

## 2025-04-10 RX ORDER — DOXAZOSIN 1 MG/1
1 TABLET ORAL NIGHTLY
Qty: 90 TABLET | Refills: 3 | Status: SHIPPED | OUTPATIENT
Start: 2025-04-10

## 2025-04-10 ASSESSMENT — ENCOUNTER SYMPTOMS
FEVER: 0
VOMITING: 0
FLANK PAIN: 0
WEIGHT LOSS: 0
NAUSEA: 0
ABDOMINAL PAIN: 0
COUGH: 0
CHILLS: 0
ORTHOPNEA: 0
PALPITATIONS: 0
HEMOPTYSIS: 0
SHORTNESS OF BREATH: 0
WHEEZING: 0
SINUS PAIN: 0
EYES NEGATIVE: 1
DIARRHEA: 0

## 2025-04-10 ASSESSMENT — FIBROSIS 4 INDEX
FIB4 SCORE: 3.2
FIB4 SCORE: 3.2

## 2025-04-10 NOTE — PROGRESS NOTES
Subjective     Cyndee Calvert is a 59 y.o. female who presents with Chronic Kidney Disease            HPI  Cyndee is coming today for f/u of CKD IIIb  Long term hx/of Nephrolithiasis, s/p ureteral stenting -f/u with urology,   Long term hx/of HTN, DM II  Doing well, no complaints  No dysuria/hematuria/flank pain  HTN: BP well controlled -monitored at home  CKD: baseline creat level at 1.3-1.5- stable  Anemia: Hb stable at normal range    Review of Systems   Constitutional:  Negative for chills, fever, malaise/fatigue and weight loss.   HENT:  Negative for congestion, hearing loss and sinus pain.    Eyes: Negative.    Respiratory:  Negative for cough, hemoptysis, shortness of breath and wheezing.    Cardiovascular:  Positive for leg swelling. Negative for chest pain, palpitations and orthopnea.   Gastrointestinal:  Negative for abdominal pain, diarrhea, nausea and vomiting.   Genitourinary:  Negative for dysuria, flank pain, frequency, hematuria and urgency.   Skin: Negative.    All other systems reviewed and are negative.      Past Medical History:   Diagnosis Date    Allergies     Anemia     Breath shortness 03/2021    After having covid    Cataract 2/2023    Surgery 4/13. And 4/24/ 2023    Diabetes (HCC)     High cholesterol     Hypertension     Oxygen dependent     .5-1 L as needed. wears consistently at night    Pneumonia 3/2021    Had pneumonia  and covid       Family History   Problem Relation Age of Onset    Heart Disease Mother     Hypertension Mother     Hyperlipidemia Mother     Heart Attack Father     Heart Disease Father     Stroke Father     Heart Failure Brother     Cancer Brother     Heart Disease Brother     Heart Disease Paternal Grandmother     Stroke Paternal Grandmother     Glaucoma Paternal Grandmother        Social History     Socioeconomic History    Marital status: Single   Tobacco Use    Smoking status: Never     Passive exposure: Never    Smokeless tobacco: Never   Vaping Use  "   Vaping status: Never Used   Substance and Sexual Activity    Alcohol use: No    Drug use: No            Objective     /78 (BP Location: Right arm, Patient Position: Sitting, BP Cuff Size: Adult)   Pulse 79   Temp 36.4 °C (97.6 °F) (Temporal)   Ht 1.702 m (5' 7\")   Wt (!) 130 kg (286 lb)   LMP 04/19/2003   SpO2 98%   BMI 44.79 kg/m²      Physical Exam  Vitals reviewed.   Constitutional:       General: She is not in acute distress.     Appearance: Normal appearance. She is well-developed. She is not diaphoretic.   HENT:      Head: Normocephalic and atraumatic.      Nose: Nose normal.      Mouth/Throat:      Mouth: Mucous membranes are moist.      Pharynx: Oropharynx is clear.   Eyes:      Extraocular Movements: Extraocular movements intact.      Conjunctiva/sclera: Conjunctivae normal.      Pupils: Pupils are equal, round, and reactive to light.   Cardiovascular:      Rate and Rhythm: Normal rate and regular rhythm.      Pulses: Normal pulses.      Heart sounds: Normal heart sounds.   Pulmonary:      Effort: Pulmonary effort is normal. No respiratory distress.      Breath sounds: Normal breath sounds. No wheezing or rales.   Abdominal:      General: Bowel sounds are normal. There is no distension.      Palpations: Abdomen is soft. There is no mass.      Tenderness: There is no abdominal tenderness. There is no right CVA tenderness or left CVA tenderness.   Musculoskeletal:      Cervical back: Normal range of motion and neck supple.      Right lower leg: Edema present.      Left lower leg: Edema present.   Skin:     General: Skin is warm.      Coloration: Skin is not pale.      Findings: No erythema or rash.   Neurological:      General: No focal deficit present.      Mental Status: She is alert and oriented to person, place, and time.      Cranial Nerves: No cranial nerve deficit.      Coordination: Coordination normal.   Psychiatric:         Mood and Affect: Mood normal.         Behavior: Behavior " normal.         Thought Content: Thought content normal.         Judgment: Judgment normal.          Laboratory results/imaging reviewed: d/w Pt   Latest Reference Range & Units 11/22/24 12:23 11/22/24 12:24 04/08/25 11:39   WBC 4.8 - 10.8 K/uL 6.9     RBC 4.20 - 5.40 M/uL 4.23     Hemoglobin 12.0 - 16.0 g/dL 12.6     Hematocrit 37.0 - 47.0 % 39.7     MCV 81.4 - 97.8 fL 93.9     MCH 27.0 - 33.0 pg 29.8     MCHC 32.2 - 35.5 g/dL 31.7 (L)     RDW 35.9 - 50.0 fL 47.2     Platelet Count 164 - 446 K/uL 157 (L)     MPV 9.0 - 12.9 fL 12.0     Sodium 135 - 145 mmol/L 139 139 140   Potassium 3.6 - 5.5 mmol/L 4.4 4.4 4.6   Chloride 96 - 112 mmol/L 105 105 107   Co2 20 - 33 mmol/L 21 22 23   Anion Gap 7.0 - 16.0  13.0 12.0 10.0   Glucose 65 - 99 mg/dL 92 90 79   Bun 8 - 22 mg/dL 27 (H) 27 (H) 15   Creatinine 0.50 - 1.40 mg/dL 1.39 1.41 (H) 1.37   GFR (CKD-EPI) >60 mL/min/1.73 m 2 43 ! 42 ! 44 !   Calcium 8.5 - 10.5 mg/dL 8.8 8.8 9.0   Correct Calcium 8.5 - 10.5 mg/dL  8.9    AST(SGOT) 12 - 45 U/L  54 (H)    ALT(SGPT) 2 - 50 U/L  43    Alkaline Phosphatase 30 - 99 U/L  92    Total Bilirubin 0.1 - 1.5 mg/dL  0.8    Albumin 3.2 - 4.9 g/dL  3.9    Total Protein 6.0 - 8.2 g/dL  7.8    Globulin 1.9 - 3.5 g/dL  3.9 (H)    A-G Ratio g/dL  1.0    Lipase 11 - 82 U/L  122 (H)    Glycohemoglobin 4.0 - 5.6 %  7.1 (H)    Estim. Avg Glu mg/dL  157    Cholesterol,Tot 100 - 199 mg/dL  145    Triglycerides 0 - 149 mg/dL  74    HDL >=40 mg/dL  47    LDL <100 mg/dL  83    Micro Alb Creat Ratio 0 - 30 mg/g 23     Creatinine, Urine mg/dL 179.58     Microalbumin, Urine Random mg/dL 4.1     Urobilinogen, Urine <=1.0 EU/dL   1.0   Color    Yellow   Character    Cloudy !   Specific Gravity <1.035    1.019   Ph 5.0 - 8.0    5.5   Glucose Negative mg/dL   Negative   Ketones Negative mg/dL   Trace !   Bilirubin Negative    Negative   Occult Blood Negative    Moderate !   Protein Negative mg/dL   30 !   Nitrite Negative    Negative   Leukocyte Esterase  Negative    Moderate !   Micro Urine Req    Microscopic   WBC /hpf   >100 !   RBC 0 - 2 /hpf   11-20 !   Epithelial Cells 0 - 5 /hpf   >20 !   Bacteria None /hpf   Many !   Urine Casts 0 - 2 /lpf   3-5 !   25-Hydroxy   Vitamin D 25 30 - 100 ng/mL 40     Pth, Intact 14.0 - 72.0 pg/mL 239.0 (H)  113.0 (H)   (L): Data is abnormally low  (H): Data is abnormally high  !: Data is abnormal      CT renal  IMPRESSION:     No evidence of  urinary tract calculus or hydronephrosis.  No evidence of peritoneal inflammation.  MN parathyroid -sestamibi    IMPRESSION:     No evidence of parathyroid adenoma.      Assessment & Plan        1. Stage 3b chronic kidney disease       Creat level stable at baseline -to monitor      Keep well hydrated      2. Anemia, unspecified type     Hb stable at normal range    3. Primary hypertension      BP well controlled -to montior at home      4. Vitamin D deficiency      Low vit D level improving continue Ergocalciferol 50 000 units every 4 weeks    5. Microalbuminuria due to type 2 diabetes mellitus (HCC)      negative    6. Hyperparathyroidism (HCC)      Nuclear scan negative for adenoma      Elevated PTH improving      Calcium at normal range      Recs:    Continue current treatment  Keep well hydrated  Monitor BP  Low salt diet  Ergocalciferol 50 000 units every 4 weeks  F/u with Urology  F/u here in 4 months

## 2025-04-10 NOTE — PATIENT INSTRUCTIONS
Continue current treatment  Keep well hydrated  Monitor BP  Low salt diet  Ergocalciferol 50 000 units every 4 weeks  F/u with Urology

## 2025-04-24 ENCOUNTER — OFFICE VISIT (OUTPATIENT)
Dept: CARDIOLOGY | Facility: MEDICAL CENTER | Age: 60
End: 2025-04-24
Payer: COMMERCIAL

## 2025-04-24 VITALS
DIASTOLIC BLOOD PRESSURE: 80 MMHG | HEART RATE: 97 BPM | BODY MASS INDEX: 45.99 KG/M2 | SYSTOLIC BLOOD PRESSURE: 136 MMHG | RESPIRATION RATE: 18 BRPM | WEIGHT: 293 LBS | OXYGEN SATURATION: 96 % | HEIGHT: 67 IN

## 2025-04-24 DIAGNOSIS — E78.5 DYSLIPIDEMIA: ICD-10-CM

## 2025-04-24 DIAGNOSIS — I10 PRIMARY HYPERTENSION: ICD-10-CM

## 2025-04-24 PROCEDURE — 99214 OFFICE O/P EST MOD 30 MIN: CPT

## 2025-04-24 PROCEDURE — 3079F DIAST BP 80-89 MM HG: CPT

## 2025-04-24 PROCEDURE — 99212 OFFICE O/P EST SF 10 MIN: CPT

## 2025-04-24 PROCEDURE — 3075F SYST BP GE 130 - 139MM HG: CPT

## 2025-04-24 RX ORDER — CARVEDILOL 6.25 MG/1
6.25 TABLET ORAL 2 TIMES DAILY WITH MEALS
Qty: 200 TABLET | Refills: 3 | Status: SHIPPED | OUTPATIENT
Start: 2025-04-24

## 2025-04-24 ASSESSMENT — ENCOUNTER SYMPTOMS
ORTHOPNEA: 0
EYES NEGATIVE: 1
CONSTITUTIONAL NEGATIVE: 1
SHORTNESS OF BREATH: 0
NERVOUS/ANXIOUS: 0
MUSCULOSKELETAL NEGATIVE: 1
DEPRESSION: 0
PALPITATIONS: 0
NEUROLOGICAL NEGATIVE: 1
GASTROINTESTINAL NEGATIVE: 1
PND: 0

## 2025-04-24 ASSESSMENT — FIBROSIS 4 INDEX: FIB4 SCORE: 3.2

## 2025-04-24 NOTE — PROGRESS NOTES
Chief Complaint   Patient presents with    Follow-Up     Dyslipidemia    Hypertension       Subjective     Cyndee Calvert is a 59 y.o. female who presents today for follow up of her HTN. They have a history of morbid obesity, type 2 diabetes, hypertension, and hyperlipidemia. Her father, mother, and brother all have coronary artery disease in her father underwent bypass surgery.     At visit with myself on 8/29/2023 she has been having some very labile blood pressures with lows in the 90s and highs up into the 200s.  Additionally she has chronic kidney disease and has been dealing with kidney stones.  I discontinued her metoprolol she was already on carvedilol, recommended her to take blood pressures before and after her medications, also placed referral to vascular medicine.  Unfortunately her insurance will be out of network so she elected to not follow-up with vascular medicine     Seen by myself on 3/12/2024 Her blood pressure continues to be very labile SBP .  She has chronic lymphedema in her legs and hands.  She also has chronic shortness of breath from COVID infection.     10/28/2024 she has been noticing less labile BPs, some GI upset when she was intolerant to Victoza.  She does have chronic lymphedema which is well-controlled.  No other acute cardiovascular symptoms    4/2/2025 she has been doing well since her last visit.  She has been following with vascular medicine.  Her blood pressures are less labile but still not quite at goal of less than 130/80.  She denies any acute cardiovascular symptoms    Past Medical History:   Diagnosis Date    Allergies     Anemia     Breath shortness 03/2021    After having covid    Cataract 2/2023    Surgery 4/13. And 4/24/ 2023    Diabetes (HCC)     High cholesterol     Hypertension     Oxygen dependent     .5-1 L as needed. wears consistently at night    Pneumonia 3/2021    Had pneumonia  and covid     Past Surgical History:   Procedure Laterality  Date    CYSTOSCOPY WITH URETERAL STENT INSERTION OR REMOVAL Right 5/1/2023    Procedure: RIGHT URETEROSCOPY;  RIGHT STENT REMOVAL;  Surgeon: Javier Ceron M.D.;  Location: [facility];  Service: Urology    BLADDER BIOPSY WITH CYSTOSCOPY  09/02/2008    Performed by LILLIE WILLIAM at [facility] ORS    HYSTERECTOMY, TOTAL ABDOMINAL      OTHER      Do remember  dates    TONSILLECTOMY      URETEROSCOPY Right     stent insertion right side     Family History   Problem Relation Age of Onset    Heart Disease Mother     Hypertension Mother     Hyperlipidemia Mother     Heart Attack Father     Heart Disease Father     Stroke Father     Heart Failure Brother     Cancer Brother     Heart Disease Brother     Heart Disease Paternal Grandmother     Stroke Paternal Grandmother     Glaucoma Paternal Grandmother      Social History     Socioeconomic History    Marital status: Single     Spouse name: Not on file    Number of children: Not on file    Years of education: Not on file    Highest education level: Not on file   Occupational History    Not on file   Tobacco Use    Smoking status: Never     Passive exposure: Never    Smokeless tobacco: Never   Vaping Use    Vaping status: Never Used   Substance and Sexual Activity    Alcohol use: No    Drug use: No    Sexual activity: Not on file   Other Topics Concern    Not on file   Social History Narrative    Not on file     Social Drivers of Health     Financial Resource Strain: Not on file   Food Insecurity: Not on file   Transportation Needs: Not on file   Physical Activity: Not on file   Stress: Not on file   Social Connections: Not on file   Intimate Partner Violence: Not on file   Housing Stability: Not on file     Allergies   Allergen Reactions    Percocet [Oxycodone-Acetaminophen] Shortness of Breath, Vomiting and Swelling    Latex Swelling and Unspecified     hives     Outpatient Encounter Medications as of 4/24/2025   Medication Sig Dispense Refill     carvedilol (COREG) 6.25 MG Tab Take 1 Tablet by mouth 2 times a day with meals. 200 Tablet 3    Esomeprazole Magnesium (NEXIUM) 2.5 MG Pack Take  by mouth.      LINZESS 72 MCG Cap Take 1 Capsule by mouth every 48 hours.      doxazosin (CARDURA) 1 MG Tab Take 1 Tablet by mouth every evening. 90 Tablet 3    valsartan (DIOVAN) 160 MG Tab Take 1 Tablet by mouth every day. 100 Tablet 3    atorvastatin (LIPITOR) 80 MG tablet Take 1 Tablet by mouth at bedtime. 90 Tablet 3    hydrochlorothiazide (MICROZIDE) 12.5 MG capsule Take 1 Capsule by mouth every day. 100 Capsule 3    ergocalciferol (DRISDOL) 40637 UNIT capsule Take 1 Capsule by mouth every 7 days. (Patient taking differently: Take 50,000 Units by mouth every 7 days. ONCE A MONTH) 15 Capsule 3    insulin glargine (LANTUS SOLOSTAR) 100 UNIT/ML Solution Pen-injector injection Inject 15 Units under the skin 2 times a day.      ezetimibe (ZETIA) 10 MG Tab Take 1 Tablet by mouth every day. 30 Tablet 11    albuterol 108 (90 Base) MCG/ACT Aero Soln inhalation aerosol Inhale 1-2 Puffs every four hours as needed. (Patient taking differently: Inhale 1-2 Puffs every 6 hours as needed for Shortness of Breath.) 1 Each 3    HUMALOG KWIKPEN 100 UNIT/ML Solution Pen-injector injection PEN Inject 2-8 Units under the skin 2 times daily with meals as needed. 1 unit for every 10g of carb's      ASPIRIN 81 PO Take 81 mg by mouth every day.      Empagliflozin (JARDIANCE) 25 MG Tab Take 25 mg by mouth every day.       No facility-administered encounter medications on file as of 4/24/2025.     Review of Systems   Constitutional: Negative.    HENT: Negative.     Eyes: Negative.    Respiratory:  Negative for shortness of breath.    Cardiovascular:  Negative for chest pain, palpitations, orthopnea, leg swelling and PND.   Gastrointestinal: Negative.    Genitourinary: Negative.    Musculoskeletal: Negative.    Skin: Negative.    Neurological: Negative.    Endo/Heme/Allergies: Negative.   "  Psychiatric/Behavioral:  Negative for depression. The patient is not nervous/anxious.               Objective     /80 (BP Location: Left arm, Patient Position: Sitting, BP Cuff Size: Adult)   Pulse 97   Resp 18   Ht 1.702 m (5' 7\")   Wt (!) 135 kg (297 lb)   LMP 04/19/2003   SpO2 96%   BMI 46.52 kg/m²     Physical Exam  Constitutional:       Appearance: Normal appearance. She is obese.   HENT:      Head: Normocephalic and atraumatic.   Neck:      Vascular: No JVD.   Cardiovascular:      Rate and Rhythm: Normal rate and regular rhythm.      Pulses: Normal pulses.      Heart sounds: Normal heart sounds. No murmur heard.     No friction rub.   Pulmonary:      Effort: Pulmonary effort is normal. No respiratory distress.      Breath sounds: Normal breath sounds.   Abdominal:      General: There is no distension.      Palpations: Abdomen is soft.   Musculoskeletal:      Right lower leg: No edema.      Left lower leg: No edema.   Skin:     General: Skin is warm and dry.   Neurological:      General: No focal deficit present.      Mental Status: She is alert and oriented to person, place, and time.   Psychiatric:         Mood and Affect: Mood normal.         Behavior: Behavior normal.            Lab Results   Component Value Date/Time    WBC 6.9 11/22/2024 12:23 PM    RBC 4.23 11/22/2024 12:23 PM    HEMOGLOBIN 12.6 11/22/2024 12:23 PM    HEMATOCRIT 39.7 11/22/2024 12:23 PM    MCV 93.9 11/22/2024 12:23 PM    MCH 29.8 11/22/2024 12:23 PM    MCHC 31.7 (L) 11/22/2024 12:23 PM    MPV 12.0 11/22/2024 12:23 PM    NEUTSPOLYS 45.30 08/27/2023 08:19 AM    LYMPHOCYTES 43.90 (H) 08/27/2023 08:19 AM    MONOCYTES 5.60 08/27/2023 08:19 AM    EOSINOPHILS 4.40 08/27/2023 08:19 AM    BASOPHILS 0.60 08/27/2023 08:19 AM    HYPOCHROMIA 1+ 11/20/2010 04:15 PM      Lab Results   Component Value Date/Time    CHOLSTRLTOT 145 11/22/2024 12:24 PM    LDL 83 11/22/2024 12:24 PM    HDL 47 11/22/2024 12:24 PM    TRIGLYCERIDE 74 11/22/2024 " 12:24 PM       Lab Results   Component Value Date/Time    SODIUM 140 04/08/2025 11:39 AM    POTASSIUM 4.6 04/08/2025 11:39 AM    CHLORIDE 107 04/08/2025 11:39 AM    CO2 23 04/08/2025 11:39 AM    GLUCOSE 79 04/08/2025 11:39 AM    BUN 15 04/08/2025 11:39 AM    CREATININE 1.37 04/08/2025 11:39 AM    CREATININE 1.2 09/18/2008 03:57 AM     Lab Results   Component Value Date/Time    ALKPHOSPHAT 92 11/22/2024 12:24 PM    ASTSGOT 54 (H) 11/22/2024 12:24 PM    ALTSGPT 43 11/22/2024 12:24 PM    TBILIRUBIN 0.8 11/22/2024 12:24 PM          Assessment & Plan     1. Dyslipidemia        2. Primary hypertension  carvedilol (COREG) 6.25 MG Tab          Medical Decision Making: Today's Assessment/Status/Plan:        Hypertension  -Now managed by vascular medicine  -Continue HCTZ, losartan, recently started on doxazosin, trial carvedilol again at half dose 6.25 mg twice daily  -Goal of less than 130/80       Hyperlipidemia  -Most recent LDL 71  -Continue atorvastatin 80 mg daily now with ezetimibe  -LDL goal of less than 100        We had a discussion about her ongoing care, she can be managed fully by vascular medicine if she prefers, follow-up with cardiology as needed     This note was dictated using Dragon speech recognition software.

## 2025-05-01 ASSESSMENT — ENCOUNTER SYMPTOMS
HEMOPTYSIS: 0
DIAPHORESIS: 0
FALLS: 0
PALPITATIONS: 0
SHORTNESS OF BREATH: 1
NAUSEA: 0
FEVER: 0
SINUS PAIN: 0
DIZZINESS: 0
MYALGIAS: 0
HEARTBURN: 0
CHILLS: 0
COUGH: 0
SPUTUM PRODUCTION: 0
DIARRHEA: 0
HEADACHES: 0
VOMITING: 0
WEAKNESS: 0
WHEEZING: 0

## 2025-05-02 ENCOUNTER — OFFICE VISIT (OUTPATIENT)
Dept: SLEEP MEDICINE | Facility: MEDICAL CENTER | Age: 60
End: 2025-05-02
Payer: COMMERCIAL

## 2025-05-02 VITALS
WEIGHT: 293 LBS | HEIGHT: 67 IN | DIASTOLIC BLOOD PRESSURE: 82 MMHG | SYSTOLIC BLOOD PRESSURE: 144 MMHG | HEART RATE: 80 BPM | OXYGEN SATURATION: 96 % | BODY MASS INDEX: 45.99 KG/M2

## 2025-05-02 DIAGNOSIS — G47.34 NOCTURNAL HYPOXIA: ICD-10-CM

## 2025-05-02 DIAGNOSIS — G47.33 OSA (OBSTRUCTIVE SLEEP APNEA): ICD-10-CM

## 2025-05-02 DIAGNOSIS — J98.4 RESTRICTIVE LUNG DISEASE: ICD-10-CM

## 2025-05-02 PROCEDURE — 99213 OFFICE O/P EST LOW 20 MIN: CPT

## 2025-05-02 PROCEDURE — 3079F DIAST BP 80-89 MM HG: CPT

## 2025-05-02 PROCEDURE — 3077F SYST BP >= 140 MM HG: CPT

## 2025-05-02 ASSESSMENT — FIBROSIS 4 INDEX: FIB4 SCORE: 3.2

## 2025-05-02 NOTE — ASSESSMENT & PLAN NOTE
OPO on 1LPM showed saturations less than 88% for 85.7 minutes with clustering.  Patient is currently using 2 LPM of oxygen due to results.  RVSP in 2022 was 27, indicating mild pulmonary hypertension.  --PSG

## 2025-05-02 NOTE — ASSESSMENT & PLAN NOTE
PFTs in 2024 show mild restriction with a FVC of 2.3 L or 79%, TLC 74%, DLCO 88% predicted.  ERV is also low, which indicates the body habitus is playing a role in her restriction.  Patient reports that she is short of breath with mMRC of 3.  She is also using 2 LPM at night.  --Advised patient to increase exercise as tolerated and weight loss  --Advised patient to continue to use albuterol before exertion  --Patient also has echocardiogram ordered, but has not yet been completed.  I did advise patient to complete the echocardiogram.  --Advised patient to remain up-to-date on all vaccines

## 2025-05-02 NOTE — PROGRESS NOTES
Pulmonary Clinic Note    Date of Visit: 5/2/2025     Chief Complaint:  Chief Complaint   Patient presents with    Follow-Up     Last seen 09/06/24 rachel RICCI  Dx: Restrictive lung disease, Chronic respiratory failure with hypoxia     HPI:   Cyndee Calvert is a very pleasant 58 y.o. year old female never smoker, with a PMHx of respiratory failure following COVID-19 infection, T2DM, HTN, DSL, lymphedema who presented to the Pulmonary Clinic for a regular follow up. Last seen in the office on 9/6/2024 with myself.     Patient is followed by the pulmonary office for respiratory failure following COVID-19 infection  In 2021.  CXR at the time showed bilateral infiltrates and was noted to be hypoxic.  She did test positive for COVID-19 at the time and was admitted to the hospital and treated with Decadron, but unfortunately she does not meet criteria for remdesivir.  Patient was discharged on oxygen at the time but since then has been able to titrate off.  PFTs in October 2021 showed mild restriction but normal DLCO.  Repeat PFTs in October 2022 showed improved FVC from 2.49 to 2.61, improved TLC from 3.93 to 4.03 and low normal DLCO.  PFTs in 2024 show an FVC of 2.3 L or 79%, TLC 74%, DLCO 88% predicted.  Patient does have an echocardiogram ordered, but has not been completed yet due to insurance denial.  Patient uses 2 LPM of oxygen at night. OPO on 1LPM showed saturations less than 88% for 85.7 minutes with clustering.     Interval events:   9/6/2024-patient states that she is short of breath with mMRC of 3 but denies any significant cough, sputum production, or wheezing.  She will use her albuterol a couple times during the day depending on her exertion level.    5/2/2025-  Patient states that she is short of breath with mMRC is 3 but denies any significant cough, sputum production, or wheezing.  She continues to use 2 LPM of oxygen at night.    Oxygen use:  2 LPM nocturnally    MMRC Grade: 3- Stops  to catch breath on level ground after 100m  Use    Past Medical History:   Diagnosis Date    Allergies     Anemia     Breath shortness 03/2021    After having covid    Cataract 2/2023    Surgery 4/13. And 4/24/ 2023    Diabetes (HCC)     High cholesterol     Hypertension     Oxygen dependent     .5-1 L as needed. wears consistently at night    Pneumonia 3/2021    Had pneumonia  and covid     Past Surgical History:   Procedure Laterality Date    CYSTOSCOPY WITH URETERAL STENT INSERTION OR REMOVAL Right 5/1/2023    Procedure: RIGHT URETEROSCOPY;  RIGHT STENT REMOVAL;  Surgeon: Javier Ceron M.D.;  Location: [facility];  Service: Urology    BLADDER BIOPSY WITH CYSTOSCOPY  09/02/2008    Performed by LILLIE WILLIAM at SUR[facility] ORS    HYSTERECTOMY, TOTAL ABDOMINAL      OTHER      Do remember  dates    TONSILLECTOMY      URETEROSCOPY Right     stent insertion right side     Social History     Socioeconomic History    Marital status: Single     Spouse name: Not on file    Number of children: Not on file    Years of education: Not on file    Highest education level: Not on file   Occupational History    Not on file   Tobacco Use    Smoking status: Never     Passive exposure: Never    Smokeless tobacco: Never   Vaping Use    Vaping status: Never Used   Substance and Sexual Activity    Alcohol use: No    Drug use: No    Sexual activity: Not on file   Other Topics Concern    Not on file   Social History Narrative    Not on file     Social Drivers of Health     Financial Resource Strain: Not on file   Food Insecurity: Not on file   Transportation Needs: Not on file   Physical Activity: Not on file   Stress: Not on file   Social Connections: Not on file   Intimate Partner Violence: Not on file   Housing Stability: Not on file        Family History   Problem Relation Age of Onset    Heart Disease Mother     Hypertension Mother     Hyperlipidemia Mother     Heart Attack Father     Heart Disease Father      Stroke Father     Heart Failure Brother     Cancer Brother     Heart Disease Brother     Heart Disease Paternal Grandmother     Stroke Paternal Grandmother     Glaucoma Paternal Grandmother      Current Outpatient Medications on File Prior to Visit   Medication Sig Dispense Refill    Insulin Degludec 100 UNIT/ML Solution Pen-injector INJECT 40 UNITS SUBCUTANEOUSLY EVERY NIGHT      carvedilol (COREG) 6.25 MG Tab Take 1 Tablet by mouth 2 times a day with meals. 200 Tablet 3    Esomeprazole Magnesium (NEXIUM) 2.5 MG Pack Take  by mouth.      LINZESS 72 MCG Cap Take 1 Capsule by mouth every 48 hours.      doxazosin (CARDURA) 1 MG Tab Take 1 Tablet by mouth every evening. 90 Tablet 3    valsartan (DIOVAN) 160 MG Tab Take 1 Tablet by mouth every day. 100 Tablet 3    atorvastatin (LIPITOR) 80 MG tablet Take 1 Tablet by mouth at bedtime. 90 Tablet 3    hydrochlorothiazide (MICROZIDE) 12.5 MG capsule Take 1 Capsule by mouth every day. 100 Capsule 3    ergocalciferol (DRISDOL) 52464 UNIT capsule Take 1 Capsule by mouth every 7 days. (Patient taking differently: Take 50,000 Units by mouth every 7 days. ONCE A MONTH) 15 Capsule 3    insulin glargine (LANTUS SOLOSTAR) 100 UNIT/ML Solution Pen-injector injection Inject 15 Units under the skin 2 times a day.      ezetimibe (ZETIA) 10 MG Tab Take 1 Tablet by mouth every day. 30 Tablet 11    albuterol 108 (90 Base) MCG/ACT Aero Soln inhalation aerosol Inhale 1-2 Puffs every four hours as needed. (Patient taking differently: Inhale 1-2 Puffs every 6 hours as needed for Shortness of Breath.) 1 Each 3    HUMALOG KWIKPEN 100 UNIT/ML Solution Pen-injector injection PEN Inject 2-8 Units under the skin 2 times daily with meals as needed. 1 unit for every 10g of carb's      ASPIRIN 81 PO Take 81 mg by mouth every day.      Empagliflozin (JARDIANCE) 25 MG Tab Take 25 mg by mouth every day.       No current facility-administered medications on file prior to visit.     Allergies: Percocet  "[oxycodone-acetaminophen] and Latex    ROS:   Review of Systems   Constitutional:  Negative for chills, diaphoresis, fever and malaise/fatigue.   HENT:  Negative for congestion and sinus pain.    Respiratory:  Positive for shortness of breath. Negative for cough, hemoptysis, sputum production and wheezing.    Cardiovascular:  Negative for chest pain, palpitations and leg swelling.   Gastrointestinal:  Negative for diarrhea, heartburn, nausea and vomiting.   Musculoskeletal:  Negative for falls and myalgias.   Neurological:  Negative for dizziness, weakness and headaches.     Vitals:  BP (!) 144/82 (BP Location: Right arm, Patient Position: Sitting, BP Cuff Size: Adult)   Pulse 80   Ht 1.702 m (5' 7\")   Wt (!) 133 kg (293 lb)   SpO2 96%     Physical Exam  Constitutional:       General: She is not in acute distress.     Appearance: Normal appearance. She is not ill-appearing, toxic-appearing or diaphoretic.   Cardiovascular:      Rate and Rhythm: Normal rate and regular rhythm.      Pulses: Normal pulses.      Heart sounds: Normal heart sounds. No murmur heard.     No friction rub. No gallop.   Pulmonary:      Effort: Pulmonary effort is normal. No respiratory distress.      Breath sounds: Normal breath sounds. No stridor. No wheezing, rhonchi or rales.   Musculoskeletal:         General: No swelling.      Right lower leg: No edema.      Left lower leg: No edema.   Skin:     General: Skin is warm.   Neurological:      General: No focal deficit present.      Mental Status: She is alert and oriented to person, place, and time.   Psychiatric:         Mood and Affect: Mood normal.         Behavior: Behavior normal.         Thought Content: Thought content normal.         Judgment: Judgment normal.         Laboratory Data:  Multioximetry (Date: 9/6/2024)-         OPO on 1LPM (Date: 10/8/2024)-  This is an overnight oximetry study performed on October 8, 2024 for duration of 7 hours and 41 minutes on 1LPM O2.     Basal " SpO2 was 89.7%.  Total time spent below saturation of 88% was 85.7 minutes. There are areas of clustered desaturations suggestive of apneic events. Treatment of nocturnal hypoxemia appears inadequate, consider formal polysomnogram         PFTs: (Date: 4/30/2024)-      Impression:  1.  There is no obstruction  2.  There is no bronchodilator response  3.  TLC is mildly reduced to 4.11 L / 74%, ERV is reduced to 31%  4.  DLCO is normal  5.  Flow volume loop consistent with mild restriction     Mild restriction, suspect due to body habitus as ERV is also reduced.  Clinically correlate.      Assessment and Plan:    Problem List Items Addressed This Visit          Pulmonary/Sleep Medicine Problems    BMI 45.0-49.9, adult (HCC)    Relevant Medications    Insulin Degludec 100 UNIT/ML Solution Pen-injector       Other    Restrictive lung disease    PFTs in 2024 show mild restriction with a FVC of 2.3 L or 79%, TLC 74%, DLCO 88% predicted.  ERV is also low, which indicates the body habitus is playing a role in her restriction.  Patient reports that she is short of breath with mMRC of 3.  She is also using 2 LPM at night.  --Advised patient to increase exercise as tolerated and weight loss  --Advised patient to continue to use albuterol before exertion  --Patient also has echocardiogram ordered, but has not yet been completed.  I did advise patient to complete the echocardiogram.  --Advised patient to remain up-to-date on all vaccines         Nocturnal hypoxia    OPO on 1LPM showed saturations less than 88% for 85.7 minutes with clustering.  Patient is currently using 2 LPM of oxygen due to results.  RVSP in 2022 was 27, indicating mild pulmonary hypertension.  --PSG         Relevant Orders    Polysomnography, 4 or More     Other Visit Diagnoses         RIYA (obstructive sleep apnea)        Relevant Orders    Polysomnography, 4 or More          Diagnostic studies have been reviewed with the patient.    Return in about 6 months  (around 11/2/2025), or if symptoms worsen or fail to improve, for COVID long hauler, with [facility].     This note was generated using voice recognition software which has a chance of producing errors of grammar and possibly content.  I have made every reasonable attempt to find and correct any obvious errors, but it should be expected that some may not be found prior to finalization of this note.    Time spent in record review prior to patient arrival, reviewing results, and in face-to-face encounter totaled 22 min.  __________  RADHAMES Ortiz  Pulmonary Medicine  Formerly [facility], later [facility]

## 2025-05-13 DIAGNOSIS — E78.5 DYSLIPIDEMIA: ICD-10-CM

## 2025-05-13 RX ORDER — EZETIMIBE 10 MG/1
10 TABLET ORAL DAILY
Qty: 90 TABLET | Refills: 3 | Status: SHIPPED | OUTPATIENT
Start: 2025-05-13

## 2025-05-15 PROCEDURE — RXMED WILLOW AMBULATORY MEDICATION CHARGE

## 2025-05-16 ENCOUNTER — PHARMACY VISIT (OUTPATIENT)
Dept: PHARMACY | Facility: MEDICAL CENTER | Age: 60
End: 2025-05-16
Payer: COMMERCIAL

## 2025-05-19 NOTE — Clinical Note
REFERRAL APPROVAL NOTICE         Sent on May 19, 2025                   Cyndee Calvert  [address] [address]                   Dear Ms. Calvert,    After a careful review of the medical information and benefit coverage, [facility] has processed your referral. See below for additional details.    If applicable, you must be actively enrolled with your insurance for coverage of the authorized service. If you have any questions regarding your coverage, please contact your insurance directly.    REFERRAL INFORMATION   Referral #:  [id number]  Referred-To Department    Referred-By Provider:  Pulmonary and Sleep Medicine    ALEXANDRO Srivastava   [facility]      [address]  [address]  [place] [address]  [phone] [address]  [address]  [place] [id number] [phone]    Referral Start Date:  05/07/2025  Referral End Date:   11/07/2025             SCHEDULING  If you do not already have an appointment, please call [phone] to make an appointment.     MORE INFORMATION  If you do not already have a [employer] account, sign up at: [email or web address]  You can access your medical information, make appointments, see lab results, billing information, and more.  If you have questions regarding this referral, please contact  the [facility] Referrals department at:             [phone]. Monday - Friday 8:00AM - 5:00PM.     Sincerely,    [facility]

## 2025-05-21 LAB — HBA1C MFR BLD: 6.7 % (ref ?–5.8)

## 2025-05-23 ENCOUNTER — HOSPITAL ENCOUNTER (OUTPATIENT)
Dept: RADIOLOGY | Facility: MEDICAL CENTER | Age: 60
End: 2025-05-23
Attending: FAMILY MEDICINE
Payer: COMMERCIAL

## 2025-05-23 DIAGNOSIS — Z12.31 ENCOUNTER FOR MAMMOGRAM TO ESTABLISH BASELINE MAMMOGRAM: ICD-10-CM

## 2025-05-23 PROCEDURE — 77067 SCR MAMMO BI INCL CAD: CPT

## 2025-06-05 ENCOUNTER — OFFICE VISIT (OUTPATIENT)
Dept: VASCULAR LAB | Facility: MEDICAL CENTER | Age: 60
End: 2025-06-05
Payer: COMMERCIAL

## 2025-06-05 VITALS
BODY MASS INDEX: 45.2 KG/M2 | HEIGHT: 67 IN | DIASTOLIC BLOOD PRESSURE: 86 MMHG | HEART RATE: 79 BPM | SYSTOLIC BLOOD PRESSURE: 147 MMHG | WEIGHT: 288 LBS

## 2025-06-05 DIAGNOSIS — E11.628 TYPE 2 DIABETES MELLITUS WITH OTHER SKIN COMPLICATION, WITHOUT LONG-TERM CURRENT USE OF INSULIN (HCC): ICD-10-CM

## 2025-06-05 DIAGNOSIS — I10 WHITE COAT SYNDROME WITH DIAGNOSIS OF HYPERTENSION: ICD-10-CM

## 2025-06-05 DIAGNOSIS — I10 PRIMARY HYPERTENSION: ICD-10-CM

## 2025-06-05 DIAGNOSIS — R60.9 LIPEDEMA: ICD-10-CM

## 2025-06-05 DIAGNOSIS — E78.5 DYSLIPIDEMIA: Primary | ICD-10-CM

## 2025-06-05 DIAGNOSIS — I89.0 LYMPHEDEMA: ICD-10-CM

## 2025-06-05 PROCEDURE — 99214 OFFICE O/P EST MOD 30 MIN: CPT

## 2025-06-05 PROCEDURE — 3077F SYST BP >= 140 MM HG: CPT

## 2025-06-05 PROCEDURE — 3079F DIAST BP 80-89 MM HG: CPT

## 2025-06-05 PROCEDURE — 99212 OFFICE O/P EST SF 10 MIN: CPT

## 2025-06-05 ASSESSMENT — FIBROSIS 4 INDEX: FIB4 SCORE: 3.2

## 2025-06-05 NOTE — PROGRESS NOTES
"VASCULAR MEDICINE CLINIC - Follow up VISIT  06/05/2025      Cyndee Calvert is a 59 y.o. female    Referring provider: Yanni Chatterjee, *    Subjective      HPI:   Here for follow-up of labile hypertension in setting of htn, dm, lymphedema, and obesity.  Patient with complicated medical history as outlined below    Last seen 4/2025  Doing well,  Tolerating medication  changes  Has seen cardiology, now is on as needed basis     Lymphedema and lipedema  Has previously seenymphedema PT  Uses stockings and pumps every day, calls them \"my best friends\"  Swelling is well-controlled mostly  No skin breakdown    Complicated rhythm disturbance  Patient with long history of irregular heartbeat  Has seen cardiology, now is on as needed basis    no palpitations since being on carvedilol       Dyslipidemia  Remains on 80 mg atorvastatin  No myalgias  Remains on ezetimibe  Will be doing labs for PCP in August     Hypertension/ckd  No  NSAIDs  Remains on low-dose of hydrochlorothiazide  Remains on valsartan  Cardiology restarted carvedilol at lower dose and is tolerating, prior stopped due to bradycardia and HOTN   Home bps 109-150/50-70s, mostly 110-10s/60-70s  Tolerating doxazosin  If bps <100 holds carvedilol and hctz in am   Still having some fatigue, but is improving   Follows with nephrology      Diabetes  follows with her primary at the [facility] clinic on a regular basis  Last A1c 6.7!    Now significantly improved  Remains on Jardiance and insulin  Denies hypoglycemia    Chronic lung disease  Follows with pulmonary regularly  She is on higher dose of o2 at home at night      Social History     Tobacco Use    Smoking status: Never     Passive exposure: Never    Smokeless tobacco: Never   Vaping Use    Vaping status: Never Used   Substance Use Topics    Alcohol use: No    Drug use: No     DIET AND EXERCISE:  Weight Change: She is lost 15 pounds on victoza, but has gained back a few since stopping, now down " "5lbs since last appt   Diet: Try to follow a diabetic diet  Exercise: Doing some walking 30 min three times a day          Objective    Vitals:    06/05/25 1404 06/05/25 1408   BP: (!) 161/91 (!) 147/86   BP Location: Left arm Left arm   Patient Position: Sitting Sitting   BP Cuff Size: Large adult Large adult   Pulse: 78 79   Weight: (!) 131 kg (288 lb)    Height: 1.702 m (5' 7\")         BP Readings from Last 4 Encounters:   06/05/25 (!) 147/86   05/02/25 (!) 144/82   04/24/25 136/80   04/10/25 131/83      Body mass index is 45.11 kg/m².   Wt Readings from Last 4 Encounters:   06/05/25 (!) 131 kg (288 lb)   05/02/25 (!) 133 kg (293 lb)   04/24/25 (!) 135 kg (297 lb)   04/10/25 (!) 130 kg (286 lb)      Physical Exam  Vitals reviewed.   Constitutional:       General: She is not in acute distress.     Appearance: She is not diaphoretic.   HENT:      Head: Normocephalic and atraumatic.   Eyes:      General: No scleral icterus.     Conjunctiva/sclera: Conjunctivae normal.   Neck:      Vascular: No carotid bruit.   Cardiovascular:      Rate and Rhythm: Normal rate. Rhythm irregular.      Heart sounds: Normal heart sounds. No murmur heard.     Comments: regularly irregular rhythm  Pulmonary:      Effort: Pulmonary effort is normal. No respiratory distress.      Breath sounds: Normal breath sounds. No wheezing or rales.   Musculoskeletal:         General: Swelling present.      Comments: Significant leg edema but no true edema or pitting   Skin:     Coloration: Skin is not pale.   Neurological:      General: No focal deficit present.      Mental Status: She is alert and oriented to person, place, and time.      Cranial Nerves: No cranial nerve deficit.      Coordination: Coordination normal.      Gait: Gait is intact. Gait normal.   Psychiatric:         Mood and Affect: Mood and affect normal.         Behavior: Behavior normal.        DATA REVIEW    Lab Results   Component Value Date/Time    CHOLSTRLTOT 145 11/22/2024 " "12:24 PM    LDL 83 11/22/2024 12:24 PM    HDL 47 11/22/2024 12:24 PM    TRIGLYCERIDE 74 11/22/2024 12:24 PM       No results found for: \"LIPOPROTA\"   No results found for: \"APOB\"   No results found for: \"CRPHIGHSEN\"     Lab Results   Component Value Date/Time    SODIUM 140 04/08/2025 11:39 AM    POTASSIUM 4.6 04/08/2025 11:39 AM    CHLORIDE 107 04/08/2025 11:39 AM    CO2 23 04/08/2025 11:39 AM    GLUCOSE 79 04/08/2025 11:39 AM    BUN 15 04/08/2025 11:39 AM    CREATININE 1.37 04/08/2025 11:39 AM    CREATININE 1.2 09/18/2008 03:57 AM     Lab Results   Component Value Date/Time    ALKPHOSPHAT 92 11/22/2024 12:24 PM    ASTSGOT 54 (H) 11/22/2024 12:24 PM    ALTSGPT 43 11/22/2024 12:24 PM    TBILIRUBIN 0.8 11/22/2024 12:24 PM       Lab Results   Component Value Date/Time    HBA1C 7.1 (H) 11/22/2024 12:24 PM       Lab Results   Component Value Date/Time    MALBCRT 23 11/22/2024 12:23 PM    MICROALBUR 4.1 11/22/2024 12:23 PM     TSH 1.7  Aldosterone 13.1  PRA 1.3  Plasma free metanephrines normal      Additional blood work February 2024  Glucose 152, A1c 13.7  GFR 37, sodium 141, potassium 5.4  Blood pressure 160, triglycerides 80, HDL 41,   Albumin creatinine ratio in the urine is 6    Cardiovascular Imaging:    Heart rhythm monitor July 2022  1.  No sustained rob or tachyarrhythmias were detected.  2.  Very rare PACs and very rare PVCs were detected.  3.  There were 11 brief episodes of NSVT detected  4.  There were 3 patient triggered events recorded. Two episodes correlated with normal sinus rhythm.  One episode correlated with bigeminal PVCs.     MPI July 2022   No scintigraphic evidence of significant prior infarct or active ischemia.   Normal left ventricular systolic function.   Normal TID ratio.   ECG INTERPRETATION   Non-diagnostic due to use of pharmacological stress agent.    Echocardiogram August 2022  Normal left ventricular systolic function.  The left ventricular ejection fraction is visually " estimated to be 55-  60%.  Mild concentric left ventricular hypertrophy.  Grade I diastolic dysfunction.  The right ventricle is normal in size and systolic function.  Estimated right ventricular systolic pressure is 27 mmHg.  The left atrium is normal in size.  Trace to mild eccentric mitral regurgitation.  Normal inferior vena cava size and inspiratory collapse.    ECG April 2024  Sinus rhythm   Ventricular trigeminy   Probable left atrial enlargement   Nonspecific T abnormalities, lateral leads   Baseline wander in lead(s) V1   Compared to ECG 07/01/2022 07:53:17   Ventricular premature complex(es) now present   T-wave abnormality now present     ABPM 1/2025  Mean daytime: 138/72 c/w out of office daytime systolic above acc/aha recommended target  Nocturnal dip: appears well maintained        Medical Decision Making:  Today's Assessment / Status / Plan:     1. Dyslipidemia  APOLIPOPROTEIN B    Lipoprotein (a)      2. Primary hypertension        3. Type 2 diabetes mellitus with other skin complication, without long-term current use of insulin ([facility])        4. Lymphedema        5. White coat syndrome with diagnosis of hypertension        6. BMI 45.0-49.9, adult ([facility])        7. Lipedema                 Etiology of Established CVD if Present:     Lymphedema/lipedema -her lymphedema seems exceptionally well-controlled and her leg edema appears stable.  She should continue with aggressive lifestyle modification including use of her pumps and stockings and let us know if she has any worsening of symptoms    2. Complicated rhythm disturbance -appears to be mostly ventricular ectopy.  Patient asymptomatic.  No evidence of structural heart disease on echo.  No obvious ischemia on previous MPI.  Will defer further workup and management to cardiology    LIPID MANAGEMENT  Qualifies for Statin Therapy Based on 2018 ACC/AHA Guidelines: yes, Diabetes  Major ASCVD events: None    High-risk conditions: Diabetes   Risk-enhancers:  Metabolic syndrome and family history  Currently on Statin: Yes  Tx goals: LDL-C <100 and non-HDL is 130   At goal?  Yes, 11/2024  Patient also with family history of premature ASCVD in father  Persistently low HDL, and diabetes  Plan:   -Continue atorvastatin 80 mg daily  -Continue ezetimibe 10 mg a day  -Recheck fasting lipid panel per pcp and lipoprotein a and a apolipoprotein B - prior to next appt     BLOOD PRESSURE MANAGEMENT  BP Goal ACC/AHA (2017) goal <130/80  Home BP at goal: yes - c/w masked hypertension  Office BP at goal:  no, slightly elevated consistent with possible white coat htn   24h ABPM: 138/72 1/2025  RDN candidate? NO  Contributing factors: ckd, dm, arrythmia  Patient does have considerable blood pressure lability  I suspect a good part of her lability is due to autonomic neuropathy due to diabetes  I do think some of her lower readings are likely artifact due to her significant arrhythmia  Spironolactone discontinued due to hyperkalemia  Previous home readings suggest good control but have elevated more recently  Office readings under better control than previous  No evidence of primary aldosteronism, pheochromocytoma, thyroid disease  Carvedilol stopped due to bradycardia and HOTN - now tolerating at lower dose   Plan:   Monitoring:   - continue home BP monitoring and bring in written log again  -Follow-up with nephrology  -consider recheck ABPM in future  Medications:  -continue valsartan 160mg daily - can increase to 320 mg if bps remain elevated  - continue carvedilol at lower dose 6.25mg BID both blood pressure and rhythm, had HOTN and bradycardia on higher doses   -Continue hydrochlorothiazide 12.5 mg daily for now, consider switching her to either chlorthalidone or loop diuretic given her renal function  -Avoid spironolactone given history of hyperkalemia  -Avoid dihydropyridine calcium channel blockers due to risk of worsening leg swelling  - continue  doxazosin 1mg nightly, increase  as tolerated,  to reduce sympathetic hyperactivity   -Consider renal artery duplex in the future - discuss at next appt     GLYCEMIC MANAGEMENT Diabetic  Very poorly controlled in the past with A1c greater than 13  Now with improving glycemic control, most recent 6.7 5/2025  Victoza recently stopped due to increased lipase  Managed by PCP at [facility]   Plan:  -Continue Jardiance  -Continue adjustment of insulin dose per PCP  -Defer follow-up A1c to PCP  -Can consider referral to diabetes pharmacotherapy service here at [facility] if PCP thinks that would be helpful    ANTITHROMBOTIC THERAPY -not currently indicated    LIFESTYLE INTERVENTIONS:    Tobacco Use: Never smoker  - continued complete avoidance of all tobacco products     Physical Activity: Continue walking.  Legs up while seated    Weight Management and/or Nutrition: She had lost 15 pounds while taking Victoza, but gained most of it back when she stopped.  She is now stabilized her weight.  She to concentrate on good diabetic diet and slow steady weight loss.    OTHER:     -CKD apparently with fluctuating renal function but most recently consistent with stage IIIb -most likely diabetic nephropathy although lack of albuminuria is interesting.  Most recent renal function improved.  Continue ARB and blood pressure control as above.  Will defer further workup management and surveillance to nephrology.  Consider renal artery duplex in the future if not already performed    - Chronic lung disease status post COVID pneumonia  -continue home O2 at night.  Await sleep study as ordered by pulmonary.  Otherwise.  Defer further workup and management to pulmonary/sleep    Studies to Be Obtained: none  Labs to Be Obtained: Will defer to other providers at present (labs will be done prior to next appt for other providers) - will need , apob, Lp(a) with next labs     Follow up in: 4 months check bps    ALEXANDRO Mccrary   [facility]  [facility]  [facility]  [phone]    Cc:   PREMA Way

## 2025-07-14 ENCOUNTER — PATIENT MESSAGE (OUTPATIENT)
Dept: SLEEP MEDICINE | Facility: MEDICAL CENTER | Age: 60
End: 2025-07-14
Payer: COMMERCIAL

## 2025-07-16 ENCOUNTER — APPOINTMENT (OUTPATIENT)
Dept: SLEEP MEDICINE | Facility: MEDICAL CENTER | Age: 60
End: 2025-07-16
Payer: COMMERCIAL

## 2025-08-01 ENCOUNTER — HOSPITAL ENCOUNTER (OUTPATIENT)
Dept: LAB | Facility: MEDICAL CENTER | Age: 60
End: 2025-08-01
Attending: INTERNAL MEDICINE
Payer: COMMERCIAL

## 2025-08-01 ENCOUNTER — HOSPITAL ENCOUNTER (OUTPATIENT)
Dept: LAB | Facility: MEDICAL CENTER | Age: 60
End: 2025-08-01
Payer: COMMERCIAL

## 2025-08-01 ENCOUNTER — HOSPITAL ENCOUNTER (OUTPATIENT)
Dept: LAB | Facility: MEDICAL CENTER | Age: 60
End: 2025-08-01
Attending: FAMILY MEDICINE
Payer: COMMERCIAL

## 2025-08-01 DIAGNOSIS — D64.9 ANEMIA, UNSPECIFIED TYPE: ICD-10-CM

## 2025-08-01 DIAGNOSIS — E55.9 VITAMIN D DEFICIENCY: ICD-10-CM

## 2025-08-01 DIAGNOSIS — E21.1 HYPERPARATHYROIDISM DUE TO VITAMIN D DEFICIENCY (HCC): ICD-10-CM

## 2025-08-01 DIAGNOSIS — N18.32 STAGE 3B CHRONIC KIDNEY DISEASE: ICD-10-CM

## 2025-08-01 DIAGNOSIS — E78.5 DYSLIPIDEMIA: ICD-10-CM

## 2025-08-01 LAB
25(OH)D3 SERPL-MCNC: 50 NG/ML (ref 30–100)
ANION GAP SERPL CALC-SCNC: 11 MMOL/L (ref 7–16)
APPEARANCE UR: ABNORMAL
BACTERIA #/AREA URNS HPF: ABNORMAL /HPF
BILIRUB UR QL STRIP.AUTO: NEGATIVE
BUN SERPL-MCNC: 26 MG/DL (ref 8–22)
CALCIUM SERPL-MCNC: 8.9 MG/DL (ref 8.5–10.5)
CASTS URNS QL MICRO: ABNORMAL /LPF (ref 0–2)
CHLORIDE SERPL-SCNC: 106 MMOL/L (ref 96–112)
CHOLEST SERPL-MCNC: 128 MG/DL (ref 100–199)
CO2 SERPL-SCNC: 22 MMOL/L (ref 20–33)
COLOR UR: YELLOW
CREAT SERPL-MCNC: 1.57 MG/DL (ref 0.5–1.4)
CREAT UR-MCNC: 129 MG/DL
EPITHELIAL CELLS 1715: ABNORMAL /HPF (ref 0–5)
ERYTHROCYTE [DISTWIDTH] IN BLOOD BY AUTOMATED COUNT: 45.3 FL (ref 35.9–50)
EST. AVERAGE GLUCOSE BLD GHB EST-MCNC: 180 MG/DL
GFR SERPLBLD CREATININE-BSD FMLA CKD-EPI: 37 ML/MIN/1.73 M 2
GLUCOSE SERPL-MCNC: 109 MG/DL (ref 65–99)
GLUCOSE UR STRIP.AUTO-MCNC: >=1000 MG/DL
HBA1C MFR BLD: 7.9 % (ref 4–5.6)
HCT VFR BLD AUTO: 38 % (ref 37–47)
HDLC SERPL-MCNC: 49 MG/DL
HGB BLD-MCNC: 12.4 G/DL (ref 12–16)
KETONES UR STRIP.AUTO-MCNC: NEGATIVE MG/DL
LDLC SERPL CALC-MCNC: 68 MG/DL
LEUKOCYTE ESTERASE UR QL STRIP.AUTO: ABNORMAL
MCH RBC QN AUTO: 30.8 PG (ref 27–33)
MCHC RBC AUTO-ENTMCNC: 32.6 G/DL (ref 32.2–35.5)
MCV RBC AUTO: 94.3 FL (ref 81.4–97.8)
MICRO URNS: ABNORMAL
MICROALBUMIN UR-MCNC: 3.1 MG/DL
MICROALBUMIN/CREAT UR: 24 MG/G (ref 0–30)
NITRITE UR QL STRIP.AUTO: NEGATIVE
PH UR STRIP.AUTO: 6 [PH] (ref 5–8)
PLATELET # BLD AUTO: 109 K/UL (ref 164–446)
PMV BLD AUTO: 12.2 FL (ref 9–12.9)
POTASSIUM SERPL-SCNC: 4.4 MMOL/L (ref 3.6–5.5)
PROT UR QL STRIP: NEGATIVE MG/DL
PTH-INTACT SERPL-MCNC: 199 PG/ML (ref 14–72)
RBC # BLD AUTO: 4.03 M/UL (ref 4.2–5.4)
RBC # URNS HPF: ABNORMAL /HPF (ref 0–2)
RBC UR QL AUTO: ABNORMAL
SODIUM SERPL-SCNC: 139 MMOL/L (ref 135–145)
SP GR UR STRIP.AUTO: 1.02
TRIGL SERPL-MCNC: 53 MG/DL (ref 0–149)
UROBILINOGEN UR STRIP.AUTO-MCNC: 0.2 EU/DL
WBC # BLD AUTO: 5.5 K/UL (ref 4.8–10.8)
WBC #/AREA URNS HPF: >100 /HPF

## 2025-08-01 PROCEDURE — 80061 LIPID PANEL: CPT

## 2025-08-01 PROCEDURE — 36415 COLL VENOUS BLD VENIPUNCTURE: CPT

## 2025-08-01 PROCEDURE — 83970 ASSAY OF PARATHORMONE: CPT

## 2025-08-01 PROCEDURE — 82043 UR ALBUMIN QUANTITATIVE: CPT

## 2025-08-01 PROCEDURE — 81001 URINALYSIS AUTO W/SCOPE: CPT

## 2025-08-01 PROCEDURE — 83695 ASSAY OF LIPOPROTEIN(A): CPT

## 2025-08-01 PROCEDURE — 82570 ASSAY OF URINE CREATININE: CPT

## 2025-08-01 PROCEDURE — 80048 BASIC METABOLIC PNL TOTAL CA: CPT

## 2025-08-01 PROCEDURE — 83036 HEMOGLOBIN GLYCOSYLATED A1C: CPT

## 2025-08-01 PROCEDURE — 82172 ASSAY OF APOLIPOPROTEIN: CPT

## 2025-08-01 PROCEDURE — 85027 COMPLETE CBC AUTOMATED: CPT

## 2025-08-01 PROCEDURE — 82306 VITAMIN D 25 HYDROXY: CPT

## 2025-08-03 LAB — APO B100 SERPL-MCNC: 69 MG/DL (ref 60–117)

## 2025-08-04 LAB — LPA SERPL-MCNC: 223 MG/DL

## 2025-08-07 ENCOUNTER — OFFICE VISIT (OUTPATIENT)
Dept: NEPHROLOGY | Facility: MEDICAL CENTER | Age: 60
End: 2025-08-07
Payer: COMMERCIAL

## 2025-08-07 ENCOUNTER — SPECIALTY PHARMACY (OUTPATIENT)
Dept: VASCULAR LAB | Facility: MEDICAL CENTER | Age: 60
End: 2025-08-07
Payer: COMMERCIAL

## 2025-08-07 VITALS
TEMPERATURE: 97.8 F | WEIGHT: 293 LBS | DIASTOLIC BLOOD PRESSURE: 74 MMHG | HEIGHT: 67 IN | BODY MASS INDEX: 45.99 KG/M2 | OXYGEN SATURATION: 97 % | HEART RATE: 68 BPM | SYSTOLIC BLOOD PRESSURE: 132 MMHG

## 2025-08-07 DIAGNOSIS — E55.9 VITAMIN D DEFICIENCY: ICD-10-CM

## 2025-08-07 DIAGNOSIS — D64.9 ANEMIA, UNSPECIFIED TYPE: ICD-10-CM

## 2025-08-07 DIAGNOSIS — N18.32 STAGE 3B CHRONIC KIDNEY DISEASE: Primary | ICD-10-CM

## 2025-08-07 DIAGNOSIS — R80.9 MICROALBUMINURIA DUE TO TYPE 2 DIABETES MELLITUS (HCC): ICD-10-CM

## 2025-08-07 DIAGNOSIS — E21.3 HYPERPARATHYROIDISM (HCC): ICD-10-CM

## 2025-08-07 DIAGNOSIS — E11.29 MICROALBUMINURIA DUE TO TYPE 2 DIABETES MELLITUS (HCC): ICD-10-CM

## 2025-08-07 DIAGNOSIS — I10 PRIMARY HYPERTENSION: ICD-10-CM

## 2025-08-07 PROCEDURE — 99214 OFFICE O/P EST MOD 30 MIN: CPT | Performed by: INTERNAL MEDICINE

## 2025-08-07 PROCEDURE — 3078F DIAST BP <80 MM HG: CPT | Performed by: INTERNAL MEDICINE

## 2025-08-07 PROCEDURE — 3075F SYST BP GE 130 - 139MM HG: CPT | Performed by: INTERNAL MEDICINE

## 2025-08-07 PROCEDURE — RXMED WILLOW AMBULATORY MEDICATION CHARGE

## 2025-08-07 ASSESSMENT — ENCOUNTER SYMPTOMS
ORTHOPNEA: 0
EYES NEGATIVE: 1
COUGH: 0
FLANK PAIN: 0
PALPITATIONS: 0
SHORTNESS OF BREATH: 0
SINUS PAIN: 0
NAUSEA: 0
FEVER: 0
VOMITING: 0
WHEEZING: 0
HEMOPTYSIS: 0
ABDOMINAL PAIN: 0
CHILLS: 0
DIARRHEA: 0
WEIGHT LOSS: 0

## 2025-08-07 ASSESSMENT — PATIENT HEALTH QUESTIONNAIRE - PHQ9: CLINICAL INTERPRETATION OF PHQ2 SCORE: 0

## 2025-08-07 ASSESSMENT — FIBROSIS 4 INDEX: FIB4 SCORE: 4.61

## 2025-08-12 ENCOUNTER — PHARMACY VISIT (OUTPATIENT)
Dept: PHARMACY | Facility: MEDICAL CENTER | Age: 60
End: 2025-08-12
Payer: COMMERCIAL

## 2025-08-15 ENCOUNTER — PATIENT MESSAGE (OUTPATIENT)
Dept: SLEEP MEDICINE | Facility: MEDICAL CENTER | Age: 60
End: 2025-08-15
Payer: COMMERCIAL

## 2025-08-20 ENCOUNTER — RESULTS FOLLOW-UP (OUTPATIENT)
Dept: SLEEP MEDICINE | Facility: MEDICAL CENTER | Age: 60
End: 2025-08-20
Payer: COMMERCIAL

## 2025-08-20 ENCOUNTER — PATIENT MESSAGE (OUTPATIENT)
Dept: SLEEP MEDICINE | Facility: MEDICAL CENTER | Age: 60
End: 2025-08-20
Payer: COMMERCIAL

## 2025-08-20 DIAGNOSIS — G47.33 OSA (OBSTRUCTIVE SLEEP APNEA): Primary | ICD-10-CM

## 2025-08-21 ENCOUNTER — OFFICE VISIT (OUTPATIENT)
Dept: SLEEP MEDICINE | Facility: MEDICAL CENTER | Age: 60
End: 2025-08-21
Payer: COMMERCIAL

## 2025-08-21 VITALS
OXYGEN SATURATION: 92 % | BODY MASS INDEX: 45.99 KG/M2 | HEIGHT: 67 IN | WEIGHT: 293 LBS | HEART RATE: 86 BPM | DIASTOLIC BLOOD PRESSURE: 84 MMHG | SYSTOLIC BLOOD PRESSURE: 132 MMHG | RESPIRATION RATE: 16 BRPM

## 2025-08-21 DIAGNOSIS — J98.4 RESTRICTIVE LUNG DISEASE: ICD-10-CM

## 2025-08-21 DIAGNOSIS — G47.33 OSA (OBSTRUCTIVE SLEEP APNEA): Primary | ICD-10-CM

## 2025-08-21 DIAGNOSIS — Z99.81 DEPENDENCE ON NOCTURNAL OXYGEN THERAPY: ICD-10-CM

## 2025-08-21 DIAGNOSIS — G47.34 NOCTURNAL HYPOXIA: ICD-10-CM

## 2025-08-21 DIAGNOSIS — J96.11 CHRONIC RESPIRATORY FAILURE WITH HYPOXIA (HCC): ICD-10-CM

## 2025-08-21 PROCEDURE — 3075F SYST BP GE 130 - 139MM HG: CPT

## 2025-08-21 PROCEDURE — 3079F DIAST BP 80-89 MM HG: CPT

## 2025-08-21 PROCEDURE — 99213 OFFICE O/P EST LOW 20 MIN: CPT

## 2025-08-21 PROCEDURE — 99214 OFFICE O/P EST MOD 30 MIN: CPT

## 2025-08-21 ASSESSMENT — LIFESTYLE VARIABLES
SKIP TO QUESTIONS 9-10: 1
AUDIT-C TOTAL SCORE: 0
HOW OFTEN DO YOU HAVE SIX OR MORE DRINKS ON ONE OCCASION: NEVER
HOW OFTEN DO YOU HAVE A DRINK CONTAINING ALCOHOL: NEVER
HOW MANY STANDARD DRINKS CONTAINING ALCOHOL DO YOU HAVE ON A TYPICAL DAY: PATIENT DOES NOT DRINK

## 2025-08-21 ASSESSMENT — FIBROSIS 4 INDEX: FIB4 SCORE: 4.61

## 2025-08-24 ENCOUNTER — HOSPITAL ENCOUNTER (OUTPATIENT)
Dept: RADIOLOGY | Facility: MEDICAL CENTER | Age: 60
End: 2025-08-24
Attending: FAMILY MEDICINE
Payer: COMMERCIAL

## 2025-08-24 DIAGNOSIS — M79.662 PAIN OF LEFT LOWER LEG: ICD-10-CM

## 2025-08-24 PROCEDURE — 93971 EXTREMITY STUDY: CPT | Mod: LT

## 2025-08-24 PROCEDURE — 93971 EXTREMITY STUDY: CPT | Mod: 26,LT | Performed by: INTERNAL MEDICINE

## 2025-10-18 ENCOUNTER — APPOINTMENT (OUTPATIENT)
Dept: SLEEP MEDICINE | Facility: MEDICAL CENTER | Age: 60
End: 2025-10-18
Payer: COMMERCIAL

## (undated) DEVICE — GOWN SURGEONS X-LARGE - DISP. (30/CA)

## (undated) DEVICE — GLOVE BIOGEL PI ORTHO SZ 6 SURGICAL PF LF (40PR/BX)

## (undated) DEVICE — GLOVE BIOGEL PI INDICATOR SZ 6.0 SURGICAL PF LF -(200PR/CA)

## (undated) DEVICE — WIRE GUIDE SENSOR DUAL FLEX - 5/BX

## (undated) DEVICE — PACK CYSTOSCOPY III - (8/CA)

## (undated) DEVICE — SET IRRIGATION CYSTOSCOPY Y-TYPE L81 IN (20EA/CA)